# Patient Record
Sex: FEMALE | Race: WHITE | Employment: OTHER | URBAN - METROPOLITAN AREA
[De-identification: names, ages, dates, MRNs, and addresses within clinical notes are randomized per-mention and may not be internally consistent; named-entity substitution may affect disease eponyms.]

---

## 2017-01-09 ENCOUNTER — HOSPITAL ENCOUNTER (OUTPATIENT)
Dept: CT IMAGING | Age: 82
Discharge: HOME | End: 2017-01-09
Attending: UROLOGY | Admitting: UROLOGY

## 2017-01-09 DIAGNOSIS — N30.81 OTHER CYSTITIS WITH HEMATURIA: ICD-10-CM

## 2017-01-09 DIAGNOSIS — N30.21 OTHER CHRONIC CYSTITIS WITH HEMATURIA: ICD-10-CM

## 2017-05-01 ENCOUNTER — HOSPITAL ENCOUNTER (OUTPATIENT)
Dept: MAMMOGRAPHY | Age: 82
Discharge: HOME | End: 2017-05-01

## 2017-05-01 DIAGNOSIS — Z12.31 ENCOUNTER FOR SCREENING MAMMOGRAM FOR BREAST CANCER: ICD-10-CM

## 2017-06-16 PROBLEM — R55 SYNCOPE: Status: ACTIVE | Noted: 2017-06-16

## 2017-06-16 PROBLEM — E78.00 HIGH CHOLESTEROL: Status: ACTIVE | Noted: 2017-06-16

## 2017-06-20 ENCOUNTER — HOSPITAL ENCOUNTER (OUTPATIENT)
Dept: NON INVASIVE DIAGNOSTICS | Age: 82
Discharge: HOME | End: 2017-06-20
Attending: INTERNAL MEDICINE | Admitting: INTERNAL MEDICINE

## 2017-06-20 DIAGNOSIS — R55 SYNCOPE AND COLLAPSE: ICD-10-CM

## 2017-06-20 LAB
LEFT VENTRICULAR EJECTION FRACTION HIGH VALUE: 65 %
LEFT VENTRICULAR EJECTION FRACTION MODE: NORMAL
LV EF: 65 %
LVEF MODALITY: NORMAL

## 2017-06-29 ENCOUNTER — HOSPITAL ENCOUNTER (OUTPATIENT)
Dept: ENDOSCOPY | Age: 82
Discharge: HOME | End: 2017-06-29
Attending: INTERNAL MEDICINE | Admitting: INTERNAL MEDICINE

## 2017-06-29 VITALS
OXYGEN SATURATION: 97 % | SYSTOLIC BLOOD PRESSURE: 138 MMHG | DIASTOLIC BLOOD PRESSURE: 73 MMHG | TEMPERATURE: 97.3 F | RESPIRATION RATE: 16 BRPM | HEART RATE: 77 BPM

## 2017-06-29 RX ORDER — SODIUM CHLORIDE 9 MG/ML
INJECTION, SOLUTION INTRAVENOUS ONCE
Status: COMPLETED | OUTPATIENT
Start: 2017-06-29 | End: 2017-06-29

## 2017-06-29 RX ADMIN — SODIUM CHLORIDE: 9 INJECTION, SOLUTION INTRAVENOUS at 12:00

## 2017-06-29 ASSESSMENT — PAIN - FUNCTIONAL ASSESSMENT: PAIN_FUNCTIONAL_ASSESSMENT: 0-10

## 2017-09-27 ENCOUNTER — OFFICE VISIT (OUTPATIENT)
Dept: ORTHOPEDIC SURGERY | Age: 82
End: 2017-09-27

## 2017-09-27 VITALS
HEIGHT: 64 IN | HEART RATE: 105 BPM | DIASTOLIC BLOOD PRESSURE: 95 MMHG | SYSTOLIC BLOOD PRESSURE: 155 MMHG | WEIGHT: 150 LBS | BODY MASS INDEX: 25.61 KG/M2

## 2017-09-27 DIAGNOSIS — Z96.621 S/P ELBOW JOINT REPLACEMENT, RIGHT: Primary | ICD-10-CM

## 2017-09-27 PROCEDURE — 99213 OFFICE O/P EST LOW 20 MIN: CPT | Performed by: ORTHOPAEDIC SURGERY

## 2017-09-27 PROCEDURE — 73080 X-RAY EXAM OF ELBOW: CPT | Performed by: ORTHOPAEDIC SURGERY

## 2017-10-02 ENCOUNTER — OFFICE VISIT (OUTPATIENT)
Dept: ENT CLINIC | Age: 82
End: 2017-10-02

## 2017-10-02 VITALS
WEIGHT: 152.3 LBS | SYSTOLIC BLOOD PRESSURE: 157 MMHG | BODY MASS INDEX: 26 KG/M2 | DIASTOLIC BLOOD PRESSURE: 76 MMHG | HEIGHT: 64 IN | HEART RATE: 83 BPM

## 2017-10-02 DIAGNOSIS — E04.2 MULTIPLE THYROID NODULES: Primary | ICD-10-CM

## 2017-10-02 PROCEDURE — 99213 OFFICE O/P EST LOW 20 MIN: CPT | Performed by: OTOLARYNGOLOGY

## 2017-10-02 NOTE — MR AVS SNAPSHOT
After Visit Summary             Esequiel Hoang   10/2/2017 10:40 AM   Office Visit    Description:  Female : 1934   Provider:  Shiva Chung MD   Department:  [facility] Ear Nose Throat              Your Follow-Up and Future Appointments         Below is a list of your follow-up and future appointments. This may not be a complete list as you may have made appointments directly with providers that we are not aware of or your providers may have made some for you. Please call your providers to confirm appointments. It is important to keep your appointments. Please bring your current insurance card, photo ID, co-pay, and all medication bottles to your appointment. If self-pay, payment is expected at the time of service. Your To-Do List     Future Appointments Provider Department Dept Phone    2018 10:45 AM Jose Pereira MD [address] [facility] [phone]    Please arrive 15 minutes prior to appointment time, bring insurance card and photo ID. Future Orders Complete By Expires    US Thyroid [15173 Custom]  10/3/2017 10/2/2018    Follow-Up    Return in about 1 year (around 10/2/2018) for recheck of thyroid, neck, and vocal cords, follow-up, and sooner if condition worsens. Information from Your Visit        Department     Name Address Phone Fax    [facility] Raeann Calix [address] [place] [address] Doctor Fadi Lopez [phone] [phone]      You Were Seen for:         Comments    Multiple thyroid nodules   [218774]         Vital Signs     Blood Pressure Pulse Height Weight Body Mass Index Smoking Status    157/76 83 5' 3.78\" (1.62 m) 152 lb 4.8 oz (69.1 kg) 26.32 kg/m2 Never Smoker      Additional Information about your Body Mass Index (BMI)           Your BMI as listed above is considered overweight (25.0-29.9). BMI is an estimate of body fat, calculated from your height and weight.   The higher Microcytic anemia    Lower abdominal pain    Iron deficiency anemia due to chronic blood loss    Unspecified intestinal malabsorption    Multinodular goiter (nontoxic)    Thyroid nodule    Chronic pansinusitis    S/P elbow joint replacement    Ganglion cyst of flexor tendon sheath    Trigger ring finger of right hand    Hand pain, right    Wrist pain, right    Hypertension (Chronic)      Immunizations as of 10/2/2017     Name Date    Influenza Virus Vaccine 10/5/2013    Influenza Whole 9/23/2015    Pneumococcal 13-valent Conjugate (Boculys67) 10/28/2016    Pneumococcal Conjugate 7-valent 7/29/2008      Preventive Care        Date Due    Tetanus Combination Vaccine (1 - Tdap) 2/23/1953    Zoster Vaccine 2/23/1994    Yearly Flu Vaccine (1) 9/1/2017            MyChart Signup           Our records indicate that you have an active [employer] account. You can view your After Visit Summary by going to [email or web address] and logging in with your Tabtor username and password. If you don't have a Tabtor username and password but a parent or guardian has access to your record, the parent or guardian should login with their own Tabtor username and password and access your record to view the After Visit Summary. Additional Information  If you have questions, please contact the physician practice where you receive care. Remember, Tabtor is NOT to be used for urgent needs. For medical emergencies, dial 911. For questions regarding your [employer] account call [phone]. If you have a clinical question, please call your doctor's office.

## 2017-10-02 NOTE — PROGRESS NOTES
melanoma in 1972. Extraocular motion: intact, normal primary gaze alignment. EARS, OTOSCOPY:  The TMs and EACs appeared to be normal bilaterally. 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 Normal external nose. NOSE:  The nasal septum was midline. The inferior turbinates were normal.  The nasal mucosa and secretions were normal.  No pus or polyps were seen. OROPHARYNX/ORAL CAVITY:  Oral mucosa, hard and soft palates, tongue, and pharynx were normal.      INDIRECT LARYNGOSCOPY:  Vocal cords normally mobile bilaterally with midline approximation on phonation. The epiglottis, supraglottis, false vocal cords, true vocal cords, mobility of the larynx, base of tongue, subglottis, and piriform sinuses appeared to be normal.   NECK: No masses or tenderness. No abnormal appearance, asymmetry or abnormal tracheal position.    (+) THYROID:  Nodule in left lobe and bilateral goiter. No tenderness. PALPATION OF LYMPH NODES, CERVICAL, FACIAL AND SUPRACLAVICULAR:  No lymphadenopathy. IMPRESSION / Jennifer Silva / Arsen Pascal / PROCEDURES:       Deja Silva was seen today for thyroid problem. Diagnoses and all orders for this visit:    Multiple thyroid nodules  -     US Thyroid; Future         RECOMMENDATIONS / PLAN:    1. Thyroid ultrasound now. 2. Return in about 1 year (around 10/2/2018) for recheck of thyroid, neck, and vocal cords, follow-up, and sooner if condition worsens.

## 2017-10-13 ENCOUNTER — HOSPITAL ENCOUNTER (OUTPATIENT)
Dept: ULTRASOUND IMAGING | Age: 82
Discharge: HOME | End: 2017-10-13
Attending: OTOLARYNGOLOGY | Admitting: OTOLARYNGOLOGY

## 2017-10-13 DIAGNOSIS — E04.2 NONTOXIC MULTINODULAR GOITER: ICD-10-CM

## 2017-10-13 DIAGNOSIS — E04.2 MULTIPLE THYROID NODULES: ICD-10-CM

## 2017-10-20 ENCOUNTER — TELEPHONE (OUTPATIENT)
Dept: ENT CLINIC | Age: 82
End: 2017-10-20

## 2017-10-20 DIAGNOSIS — E04.2 MULTIPLE THYROID NODULES: Primary | ICD-10-CM

## 2017-10-20 NOTE — TELEPHONE ENCOUNTER
Patient called requesting the results of her Thyroid Ultrasound. Her number is [phone] and the MAs can leave a message if she does not answer.

## 2017-10-20 NOTE — TELEPHONE ENCOUNTER
Glory Álvarez or Amaya:    Please call Deja Silva and advise that the thyroid ultrasound showed multiple nodules, slightly enlarged. I recommend a FNA of the two larger left lobe nodules. If she agrees we will order and schedule that. If she wants to wait until the next FU appointment, we can do that. Let me know.

## 2017-10-23 NOTE — TELEPHONE ENCOUNTER
Patient informed of results and Dr. Lisandra Dumont recommendations. Patient stated to go ahead and order FNA. She would like the [facility].

## 2017-11-03 ENCOUNTER — HOSPITAL ENCOUNTER (OUTPATIENT)
Dept: ULTRASOUND IMAGING | Age: 82
Discharge: HOME | End: 2017-11-03

## 2017-11-03 VITALS
HEIGHT: 63 IN | DIASTOLIC BLOOD PRESSURE: 79 MMHG | TEMPERATURE: 98 F | RESPIRATION RATE: 16 BRPM | BODY MASS INDEX: 26.4 KG/M2 | OXYGEN SATURATION: 97 % | WEIGHT: 149 LBS | SYSTOLIC BLOOD PRESSURE: 156 MMHG | HEART RATE: 83 BPM

## 2017-11-03 DIAGNOSIS — E04.1 THYROID NODULE: ICD-10-CM

## 2017-11-03 DIAGNOSIS — E04.2 MULTIPLE THYROID NODULES: ICD-10-CM

## 2017-11-03 RX ORDER — LIDOCAINE HYDROCHLORIDE 10 MG/ML
5 INJECTION, SOLUTION EPIDURAL; INFILTRATION; INTRACAUDAL; PERINEURAL ONCE
Status: COMPLETED | OUTPATIENT
Start: 2017-11-03 | End: 2017-11-03

## 2017-11-03 RX ORDER — BACITRACIN, NEOMYCIN, POLYMYXIN B 400; 3.5; 5 [USP'U]/G; MG/G; [USP'U]/G
OINTMENT TOPICAL DAILY
Status: COMPLETED | OUTPATIENT
Start: 2017-11-03 | End: 2017-11-03

## 2017-11-03 RX ADMIN — LIDOCAINE HYDROCHLORIDE 5 ML: 10 INJECTION, SOLUTION EPIDURAL; INFILTRATION; INTRACAUDAL; PERINEURAL at 09:35

## 2017-11-03 RX ADMIN — BACITRACIN, NEOMYCIN, POLYMYXIN B: 400; 3.5; 5 OINTMENT TOPICAL at 09:50

## 2017-11-14 ENCOUNTER — TELEPHONE (OUTPATIENT)
Dept: ENT CLINIC | Age: 82
End: 2017-11-14

## 2017-11-14 NOTE — TELEPHONE ENCOUNTER
Both FNA showed no evidence of cancer. These nodules require continued follow up and she was instructed to RTO in one year for examination and possible order of repeat ultrasound. Marbella Espitia

## 2017-12-07 ENCOUNTER — HOSPITAL ENCOUNTER (OUTPATIENT)
Dept: GENERAL RADIOLOGY | Age: 82
Discharge: HOME | End: 2017-12-07
Attending: FAMILY MEDICINE | Admitting: FAMILY MEDICINE

## 2017-12-07 DIAGNOSIS — M81.0 AGE-RELATED OSTEOPOROSIS WITHOUT CURRENT PATHOLOGICAL FRACTURE: ICD-10-CM

## 2017-12-07 DIAGNOSIS — M81.0 AGE RELATED OSTEOPOROSIS, UNSPECIFIED PATHOLOGICAL FRACTURE PRESENCE: ICD-10-CM

## 2018-06-05 ENCOUNTER — OFFICE VISIT (OUTPATIENT)
Dept: ORTHOPEDIC SURGERY | Age: 83
End: 2018-06-05

## 2018-06-05 VITALS
RESPIRATION RATE: 16 BRPM | SYSTOLIC BLOOD PRESSURE: 173 MMHG | DIASTOLIC BLOOD PRESSURE: 96 MMHG | WEIGHT: 149.91 LBS | HEIGHT: 63 IN | BODY MASS INDEX: 26.56 KG/M2 | HEART RATE: 89 BPM

## 2018-06-05 DIAGNOSIS — S43.422A SPRAIN OF LEFT ROTATOR CUFF CAPSULE, INITIAL ENCOUNTER: Primary | ICD-10-CM

## 2018-06-05 DIAGNOSIS — M25.512 LEFT SHOULDER PAIN, UNSPECIFIED CHRONICITY: ICD-10-CM

## 2018-06-05 PROCEDURE — 20610 DRAIN/INJ JOINT/BURSA W/O US: CPT | Performed by: ORTHOPAEDIC SURGERY

## 2018-06-05 PROCEDURE — 99213 OFFICE O/P EST LOW 20 MIN: CPT | Performed by: ORTHOPAEDIC SURGERY

## 2018-06-14 ENCOUNTER — OFFICE VISIT (OUTPATIENT)
Dept: ORTHOPEDIC SURGERY | Age: 83
End: 2018-06-14

## 2018-06-14 VITALS
BODY MASS INDEX: 25.44 KG/M2 | WEIGHT: 149 LBS | HEIGHT: 64 IN | HEART RATE: 91 BPM | DIASTOLIC BLOOD PRESSURE: 85 MMHG | SYSTOLIC BLOOD PRESSURE: 175 MMHG

## 2018-06-14 DIAGNOSIS — S43.422A SPRAIN OF LEFT ROTATOR CUFF CAPSULE, INITIAL ENCOUNTER: Primary | ICD-10-CM

## 2018-06-14 PROCEDURE — 99214 OFFICE O/P EST MOD 30 MIN: CPT | Performed by: ORTHOPAEDIC SURGERY

## 2018-09-26 ENCOUNTER — OFFICE VISIT (OUTPATIENT)
Dept: ORTHOPEDIC SURGERY | Age: 83
End: 2018-09-26
Payer: COMMERCIAL

## 2018-09-26 VITALS
RESPIRATION RATE: 16 BRPM | WEIGHT: 149 LBS | BODY MASS INDEX: 25.44 KG/M2 | DIASTOLIC BLOOD PRESSURE: 89 MMHG | HEART RATE: 103 BPM | SYSTOLIC BLOOD PRESSURE: 173 MMHG | HEIGHT: 64 IN

## 2018-09-26 DIAGNOSIS — Z96.621 STATUS POST RIGHT ELBOW JOINT REPLACEMENT: Primary | ICD-10-CM

## 2018-09-26 PROCEDURE — 99213 OFFICE O/P EST LOW 20 MIN: CPT | Performed by: ORTHOPAEDIC SURGERY

## 2018-09-26 NOTE — PROGRESS NOTES
Ms. Parker Mehta  returns today in follow-up of her  previous  right total elbow arthroplasty. She was last seen in September, 2017 at which time she  was functioning fairly well with mild elbow difficulty or symptoms. She returns today with unchanged mild  symptoms of the right elbow, for routine screening visit. The patient's , past medical history, medications, allergies,  family history, social history, and review of systems have been reviewed and are recorded in the chart. Physical Exam:  Vitals  BP: (!) 173/89  Pulse: 103  Resp: 16  Height: 5' 4\" (162.6 cm)  Weight: 149 lb (67.6 kg)  Ms. Parker Mehta appears well, she is in no apparent distress, she demonstrates appropriate mood & affect. Skin: Skin color, texture, turgor normal. No rashes or lesions on the Right, normal on the Left  Digital range of motion is well preserved bilaterally  Wrist range of motion is equal bilateral   Elbow range of motion is painless with near full flexion and mild discomfort at the end range of extension which measures approximately 35* on the Right, normal on the Left  Sensation is subjectively normal on the Right, normal on the Left  Vascular examination reveals normal and good capillary refill bilaterally  Swelling is absent around the elbow  She has no pain with elbow excursion, no crepitance, no laxity on the Right, normal on the Left  Triceps function is Intact on the Right, normal on the Left    Radiographic Evaluation:  Radiographs were obtained today (3 views of the right elbow). They demonstrate no evidence of acute fracture, subluxation, or dislocation. There is stable and wihtout worsening  sign of loosening,  no displacement of the implants, no new sign of wear or failure. There is unchanged since 9/2017 osteolysis. Impression:  Ms. Parker Mehta  is doing adequately 6.5 years after right total elbow arthoplasty (performed elsewhere).     Plan:  After discussion of the nature her total elbow

## 2018-09-26 NOTE — Clinical Note
Dear  Khang Bueno MD,  Thank you very much for your referral or Ms. Shea Duong to me for evaluation and treatment of her Hand & Wrist condition. I appreciate your confidence in me and thank you for allowing me the opportunity to care for your patients. If I can be of any further assistance to you on this or any other patient, please do not hesitate to contact me. Sincerely,  Karie Carrasco.  Karolyn Tejada MD

## 2018-10-29 ENCOUNTER — OFFICE VISIT (OUTPATIENT)
Dept: ENT CLINIC | Age: 83
End: 2018-10-29
Payer: COMMERCIAL

## 2018-10-29 VITALS
HEIGHT: 64 IN | DIASTOLIC BLOOD PRESSURE: 83 MMHG | WEIGHT: 154 LBS | HEART RATE: 88 BPM | SYSTOLIC BLOOD PRESSURE: 142 MMHG | BODY MASS INDEX: 26.29 KG/M2

## 2018-10-29 DIAGNOSIS — E04.2 MULTIPLE THYROID NODULES: Primary | ICD-10-CM

## 2018-10-29 DIAGNOSIS — E04.2 MULTINODULAR GOITER (NONTOXIC): ICD-10-CM

## 2018-10-29 PROCEDURE — 99214 OFFICE O/P EST MOD 30 MIN: CPT | Performed by: OTOLARYNGOLOGY

## 2018-10-29 RX ORDER — OLMESARTAN MEDOXOMIL AND HYDROCHLOROTHIAZIDE 20/12.5 20; 12.5 MG/1; MG/1
1 TABLET ORAL
COMMUNITY
Start: 2018-06-25 | End: 2020-12-07 | Stop reason: SDUPTHER

## 2018-10-29 NOTE — PROGRESS NOTES
thyroid ultrasound, may need FU visit. Return in about 1 year (around 10/29/2019) for recheck of thyroid, neck, and vocal cords.

## 2018-11-01 ENCOUNTER — HOSPITAL ENCOUNTER (OUTPATIENT)
Dept: ULTRASOUND IMAGING | Age: 83
Discharge: HOME | End: 2018-11-01
Payer: COMMERCIAL

## 2018-11-01 DIAGNOSIS — E04.2 MULTIPLE THYROID NODULES: ICD-10-CM

## 2018-11-01 DIAGNOSIS — E04.2 MULTINODULAR GOITER (NONTOXIC): ICD-10-CM

## 2018-11-01 PROCEDURE — 76536 US EXAM OF HEAD AND NECK: CPT

## 2018-11-04 ENCOUNTER — TELEPHONE (OUTPATIENT)
Dept: ENT CLINIC | Age: 83
End: 2018-11-04

## 2018-11-05 NOTE — TELEPHONE ENCOUNTER
Spoke with patient and advised of results and recommendations     Patient stated that she would call back to schedule appointment    DONE

## 2019-06-18 ENCOUNTER — APPOINTMENT (OUTPATIENT)
Dept: CT IMAGING | Age: 84
DRG: 066 | End: 2019-06-18
Payer: COMMERCIAL

## 2019-06-18 ENCOUNTER — APPOINTMENT (OUTPATIENT)
Dept: GENERAL RADIOLOGY | Age: 84
DRG: 066 | End: 2019-06-18
Payer: COMMERCIAL

## 2019-06-18 ENCOUNTER — HOSPITAL ENCOUNTER (INPATIENT)
Age: 84
LOS: 2 days | Discharge: HOME HEALTH | DRG: 066 | End: 2019-06-20
Attending: EMERGENCY MEDICINE | Admitting: INTERNAL MEDICINE
Payer: COMMERCIAL

## 2019-06-18 DIAGNOSIS — R27.0 ATAXIA: ICD-10-CM

## 2019-06-18 DIAGNOSIS — R42 DIZZINESS: Primary | ICD-10-CM

## 2019-06-18 DIAGNOSIS — R42 VERTIGO: ICD-10-CM

## 2019-06-18 PROBLEM — I63.9 ACUTE CVA (CEREBROVASCULAR ACCIDENT) (HCC): Status: ACTIVE | Noted: 2019-06-18

## 2019-06-18 LAB
A/G RATIO: 1.6 (ref 1.1–2.2)
ALBUMIN SERPL-MCNC: 4.6 G/DL (ref 3.4–5)
ALP BLD-CCNC: 137 U/L (ref 40–129)
ALT SERPL-CCNC: 12 U/L (ref 10–40)
ANION GAP SERPL CALCULATED.3IONS-SCNC: 13 MMOL/L (ref 3–16)
AST SERPL-CCNC: 24 U/L (ref 15–37)
BASOPHILS ABSOLUTE: 0.1 K/UL (ref 0–0.2)
BASOPHILS RELATIVE PERCENT: 1.2 %
BILIRUB SERPL-MCNC: 0.8 MG/DL (ref 0–1)
BILIRUBIN URINE: NEGATIVE
BLOOD, URINE: ABNORMAL
BUN BLDV-MCNC: 12 MG/DL (ref 7–20)
CALCIUM SERPL-MCNC: 10.4 MG/DL (ref 8.3–10.6)
CHLORIDE BLD-SCNC: 98 MMOL/L (ref 99–110)
CLARITY: CLEAR
CO2: 24 MMOL/L (ref 21–32)
COLOR: YELLOW
CREAT SERPL-MCNC: 0.7 MG/DL (ref 0.6–1.2)
EKG ATRIAL RATE: 74 BPM
EKG DIAGNOSIS: NORMAL
EKG P AXIS: 53 DEGREES
EKG P-R INTERVAL: 184 MS
EKG Q-T INTERVAL: 444 MS
EKG QRS DURATION: 146 MS
EKG QTC CALCULATION (BAZETT): 492 MS
EKG R AXIS: 3 DEGREES
EKG T AXIS: 38 DEGREES
EKG VENTRICULAR RATE: 74 BPM
EOSINOPHILS ABSOLUTE: 0.2 K/UL (ref 0–0.6)
EOSINOPHILS RELATIVE PERCENT: 2.1 %
EPITHELIAL CELLS, UA: 1 /HPF (ref 0–5)
GFR AFRICAN AMERICAN: >60
GFR NON-AFRICAN AMERICAN: >60
GLOBULIN: 2.9 G/DL
GLUCOSE BLD-MCNC: 131 MG/DL (ref 70–99)
GLUCOSE BLD-MCNC: 136 MG/DL (ref 70–99)
GLUCOSE URINE: NEGATIVE MG/DL
HCT VFR BLD CALC: 44.1 % (ref 36–48)
HEMOGLOBIN: 15 G/DL (ref 12–16)
HYALINE CASTS: 2 /LPF (ref 0–8)
INR BLD: 0.98 (ref 0.86–1.14)
KETONES, URINE: NEGATIVE MG/DL
LEUKOCYTE ESTERASE, URINE: NEGATIVE
LIPASE: 34 U/L (ref 13–60)
LYMPHOCYTES ABSOLUTE: 1.3 K/UL (ref 1–5.1)
LYMPHOCYTES RELATIVE PERCENT: 17.4 %
MCH RBC QN AUTO: 27.9 PG (ref 26–34)
MCHC RBC AUTO-ENTMCNC: 34.1 G/DL (ref 31–36)
MCV RBC AUTO: 81.8 FL (ref 80–100)
MICROSCOPIC EXAMINATION: YES
MONOCYTES ABSOLUTE: 0.3 K/UL (ref 0–1.3)
MONOCYTES RELATIVE PERCENT: 4.2 %
NEUTROPHILS ABSOLUTE: 5.8 K/UL (ref 1.7–7.7)
NEUTROPHILS RELATIVE PERCENT: 75.1 %
NITRITE, URINE: NEGATIVE
PDW BLD-RTO: 14.3 % (ref 12.4–15.4)
PERFORMED ON: ABNORMAL
PH UA: 8.5 (ref 5–8)
PLATELET # BLD: 228 K/UL (ref 135–450)
PMV BLD AUTO: 7.9 FL (ref 5–10.5)
POTASSIUM SERPL-SCNC: 4.3 MMOL/L (ref 3.5–5.1)
PRO-BNP: 103 PG/ML (ref 0–449)
PROTEIN UA: NEGATIVE MG/DL
PROTHROMBIN TIME: 11.2 SEC (ref 9.8–13)
RBC # BLD: 5.39 M/UL (ref 4–5.2)
RBC UA: 2 /HPF (ref 0–4)
SODIUM BLD-SCNC: 135 MMOL/L (ref 136–145)
SPECIFIC GRAVITY UA: 1.02 (ref 1–1.03)
TOTAL PROTEIN: 7.5 G/DL (ref 6.4–8.2)
TROPONIN: <0.01 NG/ML
URINE REFLEX TO CULTURE: ABNORMAL
URINE TYPE: ABNORMAL
UROBILINOGEN, URINE: 0.2 E.U./DL
WBC # BLD: 7.7 K/UL (ref 4–11)
WBC UA: 1 /HPF (ref 0–5)

## 2019-06-18 PROCEDURE — 6360000004 HC RX CONTRAST MEDICATION: Performed by: PHYSICIAN ASSISTANT

## 2019-06-18 PROCEDURE — 70450 CT HEAD/BRAIN W/O DYE: CPT

## 2019-06-18 PROCEDURE — 93010 ELECTROCARDIOGRAM REPORT: CPT | Performed by: INTERNAL MEDICINE

## 2019-06-18 PROCEDURE — 70498 CT ANGIOGRAPHY NECK: CPT

## 2019-06-18 PROCEDURE — 80053 COMPREHEN METABOLIC PANEL: CPT

## 2019-06-18 PROCEDURE — 93005 ELECTROCARDIOGRAM TRACING: CPT | Performed by: EMERGENCY MEDICINE

## 2019-06-18 PROCEDURE — 2580000003 HC RX 258: Performed by: INTERNAL MEDICINE

## 2019-06-18 PROCEDURE — 94760 N-INVAS EAR/PLS OXIMETRY 1: CPT

## 2019-06-18 PROCEDURE — G0378 HOSPITAL OBSERVATION PER HR: HCPCS

## 2019-06-18 PROCEDURE — 85025 COMPLETE CBC W/AUTO DIFF WBC: CPT

## 2019-06-18 PROCEDURE — 70496 CT ANGIOGRAPHY HEAD: CPT

## 2019-06-18 PROCEDURE — 85610 PROTHROMBIN TIME: CPT

## 2019-06-18 PROCEDURE — 83690 ASSAY OF LIPASE: CPT

## 2019-06-18 PROCEDURE — 71045 X-RAY EXAM CHEST 1 VIEW: CPT

## 2019-06-18 PROCEDURE — 2580000003 HC RX 258: Performed by: PHYSICIAN ASSISTANT

## 2019-06-18 PROCEDURE — 83880 ASSAY OF NATRIURETIC PEPTIDE: CPT

## 2019-06-18 PROCEDURE — 6370000000 HC RX 637 (ALT 250 FOR IP): Performed by: INTERNAL MEDICINE

## 2019-06-18 PROCEDURE — 96361 HYDRATE IV INFUSION ADD-ON: CPT

## 2019-06-18 PROCEDURE — 84484 ASSAY OF TROPONIN QUANT: CPT

## 2019-06-18 PROCEDURE — 1200000000 HC SEMI PRIVATE

## 2019-06-18 PROCEDURE — 6360000002 HC RX W HCPCS: Performed by: PHYSICIAN ASSISTANT

## 2019-06-18 PROCEDURE — 81001 URINALYSIS AUTO W/SCOPE: CPT

## 2019-06-18 PROCEDURE — 96374 THER/PROPH/DIAG INJ IV PUSH: CPT

## 2019-06-18 PROCEDURE — 99285 EMERGENCY DEPT VISIT HI MDM: CPT

## 2019-06-18 RX ORDER — ESOMEPRAZOLE MAGNESIUM 40 MG/1
40 CAPSULE, DELAYED RELEASE ORAL
COMMUNITY

## 2019-06-18 RX ORDER — ASPIRIN 81 MG/1
81 TABLET, CHEWABLE ORAL DAILY
Status: DISCONTINUED | OUTPATIENT
Start: 2019-06-18 | End: 2019-06-18 | Stop reason: SDUPTHER

## 2019-06-18 RX ORDER — DICLOFENAC SODIUM 75 MG/1
75 TABLET, DELAYED RELEASE ORAL 2 TIMES DAILY
COMMUNITY
Start: 2019-06-12 | End: 2019-06-18 | Stop reason: ALTCHOICE

## 2019-06-18 RX ORDER — POTASSIUM CHLORIDE 20 MEQ/1
40 TABLET, EXTENDED RELEASE ORAL PRN
Status: DISCONTINUED | OUTPATIENT
Start: 2019-06-18 | End: 2019-06-20 | Stop reason: HOSPADM

## 2019-06-18 RX ORDER — PANTOPRAZOLE SODIUM 40 MG/1
40 TABLET, DELAYED RELEASE ORAL
Status: DISCONTINUED | OUTPATIENT
Start: 2019-06-19 | End: 2019-06-20 | Stop reason: HOSPADM

## 2019-06-18 RX ORDER — 0.9 % SODIUM CHLORIDE 0.9 %
500 INTRAVENOUS SOLUTION INTRAVENOUS PRN
Status: DISCONTINUED | OUTPATIENT
Start: 2019-06-18 | End: 2019-06-20 | Stop reason: HOSPADM

## 2019-06-18 RX ORDER — SODIUM CHLORIDE 0.9 % (FLUSH) 0.9 %
10 SYRINGE (ML) INJECTION EVERY 12 HOURS SCHEDULED
Status: DISCONTINUED | OUTPATIENT
Start: 2019-06-18 | End: 2019-06-20 | Stop reason: HOSPADM

## 2019-06-18 RX ORDER — ONDANSETRON 2 MG/ML
4 INJECTION INTRAMUSCULAR; INTRAVENOUS EVERY 6 HOURS PRN
Status: DISCONTINUED | OUTPATIENT
Start: 2019-06-18 | End: 2019-06-20 | Stop reason: HOSPADM

## 2019-06-18 RX ORDER — SODIUM CHLORIDE 0.9 % (FLUSH) 0.9 %
10 SYRINGE (ML) INJECTION PRN
Status: DISCONTINUED | OUTPATIENT
Start: 2019-06-18 | End: 2019-06-20 | Stop reason: HOSPADM

## 2019-06-18 RX ORDER — FERROUS SULFATE 325(65) MG
325 TABLET ORAL DAILY
Status: DISCONTINUED | OUTPATIENT
Start: 2019-06-19 | End: 2019-06-20 | Stop reason: HOSPADM

## 2019-06-18 RX ORDER — 0.9 % SODIUM CHLORIDE 0.9 %
1000 INTRAVENOUS SOLUTION INTRAVENOUS ONCE
Status: COMPLETED | OUTPATIENT
Start: 2019-06-18 | End: 2019-06-18

## 2019-06-18 RX ORDER — POTASSIUM CHLORIDE 7.45 MG/ML
10 INJECTION INTRAVENOUS PRN
Status: DISCONTINUED | OUTPATIENT
Start: 2019-06-18 | End: 2019-06-20 | Stop reason: HOSPADM

## 2019-06-18 RX ORDER — MECLIZINE HCL 12.5 MG/1
25 TABLET ORAL EVERY 6 HOURS PRN
Status: DISCONTINUED | OUTPATIENT
Start: 2019-06-18 | End: 2019-06-20 | Stop reason: HOSPADM

## 2019-06-18 RX ORDER — ONDANSETRON 2 MG/ML
4 INJECTION INTRAMUSCULAR; INTRAVENOUS ONCE
Status: COMPLETED | OUTPATIENT
Start: 2019-06-18 | End: 2019-06-18

## 2019-06-18 RX ORDER — MELATONIN/PYRIDOXINE HCL (B6) 5 MG-10 MG
1 TABLET,IMMED, EXTENDED RELEASE, BIPHASIC ORAL DAILY
Status: DISCONTINUED | OUTPATIENT
Start: 2019-06-18 | End: 2019-06-18 | Stop reason: CLARIF

## 2019-06-18 RX ORDER — CETIRIZINE HYDROCHLORIDE 10 MG/1
10 TABLET ORAL PRN
Status: DISCONTINUED | OUTPATIENT
Start: 2019-06-18 | End: 2019-06-20 | Stop reason: HOSPADM

## 2019-06-18 RX ORDER — ACETAMINOPHEN 325 MG/1
650 TABLET ORAL EVERY 4 HOURS PRN
Status: DISCONTINUED | OUTPATIENT
Start: 2019-06-18 | End: 2019-06-20 | Stop reason: HOSPADM

## 2019-06-18 RX ORDER — ASPIRIN 325 MG
325 TABLET ORAL ONCE
Status: DISCONTINUED | OUTPATIENT
Start: 2019-06-18 | End: 2019-06-20 | Stop reason: HOSPADM

## 2019-06-18 RX ORDER — DIMENHYDRINATE 50 MG
1200 TABLET ORAL DAILY
Status: DISCONTINUED | OUTPATIENT
Start: 2019-06-18 | End: 2019-06-18 | Stop reason: CLARIF

## 2019-06-18 RX ORDER — LOSARTAN POTASSIUM 25 MG/1
50 TABLET ORAL DAILY
Status: DISCONTINUED | OUTPATIENT
Start: 2019-06-19 | End: 2019-06-20 | Stop reason: HOSPADM

## 2019-06-18 RX ORDER — FERROUS SULFATE 325(65) MG
325 TABLET ORAL DAILY
COMMUNITY
End: 2020-12-07

## 2019-06-18 RX ORDER — MELATONIN/PYRIDOXINE HCL (B6) 5 MG-10 MG
1 TABLET,IMMED, EXTENDED RELEASE, BIPHASIC ORAL DAILY
COMMUNITY

## 2019-06-18 RX ORDER — OLMESARTAN MEDOXOMIL AND HYDROCHLOROTHIAZIDE 20/12.5 20; 12.5 MG/1; MG/1
1 TABLET ORAL DAILY
Status: DISCONTINUED | OUTPATIENT
Start: 2019-06-18 | End: 2019-06-18 | Stop reason: CLARIF

## 2019-06-18 RX ORDER — HYDRALAZINE HYDROCHLORIDE 20 MG/ML
10 INJECTION INTRAMUSCULAR; INTRAVENOUS EVERY 6 HOURS PRN
Status: DISCONTINUED | OUTPATIENT
Start: 2019-06-18 | End: 2019-06-20 | Stop reason: HOSPADM

## 2019-06-18 RX ORDER — HYDROCHLOROTHIAZIDE 25 MG/1
12.5 TABLET ORAL DAILY
Status: DISCONTINUED | OUTPATIENT
Start: 2019-06-19 | End: 2019-06-20 | Stop reason: HOSPADM

## 2019-06-18 RX ORDER — ASPIRIN 81 MG/1
81 TABLET ORAL DAILY
Status: DISCONTINUED | OUTPATIENT
Start: 2019-06-19 | End: 2019-06-20 | Stop reason: HOSPADM

## 2019-06-18 RX ADMIN — MECLIZINE 25 MG: 12.5 TABLET ORAL at 17:16

## 2019-06-18 RX ADMIN — IOPAMIDOL 75 ML: 755 INJECTION, SOLUTION INTRAVENOUS at 12:50

## 2019-06-18 RX ADMIN — Medication 10 ML: at 21:26

## 2019-06-18 RX ADMIN — SODIUM CHLORIDE 1000 ML: 9 INJECTION, SOLUTION INTRAVENOUS at 13:05

## 2019-06-18 RX ADMIN — ACETAMINOPHEN 650 MG: 325 TABLET, FILM COATED ORAL at 21:12

## 2019-06-18 RX ADMIN — ONDANSETRON 4 MG: 2 INJECTION INTRAMUSCULAR; INTRAVENOUS at 13:05

## 2019-06-18 ASSESSMENT — PAIN DESCRIPTION - ORIENTATION
ORIENTATION: POSTERIOR
ORIENTATION_2: UPPER;RIGHT
ORIENTATION: POSTERIOR

## 2019-06-18 ASSESSMENT — PAIN DESCRIPTION - LOCATION
LOCATION: NECK
LOCATION: NECK
LOCATION_2: ABDOMEN

## 2019-06-18 ASSESSMENT — PAIN SCALES - GENERAL
PAINLEVEL_OUTOF10: 5
PAINLEVEL_OUTOF10: 4
PAINLEVEL_OUTOF10: 4

## 2019-06-18 ASSESSMENT — PAIN DESCRIPTION - DESCRIPTORS: DESCRIPTORS_2: DISCOMFORT

## 2019-06-18 ASSESSMENT — PAIN DESCRIPTION - PAIN TYPE
TYPE: ACUTE PAIN
TYPE: ACUTE PAIN
TYPE_2: CHRONIC PAIN

## 2019-06-18 ASSESSMENT — ENCOUNTER SYMPTOMS
VOMITING: 0
EYE PAIN: 0
COUGH: 0
SHORTNESS OF BREATH: 0
NAUSEA: 1
DIARRHEA: 0
SINUS PRESSURE: 0
RHINORRHEA: 0
SINUS PAIN: 0
ABDOMINAL PAIN: 0
EYE ITCHING: 0

## 2019-06-18 ASSESSMENT — PAIN DESCRIPTION - INTENSITY: RATING_2: 9

## 2019-06-18 NOTE — ED PROVIDER NOTES
motor subscore is 6. Cranial nerves II through XII are grossly intact. Motor strength is 5/5 throughout. Normal sensation light touch. There is slight ataxia with finger-to-nose to the left upper extremity otherwise no ataxia finger-to-nose on the right. No pronator drift. No leg motor drift. I did see the patient up, she was swaying, and she was unable to walk due to her dizziness. Skin: Skin is warm and dry. She is not diaphoretic. Psychiatric: She has a normal mood and affect. Her behavior is normal.   Nursing note and vitals reviewed.       DIAGNOSTIC RESULTS   LABS:    Labs Reviewed   CBC WITH AUTO DIFFERENTIAL - Abnormal; Notable for the following components:       Result Value    RBC 5.39 (*)     All other components within normal limits    Narrative:     Performed at:  [facility] [address], [place], [address]   Phone [phone]   COMPREHENSIVE METABOLIC PANEL - Abnormal; Notable for the following components:    Sodium 135 (*)     Chloride 98 (*)     Glucose 136 (*)     Alkaline Phosphatase 137 (*)     All other components within normal limits    Narrative:     Performed at:  [facility] [address], [place], [address]   Phone [phone]   URINE RT REFLEX TO CULTURE - Abnormal; Notable for the following components:    Blood, Urine TRACE (*)     pH, UA 8.5 (*)     All other components within normal limits    Narrative:     Performed at:  [facility] [address], [place], [address]   Phone [phone]   POCT GLUCOSE - Abnormal; Notable for the following components:    POC Glucose 131 (*)     All other components within normal limits    Narrative:     Performed at:  [facility] [address], [place], [address]   Phone [phone]   LIPASE    Narrative:     Performed at:  [facility]  [address] [place] [address], VERTEBRAL ARTERIES: The vertebral arteries both arise from the subclavian arteries and are normal in caliber without evidence of flow limiting stenosis or dissection. SOFT TISSUES: The lung apices are clear. No cervical or superior mediastinal lymphadenopathy. The visualized portion of the larynx and pharynx appear unremarkable. The parotid, submandibular and thyroid glands demonstrate no acute abnormality. BONES: There is diffuse osteopenia. There is mild height loss of T1 and T2 vertebral bodies without fracture plane visible. CTA HEAD: ANTERIOR CIRCULATION: The intracranial internal carotid arteries are patent. The A1/A2 and M1/M2 segments are patent bilaterally. POSTERIOR CIRCULATION: The vertebrobasilar system is patent. The proximal posterior cerebral arteries are patent. BRAIN: Filling defects of the left transverse dural venous sinus likely related to arachnoid granulations. There is no intracranial mass effect or abnormal enhancement. Unremarkable CTA of the head and neck. Cta Head W Contrast    Result Date: 6/18/2019  EXAMINATION: CTA OF THE NECK; CTA OF THE HEAD WITH CONTRAST 6/18/2019 12:47 pm: TECHNIQUE: CTA of the neck was performed with the administration of intravenous contrast. Multiplanar reformatted images are provided for review. MIP images are provided for review. Stenosis of the internal carotid arteries measured using NASCET criteria. Dose modulation, iterative reconstruction, and/or weight based adjustment of the mA/kV was utilized to reduce the radiation dose to as low as reasonably achievable.; CTA of the head/brain was performed with the administration of intravenous contrast. Multiplanar reformatted images are provided for review. MIP images are provided for review. Dose modulation, iterative reconstruction, and/or weight based adjustment of the mA/kV was utilized to reduce the radiation dose to as low as reasonably achievable. COMPARISON: None.  HISTORY: ORDERING SYSTEM PROVIDED HISTORY: dizziness r/o posterior etiology TECHNOLOGIST PROVIDED HISTORY: Has a \"code stroke\" or \"stroke alert\" been called? ->Yes Ordering Physician Provided Reason for Exam: dizziness, stroke alert Acuity: Unknown Type of Exam: Unknown; ORDERING SYSTEM PROVIDED HISTORY: dizziness, r/o posterior etiology TECHNOLOGIST PROVIDED HISTORY: Has a \"code stroke\" or \"stroke alert\" been called? ->Yes Ordering Physician Provided Reason for Exam: dizziness, stroke alert Acuity: Unknown Type of Exam: Unknown FINDINGS: CTA NECK: AORTIC ARCH/ARCH VESSELS: There is a normal branch pattern of the aortic arch. No significant stenosis is seen of the innominate artery or subclavian arteries. CAROTID ARTERIES: The common and internal carotid arteries are patent bilaterally without evidence of a flow limiting stenosis or acute dissection. There is tortuosity of the distal cervical internal carotid arteries. VERTEBRAL ARTERIES: The vertebral arteries both arise from the subclavian arteries and are normal in caliber without evidence of flow limiting stenosis or dissection. SOFT TISSUES: The lung apices are clear. No cervical or superior mediastinal lymphadenopathy. The visualized portion of the larynx and pharynx appear unremarkable. The parotid, submandibular and thyroid glands demonstrate no acute abnormality. BONES: There is diffuse osteopenia. There is mild height loss of T1 and T2 vertebral bodies without fracture plane visible. CTA HEAD: ANTERIOR CIRCULATION: The intracranial internal carotid arteries are patent. The A1/A2 and M1/M2 segments are patent bilaterally. POSTERIOR CIRCULATION: The vertebrobasilar system is patent. The proximal posterior cerebral arteries are patent. BRAIN: Filling defects of the left transverse dural venous sinus likely related to arachnoid granulations. There is no intracranial mass effect or abnormal enhancement. Unremarkable CTA of the head and neck.           PROCEDURES   Unless

## 2019-06-18 NOTE — ED PROVIDER NOTES
This patient was seen by the Mid-Level Provider. I have seen and examined the patient, agree with the workup, evaluation, management and diagnosis. Care plan has been discussed. My assessment reveals an 80-year-old female who presents with dizziness. The patient presents with a severe episode of dizziness that started around 9:00 this morning. It is worse with movement. She describes some nausea and vomiting with it. She denies any chest pain or abdominal pain. She denies any history of this in the past.  She describes it is things moving around her. When she initially arrived the patient appeared to be ataxic as well. The patient's work-up was extensive today including arterial studies of her neck and head and a CT of the head that was negative. However, the patient continues to be very symptomatic. Her work-up included a cardiac work-up as well. Her EKG was unremarkable and labs are normal.  The patient was admitted for further care. The stroke team was consulted. Exam: Well-nourished female who appears to be ill sitting up in bed. Neurologically cranial 2 through 12 are intact with no focal motor or sensory deficits. Patient had difficult time standing because of some ataxia. MDM: The patient has remained stable and well. This does not appear to be cardiac. Her work-up was unremarkable. Patient is still very symptomatic. She was admitted to the hospitalist for further care consultation.     Results for orders placed or performed during the hospital encounter of 06/18/19   CBC Auto Differential   Result Value Ref Range    WBC 7.7 4.0 - 11.0 K/uL    RBC 5.39 (H) 4.00 - 5.20 M/uL    Hemoglobin 15.0 12.0 - 16.0 g/dL    Hematocrit 44.1 36.0 - 48.0 %    MCV 81.8 80.0 - 100.0 fL    MCH 27.9 26.0 - 34.0 pg    MCHC 34.1 31.0 - 36.0 g/dL    RDW 14.3 12.4 - 15.4 %    Platelets 418 071 - 172 K/uL    MPV 7.9 5.0 - 10.5 fL    Neutrophils % 75.1 %    Lymphocytes % 17.4 %    Monocytes % 4.2 %    Eosinophils \"code stroke\" or \"stroke alert\" been called? ->Yes Ordering Physician Provided Reason for Exam: dizziness, stroke alert Acuity: Unknown Type of Exam: Unknown FINDINGS: CTA NECK: AORTIC ARCH/ARCH VESSELS: There is a normal branch pattern of the aortic arch. No significant stenosis is seen of the innominate artery or subclavian arteries. CAROTID ARTERIES: The common and internal carotid arteries are patent bilaterally without evidence of a flow limiting stenosis or acute dissection. There is tortuosity of the distal cervical internal carotid arteries. VERTEBRAL ARTERIES: The vertebral arteries both arise from the subclavian arteries and are normal in caliber without evidence of flow limiting stenosis or dissection. SOFT TISSUES: The lung apices are clear. No cervical or superior mediastinal lymphadenopathy. The visualized portion of the larynx and pharynx appear unremarkable. The parotid, submandibular and thyroid glands demonstrate no acute abnormality. BONES: There is diffuse osteopenia. There is mild height loss of T1 and T2 vertebral bodies without fracture plane visible. CTA HEAD: ANTERIOR CIRCULATION: The intracranial internal carotid arteries are patent. The A1/A2 and M1/M2 segments are patent bilaterally. POSTERIOR CIRCULATION: The vertebrobasilar system is patent. The proximal posterior cerebral arteries are patent. BRAIN: Filling defects of the left transverse dural venous sinus likely related to arachnoid granulations. There is no intracranial mass effect or abnormal enhancement. Unremarkable CTA of the head and neck. Cta Head W Contrast    Result Date: 6/18/2019  EXAMINATION: CTA OF THE NECK; CTA OF THE HEAD WITH CONTRAST 6/18/2019 12:47 pm: TECHNIQUE: CTA of the neck was performed with the administration of intravenous contrast. Multiplanar reformatted images are provided for review. MIP images are provided for review. Stenosis of the internal carotid arteries measured using NASCET criteria.

## 2019-06-18 NOTE — CARE COORDINATION
Discharge Planning Assessment  SW discharge planner met with patient to discuss reason for admission, current living situation, and potential needs at the time of discharge. Pt in ED d/t dizziness    Demographics/Insurance verified Yes    Current type of dwelling:  House    Living arrangements:  w/spouse    Level of function/Support:  Pt reported usually independent with ADLs when she's not dizzy. Spouse and family are supportive. PCP:  Dr. Sinan Lemus    Last Visit to PCP:  March    DME:  None. No needs reported. Active with any community resources/agencies/skilled home care:  No.  No non-skilled needs reported. Medication compliance issues:   No    Financial issues that could impact healthcare:  No    Transportation at the time of discharge:  Pt drives but family can assist at discharge. Tentative discharge plan:  Home most likely. No needs identified at this time.     Electronically signed by HIEU Pimentel, LSW on 6/18/2019 at 3:45 PM

## 2019-06-18 NOTE — H&P
Hospital Problems    Diagnosis    Ataxia [R27.0]    Acute CVA (cerebrovascular accident) ([facility] [I63.9]    High cholesterol [E78.00]    Hypertension [I10]         Review of Systems: (1 system for EPF, 2-9 for detailed, 10+ for comprehensive)  Review of Systems   Constitutional: Negative for chills and fever. HENT: Negative for sinus pressure and sinus pain. Eyes: Negative for pain and itching. Respiratory: Negative for cough and shortness of breath. Cardiovascular: Negative for chest pain and leg swelling. Gastrointestinal: Positive for nausea. Negative for vomiting. Endocrine: Negative for heat intolerance and polyphagia. Genitourinary: Negative for frequency and urgency. Musculoskeletal: Positive for gait problem. Allergic/Immunologic: Negative for food allergies. Neurological: Positive for dizziness. Negative for speech difficulty. Hematological: Negative for adenopathy. Psychiatric/Behavioral: Negative for hallucinations. The patient is not hyperactive.             Past Medical History:   Past Medical History:   Diagnosis Date    Bleeding ulcer     [facility]     melanoma L IRIS    Gastroesophagitis     GERD (gastroesophageal reflux disease)     Hyperlipidemia     Hypertension     Pneumonia     Rheumatic fever with heart involvement     Trigger ring finger of right hand 7/1/2013    Unspecified diseases of blood and blood-forming organs     chronic anemia       Past Surgical History:   Past Surgical History:   Procedure Laterality Date    BLADDER REPAIR N/A 2002    BREAST SURGERY      L breast tumor-benign    CYST REMOVAL Right 8-1-13    GANGLION CYST EXCISION RIGHT WRIST, TRIGGER FINGER RELEASE RIGHT FOURTH    EYE SURGERY      , cataract right eye    FRACTURE SURGERY      R shoulderx2-pinned    HYSTERECTOMY      JOINT REPLACEMENT Right 03/28/2012    Total Elbow - Dr. Anita Lux WITH CONTRAST 6/18/2019 12:47 pm: TECHNIQUE: CTA of the neck was performed with the administration of intravenous contrast. Multiplanar reformatted images are provided for review. MIP images are provided for review. Stenosis of the internal carotid arteries measured using NASCET criteria. Dose modulation, iterative reconstruction, and/or weight based adjustment of the mA/kV was utilized to reduce the radiation dose to as low as reasonably achievable.; CTA of the head/brain was performed with the administration of intravenous contrast. Multiplanar reformatted images are provided for review. MIP images are provided for review. Dose modulation, iterative reconstruction, and/or weight based adjustment of the mA/kV was utilized to reduce the radiation dose to as low as reasonably achievable. COMPARISON: None. HISTORY: ORDERING SYSTEM PROVIDED HISTORY: dizziness r/o posterior etiology TECHNOLOGIST PROVIDED HISTORY: Has a \"code stroke\" or \"stroke alert\" been called? ->Yes Ordering Physician Provided Reason for Exam: dizziness, stroke alert Acuity: Unknown Type of Exam: Unknown; ORDERING SYSTEM PROVIDED HISTORY: dizziness, r/o posterior etiology TECHNOLOGIST PROVIDED HISTORY: Has a \"code stroke\" or \"stroke alert\" been called? ->Yes Ordering Physician Provided Reason for Exam: dizziness, stroke alert Acuity: Unknown Type of Exam: Unknown FINDINGS: CTA NECK: AORTIC ARCH/ARCH VESSELS: There is a normal branch pattern of the aortic arch. No significant stenosis is seen of the innominate artery or subclavian arteries. CAROTID ARTERIES: The common and internal carotid arteries are patent bilaterally without evidence of a flow limiting stenosis or acute dissection. There is tortuosity of the distal cervical internal carotid arteries. VERTEBRAL ARTERIES: The vertebral arteries both arise from the subclavian arteries and are normal in caliber without evidence of flow limiting stenosis or dissection.  SOFT TISSUES: The lung apices are

## 2019-06-19 ENCOUNTER — APPOINTMENT (OUTPATIENT)
Dept: GENERAL RADIOLOGY | Age: 84
DRG: 066 | End: 2019-06-19
Payer: COMMERCIAL

## 2019-06-19 ENCOUNTER — APPOINTMENT (OUTPATIENT)
Dept: MRI IMAGING | Age: 84
DRG: 066 | End: 2019-06-19
Payer: COMMERCIAL

## 2019-06-19 PROBLEM — R42 VERTIGO: Status: ACTIVE | Noted: 2019-06-19

## 2019-06-19 LAB
A/G RATIO: 2 (ref 1.1–2.2)
ALBUMIN SERPL-MCNC: 4.3 G/DL (ref 3.4–5)
ALP BLD-CCNC: 119 U/L (ref 40–129)
ALT SERPL-CCNC: 9 U/L (ref 10–40)
ANION GAP SERPL CALCULATED.3IONS-SCNC: 12 MMOL/L (ref 3–16)
AST SERPL-CCNC: 14 U/L (ref 15–37)
BASOPHILS ABSOLUTE: 0.1 K/UL (ref 0–0.2)
BASOPHILS RELATIVE PERCENT: 0.8 %
BILIRUB SERPL-MCNC: 0.8 MG/DL (ref 0–1)
BUN BLDV-MCNC: 13 MG/DL (ref 7–20)
CALCIUM SERPL-MCNC: 9.8 MG/DL (ref 8.3–10.6)
CHLORIDE BLD-SCNC: 99 MMOL/L (ref 99–110)
CHOLESTEROL, TOTAL: 246 MG/DL (ref 0–199)
CO2: 23 MMOL/L (ref 21–32)
CREAT SERPL-MCNC: 0.7 MG/DL (ref 0.6–1.2)
EOSINOPHILS ABSOLUTE: 0.1 K/UL (ref 0–0.6)
EOSINOPHILS RELATIVE PERCENT: 1.2 %
ESTIMATED AVERAGE GLUCOSE: 108.3 MG/DL
GFR AFRICAN AMERICAN: >60
GFR NON-AFRICAN AMERICAN: >60
GLOBULIN: 2.2 G/DL
GLUCOSE BLD-MCNC: 112 MG/DL (ref 70–99)
HBA1C MFR BLD: 5.4 %
HCT VFR BLD CALC: 43.5 % (ref 36–48)
HDLC SERPL-MCNC: 64 MG/DL (ref 40–60)
HEMOGLOBIN: 14.8 G/DL (ref 12–16)
LDL CHOLESTEROL CALCULATED: 167 MG/DL
LYMPHOCYTES ABSOLUTE: 1.7 K/UL (ref 1–5.1)
LYMPHOCYTES RELATIVE PERCENT: 17.4 %
MAGNESIUM: 2.2 MG/DL (ref 1.8–2.4)
MCH RBC QN AUTO: 27.6 PG (ref 26–34)
MCHC RBC AUTO-ENTMCNC: 34.1 G/DL (ref 31–36)
MCV RBC AUTO: 80.9 FL (ref 80–100)
MONOCYTES ABSOLUTE: 0.6 K/UL (ref 0–1.3)
MONOCYTES RELATIVE PERCENT: 6 %
NEUTROPHILS ABSOLUTE: 7.1 K/UL (ref 1.7–7.7)
NEUTROPHILS RELATIVE PERCENT: 74.6 %
PDW BLD-RTO: 14.5 % (ref 12.4–15.4)
PLATELET # BLD: 243 K/UL (ref 135–450)
PMV BLD AUTO: 7.2 FL (ref 5–10.5)
POTASSIUM REFLEX MAGNESIUM: 3 MMOL/L (ref 3.5–5.1)
RBC # BLD: 5.37 M/UL (ref 4–5.2)
SODIUM BLD-SCNC: 134 MMOL/L (ref 136–145)
TOTAL PROTEIN: 6.5 G/DL (ref 6.4–8.2)
TRIGL SERPL-MCNC: 76 MG/DL (ref 0–150)
VLDLC SERPL CALC-MCNC: 15 MG/DL
WBC # BLD: 9.5 K/UL (ref 4–11)

## 2019-06-19 PROCEDURE — 2580000003 HC RX 258: Performed by: INTERNAL MEDICINE

## 2019-06-19 PROCEDURE — 6360000002 HC RX W HCPCS: Performed by: INTERNAL MEDICINE

## 2019-06-19 PROCEDURE — G0378 HOSPITAL OBSERVATION PER HR: HCPCS

## 2019-06-19 PROCEDURE — 70553 MRI BRAIN STEM W/O & W/DYE: CPT

## 2019-06-19 PROCEDURE — 94760 N-INVAS EAR/PLS OXIMETRY 1: CPT

## 2019-06-19 PROCEDURE — 6370000000 HC RX 637 (ALT 250 FOR IP): Performed by: INTERNAL MEDICINE

## 2019-06-19 PROCEDURE — 80053 COMPREHEN METABOLIC PANEL: CPT

## 2019-06-19 PROCEDURE — 96376 TX/PRO/DX INJ SAME DRUG ADON: CPT

## 2019-06-19 PROCEDURE — 36415 COLL VENOUS BLD VENIPUNCTURE: CPT

## 2019-06-19 PROCEDURE — 85025 COMPLETE CBC W/AUTO DIFF WBC: CPT

## 2019-06-19 PROCEDURE — 96375 TX/PRO/DX INJ NEW DRUG ADDON: CPT

## 2019-06-19 PROCEDURE — 99223 1ST HOSP IP/OBS HIGH 75: CPT | Performed by: PSYCHIATRY & NEUROLOGY

## 2019-06-19 PROCEDURE — 83735 ASSAY OF MAGNESIUM: CPT

## 2019-06-19 PROCEDURE — A9579 GAD-BASE MR CONTRAST NOS,1ML: HCPCS | Performed by: INTERNAL MEDICINE

## 2019-06-19 PROCEDURE — 80061 LIPID PANEL: CPT

## 2019-06-19 PROCEDURE — 83036 HEMOGLOBIN GLYCOSYLATED A1C: CPT

## 2019-06-19 PROCEDURE — 74019 RADEX ABDOMEN 2 VIEWS: CPT

## 2019-06-19 PROCEDURE — 2580000003 HC RX 258

## 2019-06-19 PROCEDURE — 1200000000 HC SEMI PRIVATE

## 2019-06-19 PROCEDURE — 96372 THER/PROPH/DIAG INJ SC/IM: CPT

## 2019-06-19 PROCEDURE — 6360000004 HC RX CONTRAST MEDICATION: Performed by: INTERNAL MEDICINE

## 2019-06-19 RX ORDER — DIAZEPAM 2 MG/1
2 TABLET ORAL EVERY 8 HOURS
Status: DISCONTINUED | OUTPATIENT
Start: 2019-06-19 | End: 2019-06-20 | Stop reason: HOSPADM

## 2019-06-19 RX ORDER — SODIUM CHLORIDE 0.9 % (FLUSH) 0.9 %
10 SYRINGE (ML) INJECTION ONCE
Status: COMPLETED | OUTPATIENT
Start: 2019-06-19 | End: 2019-06-19

## 2019-06-19 RX ORDER — SODIUM CHLORIDE 9 MG/ML
INJECTION, SOLUTION INTRAVENOUS
Status: COMPLETED
Start: 2019-06-19 | End: 2019-06-19

## 2019-06-19 RX ORDER — PROMETHAZINE HYDROCHLORIDE 25 MG/ML
6.25 INJECTION, SOLUTION INTRAMUSCULAR; INTRAVENOUS EVERY 6 HOURS PRN
Status: DISCONTINUED | OUTPATIENT
Start: 2019-06-19 | End: 2019-06-20 | Stop reason: HOSPADM

## 2019-06-19 RX ORDER — MECLIZINE HCL 12.5 MG/1
12.5 TABLET ORAL EVERY 6 HOURS SCHEDULED
Status: DISCONTINUED | OUTPATIENT
Start: 2019-06-19 | End: 2019-06-20 | Stop reason: HOSPADM

## 2019-06-19 RX ADMIN — Medication 10 ML: at 09:28

## 2019-06-19 RX ADMIN — ONDANSETRON 4 MG: 2 INJECTION INTRAMUSCULAR; INTRAVENOUS at 06:54

## 2019-06-19 RX ADMIN — PROMETHAZINE HYDROCHLORIDE 6.25 MG: 25 INJECTION INTRAMUSCULAR; INTRAVENOUS at 10:05

## 2019-06-19 RX ADMIN — POTASSIUM CHLORIDE 10 MEQ: 7.46 INJECTION, SOLUTION INTRAVENOUS at 06:20

## 2019-06-19 RX ADMIN — FERROUS SULFATE TAB 325 MG (65 MG ELEMENTAL FE) 325 MG: 325 (65 FE) TAB at 17:47

## 2019-06-19 RX ADMIN — PANTOPRAZOLE SODIUM 40 MG: 40 TABLET, DELAYED RELEASE ORAL at 05:24

## 2019-06-19 RX ADMIN — GADOTERIDOL 15 ML: 279.3 INJECTION, SOLUTION INTRAVENOUS at 09:28

## 2019-06-19 RX ADMIN — Medication 10 ML: at 10:08

## 2019-06-19 RX ADMIN — MECLIZINE 12.5 MG: 12.5 TABLET ORAL at 12:04

## 2019-06-19 RX ADMIN — DIAZEPAM 2 MG: 2 TABLET ORAL at 20:39

## 2019-06-19 RX ADMIN — DIAZEPAM 2 MG: 2 TABLET ORAL at 13:26

## 2019-06-19 RX ADMIN — SODIUM CHLORIDE: 9 INJECTION, SOLUTION INTRAVENOUS at 13:26

## 2019-06-19 RX ADMIN — ONDANSETRON 4 MG: 2 INJECTION INTRAMUSCULAR; INTRAVENOUS at 00:44

## 2019-06-19 RX ADMIN — HYDROCHLOROTHIAZIDE 12.5 MG: 25 TABLET ORAL at 10:07

## 2019-06-19 RX ADMIN — POTASSIUM CHLORIDE 10 MEQ: 7.46 INJECTION, SOLUTION INTRAVENOUS at 16:30

## 2019-06-19 RX ADMIN — ASPIRIN 81 MG: 81 TABLET, COATED ORAL at 10:08

## 2019-06-19 RX ADMIN — POTASSIUM CHLORIDE 10 MEQ: 7.46 INJECTION, SOLUTION INTRAVENOUS at 17:48

## 2019-06-19 RX ADMIN — MECLIZINE 12.5 MG: 12.5 TABLET ORAL at 17:47

## 2019-06-19 RX ADMIN — POTASSIUM CHLORIDE 10 MEQ: 7.46 INJECTION, SOLUTION INTRAVENOUS at 10:41

## 2019-06-19 RX ADMIN — POTASSIUM CHLORIDE 10 MEQ: 7.46 INJECTION, SOLUTION INTRAVENOUS at 14:46

## 2019-06-19 RX ADMIN — HYDRALAZINE HYDROCHLORIDE 10 MG: 20 INJECTION INTRAMUSCULAR; INTRAVENOUS at 00:45

## 2019-06-19 RX ADMIN — Medication 10 ML: at 20:39

## 2019-06-19 RX ADMIN — LOSARTAN POTASSIUM 50 MG: 25 TABLET ORAL at 10:08

## 2019-06-19 RX ADMIN — ACETAMINOPHEN 650 MG: 325 TABLET, FILM COATED ORAL at 10:41

## 2019-06-19 RX ADMIN — ENOXAPARIN SODIUM 40 MG: 40 INJECTION SUBCUTANEOUS at 10:11

## 2019-06-19 RX ADMIN — POTASSIUM CHLORIDE 10 MEQ: 7.46 INJECTION, SOLUTION INTRAVENOUS at 12:45

## 2019-06-19 ASSESSMENT — PAIN DESCRIPTION - PROGRESSION
CLINICAL_PROGRESSION: GRADUALLY IMPROVING
CLINICAL_PROGRESSION: GRADUALLY IMPROVING
CLINICAL_PROGRESSION: GRADUALLY WORSENING

## 2019-06-19 ASSESSMENT — PAIN SCALES - GENERAL
PAINLEVEL_OUTOF10: 0
PAINLEVEL_OUTOF10: 5
PAINLEVEL_OUTOF10: 0
PAINLEVEL_OUTOF10: 2

## 2019-06-19 ASSESSMENT — PAIN DESCRIPTION - LOCATION
LOCATION: HEAD;NECK;ABDOMEN
LOCATION: NECK;ABDOMEN

## 2019-06-19 ASSESSMENT — PAIN DESCRIPTION - ONSET
ONSET: ON-GOING
ONSET: GRADUAL

## 2019-06-19 ASSESSMENT — PAIN DESCRIPTION - FREQUENCY
FREQUENCY: INTERMITTENT
FREQUENCY: INTERMITTENT

## 2019-06-19 ASSESSMENT — PAIN DESCRIPTION - ORIENTATION
ORIENTATION: POSTERIOR
ORIENTATION: POSTERIOR

## 2019-06-19 ASSESSMENT — PAIN DESCRIPTION - DESCRIPTORS
DESCRIPTORS: ACHING
DESCRIPTORS: ACHING

## 2019-06-19 ASSESSMENT — PAIN DESCRIPTION - PAIN TYPE
TYPE: ACUTE PAIN
TYPE: ACUTE PAIN

## 2019-06-19 NOTE — PLAN OF CARE
Problem: Falls - Risk of:  Goal: Will remain free from falls  Description  Will remain free from falls  Outcome: Met This Shift  Note:   Pt remains free from falls. Safety precautions in place. Bed in lowest position, bed wheels locked, call light with in reach, bed alarm on, yellow blanket in place, fall risk wrist band on, SAFE outside of doorway. Patient up with SBA to bathroom. Patient stated that dizziness is much better and almost all the way gone. Will continue to monitor.

## 2019-06-19 NOTE — CONSULTS
NEUROLOGY CONSULTATION     Patient's Name :   Leonarda Schwab        YOB: 1934                    Date of Consultation : 6/19/2019 10:00 AM    Referring Physician :  Lorena Mayorga MD    Reason for Consultation :    Acute vertigo and ataxia    HISTORY OF PRESENT ILLNESS :    Leonarda Schwab is a 80 y.o. female   History was obtained from patient and from dictations in the chart. Additional history was obtained from the patient's daughter at the bedside. Patient developed acute onset of dizziness and vertigo on the day of admission. Every time she moves her head to the left side the dizziness and vertigo became much worse. She also developed severe nausea and vomiting. The symptoms were better if she was very still without moving her head. There was no focal weakness numbness or diplopia. She has never had any similar symptoms in the past.  Patient states that she has chronic hearing loss on the right side. Symptom onset was acute abrupt and moderately severe with no other aggravating or relieving factors. REVIEW OF SYSTEMS  Denies any chest pain palpitations breathlessness fever or weight loss. Has nausea and some abdominal discomfort as well.   Rest of the 14 system review was reviewed and was negative except for the symptoms noted above    Past Medical History:   Diagnosis Date    Bleeding ulcer     Cancer ([address]     melanoma L IRIS    Gastroesophagitis     GERD (gastroesophageal reflux disease)     Hyperlipidemia     Hypertension     Pneumonia     Rheumatic fever with heart involvement     Trigger ring finger of right hand 7/1/2013    Unspecified diseases of blood and blood-forming organs     chronic anemia     Family History   Problem Relation Age of Onset    Heart Disease Mother     High Blood Pressure Mother     High Cholesterol Mother     Heart Disease Father     High Blood Pressure Father     High

## 2019-06-19 NOTE — PROGRESS NOTES
initial interpretation. Stroke alert results were called by Dr. Angelica Youngblood. MD Kal to [facility] on 6/18/2019 at 13:05. Result Date: 6/18/2019  EXAMINATION: CT OF THE HEAD WITHOUT CONTRAST  6/18/2019 12:46 pm TECHNIQUE: CT of the head was performed without the administration of intravenous contrast. Dose modulation, iterative reconstruction, and/or weight based adjustment of the mA/kV was utilized to reduce the radiation dose to as low as reasonably achievable. COMPARISON: None. HISTORY: ORDERING SYSTEM PROVIDED HISTORY: dizziness TECHNOLOGIST PROVIDED HISTORY: Has a \"code stroke\" or \"stroke alert\" been called? ->Yes Ordering Physician Provided Reason for Exam: dizziness Acuity: Unknown Type of Exam: Unknown FINDINGS: BRAIN/VENTRICLES: There is no acute intracranial hemorrhage, mass effect or midline shift. No abnormal extra-axial fluid collection. The gray-white differentiation is maintained without evidence of an acute infarct. There is no evidence of hydrocephalus. ORBITS: The visualized portion of the orbits demonstrate no acute abnormality. SINUSES: There is mild mucosal thickening throughout the paranasal sinuses. SOFT TISSUES/SKULL:  No acute abnormality of the visualized skull or soft tissues. No acute intracranial abnormality. Xr Chest Portable    Result Date: 6/18/2019  EXAMINATION: ONE XRAY VIEW OF THE CHEST 6/18/2019 1:22 pm COMPARISON: Chest radiograph June 16, 2017 and priors. HISTORY: ORDERING SYSTEM PROVIDED HISTORY: SOB TECHNOLOGIST PROVIDED HISTORY: Reason for exam:->SOB Ordering Physician Provided Reason for Exam: SOB. Acuity: Acute Type of Exam: Initial FINDINGS: The lungs are without acute focal process. Calcified granuloma is again seen in the right base. There is no effusion or pneumothorax. The cardiomediastinal silhouette is without acute process. The osseous structures are without acute process. No significant change compared to prior. No acute process.      Cta Neck W Contrast    Result Date: 6/18/2019  EXAMINATION: CTA OF THE NECK; CTA OF THE HEAD WITH CONTRAST 6/18/2019 12:47 pm: TECHNIQUE: CTA of the neck was performed with the administration of intravenous contrast. Multiplanar reformatted images are provided for review. MIP images are provided for review. Stenosis of the internal carotid arteries measured using NASCET criteria. Dose modulation, iterative reconstruction, and/or weight based adjustment of the mA/kV was utilized to reduce the radiation dose to as low as reasonably achievable.; CTA of the head/brain was performed with the administration of intravenous contrast. Multiplanar reformatted images are provided for review. MIP images are provided for review. Dose modulation, iterative reconstruction, and/or weight based adjustment of the mA/kV was utilized to reduce the radiation dose to as low as reasonably achievable. COMPARISON: None. HISTORY: ORDERING SYSTEM PROVIDED HISTORY: dizziness r/o posterior etiology TECHNOLOGIST PROVIDED HISTORY: Has a \"code stroke\" or \"stroke alert\" been called? ->Yes Ordering Physician Provided Reason for Exam: dizziness, stroke alert Acuity: Unknown Type of Exam: Unknown; ORDERING SYSTEM PROVIDED HISTORY: dizziness, r/o posterior etiology TECHNOLOGIST PROVIDED HISTORY: Has a \"code stroke\" or \"stroke alert\" been called? ->Yes Ordering Physician Provided Reason for Exam: dizziness, stroke alert Acuity: Unknown Type of Exam: Unknown FINDINGS: CTA NECK: AORTIC ARCH/ARCH VESSELS: There is a normal branch pattern of the aortic arch. No significant stenosis is seen of the innominate artery or subclavian arteries. CAROTID ARTERIES: The common and internal carotid arteries are patent bilaterally without evidence of a flow limiting stenosis or acute dissection. There is tortuosity of the distal cervical internal carotid arteries.  VERTEBRAL ARTERIES: The vertebral arteries both arise from the subclavian arteries and are normal in caliber without been called? ->Yes Ordering Physician Provided Reason for Exam: dizziness, stroke alert Acuity: Unknown Type of Exam: Unknown; ORDERING SYSTEM PROVIDED HISTORY: dizziness, r/o posterior etiology TECHNOLOGIST PROVIDED HISTORY: Has a \"code stroke\" or \"stroke alert\" been called? ->Yes Ordering Physician Provided Reason for Exam: dizziness, stroke alert Acuity: Unknown Type of Exam: Unknown FINDINGS: CTA NECK: AORTIC ARCH/ARCH VESSELS: There is a normal branch pattern of the aortic arch. No significant stenosis is seen of the innominate artery or subclavian arteries. CAROTID ARTERIES: The common and internal carotid arteries are patent bilaterally without evidence of a flow limiting stenosis or acute dissection. There is tortuosity of the distal cervical internal carotid arteries. VERTEBRAL ARTERIES: The vertebral arteries both arise from the subclavian arteries and are normal in caliber without evidence of flow limiting stenosis or dissection. SOFT TISSUES: The lung apices are clear. No cervical or superior mediastinal lymphadenopathy. The visualized portion of the larynx and pharynx appear unremarkable. The parotid, submandibular and thyroid glands demonstrate no acute abnormality. BONES: There is diffuse osteopenia. There is mild height loss of T1 and T2 vertebral bodies without fracture plane visible. CTA HEAD: ANTERIOR CIRCULATION: The intracranial internal carotid arteries are patent. The A1/A2 and M1/M2 segments are patent bilaterally. POSTERIOR CIRCULATION: The vertebrobasilar system is patent. The proximal posterior cerebral arteries are patent. BRAIN: Filling defects of the left transverse dural venous sinus likely related to arachnoid granulations. There is no intracranial mass effect or abnormal enhancement. Unremarkable CTA of the head and neck.        Assessment and Plan:  Patient Active Hospital Problem List:   Acute CVA (cerebrovascular accident) ([facility] [address] (6/18/2019)    Assessment: Established problem. Stable. Awaiting MRI brain. Still dizzy, with HA    Plan: per MRI finding (still awaiting reading)   Hypertension (3/6/2012)    Assessment: Established problem. Stable. 125/63    Plan: Continue present orders/plan. High cholesterol (6/16/2017)    Assessment: Established problem. Stable. Plan: Continue present orders/plan. Ataxia (6/18/2019)    Assessment: could be CVA. Could be vertigo or vestibular issue? Plan: ask PT/vestibuilar therapy to see. Antivert added.               Isidra Broussard  6/19/2019

## 2019-06-20 VITALS
RESPIRATION RATE: 16 BRPM | HEART RATE: 76 BPM | HEIGHT: 63 IN | WEIGHT: 150 LBS | BODY MASS INDEX: 26.58 KG/M2 | TEMPERATURE: 97.1 F | DIASTOLIC BLOOD PRESSURE: 71 MMHG | OXYGEN SATURATION: 96 % | SYSTOLIC BLOOD PRESSURE: 156 MMHG

## 2019-06-20 LAB
ANION GAP SERPL CALCULATED.3IONS-SCNC: 9 MMOL/L (ref 3–16)
BASOPHILS ABSOLUTE: 0.1 K/UL (ref 0–0.2)
BASOPHILS RELATIVE PERCENT: 1.1 %
BUN BLDV-MCNC: 12 MG/DL (ref 7–20)
CALCIUM SERPL-MCNC: 9.7 MG/DL (ref 8.3–10.6)
CHLORIDE BLD-SCNC: 101 MMOL/L (ref 99–110)
CO2: 24 MMOL/L (ref 21–32)
CREAT SERPL-MCNC: 0.7 MG/DL (ref 0.6–1.2)
EOSINOPHILS ABSOLUTE: 0.3 K/UL (ref 0–0.6)
EOSINOPHILS RELATIVE PERCENT: 5.3 %
GFR AFRICAN AMERICAN: >60
GFR NON-AFRICAN AMERICAN: >60
GLUCOSE BLD-MCNC: 96 MG/DL (ref 70–99)
HCT VFR BLD CALC: 38.8 % (ref 36–48)
HEMOGLOBIN: 13.4 G/DL (ref 12–16)
LYMPHOCYTES ABSOLUTE: 1.9 K/UL (ref 1–5.1)
LYMPHOCYTES RELATIVE PERCENT: 30.8 %
MCH RBC QN AUTO: 28.3 PG (ref 26–34)
MCHC RBC AUTO-ENTMCNC: 34.5 G/DL (ref 31–36)
MCV RBC AUTO: 81.9 FL (ref 80–100)
MONOCYTES ABSOLUTE: 0.4 K/UL (ref 0–1.3)
MONOCYTES RELATIVE PERCENT: 6.4 %
NEUTROPHILS ABSOLUTE: 3.4 K/UL (ref 1.7–7.7)
NEUTROPHILS RELATIVE PERCENT: 56.4 %
PDW BLD-RTO: 14.3 % (ref 12.4–15.4)
PLATELET # BLD: 201 K/UL (ref 135–450)
PMV BLD AUTO: 7.6 FL (ref 5–10.5)
POTASSIUM SERPL-SCNC: 3.5 MMOL/L (ref 3.5–5.1)
RBC # BLD: 4.74 M/UL (ref 4–5.2)
SODIUM BLD-SCNC: 134 MMOL/L (ref 136–145)
WBC # BLD: 6 K/UL (ref 4–11)

## 2019-06-20 PROCEDURE — 97116 GAIT TRAINING THERAPY: CPT

## 2019-06-20 PROCEDURE — 97530 THERAPEUTIC ACTIVITIES: CPT

## 2019-06-20 PROCEDURE — G0378 HOSPITAL OBSERVATION PER HR: HCPCS

## 2019-06-20 PROCEDURE — 85025 COMPLETE CBC W/AUTO DIFF WBC: CPT

## 2019-06-20 PROCEDURE — 97535 SELF CARE MNGMENT TRAINING: CPT

## 2019-06-20 PROCEDURE — 80048 BASIC METABOLIC PNL TOTAL CA: CPT

## 2019-06-20 PROCEDURE — 6370000000 HC RX 637 (ALT 250 FOR IP): Performed by: INTERNAL MEDICINE

## 2019-06-20 PROCEDURE — 36415 COLL VENOUS BLD VENIPUNCTURE: CPT

## 2019-06-20 PROCEDURE — 2580000003 HC RX 258: Performed by: INTERNAL MEDICINE

## 2019-06-20 PROCEDURE — 97161 PT EVAL LOW COMPLEX 20 MIN: CPT

## 2019-06-20 PROCEDURE — 97165 OT EVAL LOW COMPLEX 30 MIN: CPT

## 2019-06-20 RX ORDER — DIAZEPAM 2 MG/1
2 TABLET ORAL EVERY 8 HOURS PRN
Qty: 30 TABLET | Refills: 0 | Status: SHIPPED | OUTPATIENT
Start: 2019-06-20 | End: 2019-06-30

## 2019-06-20 RX ORDER — MECLIZINE HYDROCHLORIDE 25 MG/1
25 TABLET ORAL EVERY 6 HOURS PRN
Qty: 30 TABLET | Refills: 1 | Status: SHIPPED | OUTPATIENT
Start: 2019-06-20 | End: 2019-06-30

## 2019-06-20 RX ADMIN — MECLIZINE 12.5 MG: 12.5 TABLET ORAL at 05:21

## 2019-06-20 RX ADMIN — PANTOPRAZOLE SODIUM 40 MG: 40 TABLET, DELAYED RELEASE ORAL at 05:21

## 2019-06-20 RX ADMIN — LOSARTAN POTASSIUM 50 MG: 25 TABLET ORAL at 09:19

## 2019-06-20 RX ADMIN — ASPIRIN 81 MG: 81 TABLET, COATED ORAL at 09:19

## 2019-06-20 RX ADMIN — MECLIZINE 12.5 MG: 12.5 TABLET ORAL at 00:24

## 2019-06-20 RX ADMIN — POTASSIUM CHLORIDE 40 MEQ: 1500 TABLET, EXTENDED RELEASE ORAL at 09:19

## 2019-06-20 RX ADMIN — HYDROCHLOROTHIAZIDE 12.5 MG: 25 TABLET ORAL at 09:19

## 2019-06-20 RX ADMIN — DIAZEPAM 2 MG: 2 TABLET ORAL at 05:21

## 2019-06-20 RX ADMIN — Medication 10 ML: at 09:21

## 2019-06-20 ASSESSMENT — PAIN SCALES - GENERAL
PAINLEVEL_OUTOF10: 0

## 2019-06-20 NOTE — DISCHARGE INSTR - COC
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    Ganglion cyst of flexor tendon sheath M67.40    Trigger ring finger of right hand M65.341    Hand pain, right M79.641    Wrist pain, right M25.531    Thyroid nodule E04.1    Chronic pansinusitis J32.4    Multinodular goiter (nontoxic) E04.2    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.060    Microcytic anemia D50.9    Lower abdominal pain R10.30    Iron deficiency anemia due to chronic blood loss D50.0    Intestinal malabsorption K90.9    Hearing loss due to cerumen impaction H61.20    High cholesterol E78.00    Syncope R55    Multiple thyroid nodules E04.2    Sprain of left rotator cuff capsule S43.422A    Ataxia R27.0    Acute CVA (cerebrovascular accident) ([facility] [address] I63.9    Vertigo R42       Isolation/Infection:   Isolation          No Isolation            Nurse Assessment:  Last Vital Signs: BP (!) 154/71   Pulse 67   Temp 98.4 °F (36.9 °C) (Temporal)   Resp 16   Ht 5' 3\" (1.6 m)   Wt 150 lb (68 kg)   SpO2 95%   BMI 26.57 kg/m²     Last documented pain score (0-10 scale): Pain Level: 0  Last Weight:   Wt Readings from Last 1 Encounters:   06/20/19 150 lb (68 kg)     Mental Status:  oriented, alert, coherent, logical and thought processes intact    IV Access:  - None    Nursing Mobility/ADLs:  Walking   Assisted  Transfer  Assisted  Bathing Assisted  Dressing  Assisted  Toileting  Assisted  Feeding  Independent  Med Admin  Independent  Med Delivery   whole    Wound Care Documentation and Therapy:  Incision 08/01/13 Wrist Right (Active)   Number of days: 2148        Elimination:  Continence:   · Bowel: Yes  · Bladder: Yes  Urinary Catheter: None   Colostomy/Ileostomy/Ileal Conduit: No       Date of Last BM: 6-      Intake/Output Summary (Last 24 hours) at 6/20/2019 0831  Last data filed at 6/19/2019 1333  Gross per 24 hour   Intake 490 ml   Output 200 ml   Net 290 ml     I/O last 3 completed shifts: In: 56 [P.O.:480; I.V.:10]  Out: 200 [Urine:200]    Safety Concerns: At Risk for Falls    Impairments/Disabilities:      Vision and Hearing    Nutrition Therapy:  Current Nutrition Therapy:   - Oral Diet:  General    Routes of Feeding: Oral  Liquids: Thin Liquids  Daily Fluid Restriction: no  Last Modified Barium Swallow with Video (Video Swallowing Test): not done    Treatments at the Time of Hospital Discharge:   Respiratory Treatments: NONE    Oxygen Therapy:  is not on home oxygen therapy.   Ventilator:    - No ventilator support    Rehab Therapies: Physical Therapy and Occupational Therapy  Weight Bearing Status/Restrictions: No weight bearing restirctions  Other Medical Equipment (for information only, NOT a DME order):  walker  Other Treatments: VERTIGO EAR EXERCISES -INNER EAR     Patient's personal belongings (please select all that are sent with patient):  Glasses    RN SIGNATURE:  Electronically signed by Lizette Parrish RN on 6/20/19 at 10:01 AM    CASE MANAGEMENT/SOCIAL WORK SECTION    Inpatient Status Date: ***    Readmission Risk Assessment Score:  Readmission Risk              Risk of Unplanned Readmission:        11           Discharging to Facility/ Agency   · Name:   · Address:  · Phone:  · Fax:    Dialysis Facility (if applicable)   · Name:  · Address:  · Dialysis Schedule:  · Phone:  · Fax:    /Social

## 2019-06-20 NOTE — PROGRESS NOTES
Occupational Therapy   Occupational Therapy Initial Assessment/Discharge Summary d/t pt d/cd'ing from hospital this date  Date: 2019   Patient Name: Mercy Wilcox  MRN: [id number]     : 1934    Date of Service: 2019    Discharge Recommendations: Mercy Wilcox scored a  on the AM-PAC ADL Inpatient form. At this time, no further OT is recommended upon discharge due to anticipating pt will return to baseline level. Recommend patient returns to prior setting with prior services (OP PT recommended for vestibular therapy)         OT Equipment Recommendations  Equipment Needed: Yes  Other: shower chair without a back    Assessment   Performance deficits / Impairments: Decreased functional mobility ; Decreased ADL status; Decreased balance;Decreased high-level IADLs  Assessment: Pt is not at baseline level of function and will benefit from skilled OT in order to address the above limitations. Treatment Diagnosis: Decreased functional mobility, ADL/IADL status, balance related to vertigo  Prognosis: Good  Decision Making: Low Complexity  Patient Education: Role of OT, OT eval, safety with ADLs, DME, POC, d/c  REQUIRES OT FOLLOW UP: No(d/c pt d/c'ing this date ())  Safety Devices  Safety Devices in place: Yes  Type of devices: All fall risk precautions in place; Patient at risk for falls; Left in chair;Call light within reach;Nurse notified(medium fall risk)           Patient Diagnosis(es): The primary encounter diagnosis was Dizziness. Diagnoses of Vertigo and Ataxia were also pertinent to this visit. has a past medical history of Bleeding ulcer, Cancer ([address] Gastroesophagitis, GERD (gastroesophageal reflux disease), Hyperlipidemia, Hypertension, Pneumonia, Rheumatic fever with heart involvement, Trigger ring finger of right hand, and Unspecified diseases of blood and blood-forming organs. has a past surgical history that includes Hysterectomy;  Tonsillectomy; tumor removal; Rectocele repair; Breast surgery; fracture surgery; Upper gastrointestinal endoscopy (3/7/12); bladder repair (N/A, 2002); cyst removal (Right, 8-1-13); joint replacement (Right, 03/28/2012); shoulder surgery (Right); Upper gastrointestinal endoscopy (11/8/13); Upper gastrointestinal endoscopy (4/24/14); eye surgery; Upper gastrointestinal endoscopy (3/17/16); and Upper gastrointestinal endoscopy (06/29/2017). Treatment Diagnosis: Decreased functional mobility, ADL/IADL status, balance related to vertigo      Restrictions  Restrictions/Precautions  Restrictions/Precautions: Fall Risk(medium fall risk )  Position Activity Restriction  Other position/activity restrictions: Pt admitted on 6/18 for dizziness and gait imbalance. The patient's work-up including arterial studies of her neck and head and a CT of the head that was negative. Her EKG was unremarkable and labs are normal.  She has had some nausea and vomiting. Subjective   General  Chart Reviewed: Yes  Family / Caregiver Present: Yes(grand daughter)  Diagnosis: vertigo  Subjective  Subjective: Pt lying supine in bed upon arrival, agreeable to evaluation. Pt reports decreased dizziness after receiving antivert. However c/o dizziness when ambulating in hallway and specifically when looking to the left.   Pain Assessment  Pain Assessment: 0-10  Pain Level: 0    Social/Functional History  Social/Functional History  Lives With: Spouse  Type of Home: House  Home Layout: Multi-level, Bed/Bath upstairs  Home Access: Stairs to enter without rails  Entrance Stairs - Number of Steps: 1  Bathroom Shower/Tub: Walk-in shower  Bathroom Toilet: Handicap height  Receives Help From: Family  ADL Assistance: Independent  Homemaking Assistance: Independent  Homemaking Responsibilities: Yes  Ambulation Assistance: Independent  Transfer Assistance: Independent  Active : Yes  Mode of Transportation: Car  Occupation: Retired  Type of occupation: seamstress  Leisure & Hobbies:

## 2019-06-20 NOTE — DISCHARGE SUMMARY
[facility] -- Physician Discharge Summary     Adolfo Matthews  1934  MRN: [id number]    Admit Date: 6/18/2019  Discharge Date: No discharge date for patient encounter. Attending MD: Oralia Acosta MD  Discharging MD: Oralia Acosta MD  PCP: Babatunde Moore MD [address] / [place] [phone]    Admission Diagnosis: Acute CVA (cerebrovascular accident) [facility]) [I63.9]  Acute CVA (cerebrovascular accident) [facility]) [I63.9]  DISCHARGE DIAGNOSIS: acute vertigo    Full Hospital Problem List:  Active Hospital Problems    Diagnosis Date Noted    Vertigo [R42] 06/19/2019    Ataxia [R27.0] 06/18/2019    Acute CVA (cerebrovascular accident) ([facility] [address] [I63.9] 06/18/2019    High cholesterol [E78.00] 06/16/2017    Hypertension [I10] 03/06/2012           Hospital Course:  80 y. o. female presents to the emergency department today for evaluation for dizziness and nausea.  The patient tells me that she has not been feeling well for the past week, with complaints of intermittent nausea.  The patient states that today around 9 AM she had sudden onset of dizziness and she felt that she was off balance and she could not walk. Marie Singh states that she does have a history of dizziness, but she has never been dizzy like this before.  The patient states that her dizziness is worse with turning her head from side to side and with position changes.  She denies any headaches.  She denies any numbness, tingling or weakness.  She denies any fall or head injury.  She denies any chest pain or shortness of breath.  She denies any abdominal pain.  No vomiting or diarrhea.  No urinary symptoms.     CT head, reviewed by self, does not show sign of acute cva    Pt admitted for further eval    Neurology consulted  MRI Brain done   Impression:       No acute intracranial abnormality. Age-related involutional changes and mild chronic microvascular ischemic  disease.        Pt continues to have dizziness - given negative an acute infarct. There is no evidence of hydrocephalus. ORBITS: The visualized portion of the orbits demonstrate no acute abnormality. SINUSES: There is mild mucosal thickening throughout the paranasal sinuses. SOFT TISSUES/SKULL:  No acute abnormality of the visualized skull or soft tissues. No acute intracranial abnormality. Xr Chest Portable    Result Date: 6/18/2019  EXAMINATION: ONE XRAY VIEW OF THE CHEST 6/18/2019 1:22 pm COMPARISON: Chest radiograph June 16, 2017 and priors. HISTORY: ORDERING SYSTEM PROVIDED HISTORY: SOB TECHNOLOGIST PROVIDED HISTORY: Reason for exam:->SOB Ordering Physician Provided Reason for Exam: SOB. Acuity: Acute Type of Exam: Initial FINDINGS: The lungs are without acute focal process. Calcified granuloma is again seen in the right base. There is no effusion or pneumothorax. The cardiomediastinal silhouette is without acute process. The osseous structures are without acute process. No significant change compared to prior. No acute process. Cta Neck W Contrast    Result Date: 6/18/2019  EXAMINATION: CTA OF THE NECK; CTA OF THE HEAD WITH CONTRAST 6/18/2019 12:47 pm: TECHNIQUE: CTA of the neck was performed with the administration of intravenous contrast. Multiplanar reformatted images are provided for review. MIP images are provided for review. Stenosis of the internal carotid arteries measured using NASCET criteria. Dose modulation, iterative reconstruction, and/or weight based adjustment of the mA/kV was utilized to reduce the radiation dose to as low as reasonably achievable.; CTA of the head/brain was performed with the administration of intravenous contrast. Multiplanar reformatted images are provided for review. MIP images are provided for review. Dose modulation, iterative reconstruction, and/or weight based adjustment of the mA/kV was utilized to reduce the radiation dose to as low as reasonably achievable. COMPARISON: None.  HISTORY: ORDERING SYSTEM PROVIDED HISTORY: dizziness r/o posterior etiology TECHNOLOGIST PROVIDED HISTORY: Has a \"code stroke\" or \"stroke alert\" been called? ->Yes Ordering Physician Provided Reason for Exam: dizziness, stroke alert Acuity: Unknown Type of Exam: Unknown; ORDERING SYSTEM PROVIDED HISTORY: dizziness, r/o posterior etiology TECHNOLOGIST PROVIDED HISTORY: Has a \"code stroke\" or \"stroke alert\" been called? ->Yes Ordering Physician Provided Reason for Exam: dizziness, stroke alert Acuity: Unknown Type of Exam: Unknown FINDINGS: CTA NECK: AORTIC ARCH/ARCH VESSELS: There is a normal branch pattern of the aortic arch. No significant stenosis is seen of the innominate artery or subclavian arteries. CAROTID ARTERIES: The common and internal carotid arteries are patent bilaterally without evidence of a flow limiting stenosis or acute dissection. There is tortuosity of the distal cervical internal carotid arteries. VERTEBRAL ARTERIES: The vertebral arteries both arise from the subclavian arteries and are normal in caliber without evidence of flow limiting stenosis or dissection. SOFT TISSUES: The lung apices are clear. No cervical or superior mediastinal lymphadenopathy. The visualized portion of the larynx and pharynx appear unremarkable. The parotid, submandibular and thyroid glands demonstrate no acute abnormality. BONES: There is diffuse osteopenia. There is mild height loss of T1 and T2 vertebral bodies without fracture plane visible. CTA HEAD: ANTERIOR CIRCULATION: The intracranial internal carotid arteries are patent. The A1/A2 and M1/M2 segments are patent bilaterally. POSTERIOR CIRCULATION: The vertebrobasilar system is patent. The proximal posterior cerebral arteries are patent. BRAIN: Filling defects of the left transverse dural venous sinus likely related to arachnoid granulations. There is no intracranial mass effect or abnormal enhancement. Unremarkable CTA of the head and neck.      Cta Head W Contrast    Result Date: 6/18/2019  EXAMINATION: CTA OF THE NECK; CTA OF THE HEAD WITH CONTRAST 6/18/2019 12:47 pm: TECHNIQUE: CTA of the neck was performed with the administration of intravenous contrast. Multiplanar reformatted images are provided for review. MIP images are provided for review. Stenosis of the internal carotid arteries measured using NASCET criteria. Dose modulation, iterative reconstruction, and/or weight based adjustment of the mA/kV was utilized to reduce the radiation dose to as low as reasonably achievable.; CTA of the head/brain was performed with the administration of intravenous contrast. Multiplanar reformatted images are provided for review. MIP images are provided for review. Dose modulation, iterative reconstruction, and/or weight based adjustment of the mA/kV was utilized to reduce the radiation dose to as low as reasonably achievable. COMPARISON: None. HISTORY: ORDERING SYSTEM PROVIDED HISTORY: dizziness r/o posterior etiology TECHNOLOGIST PROVIDED HISTORY: Has a \"code stroke\" or \"stroke alert\" been called? ->Yes Ordering Physician Provided Reason for Exam: dizziness, stroke alert Acuity: Unknown Type of Exam: Unknown; ORDERING SYSTEM PROVIDED HISTORY: dizziness, r/o posterior etiology TECHNOLOGIST PROVIDED HISTORY: Has a \"code stroke\" or \"stroke alert\" been called? ->Yes Ordering Physician Provided Reason for Exam: dizziness, stroke alert Acuity: Unknown Type of Exam: Unknown FINDINGS: CTA NECK: AORTIC ARCH/ARCH VESSELS: There is a normal branch pattern of the aortic arch. No significant stenosis is seen of the innominate artery or subclavian arteries. CAROTID ARTERIES: The common and internal carotid arteries are patent bilaterally without evidence of a flow limiting stenosis or acute dissection. There is tortuosity of the distal cervical internal carotid arteries.  VERTEBRAL ARTERIES: The vertebral arteries both arise from the subclavian arteries and are normal in caliber without evidence of flow limiting stenosis or dissection. SOFT TISSUES: The lung apices are clear. No cervical or superior mediastinal lymphadenopathy. The visualized portion of the larynx and pharynx appear unremarkable. The parotid, submandibular and thyroid glands demonstrate no acute abnormality. BONES: There is diffuse osteopenia. There is mild height loss of T1 and T2 vertebral bodies without fracture plane visible. CTA HEAD: ANTERIOR CIRCULATION: The intracranial internal carotid arteries are patent. The A1/A2 and M1/M2 segments are patent bilaterally. POSTERIOR CIRCULATION: The vertebrobasilar system is patent. The proximal posterior cerebral arteries are patent. BRAIN: Filling defects of the left transverse dural venous sinus likely related to arachnoid granulations. There is no intracranial mass effect or abnormal enhancement. Unremarkable CTA of the head and neck. Mri Brain With And Without Contrast    Result Date: 6/19/2019  EXAMINATION: MRI OF THE BRAIN WITHOUT AND WITH CONTRAST  6/19/2019 8:39 am TECHNIQUE: Multiplanar multisequence MRI of the head/brain was performed without and with the administration of intravenous contrast. COMPARISON: Noncontrast CT head 06/18/2019 HISTORY: ORDERING SYSTEM PROVIDED HISTORY: TIA TECHNOLOGIST PROVIDED HISTORY: Ordering Physician Provided Reason for Exam: PT BEING EVALUATED TO R/O TIA Acuity: Acute Type of Exam: Initial Additional signs and symptoms: PT BEING EVALUATED TO R/O TIA Relevant Medical/Surgical History: PT BEING EVALUATED TO R/O TIA FINDINGS: INTRACRANIAL STRUCTURES/VENTRICLES:  Ventricles and sulci within normal limits for patient's age. There is no midline shift or hydrocephalus. Basal cisterns are patent. There is no extra-axial fluid collection. Patchy foci of T2/FLAIR hyperintense signal  in the periventricular and subcortical white matter and gilda may reflect mild chronic microvascular disease. No restricted diffusion is identified to suggest acute infarct.

## 2019-06-20 NOTE — PROGRESS NOTES
removal; Rectocele repair; Breast surgery; fracture surgery; Upper gastrointestinal endoscopy (3/7/12); bladder repair (N/A, 2002); cyst removal (Right, 8-1-13); joint replacement (Right, 03/28/2012); shoulder surgery (Right); Upper gastrointestinal endoscopy (11/8/13); Upper gastrointestinal endoscopy (4/24/14); eye surgery; Upper gastrointestinal endoscopy (3/17/16); and Upper gastrointestinal endoscopy (06/29/2017). Restrictions  Restrictions/Precautions  Restrictions/Precautions: Fall Risk(medium fall risk )  Position Activity Restriction  Other position/activity restrictions: Pt admitted on 6/18 for dizziness and gait imbalance. The patient's work-up including arterial studies of her neck and head and a CT of the head that was negative. Her EKG was unremarkable and labs are normal.  She has had some nausea and vomiting.   Vision/Hearing  Vision: Impaired  Vision Exceptions: Wears glasses at all times  Hearing: Exceptions to [facility]  Hearing Exceptions: Hard of hearing/hearing concerns     Subjective  General  Chart Reviewed: Yes  Response To Previous Treatment: Not applicable  Diagnosis: dizziness  Follows Commands: Within Functional Limits  General Comment  Comments: pt. was supine in bed upon arrival  Subjective  Subjective: pt. was agreeable to PT eval on this date   Pain Screening  Patient Currently in Pain: Denies  Vital Signs  Patient Currently in Pain: Denies       Orientation  Orientation  Overall Orientation Status: Within Normal Limits  Social/Functional History  Social/Functional History  Lives With: Spouse  Type of Home: House  Home Layout: Multi-level, Bed/Bath upstairs  Home Access: Stairs to enter without rails  Entrance Stairs - Number of Steps: 1  Bathroom Shower/Tub: Walk-in shower  Bathroom Toilet: Handicap height  Receives Help From: Family  ADL Assistance: Independent  Homemaking Assistance: Independent  Homemaking Responsibilities: Yes  Ambulation Assistance: Independent  Transfer

## 2019-06-20 NOTE — PLAN OF CARE
Problem: Pain:  Goal: Pain level will decrease  Description  Pain level will decrease  Outcome: Ongoing   Pt c/o no pain throughout the shift. Problem: Nausea/Vomiting:  Goal: Absence of nausea/vomiting  Description  Absence of nausea/vomiting  Outcome: Ongoing   Pt has had no n/v throughout the shift  Problem: HEMODYNAMIC STATUS  Goal: Patient has stable vital signs and fluid balance  Outcome: Ongoing   Pt vitals are stable  Problem: Falls - Risk of:  Goal: Will remain free from falls  Description  Will remain free from falls  6/20/2019 0323 by Maggi Murray RN  Outcome: Ongoing   Pt remains free from falls. Pt is asleep in bed with 2/4 side rails up, bed in lowest position with brakes locked and bedside table and call light within reach. Bed alarm on. Will continue to monitor pt.

## 2019-06-25 ENCOUNTER — FOLLOWUP TELEPHONE ENCOUNTER (OUTPATIENT)
Dept: INPATIENT UNIT | Age: 84
End: 2019-06-25

## 2019-10-18 ENCOUNTER — HOSPITAL ENCOUNTER (OUTPATIENT)
Dept: CT IMAGING | Age: 84
Discharge: HOME | End: 2019-10-18
Payer: COMMERCIAL

## 2019-10-18 ENCOUNTER — APPOINTMENT (OUTPATIENT)
Dept: CT IMAGING | Age: 84
End: 2019-10-18
Payer: COMMERCIAL

## 2019-10-18 DIAGNOSIS — D50.9 MICROCYTIC ANEMIA: ICD-10-CM

## 2019-10-18 PROCEDURE — 74177 CT ABD & PELVIS W/CONTRAST: CPT

## 2019-10-18 PROCEDURE — 6360000004 HC RX CONTRAST MEDICATION: Performed by: INTERNAL MEDICINE

## 2019-10-18 RX ADMIN — IOHEXOL 50 ML: 240 INJECTION, SOLUTION INTRATHECAL; INTRAVASCULAR; INTRAVENOUS; ORAL at 14:27

## 2019-10-18 RX ADMIN — IOPAMIDOL 75 ML: 755 INJECTION, SOLUTION INTRAVENOUS at 14:24

## 2019-11-22 ENCOUNTER — OFFICE VISIT (OUTPATIENT)
Dept: ENT CLINIC | Age: 84
End: 2019-11-22
Payer: COMMERCIAL

## 2019-11-22 VITALS
WEIGHT: 152.8 LBS | DIASTOLIC BLOOD PRESSURE: 87 MMHG | SYSTOLIC BLOOD PRESSURE: 153 MMHG | HEIGHT: 63 IN | HEART RATE: 104 BPM | BODY MASS INDEX: 27.07 KG/M2

## 2019-11-22 DIAGNOSIS — E04.2 MULTIPLE THYROID NODULES: ICD-10-CM

## 2019-11-22 DIAGNOSIS — J39.2 DRY THROAT: Primary | ICD-10-CM

## 2019-11-22 DIAGNOSIS — R09.89 GLOBUS PHARYNGEUS: ICD-10-CM

## 2019-11-22 DIAGNOSIS — R49.0 HOARSENESS OF VOICE: ICD-10-CM

## 2019-11-22 DIAGNOSIS — E04.2 MULTINODULAR GOITER (NONTOXIC): ICD-10-CM

## 2019-11-22 DIAGNOSIS — R68.2 DRY MOUTH: ICD-10-CM

## 2019-11-22 PROBLEM — R09.A2 GLOBUS PHARYNGEUS: Status: ACTIVE | Noted: 2019-11-22

## 2019-11-22 PROCEDURE — 99214 OFFICE O/P EST MOD 30 MIN: CPT | Performed by: OTOLARYNGOLOGY

## 2019-11-22 PROCEDURE — 31505 DIAGNOSTIC LARYNGOSCOPY: CPT | Performed by: OTOLARYNGOLOGY

## 2019-11-29 ENCOUNTER — HOSPITAL ENCOUNTER (OUTPATIENT)
Dept: ULTRASOUND IMAGING | Age: 84
Discharge: HOME | End: 2019-11-29
Payer: COMMERCIAL

## 2019-11-29 DIAGNOSIS — E04.2 MULTIPLE THYROID NODULES: ICD-10-CM

## 2019-11-29 DIAGNOSIS — E04.2 MULTINODULAR GOITER (NONTOXIC): ICD-10-CM

## 2019-11-29 PROCEDURE — 76536 US EXAM OF HEAD AND NECK: CPT

## 2019-12-09 ENCOUNTER — TELEPHONE (OUTPATIENT)
Dept: ENT CLINIC | Age: 84
End: 2019-12-09

## 2019-12-25 ENCOUNTER — APPOINTMENT (OUTPATIENT)
Dept: GENERAL RADIOLOGY | Age: 84
End: 2019-12-25
Payer: COMMERCIAL

## 2019-12-25 ENCOUNTER — HOSPITAL ENCOUNTER (EMERGENCY)
Age: 84
Discharge: HOME | End: 2019-12-25
Attending: EMERGENCY MEDICINE
Payer: COMMERCIAL

## 2019-12-25 VITALS
TEMPERATURE: 97.9 F | WEIGHT: 155 LBS | HEIGHT: 63 IN | RESPIRATION RATE: 20 BRPM | OXYGEN SATURATION: 97 % | DIASTOLIC BLOOD PRESSURE: 88 MMHG | BODY MASS INDEX: 27.46 KG/M2 | SYSTOLIC BLOOD PRESSURE: 175 MMHG | HEART RATE: 90 BPM
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
## 2019-12-25 DIAGNOSIS — S22.31XA CLOSED FRACTURE OF ONE RIB OF RIGHT SIDE, INITIAL ENCOUNTER: Primary | ICD-10-CM

## 2019-12-25 PROCEDURE — 6370000000 HC RX 637 (ALT 250 FOR IP): Performed by: EMERGENCY MEDICINE

## 2019-12-25 PROCEDURE — 99283 EMERGENCY DEPT VISIT LOW MDM: CPT

## 2019-12-25 PROCEDURE — 71101 X-RAY EXAM UNILAT RIBS/CHEST: CPT

## 2019-12-25 RX ORDER — HYDROCODONE BITARTRATE AND ACETAMINOPHEN 5; 325 MG/1; MG/1
1 TABLET ORAL ONCE
Status: COMPLETED | OUTPATIENT
Start: 2019-12-25 | End: 2019-12-25

## 2019-12-25 RX ORDER — IBUPROFEN 600 MG/1
600 TABLET ORAL EVERY 6 HOURS PRN
Qty: 40 TABLET | Refills: 0 | Status: SHIPPED | OUTPATIENT
Start: 2019-12-25 | End: 2021-08-03 | Stop reason: DRUGHIGH

## 2019-12-25 RX ORDER — HYDROCODONE BITARTRATE AND ACETAMINOPHEN 5; 325 MG/1; MG/1
1 TABLET ORAL EVERY 6 HOURS PRN
Qty: 12 TABLET | Refills: 0 | Status: SHIPPED | OUTPATIENT
Start: 2019-12-25 | End: 2019-12-28

## 2019-12-25 RX ADMIN — HYDROCODONE BITARTRATE AND ACETAMINOPHEN 1 TABLET: 5; 325 TABLET ORAL at 20:59

## 2019-12-25 ASSESSMENT — PAIN SCALES - GENERAL
PAINLEVEL_OUTOF10: 10
PAINLEVEL_OUTOF10: 10

## 2019-12-25 ASSESSMENT — PAIN DESCRIPTION - ORIENTATION: ORIENTATION: RIGHT

## 2019-12-25 ASSESSMENT — PAIN DESCRIPTION - PAIN TYPE: TYPE: ACUTE PAIN

## 2019-12-25 ASSESSMENT — PAIN DESCRIPTION - LOCATION: LOCATION: RIB CAGE

## 2020-02-26 ENCOUNTER — OFFICE VISIT (OUTPATIENT)
Dept: ORTHOPEDIC SURGERY | Age: 85
End: 2020-02-26
Payer: COMMERCIAL

## 2020-02-26 VITALS
HEIGHT: 63 IN | DIASTOLIC BLOOD PRESSURE: 100 MMHG | SYSTOLIC BLOOD PRESSURE: 196 MMHG | HEART RATE: 110 BPM | WEIGHT: 150 LBS | RESPIRATION RATE: 16 BRPM | BODY MASS INDEX: 26.58 KG/M2

## 2020-02-26 PROCEDURE — 99213 OFFICE O/P EST LOW 20 MIN: CPT | Performed by: ORTHOPAEDIC SURGERY

## 2020-02-26 NOTE — PROGRESS NOTES
Ms. Dmitri Whitehead  returns today in follow-up of her  previous  right total elbow arthroplasty done elsewhere in 3/28/2012 which was very early on, complicated by early loose ing and cement failure. She was last seen in September, 2018 at which time she  was functioning fairly well with mild elbow difficulty or symptoms. She returns today without new complaint or symptoms of the right elbow, for routine screening visit. The patient's , past medical history, medications, allergies,  family history, social history, and review of systems have been reviewed and are recorded in the chart. Physical Exam:  Vitals  BP: (!) 196/100  Pulse: 110  Resp: 16  Height: 5' 3\" (160 cm)  Weight: 150 lb (68 kg)  Ms. Dmitri Whitehead appears well, she is in no apparent distress, she demonstrates appropriate mood & affect. Skin: Normal in appearance, Normal Color and Free of Lesions Bilaterally   Digital range of motion is without significant limitation on the Right, normal on the Left  Wrist range of motion is without significant limitation on the Right, normal on the Left  Elbow range of motion is flexion arc to the face with loss of 45* extension, pronation functionally full and supination  functionally full on the Right, normal on the Left  Sensation is subjectively normal bilaterally  Vascular examination reveals normal and good capillary refill bilaterally  Swelling is minimal around the elbow  She has mild pain with elbow excursion, no crepitance, no laxity on the Right, normal on the Left  Triceps function is Intact on the Right, normal on the Left    Radiographic Evaluation:  Radiographs were obtained today (3 views of the right elbow). They demonstrate no evidence of acute fracture, subluxation, or dislocation. There is stable and unchanged sign of loosening,  no displacement of the implants, mild sign of wear or failure. There is minimal and unchanged osteolysis.       Impression:  Ms. Dmitri Whitehead  is doing adequately 8 years after right total elbow arthoplasty. Plan:  After discussion of the nature her total elbow arthoplasty,  she is reminded of her precautions and limitations. We have again reviewed weight limits and activity restrictions specifically related to her prosthetic device. We have also reviewed the requirement for prophylactic oral antibiotic administration before any procedure or treatment which may pose a risk of hematogenous infection of the Elbow Arthroplasty. She is asked to follow up annually for routine radiographic screening and examination. Ms. Naveen Flowers has been given a full verbal list of instructions and precautions related to her present condition. I have asked her to followup with me in the office at the prescribed time. She is also specifically requested to call or return to the office sooner if her symptoms change or worsen prior to the next scheduled appointment.

## 2020-02-26 NOTE — PATIENT INSTRUCTIONS
Thank you for choosing [facility]) Physicians for your Hand and Upper Extremity needs. If we can be of any further assistance to you, please do not hesitate to contact us.     Office Phone Number:  [phone]  or  [phone]

## 2020-07-13 ENCOUNTER — OFFICE VISIT (OUTPATIENT)
Dept: ENT CLINIC | Age: 85
End: 2020-07-13
Payer: COMMERCIAL

## 2020-07-13 VITALS
TEMPERATURE: 97.8 F | WEIGHT: 149 LBS | BODY MASS INDEX: 25.44 KG/M2 | DIASTOLIC BLOOD PRESSURE: 98 MMHG | HEIGHT: 64 IN | SYSTOLIC BLOOD PRESSURE: 176 MMHG | HEART RATE: 97 BPM

## 2020-07-13 PROCEDURE — 99214 OFFICE O/P EST MOD 30 MIN: CPT | Performed by: OTOLARYNGOLOGY

## 2020-07-13 RX ORDER — HYDROCODONE POLISTIREX AND CHLORPHENIRAMINE POLISTIREX 10; 8 MG/5ML; MG/5ML
5 SUSPENSION, EXTENDED RELEASE ORAL EVERY 12 HOURS PRN
Qty: 100 ML | Refills: 0 | Status: SHIPPED | OUTPATIENT
Start: 2020-07-13 | End: 2020-07-23

## 2020-07-13 NOTE — PROGRESS NOTES
normal bilaterally. EXTERNAL EAR/NOSE:  Normal pinnae and mastoids. Normal external nose. EARS, OTOSCOPY: The TMs and EACs appeared to be normal bilaterally. NOSE:  The nasal septum was midline. The inferior turbinates were normal.  The nasal mucosa and secretions were normal.  No pus or polyps were seen. SINUSES:  The maxillary and frontal sinuses were nontender, bilaterally. LIPS, TEETH, AND GUMS:   Normal.     OROPHARYNX/ORAL CAVITY:  Oral mucosa, hard and soft palates, tongue, and pharynx were normal.      NECK:  No masses or tenderness. The laryngeal cartilages and hyoid bone were normal, with normal laryngeal crepitus. No abnormal appearance, asymmetry or abnormal tracheal position. PALPATION OF LYMPH NODES, CERVICAL, FACIAL AND SUPRACLAVICULAR:  No lymphadenopathy. IMPRESSION / Nas Cook / Glory Distance / PROCEDURES:       Rob Johnston was seen today for cough. Diagnoses and all orders for this visit:    Chronic sinusitis, unspecified location  -     CT SINUS WO CONTRAST    Chronic cough  -     CT SINUS WO CONTRAST  -     HYDROcodone-chlorpheniramine (TUSSIONEX PENNKINETIC ER) 10-8 MG/5ML SUER; Take 5 mLs by mouth every 12 hours as needed (chronic cough) for up to 10 days. Allergic rhinitis, unspecified seasonality, unspecified trigger  -     RESPIRATORY ALLERGEN PROFILE         RECOMMENDATIONS / PLAN:    Return for flexible fiberoptic nasal and nasopharyngolaryngoscopy.

## 2020-07-20 ENCOUNTER — HOSPITAL ENCOUNTER (OUTPATIENT)
Dept: CT IMAGING | Age: 85
Discharge: HOME | End: 2020-07-20
Payer: COMMERCIAL

## 2020-07-20 ENCOUNTER — HOSPITAL ENCOUNTER (OUTPATIENT)
Age: 85
Discharge: HOME | End: 2020-07-20
Payer: COMMERCIAL

## 2020-07-20 PROCEDURE — 82785 ASSAY OF IGE: CPT

## 2020-07-20 PROCEDURE — 70486 CT MAXILLOFACIAL W/O DYE: CPT

## 2020-07-20 PROCEDURE — 86003 ALLG SPEC IGE CRUDE XTRC EA: CPT

## 2020-07-26 LAB
2000687N OAK TREE IGE: <0.1 KU/L
ALLERGEN ASPERGILLUS ALTERNATA IGE: <0.1 KU/L
ALLERGEN ASPERGILLUS FUMIGATUS IGE: <0.1 KU/L
ALLERGEN BERMUDA GRASS IGE: <0.1 KU/L
ALLERGEN BIRCH IGE: <0.1 KU/L
ALLERGEN CAT DANDER IGE: <0.1 KU/L
ALLERGEN COMMON SHORT RAGWEED IGE: <0.1 KU/L
ALLERGEN COTTONWOOD: <0.1 KU/L
ALLERGEN DOG DANDER IGE: <0.1 KU/L
ALLERGEN ELM IGE: <0.1 KU/L
ALLERGEN FUNGI/MOLD M.RACEMOSUS IGE: <0.1 KU/L
ALLERGEN GERMAN COCKROACH IGE: <0.1 KU/L
ALLERGEN HORMODENDRUM HORDEI IGE: <0.1 KU/L
ALLERGEN MAPLE/BOX ELDER IGE: <0.1 KU/L
ALLERGEN MITE DUST FARINAE IGE: <0.1 KU/L
ALLERGEN MITE DUST PTERONYSSINUS IGE: <0.1 KU/L
ALLERGEN MOUNTAIN CEDAR: <0.1 KU/L
ALLERGEN MOUSE EPITHELIA IGE: <0.1 KU/L
ALLERGEN PECAN TREE IGE: <0.1 KU/L
ALLERGEN PENICILLIUM NOTATUM: <0.1 KU/L
ALLERGEN ROUGH PIGWEED (W14) IGE: <0.1 KU/L
ALLERGEN RUSSIAN THISTLE IGE: <0.1 KU/L
ALLERGEN SEE NOTE: NORMAL
ALLERGEN SHEEP SORREL (W18) IGE: <0.1 KU/L
ALLERGEN TIMOTHY GRASS: <0.1 KU/L
ALLERGEN TREE SYCAMORE: <0.1 KU/L
ALLERGEN WALNUT TREE IGE: <0.1 KU/L
ALLERGEN WHITE MULBERRY TREE, IGE: <0.1 KU/L
ALLERGEN, TREE, WHITE ASH IGE: <0.1 KU/L
IGE: 34 KU/L

## 2020-07-30 ENCOUNTER — OFFICE VISIT (OUTPATIENT)
Dept: ENT CLINIC | Age: 85
End: 2020-07-30
Payer: COMMERCIAL

## 2020-07-30 VITALS — SYSTOLIC BLOOD PRESSURE: 194 MMHG | DIASTOLIC BLOOD PRESSURE: 98 MMHG | TEMPERATURE: 98.7 F | HEART RATE: 119 BPM

## 2020-07-30 PROCEDURE — 31575 DIAGNOSTIC LARYNGOSCOPY: CPT | Performed by: OTOLARYNGOLOGY

## 2020-07-30 RX ORDER — CEFUROXIME AXETIL 250 MG/1
250 TABLET ORAL 2 TIMES DAILY
Qty: 28 TABLET | Refills: 0 | Status: SHIPPED | OUTPATIENT
Start: 2020-07-30 | End: 2020-08-13

## 2020-07-30 NOTE — PATIENT INSTRUCTIONS
RHINOSINUSITIS CARE  · Take Probiotic while you are taking antibiotics, to prevent diarrhea, stomach upset, pseudomembranous colitis, and C. difficile diarrhea. This may be obtained at your pharmacy or health food store. Alternatively, you may eat one cup of yogurt with active or live cultures twice daily while taking the antibiotic. Continue for two to three days after completion of the antibiotic. · Use a 12 hour decongestant spray, 0.05% oxymetazoline (e.g. Afrin, Duration, 4-Way). Spray each nostril twice three times a day for three days, then two times a day for 2 days, and then stop for two days and then repeat the cycle once. · Take one Mucinex (blue box) maximum strength 1200 mg tablet every 12 hours, daily for the next 14 days. · Use fluticasone 2 sprays in each nostril once daily. · It is important to take all your medications as prescribed. Please continue your antibiotics as prescribed.        ===================================================================      ADVERSE AND SIDE EFFECTS OF MEDICATIONS:    Please be aware of the following possible adverse reactions, side effects, and complications of the following medications, including, but not limited to: allergic reaction, interactions with other medications, nausea, headache, diarrhea, persistent symptoms, failure to improve, and the following:     Cefuroxime (antibiotic):  diarrhea, colitis (severe infection and inflammation of the large intestine), pseudomembranous colitis (severe infection and inflammation of the colon, usually due to C. difficile bacteria)  yeast or fungal  infections, Moore-Jamarcus syndrome (very rare necrotic skin reaction with peeling of skin, blisters and arthritis), persistent symptoms/infection, and failure to improve.      Fluticasone:  nosebleed, burning sensation, atrophy of nasal mucosa, septal ulceration or perforation, nasal irritation, nasal yeast infection,  drowsiness, bad taste.      ~~~>>> Also

## 2020-07-30 NOTE — PROGRESS NOTES
Chief Complaint   Patient presents with    Other     post nasal drainage    Cough       PROCEDURE:  FLEXIBLE FIBEROPTIC NASAL AND NASOPHARYNGOLARYNGOSCOPY  INDICATION:  Inadequate visualization by indirect laryngoscopy mirror examination and need for detailed endoscopic examination of the nose, larynx and pharynx to evaluate the upper aerodigestive tract for   etiology of chronic cough and post nasal drip. INFORMED CONSENT:  The procedure was described to the patient, including method of anesthesia. The patient was advised of the medical necessity for this procedure. The risks and potential complications were discussed, including, but not limited to, bleeding, infection, adverse reaction to medications, hoarseness, sore throat, inability to obtain adequate visualization, and future need for rigid operative endoscopy. The expected outcome, potential benefits and the alternatives of therapy were discussed. Dixon Pacheco asked appropriate questions and then expressed the lack of any further questions, understanding, acceptance, and the desire to undergo with this procedure, granting verbal informed consent. FINDINGS:  Nasal okay and normal.  Left nf septal spur. Drainage from MM bilaterally onto inf turbinate and into post nasal and NP. The vocal cords appeared to be normal, with no nodule, ulceration, polyp, leukoplakia or other lesions. The vocal cords appeared to be normally mobile bilaterally with midline approximation on phonation. Sensation of the hypopharynx and larynx appeared to be normal when touched by the end of the flexible scope. The nasopharynx, eustachian tube orifices and fossa of Rosenmüller, oropharynx, base of tongue, hypopharynx, supraglottis, subglottis, and piriform sinuses all appeared to be normal, with no lesions. Visualization was excellent throughout the examination.        DESCRIPTION OF PROCEDURE:  The right and left nasal cavity was topically anesthetized and decongested with a 50-50 mixture of 0.05% oxymetazoline solution and topical 4% lidocaine solution by nasal sprayer, twice. After about ten minutes, the flexible fiberoptic nasopharyngolaryngoscope, with camera attached, was inserted through the right nare/nasal cavity and advanced to the nasopharynx and then to the hypopharynx and larynx. The [employer] video system was used. After adequate endoscopic visualization, the endoscope was removed. The patient appeared to tolerate the procedure well with no evidence of perioperative complications. REVIEW OF IMAGES:       I independently reviewed the images of the CT scan of the sinuses, showing chronic maxillary and ethmoid sinusitis. See images and radiologist interpretation report for details. Narrative    EXAMINATION:    CT OF THE SINUS WITHOUT CONTRAST  7/20/2020 3:12 pm           FINDINGS:    SINUSES/MASTOIDS:  Mild mucosal thickening present in the maxillary sinuses.     Moderate opacification bilaterally in the ethmoid sinuses.  Frontal sinuses    and sphenoid sinuses are clear.  Left ostiomeatal complex is patent.  Right    ostiomeatal complex is obstructed by soft tissue.  Septum lies in the    midline.  Moderate to large blanca bullosa in the right middle nasal    turbinates filled with mucus.  Turbinates otherwise normal in size.  The    mastoid air cells are well aerated.         SOFT TISSUES:  Visualized soft tissues demonstrate no acute abnormality.  The    visualized portion of the intracranial contents demonstrate no gross acute    abnormality.              Impression    No evidence of acute sinusitis.  Chronic sinusitis involving the maxillary    and ethmoid sinuses.  Moderate ethmoid opacification with opacification of    both fronto-ethmoid complexes.  Soft tissue obstruction of the right    ostiomeatal complex.  Filling of moderate 2 large blanca bullosa of the right    middle turbinate with mucus.  Significant progression of disease since 06/19/2019.                 INVITRO ALLERGY TESTING   Negative for all antigens tested. COUNSELING FOR ENDOSCOPIC SINUS SURGERY  I counseled Illene Callow for the procedure of ENDOSCOPIC SINUS SURGERY, POSSIBLE POLYPECTOMY, SEPTAL RECONSTRUCTION (SEPTOPLASTY), OR PARTIAL EXCISION, OUTFRACTURE, OR CAUTERIZATION OF NASAL TURBINATES. I advised that the purpose/goal of this surgery is for the treatment of and the attempt to improve chronic and/or recurrent sinusitis, nasal polyps and/or nasal obstruction, with difficulty breathing through the nose. I advised that the expected outcome and potential benefits of surgery, include, but are not limited to:  Decreased frequency of sinus infections, resolution of chronic sinusitis, improvement in nasal breathing. I advised that certain expected conditions may occur after this surgery, usually temporary, including, but not limited to, bleeding, nasal drainage and/or crusting, pain and discomfort, numbness or pain of the upper lip, teeth, and/or gums, decrease in sense of taste or smell, and headache.   I advised Illene Callow of the attendant RISKS AND POTENTIAL COMPLICATIONS, including, but not limited to:  excessive bleeding, infection, persistent or recurrent sinusitis, persistent nasal/post-nasal drainage, excessive crusting in nose, persistent nasal obstruction and/or difficulty breathing and/or snoring, persistence of headache, recurrence of polyps or enlargement of turbinates, loss of part or all of turbinate(s) due to necrosis or slough, chronic pain or neuralgia, numbness of the facial skin in the cheek and lateral nasal areas or the upper lip and gums (infra-orbital nerve injury), atrophic rhinitis (dry nose), decreased (partial or total) sense of smell or taste, need for further surgery or secondary procedure, risks of septoplasty (hole or perforation of the septum, residual deformity or deviation of the septum, depression of nose, saddle-nose deformity), adverse reactions to medications, and the risks and complications of anesthesia. I advised of certain rare complications, including, but not limited to:  central retinal artery occlusion, disturbance or loss of vision, cerebrospinal fluid leak, meningitis, brain abscess/infection, pulmonary embolism, and complications of anesthesia, including cardiac arrest and death. The patient was counseled at length about the risks of rosette Covid-19 during the perioperative period and any recovery window from the procedure. The patient was made aware that rosette Covid-19  may worsen the prognosis for recovering from the procedure and lend to a higher morbidity and/or mortality risk. All material risks, benefits, and reasonable alternatives including postponing the procedure were discussed. The patient does wish to proceed with the procedure at this time. I discussed the alternatives of therapy, including, but not limited to: Further observation and medical treatment. Potential risk, possible consequences, and adverse effects of not undergoing the surgery, including, but not limited to: persistence of sinusitis and the associated symptoms or problems, complications of untreated sinusitis, including, but not limited to, meningitis, brain abscess, loss of vision, or complete, blindness, and death. IMPRESSION / Nguyễn Stone / Robson Williamson / PROCEDURES:       Amelia Saldana was seen today for other and cough. Diagnoses and all orders for this visit:    Chronic ethmoidal sinusitis  -     cefUROXime (CEFTIN) 250 MG tablet; Take 1 tablet by mouth 2 times daily for 14 days    Chronic maxillary sinusitis  -     cefUROXime (CEFTIN) 250 MG tablet; Take 1 tablet by mouth 2 times daily for 14 days    Chronic cough  -     cefUROXime (CEFTIN) 250 MG tablet; Take 1 tablet by mouth 2 times daily for 14 days    Post-nasal drip  -     cefUROXime (CEFTIN) 250 MG tablet;  Take 1 tablet by mouth 2 times daily for 14 days    Other orders  -     Cancel:

## 2020-08-09 PROBLEM — R09.82 POST-NASAL DRIP: Status: ACTIVE | Noted: 2020-08-09

## 2020-08-09 PROBLEM — R05.3 CHRONIC COUGH: Status: ACTIVE | Noted: 2020-08-09

## 2020-08-09 PROBLEM — J32.2 CHRONIC ETHMOIDAL SINUSITIS: Status: ACTIVE | Noted: 2020-08-09

## 2020-08-09 PROBLEM — J32.0 CHRONIC MAXILLARY SINUSITIS: Status: ACTIVE | Noted: 2020-08-09

## 2020-09-09 ENCOUNTER — TELEPHONE (OUTPATIENT)
Dept: ENT CLINIC | Age: 85
End: 2020-09-09

## 2020-12-07 ENCOUNTER — HOSPITAL ENCOUNTER (OUTPATIENT)
Dept: GENERAL RADIOLOGY | Age: 85
Discharge: HOME | End: 2020-12-07
Payer: COMMERCIAL

## 2020-12-07 ENCOUNTER — OFFICE VISIT (OUTPATIENT)
Dept: INTERNAL MEDICINE CLINIC | Age: 85
End: 2020-12-07
Payer: COMMERCIAL

## 2020-12-07 ENCOUNTER — HOSPITAL ENCOUNTER (OUTPATIENT)
Age: 85
Discharge: HOME | End: 2020-12-07
Payer: COMMERCIAL

## 2020-12-07 VITALS
BODY MASS INDEX: 24.96 KG/M2 | OXYGEN SATURATION: 100 % | DIASTOLIC BLOOD PRESSURE: 86 MMHG | WEIGHT: 145.4 LBS | HEART RATE: 96 BPM | TEMPERATURE: 96 F | SYSTOLIC BLOOD PRESSURE: 152 MMHG

## 2020-12-07 DIAGNOSIS — I10 ESSENTIAL HYPERTENSION: ICD-10-CM

## 2020-12-07 PROBLEM — J34.89 NASAL SEPTAL SPUR: Status: ACTIVE | Noted: 2020-09-02

## 2020-12-07 PROBLEM — K21.9 LARYNGOPHARYNGEAL REFLUX (LPR): Status: ACTIVE | Noted: 2020-09-02

## 2020-12-07 PROBLEM — J34.3 HYPERTROPHY OF NASAL TURBINATES: Status: ACTIVE | Noted: 2020-09-02

## 2020-12-07 PROBLEM — I63.9 ACUTE CVA (CEREBROVASCULAR ACCIDENT) (HCC): Status: RESOLVED | Noted: 2019-06-18 | Resolved: 2020-12-07

## 2020-12-07 LAB
ANION GAP SERPL CALCULATED.3IONS-SCNC: 11 MMOL/L (ref 3–16)
BUN BLDV-MCNC: 12 MG/DL (ref 7–20)
CALCIUM SERPL-MCNC: 10.3 MG/DL (ref 8.3–10.6)
CHLORIDE BLD-SCNC: 100 MMOL/L (ref 99–110)
CO2: 27 MMOL/L (ref 21–32)
CREAT SERPL-MCNC: 0.8 MG/DL (ref 0.6–1.2)
GFR AFRICAN AMERICAN: >60
GFR NON-AFRICAN AMERICAN: >60
GLUCOSE BLD-MCNC: 116 MG/DL (ref 70–99)
POTASSIUM SERPL-SCNC: 3.6 MMOL/L (ref 3.5–5.1)
SODIUM BLD-SCNC: 138 MMOL/L (ref 136–145)

## 2020-12-07 PROCEDURE — 99214 OFFICE O/P EST MOD 30 MIN: CPT | Performed by: NURSE PRACTITIONER

## 2020-12-07 PROCEDURE — 71046 X-RAY EXAM CHEST 2 VIEWS: CPT

## 2020-12-07 RX ORDER — ALBUTEROL SULFATE 90 UG/1
1 AEROSOL, METERED RESPIRATORY (INHALATION) 4 TIMES DAILY PRN
Qty: 1 INHALER | Refills: 0 | Status: SHIPPED | OUTPATIENT
Start: 2020-12-07 | End: 2021-01-29 | Stop reason: SDUPTHER

## 2020-12-07 RX ORDER — OLMESARTAN MEDOXOMIL AND HYDROCHLOROTHIAZIDE 20/12.5 20; 12.5 MG/1; MG/1
1 TABLET ORAL DAILY
Qty: 90 TABLET | Refills: 3 | Status: SHIPPED | OUTPATIENT
Start: 2020-12-07 | End: 2021-01-06

## 2020-12-07 ASSESSMENT — ENCOUNTER SYMPTOMS
CHEST TIGHTNESS: 0
WHEEZING: 1
SORE THROAT: 0
COUGH: 1
TROUBLE SWALLOWING: 0
VOICE CHANGE: 0
SINUS PRESSURE: 0
SHORTNESS OF BREATH: 1
FACIAL SWELLING: 0
RHINORRHEA: 1

## 2020-12-07 ASSESSMENT — PATIENT HEALTH QUESTIONNAIRE - PHQ9
1. LITTLE INTEREST OR PLEASURE IN DOING THINGS: 1
SUM OF ALL RESPONSES TO PHQ9 QUESTIONS 1 & 2: 2
SUM OF ALL RESPONSES TO PHQ QUESTIONS 1-9: 2
2. FEELING DOWN, DEPRESSED OR HOPELESS: 1

## 2020-12-07 NOTE — PROGRESS NOTES
12/7/2020    This is a 80 y.o. female   Chief Complaint   Patient presents with    Established New Doctor    Cough     post nasal drip, has been seeing ENT for it, was told no surgery, feels that her glands in her neck are swollen     HPI     Pt is here to establish care. Previously seeing Dr. Bernardino Hunt    Seeing ENT for chronic allergic rhinitis, allergies, chronic PND with cough, dry mouth, hx of thyroid nodules. She has been taking abx and steroids off and on all year. Had CT sinus. Recommended sinus surgery. Had a second opinion - no surgery. Using nasal rinse and humidifier. Was using nasocort - with some relief but not using currently. Using a oral rinse for dry mouth. Does not have follow up for plan. She reports her cough is worsening, some intermittent SOB at times can hear wheezing at times. Discussed seeing pulmonology - did not see yet (needs referral). GERD with history of bleeding ulcer - on PPI  History of anemia 2/2 to gi bleed   Taking pepcid at night to help with cough and neal reflux    HTN - reports better controlled at home 130s/70s  Reports history of white coat syndrome.  Denies any signs of end organ damage     HLD - on CoQ10 and asa    Past Medical History:   Diagnosis Date    Bleeding ulcer     Cancer ([facility] [address]     melanoma L IRIS    Gastroesophagitis     GERD (gastroesophageal reflux disease)     Hyperlipidemia     Hypertension     Pneumonia     Rheumatic fever with heart involvement     Trigger ring finger of right hand 7/1/2013    Unspecified diseases of blood and blood-forming organs     chronic anemia     Past Surgical History:   Procedure Laterality Date    BLADDER REPAIR N/A 2002    BREAST SURGERY      L breast tumor-benign    CYST REMOVAL Right 8-1-13    GANGLION CYST EXCISION RIGHT WRIST, TRIGGER FINGER RELEASE RIGHT FOURTH    EYE SURGERY      , cataract right eye    FRACTURE SURGERY      R [id number]    HYSTERECTOMY      JOINT REPLACEMENT Right High Blood Pressure Father     High Cholesterol Father     Cancer Sister     Stroke Sister     High Blood Pressure Sister     High Cholesterol Sister     High Blood Pressure Brother     High Cholesterol Brother     Heart Attack Brother     Cancer Brother     Heart Attack Brother     Parkinsonism Brother        Current Outpatient Medications   Medication Sig Dispense Refill    Iodine, Kelp, (KELP PO) Take by mouth daily      TURMERIC PO Take by mouth      olmesartan-hydroCHLOROthiazide (BENICAR HCT) 20-12.5 MG per tablet Take 1 tablet by mouth daily 90 tablet 3    Famotidine-Ca Carb-Mag Hydrox (PEPCID COMPLETE PO) Take 1 tablet by mouth nightly      albuterol sulfate HFA (VENTOLIN HFA) 108 (90 Base) MCG/ACT inhaler Inhale 1 puff into the lungs 4 times daily as needed for Wheezing or Shortness of Breath 1 Inhaler 0    esomeprazole (NEXIUM) 40 MG delayed release capsule Take 40 mg by mouth every morning (before breakfast)      Coenzyme Q10 (COQ-10) 100 MG CPCR Take 1 capsule by mouth daily      cetirizine (ZYRTEC) 10 MG tablet Take 10 mg by mouth as needed       Flaxseed, Linseed, (FLAX SEED OIL) 1000 MG CAPS Take 1,200 mg by mouth daily.  aspirin 81 MG chewable tablet Take 81 mg by mouth daily.  ibuprofen (ADVIL;MOTRIN) 600 MG tablet Take 1 tablet by mouth every 6 hours as needed for Pain (Patient not taking: Reported on 12/7/2020) 40 tablet 0    Saline (SIMPLY SALINE) 0.9 % AERS by Nasal route daily as needed        No current facility-administered medications for this visit.         Allergies   Allergen Reactions    Simvastatin Other (See Comments)     Body aches    Codeine [facility] Outpatient Visit on 07/20/2020   Component Date Value Ref Range Status    IgE 07/20/2020 34  <=214 kU/L Final    Comment: REFERENCE INTERVAL: Immunoglobulin E, Serum  Access complete set of age- and/or gender-specific reference intervals  for  this test in the [facility] Test She is not ill-appearing or diaphoretic. HENT:      Head: Normocephalic and atraumatic. Right Ear: Ear canal normal.      Left Ear: Ear canal normal.      Nose: Rhinorrhea present. Mouth/Throat:      Mouth: Mucous membranes are moist.      Pharynx: No oropharyngeal exudate. Cardiovascular:      Rate and Rhythm: Normal rate and regular rhythm. Heart sounds: Normal heart sounds. No murmur. Pulmonary:      Effort: Pulmonary effort is normal. No respiratory distress. Breath sounds: Normal breath sounds. No wheezing or rhonchi. Skin:     General: Skin is warm and dry. Neurological:      General: No focal deficit present. Mental Status: She is alert and oriented to person, place, and time. Psychiatric:         Mood and Affect: Mood and affect normal.         Behavior: Behavior normal.       Diagnosis  Assessment and Plan  1. Essential hypertension  Stable, controlled on current regimen. Better controlled at home   History of white coat syndrome   Some improvement with recheck   - olmesartan-hydroCHLOROthiazide (BENICAR HCT) 20-12.5 MG per tablet; Take 1 tablet by mouth daily  Dispense: 90 tablet; Refill: 3  - Basic Metabolic Panel; Future    2. High cholesterol  Stable, controlled on current regimen. 3. Multiple thyroid nodules  Stable, controlled on current regimen. Seeing ENT     4. Post-nasal drip  Stable, uncontrolled  Discussed restarted nasal steroid for better control   Will f/u with ENT     5. Laryngopharyngeal reflux (LPR)  Stable, controlled on current regimen. On PPI and taking pepcid at night for better control   - XR CHEST STANDARD (2 VW); Future    6. At high risk for falls    7. Chronic cough  Stable, uncontrolled  Lungs clear today - although reports intermittent SOB and wheezing - checking xray   Trial of CHANDAN - discussed seeing pulmonology - would like cxr first   - XR CHEST STANDARD (2 VW);  Future  - albuterol sulfate HFA (VENTOLIN HFA) 108 (90 Base)

## 2020-12-09 ENCOUNTER — TELEPHONE (OUTPATIENT)
Dept: ADMINISTRATIVE | Age: 85
End: 2020-12-09

## 2020-12-09 NOTE — TELEPHONE ENCOUNTER
Submitted PA for Albuterol Sulfate  (90 Base)MCG/ACT aerosol  Via CMM STATUS: The patient currently has access to the requested medication and a Prior Authorization is not needed for the patient/medication. .Please notify patient, thank you.

## 2020-12-21 ENCOUNTER — NURSE TRIAGE (OUTPATIENT)
Dept: OTHER | Facility: CLINIC | Age: 85
End: 2020-12-21

## 2020-12-22 ENCOUNTER — OFFICE VISIT (OUTPATIENT)
Dept: INTERNAL MEDICINE CLINIC | Age: 85
End: 2020-12-22
Payer: COMMERCIAL

## 2020-12-22 VITALS
TEMPERATURE: 97.1 F | SYSTOLIC BLOOD PRESSURE: 174 MMHG | HEART RATE: 99 BPM | WEIGHT: 145.4 LBS | HEIGHT: 63 IN | BODY MASS INDEX: 25.76 KG/M2 | DIASTOLIC BLOOD PRESSURE: 90 MMHG

## 2020-12-22 PROCEDURE — 99213 OFFICE O/P EST LOW 20 MIN: CPT | Performed by: NURSE PRACTITIONER

## 2020-12-22 RX ORDER — METHYLPREDNISOLONE 4 MG/1
TABLET ORAL
Qty: 1 KIT | Refills: 0 | Status: SHIPPED | OUTPATIENT
Start: 2020-12-22 | End: 2020-12-28

## 2020-12-22 ASSESSMENT — ENCOUNTER SYMPTOMS
COUGH: 1
DIARRHEA: 0
SHORTNESS OF BREATH: 0
ABDOMINAL PAIN: 0
WHEEZING: 0
CONSTIPATION: 0
VOMITING: 0
NAUSEA: 0

## 2020-12-22 NOTE — PROGRESS NOTES
Acute Office Visit  12/22/2020    SUBJECTIVE:    Patient ID: Montana Reynoso is a 80 y.o. female. Chief Complaint   Patient presents with    Cough     Pt has had for over a year. HPI: The patient presents to the office for an acute visit. Pt's PCP note from 12/7/2020 states \"Seeing ENT for chronic allergic rhinitis, allergies, chronic PND with cough, dry mouth, hx of thyroid nodules. She has been taking abx and steroids off and on all year. Had CT sinus. Recommended sinus surgery. Had a second opinion - no surgery. Using nasal rinse and humidifier. Was using nasocort - with some relief but not using currently. Using a oral rinse for dry mouth. Does not have follow up for plan. She reports her cough is worsening, some intermittent SOB at times can hear wheezing at times. Discussed seeing pulmonology - did not see yet (needs referral). \"    CXR was ordered and albuterol was prescribed. CXR showed no acute findings. Pt reports a cough \"forever\" - around 12 months. Has had ATB 8 times and steroids 3-4 times per pt. Intermittent for months. Stable. No other URI s/s. Denies sore throat/nasal congestion/fevers/chills. Allergies   Allergen Reactions    Simvastatin Other (See Comments)     Body aches    Codeine Hives     Current Outpatient Medications   Medication Sig Dispense Refill    methylPREDNISolone (MEDROL DOSEPACK) 4 MG tablet Take by mouth.  1 kit 0    Iodine, Kelp, (KELP PO) Take by mouth daily      TURMERIC PO Take by mouth      olmesartan-hydroCHLOROthiazide (BENICAR HCT) 20-12.5 MG per tablet Take 1 tablet by mouth daily 90 tablet 3    Famotidine-Ca Carb-Mag Hydrox (PEPCID COMPLETE PO) Take 1 tablet by mouth nightly      albuterol sulfate HFA (VENTOLIN HFA) 108 (90 Base) MCG/ACT inhaler Inhale 1 puff into the lungs 4 times daily as needed for Wheezing or Shortness of Breath 1 Inhaler 0  ibuprofen (ADVIL;MOTRIN) 600 MG tablet Take 1 tablet by mouth every 6 hours as needed for Pain 40 tablet 0    esomeprazole (NEXIUM) 40 MG delayed release capsule Take 40 mg by mouth every morning (before breakfast)      Coenzyme Q10 (COQ-10) 100 MG CPCR Take 1 capsule by mouth daily      cetirizine (ZYRTEC) 10 MG tablet Take 10 mg by mouth as needed       Saline (SIMPLY SALINE) 0.9 % AERS by Nasal route daily as needed       Flaxseed, Linseed, (FLAX SEED OIL) 1000 MG CAPS Take 1,200 mg by mouth daily.  aspirin 81 MG chewable tablet Take 81 mg by mouth daily. No current facility-administered medications for this visit. Review of Systems   Constitutional: Negative for chills, fatigue, fever and unexpected weight change. Eyes: Negative for visual disturbance. Respiratory: Positive for cough. Negative for shortness of breath and wheezing. Cardiovascular: Negative for chest pain, palpitations and leg swelling. Gastrointestinal: Negative for abdominal pain, constipation, diarrhea, nausea and vomiting. Skin: Negative for pallor and rash. Neurological: Negative for dizziness, weakness, light-headedness, numbness and headaches. OBJECTIVE:  BP (!) 174/90   Pulse 99   Temp 97.1 °F (36.2 °C) (Temporal)   Ht 5' 3\" (1.6 m)   Wt 145 lb 6.4 oz (66 kg)   BMI 25.76 kg/m²    Physical Exam  Vitals signs reviewed. Constitutional:       General: She is not in acute distress. Appearance: She is well-developed. She is not diaphoretic. HENT:      Head: Normocephalic and atraumatic. Eyes:      Pupils: Pupils are equal, round, and reactive to light. Cardiovascular:      Rate and Rhythm: Normal rate and regular rhythm. Pulmonary:      Effort: Pulmonary effort is normal. No respiratory distress. Breath sounds: Normal breath sounds. No wheezing or rales. Comments: Cough noted throughout the visit  Chest:      Chest wall: No tenderness.    Skin: General: Skin is warm and dry. Findings: No rash. Neurological:      Mental Status: She is alert and oriented to person, place, and time. Coordination: Coordination normal.   Psychiatric:         Mood and Affect: Mood normal.       Vitals 12/22/2020 [id number]/2957   SYSTOLIC 522 282   DIASTOLIC 90 98     ASSESSMENT/PLAN:  Ana Maria Baker was seen today for cough. Diagnoses and all orders for this visit:    Chronic cough  -    Coughing throughout the visit. Recommend steroids. Patient reports she has been on multiple antibiotics and she is agreeable to steroids at this time.  - Recommend pulmonology referral- pt agreeable. - Carolyne Enciso MD, Pulmonary, [facility]  - methylPREDNISolone (MEDROL DOSEPACK) 4 MG tablet; Take by mouth. - patient education handout provided and reviewed with the pt. - Patient will call if symptoms worsen or fail to improve  - Red flag warning signs reviewed with the patient she will go to the ER if these occur    Essential hypertension  - BP elevated today. Appears blood pressure was elevated last visit as well. - She states that she is taking medications as prescribed. White coat HTN reported. Home BP readings running 150/80s per pt. - Recommended dose increase on BP medication. The patient declines. The patient reports that she is interested in keeping a daily blood pressure log and calling with elevated readings.  - Recommended close BP follow-up to reevaluate  - Red flag warning signs reviewed with the patient she will go to the ER if these occur    Return in about 2 weeks (around 1/5/2021) for BP f/u, or sooner if needed. Pt informed to call if symptoms worsen or fail to improve. All questions answered. Patient states no further questions or concerns at this time.     Electronically signed by ROSE Braxton CNP 12/22/20

## 2020-12-22 NOTE — PATIENT INSTRUCTIONS
Call to schedule with pulmonology    Keep BP log daily and bring to appt. Take all steroids as prescribed      Patient Education   methylprednisolone (oral)  Pronunciation:  METH il pred NIS oh lone  Brand:  Medrol, Medrol Dosepak, MethylPREDNISolone Dose Pack  What is the most important information I should know about methylprednisolone? You should not use this medicine if you have a fungal infection anywhere in your body. What is methylprednisolone? Methylprednisolone is a steroid that prevents the release of substances in the body that cause inflammation. Methylprednisolone is used to treat many different inflammatory conditions such as arthritis, lupus, psoriasis, ulcerative colitis, allergic disorders, gland (endocrine) disorders, and conditions that affect the skin, eyes, lungs, stomach, nervous system, or blood cells. Methylprednisolone may also be used for purposes not listed in this medication guide. What should I discuss with my healthcare provider before taking methylprednisolone? You should not use methylprednisolone if you are allergic to it, or if you have:  · a fungal infection anywhere in your body. Methylprednisolone can weaken your immune system, making it easier for you to get an infection. Steroids can also worsen an infection you already have, or reactivate an infection you recently had. Tell your doctor about any illness or infection you have had within the past several weeks. To make sure methylprednisolone is safe for you, tell your doctor if you have ever had:  · a thyroid disorder;  · herpes infection of the eyes;  · stomach ulcers, ulcerative colitis, or diverticulitis;  · depression, mental illness, or psychosis;  · liver disease (especially cirrhosis);  · high blood pressure;  · osteoporosis;  · a muscle disorder such as myasthenia gravis; or  · multiple sclerosis. An overdose of methylprednisolone is not expected to produce life threatening symptoms. However, long term use of high steroid doses can lead to symptoms such as thinning skin, easy bruising, changes in the shape or location of body fat (especially in your face, neck, back, and waist), increased acne or facial hair, menstrual problems, impotence, or loss of interest in sex. What should I avoid while taking methylprednisolone? Avoid being near people who are sick or have infections. Call your doctor for preventive treatment if you are exposed to chicken pox or measles. These conditions can be serious or even fatal in people who are using steroid medication. Do not receive a \"live\" vaccine while using methylprednisolone. The vaccine may not work as well during this time, and may not fully protect you from disease. Live vaccines include measles, mumps, rubella (MMR), polio, rotavirus, typhoid, yellow fever, varicella (chickenpox), zoster (shingles), and nasal flu (influenza) vaccine. What are the possible side effects of methylprednisolone? Get emergency medical help if you have signs of an allergic reaction: hives; difficult breathing; swelling of your face, lips, tongue, or throat. Call your doctor at once if you have:  · shortness of breath (even with mild exertion), swelling, rapid weight gain;  · bruising, thinning skin, or any wound that will not heal;  · blurred vision, tunnel vision, eye pain, or seeing halos around lights;  · severe depression, changes in personality, unusual thoughts or behavior;  · new or unusual pain in an arm or leg or in your back;  · bloody or tarry stools, coughing up blood or vomit that looks like coffee grounds;  · seizure (convulsions); or  · low potassium --leg cramps, constipation, irregular heartbeats, fluttering in your chest, increased thirst or urination, numbness or tingling. Steroids can affect growth in children. Tell your doctor if your child is not growing at a normal rate while using this medicine. Common side effects may include:  · fluid retention (swelling in your hands or ankles);  · dizziness, spinning sensation;  · changes in your menstrual periods;  · headache;  · mild muscle pain or weakness; or  · stomach discomfort, bloating. This is not a complete list of side effects and others may occur. Call your doctor for medical advice about side effects. You may report side effects to FDA at 3-919-FDA-4771. What other drugs will affect methylprednisolone? Other drugs may interact with methylprednisolone, including prescription and over-the-counter medicines, vitamins, and herbal products. Tell each of your health care providers about all medicines you use now and any medicine you start or stop using. Where can I get more information? Your pharmacist can provide more information about methylprednisolone. Remember, keep this and all other medicines out of the reach of children, never share your medicines with others, and use this medication only for the indication prescribed. Every effort has been made to ensure that the information provided by [address] is accurate, up-to-date, and complete, but no guarantee is made to that effect. Drug information contained herein may be time sensitive. [facility] information has been compiled for use by healthcare practitioners and consumers in the [place] and therefore [facility] does not warrant that uses outside of the [place] are appropriate, unless specifically indicated otherwise. [facility]'s drug information does not endorse drugs, diagnose patients or recommend therapy. [facility]'s drug information is an informational resource designed to assist licensed healthcare practitioners in caring for their patients and/or to serve consumers viewing this service as a supplement to, and not a substitute for, the expertise, skill, knowledge and judgment of healthcare practitioners. The absence of a warning for a given drug or drug combination in no way should be construed to indicate that the drug or drug combination is safe, effective or appropriate for any given patient. [facility] does not assume any responsibility for any aspect of healthcare administered with the aid of information [facility] provides. The information contained herein is not intended to cover all possible uses, directions, precautions, warnings, drug interactions, allergic reactions, or adverse effects. If you have questions about the drugs you are taking, check with your doctor, nurse or pharmacist.  Copyright 7816-4673 [address]. Version: 9.01. Revision date: 8/30/2017. Care instructions adapted under license by [facility] ([facility]). If you have questions about a medical condition or this instruction, always ask your healthcare professional. [place] [address] any warranty or liability for your use of this information.

## 2020-12-23 NOTE — TELEPHONE ENCOUNTER
Evaluated in office 12/22
Please do not respond through this encounter as the response is not directed to a shared pool. Warm transfer to [facility] ([facility]).

## 2021-01-06 ENCOUNTER — OFFICE VISIT (OUTPATIENT)
Dept: INTERNAL MEDICINE CLINIC | Age: 86
End: 2021-01-06
Payer: COMMERCIAL

## 2021-01-06 VITALS
DIASTOLIC BLOOD PRESSURE: 82 MMHG | OXYGEN SATURATION: 96 % | BODY MASS INDEX: 26.29 KG/M2 | TEMPERATURE: 96 F | SYSTOLIC BLOOD PRESSURE: 138 MMHG | WEIGHT: 148.4 LBS | HEART RATE: 78 BPM

## 2021-01-06 DIAGNOSIS — I10 ESSENTIAL HYPERTENSION: Primary | ICD-10-CM

## 2021-01-06 PROCEDURE — 99212 OFFICE O/P EST SF 10 MIN: CPT | Performed by: NURSE PRACTITIONER

## 2021-01-06 RX ORDER — OLMESARTAN MEDOXOMIL AND HYDROCHLOROTHIAZIDE 40/12.5 40; 12.5 MG/1; MG/1
TABLET ORAL
COMMUNITY
Start: 2020-12-08 | End: 2021-03-10

## 2021-01-06 ASSESSMENT — PATIENT HEALTH QUESTIONNAIRE - PHQ9
SUM OF ALL RESPONSES TO PHQ QUESTIONS 1-9: 1
2. FEELING DOWN, DEPRESSED OR HOPELESS: 1
SUM OF ALL RESPONSES TO PHQ QUESTIONS 1-9: 1
SUM OF ALL RESPONSES TO PHQ QUESTIONS 1-9: 1

## 2021-01-06 NOTE — PROGRESS NOTES
1/6/21     Chief Complaint   Patient presents with    2 Week Follow-Up     hypertension     HPI     Here for HTN f/u   Noted to have elevated BP during acute visit 2 weeks ago   No medication changes at that time  Pt monitoring BP at home - running 115-140/70s     Chronic cough - was worse and improved with recent round of steroids. Doing better. Seeing pulmonology. Allergies   Allergen Reactions    Simvastatin Other (See Comments)     Body aches    Codeine Hives     Current Outpatient Medications   Medication Sig Dispense Refill    olmesartan-hydroCHLOROthiazide (BENICAR HCT) 40-12.5 MG per tablet TAKE 1 TABLET BY MOUTH DAILY      Iodine, Kelp, (KELP PO) Take by mouth daily      TURMERIC PO Take by mouth      Famotidine-Ca Carb-Mag Hydrox (PEPCID COMPLETE PO) Take 1 tablet by mouth nightly      albuterol sulfate HFA (VENTOLIN HFA) 108 (90 Base) MCG/ACT inhaler Inhale 1 puff into the lungs 4 times daily as needed for Wheezing or Shortness of Breath 1 Inhaler 0    ibuprofen (ADVIL;MOTRIN) 600 MG tablet Take 1 tablet by mouth every 6 hours as needed for Pain 40 tablet 0    esomeprazole (NEXIUM) 40 MG delayed release capsule Take 40 mg by mouth every morning (before breakfast)      Coenzyme Q10 (COQ-10) 100 MG CPCR Take 1 capsule by mouth daily      cetirizine (ZYRTEC) 10 MG tablet Take 10 mg by mouth as needed       Saline (SIMPLY SALINE) 0.9 % AERS by Nasal route daily as needed       Flaxseed, Linseed, (FLAX SEED OIL) 1000 MG CAPS Take 1,200 mg by mouth daily.  aspirin 81 MG chewable tablet Take 81 mg by mouth daily. No current facility-administered medications for this visit. Review of Systems  Negative other than HPI    Vitals:    01/06/21 1032   BP: 138/82   Pulse: 78   Temp: 96 °F (35.6 °C)   SpO2: 96%   Weight: 148 lb 6.4 oz (67.3 kg)      Physical Exam  Vitals signs reviewed. Constitutional:       General: She is not in acute distress. Appearance: She is well-developed. She is not ill-appearing or diaphoretic. HENT:      Head: Normocephalic and atraumatic. Cardiovascular:      Rate and Rhythm: Normal rate and regular rhythm. Heart sounds: Normal heart sounds. No murmur. Pulmonary:      Effort: Pulmonary effort is normal. No respiratory distress. Breath sounds: Normal breath sounds. No wheezing or rhonchi. Skin:     General: Skin is warm and dry. Neurological:      General: No focal deficit present. Mental Status: She is alert and oriented to person, place, and time. Psychiatric:         Mood and Affect: Mood and affect normal.         Behavior: Behavior normal.       Assessment/Plan:  1. Essential hypertension  Stable, controlled on current regimen. Discussed medications with patient, who voiced understanding of their use and indications. All questions answered.     Keep AWV appt as scheduled     Electronically signed by ROSE Curry CNP on 1/6/2021 at 10:45 AM

## 2021-01-29 DIAGNOSIS — R05.3 CHRONIC COUGH: ICD-10-CM

## 2021-01-29 RX ORDER — ALBUTEROL SULFATE 90 UG/1
1 AEROSOL, METERED RESPIRATORY (INHALATION) 4 TIMES DAILY PRN
Qty: 1 INHALER | Refills: 1 | Status: SHIPPED | OUTPATIENT
Start: 2021-01-29 | End: 2022-07-05

## 2021-02-02 ENCOUNTER — APPOINTMENT (OUTPATIENT)
Dept: CT IMAGING | Age: 86
End: 2021-02-02
Payer: COMMERCIAL

## 2021-02-02 ENCOUNTER — HOSPITAL ENCOUNTER (EMERGENCY)
Age: 86
Discharge: HOME | End: 2021-02-02
Attending: EMERGENCY MEDICINE
Payer: COMMERCIAL

## 2021-02-02 ENCOUNTER — APPOINTMENT (OUTPATIENT)
Dept: GENERAL RADIOLOGY | Age: 86
End: 2021-02-02
Payer: COMMERCIAL

## 2021-02-02 VITALS
RESPIRATION RATE: 18 BRPM | HEART RATE: 95 BPM | OXYGEN SATURATION: 100 % | SYSTOLIC BLOOD PRESSURE: 182 MMHG | DIASTOLIC BLOOD PRESSURE: 91 MMHG | TEMPERATURE: 98.3 F

## 2021-02-02 DIAGNOSIS — S61.411A LACERATION OF RIGHT HAND WITHOUT FOREIGN BODY, INITIAL ENCOUNTER: ICD-10-CM

## 2021-02-02 DIAGNOSIS — S62.316A DISPLACED FRACTURE OF BASE OF FIFTH METACARPAL BONE, RIGHT HAND, INITIAL ENCOUNTER FOR CLOSED FRACTURE: ICD-10-CM

## 2021-02-02 DIAGNOSIS — S09.90XA INJURY OF HEAD, INITIAL ENCOUNTER: ICD-10-CM

## 2021-02-02 DIAGNOSIS — S01.81XA LACERATION OF FOREHEAD, INITIAL ENCOUNTER: Primary | ICD-10-CM

## 2021-02-02 DIAGNOSIS — S80.01XA CONTUSION OF RIGHT KNEE, INITIAL ENCOUNTER: ICD-10-CM

## 2021-02-02 PROCEDURE — 29125 APPL SHORT ARM SPLINT STATIC: CPT

## 2021-02-02 PROCEDURE — 70450 CT HEAD/BRAIN W/O DYE: CPT

## 2021-02-02 PROCEDURE — 99284 EMERGENCY DEPT VISIT MOD MDM: CPT

## 2021-02-02 PROCEDURE — 6370000000 HC RX 637 (ALT 250 FOR IP): Performed by: EMERGENCY MEDICINE

## 2021-02-02 PROCEDURE — 2500000003 HC RX 250 WO HCPCS: Performed by: STUDENT IN AN ORGANIZED HEALTH CARE EDUCATION/TRAINING PROGRAM

## 2021-02-02 PROCEDURE — 73110 X-RAY EXAM OF WRIST: CPT

## 2021-02-02 PROCEDURE — 6360000002 HC RX W HCPCS: Performed by: EMERGENCY MEDICINE

## 2021-02-02 PROCEDURE — 12002 RPR S/N/AX/GEN/TRNK2.6-7.5CM: CPT

## 2021-02-02 PROCEDURE — 73562 X-RAY EXAM OF KNEE 3: CPT

## 2021-02-02 PROCEDURE — 90715 TDAP VACCINE 7 YRS/> IM: CPT | Performed by: EMERGENCY MEDICINE

## 2021-02-02 PROCEDURE — 12011 RPR F/E/E/N/L/M 2.5 CM/<: CPT

## 2021-02-02 PROCEDURE — 90471 IMMUNIZATION ADMIN: CPT | Performed by: EMERGENCY MEDICINE

## 2021-02-02 PROCEDURE — 72125 CT NECK SPINE W/O DYE: CPT

## 2021-02-02 RX ORDER — BACITRACIN ZINC AND POLYMYXIN B SULFATE 500; 1000 [USP'U]/G; [USP'U]/G
OINTMENT TOPICAL ONCE
Status: COMPLETED | OUTPATIENT
Start: 2021-02-02 | End: 2021-02-02

## 2021-02-02 RX ADMIN — LIDOCAINE HYDROCHLORIDE 20 ML: 10; .005 INJECTION, SOLUTION EPIDURAL; INFILTRATION; INTRACAUDAL; PERINEURAL at 15:51

## 2021-02-02 RX ADMIN — BACITRACIN ZINC AND POLYMYXIN B SULFATE: 500; 10000 OINTMENT TOPICAL at 15:52

## 2021-02-02 RX ADMIN — TETANUS TOXOID, REDUCED DIPHTHERIA TOXOID AND ACELLULAR PERTUSSIS VACCINE, ADSORBED 0.5 ML: 5; 2.5; 8; 8; 2.5 SUSPENSION INTRAMUSCULAR at 16:19

## 2021-02-02 ASSESSMENT — PAIN DESCRIPTION - LOCATION: LOCATION: HEAD;HAND;LEG

## 2021-02-02 ASSESSMENT — PAIN DESCRIPTION - DESCRIPTORS: DESCRIPTORS: CONSTANT

## 2021-02-02 ASSESSMENT — PAIN DESCRIPTION - ORIENTATION: ORIENTATION: RIGHT

## 2021-02-02 ASSESSMENT — PAIN SCALES - GENERAL: PAINLEVEL_OUTOF10: 0

## 2021-02-02 NOTE — ED NOTES
Patient discharged to home via family. Written discharge instructions reviewed with understanding. Copy of AVD sent home with patient. Patient discharged from ED by wheelchair.      Mahi Delvalle RN  02/02/21 3949

## 2021-02-02 NOTE — ED PROVIDER NOTES
ED Attending Attestation Note     Date of evaluation: 2/2/2021    This patient was seen by the resident. I have seen and examined the patient, agree with the workup, evaluation, management and diagnosis. The care plan has been discussed. My assessment reveals patient here after mechanical fall during which she sustained head, arm, and leg injuries. Imaging shows base of 5th MC fx.  CTs negative. Lacerations repaired by me and dressings applied. Ulnar gutter splint applied and patient will f/u with her hand specialist Dr. Kirt Davies. Laceration Repair Procedure Note    Indication: Laceration    Procedure: The patient was placed in the appropriate position and anesthesia around the lacerations were obtained by infiltration using 5.0 cc of 1% Lidocaine with epinephrine. The area was then cleansed using chlorhexidine, irrigated with normal saline and draped in a sterile fashion. The laceration was closed with 5-0 Ethilon using interrupted sutures. A second laceration on the right hand was closed with 6-0 Ethilon using interrupted sutures. The wound area was then dressed with bacitracin and a bandage. Total repaired wound length: 4 cm. Other Items: None    The patient tolerated the procedure well.     Complications: None         Paula Han MD  02/02/21 8699

## 2021-02-02 NOTE — ED NOTES
Bed: B25-25  Expected date:   Expected time:   Means of arrival:   Comments:  [address], RN  02/02/21 1400

## 2021-02-02 NOTE — ED PROVIDER NOTES
[facility] RESIDENT NOTE       Date of evaluation: 2/2/2021    Chief Complaint     Fall (Pt tripped and fell on stairs. Lac above right eye. Skin tear on right hand, \"feels broke\" ), Head Injury, Facial Laceration, Hand Pain (right), and Knee Pain (right)      History of Present Illness     Trinidad Pal is a 80 y.o. female w/ past medical history of GERD, HLD, HTN, left eye melanoma who presents with a fall. Patient reports she was at her sister's house and she was walking on the stairs she missed a step and fell and hit the right side of her forehead. She explains that she also hurt her right wrist and right knee and right cheek. Patient endorses not losing consciousness and is sure that she tripped on a step. On examination patient has small laceration on the right side on top of her eyebrow. Patient does have some scrapes and bruises on cheek, right hand and wrist, and right knee. Patient is neurologically intact, she is alert and oriented x4. Review of Systems     Review of Systems   Constitutional: Negative. Past Medical, Surgical, Family, and Social History     She has a past medical history of Bleeding ulcer, Cancer ([address] Gastroesophagitis, GERD (gastroesophageal reflux disease), Hyperlipidemia, Hypertension, Pneumonia, Rheumatic fever with heart involvement, Trigger ring finger of right hand, and Unspecified diseases of blood and blood-forming organs. She has a past surgical history that includes Hysterectomy; Tonsillectomy; tumor removal; Rectocele repair; Breast surgery; fracture surgery; Upper gastrointestinal endoscopy (3/7/12); bladder repair (N/A, 2002); cyst removal (Right, 8-1-13); joint replacement (Right, 03/28/2012); shoulder surgery (Right); Upper gastrointestinal endoscopy (11/8/13); Upper gastrointestinal endoscopy (4/24/14); eye surgery; Upper gastrointestinal endoscopy (3/17/16); and Upper gastrointestinal endoscopy (06/29/2017). Her family history includes Cancer in her brother and sister; Heart Attack in her brother and brother; Heart Disease in her father and mother; High Blood Pressure in her brother, father, mother, and sister; High Cholesterol in her brother, father, mother, and sister; Parkinsonism in her brother; Stroke in her sister. She reports that she has never smoked. She has never used smokeless tobacco. She reports that she does not drink alcohol or use drugs.     Medications     Current Discharge Medication List      CONTINUE these medications which have NOT CHANGED    Details   albuterol sulfate HFA (VENTOLIN HFA) 108 (90 Base) MCG/ACT inhaler Inhale 1 puff into the lungs 4 times daily as needed for Wheezing or Shortness of Breath  Qty: 1 Inhaler, Refills: 1    Associated Diagnoses: Chronic cough      olmesartan-hydroCHLOROthiazide (BENICAR HCT) 40-12.5 MG per tablet TAKE 1 TABLET BY MOUTH DAILY      Iodine, Kelp, (KELP PO) Take by mouth daily      TURMERIC PO Take by mouth      Famotidine-Ca Carb-Mag Hydrox (PEPCID COMPLETE PO) Take 1 tablet by mouth nightly      ibuprofen (ADVIL;MOTRIN) 600 MG tablet Take 1 tablet by mouth every 6 hours as needed for Pain  Qty: 40 tablet, Refills: 0      esomeprazole (NEXIUM) 40 MG delayed release capsule Take 40 mg by mouth every morning (before breakfast)      Coenzyme Q10 (COQ-10) 100 MG CPCR Take 1 capsule by mouth daily      cetirizine (ZYRTEC) 10 MG tablet Take 10 mg by mouth as needed Saline (SIMPLY SALINE) 0.9 % AERS by Nasal route daily as needed       Flaxseed, Linseed, (FLAX SEED OIL) 1000 MG CAPS Take 1,200 mg by mouth daily. aspirin 81 MG chewable tablet Take 81 mg by mouth daily. Allergies     She is allergic to simvastatin and codeine. Physical Exam     INITIAL VITALS: BP: (!) 196/89, Temp: 98.3 °F (36.8 °C), Pulse: 95, Resp: 18, SpO2: 100 %   Physical Exam  Constitutional:       Appearance: Normal appearance. HENT:      Head: Normocephalic. Comments: Laceration on top of right eyebrow   Eyes:      Extraocular Movements: Extraocular movements intact. Pupils: Pupils are equal, round, and reactive to light. Neck:      Musculoskeletal: Normal range of motion and neck supple. Cardiovascular:      Rate and Rhythm: Normal rate and regular rhythm. Pulses: Normal pulses. Heart sounds: Normal heart sounds. Pulmonary:      Effort: Pulmonary effort is normal.      Breath sounds: Normal breath sounds. Abdominal:      Palpations: Abdomen is soft. Musculoskeletal: Normal range of motion. General: Tenderness present. Comments: Lacerations and tenderness present on right wrist, right cheek, and right knee    Neurological:      General: No focal deficit present. Mental Status: She is alert. Psychiatric:         Mood and Affect: Mood normal.         Behavior: Behavior normal.         Thought Content: Thought content normal.         Diagnostic Results       RADIOLOGY:  XR KNEE RIGHT (3 VIEWS)   Final Result   1. Mild degenerative changes      XR WRIST RIGHT (MIN 3 VIEWS)   Final Result   1. Transverse fracture of the proximal shaft of the base of fifth metacarpal #2.   2. Mild degenerative changes   3. Osteoporosis      CT Cervical Spine WO Contrast   Final Result      1. No discrete findings for acute traumatic cervical spine abnormality. 2.  Multilevel degenerative spondylosis and facet arthropathy contributing to neural foraminal narrowing at multiple levels. CT Head WO Contrast   Final Result      1. No findings for acute intracranial abnormality. 2.  Age related atrophy with mild periventricular white matter changes bilaterally consistent with chronic small vessel ischemia. 3.  Right frontal scalp edema without underlying skull fracture. 4.  Pansinus disease. LABS:   No results found for this visit on 02/02/21. RECENT VITALS:  BP: (!) 182/91, Temp: 98.3 °F (36.8 °C),Pulse: 95, Resp: 18, SpO2: 100 %     Procedures         ED Course     Nursing Notes, Past Medical Hx, Past Surgical Hx, Social Hx, Allergies, and FamilyHx were reviewed.     The patient was giventhe following medications:  Orders Placed This Encounter   Medications    lidocaine-EPINEPHrine 1 percent-1:264617 injection 20 mL    bacitracin-polymyxin b (POLYSPORIN) ointment    Tetanus-Diphth-Acell Pertussis (BOOSTRIX) injection 0.5 mL       CONSULTS:  None    MEDICAL DECISION Rei Grimaldo / Vicky Alvarez / Mike Santa Rosa Carrion is a 80 y.o. female w/ past medical history of GERD, HLD, HTN, left eye melanoma who presents with a fall. Patient reports she was at her sister's house and she was walking on the stairs she missed a step and fell and hit the right side of her forehead. She explains that she also hurt her right wrist and right knee and right cheek. We ordered CT head, CT cervical spine. CT head and CT cervical came back within normal limits with no acute bleeds or fractures. X-ray of the right right knee resulted within normal limits without any fracture. X-ray of right wrist resulted with transverse fracture of the proximal shaft of the base of the fifth metacarpal.  Patient will receive ulnar gutter splint. Patient received tetanus injection. Patient received stitches for laceration. Patient follow-up with Dr. Marcelo Curtis. Discussed plan with patient patient agreeable and is stable upon discharge. This patient was also evaluated by the attending physician. All care plans were discussed and agreed upon. Clinical Impression     1. Laceration of forehead, initial encounter    2. Laceration of right hand without foreign body, initial encounter    3. Displaced fracture of base of fifth metacarpal bone, right hand, initial encounter for closed fracture    4. Contusion of right knee, initial encounter    5.  Injury of head, initial encounter        Disposition     PATIENT REFERRED TO:  Rosa Maria Denton MD  [address] [place] [address]  [phone]    Call in 1 day  to schedule followup appointment      DISCHARGE MEDICATIONS:  Current Discharge Medication List          Ruben Mcdonald MD  Resident  02/02/21 9851

## 2021-02-05 ENCOUNTER — OFFICE VISIT (OUTPATIENT)
Dept: ORTHOPEDIC SURGERY | Age: 86
End: 2021-02-05
Payer: COMMERCIAL

## 2021-02-05 VITALS — BODY MASS INDEX: 26.22 KG/M2 | WEIGHT: 148 LBS | HEIGHT: 63 IN | TEMPERATURE: 98 F

## 2021-02-05 DIAGNOSIS — S62.398A: Primary | ICD-10-CM

## 2021-02-05 PROCEDURE — 26600 TREAT METACARPAL FRACTURE: CPT | Performed by: PHYSICIAN ASSISTANT

## 2021-02-05 PROCEDURE — 99203 OFFICE O/P NEW LOW 30 MIN: CPT | Performed by: PHYSICIAN ASSISTANT

## 2021-02-05 NOTE — PROGRESS NOTES
I applied a Short Arm Fiberglass Cast incorporating the Ring Finger and Small Finger in an intrinsic safe postion to [facility], Ring Finger, Small Finger. I applied casting stockinette,  1 rolls of padding in an overlapping fashion. Rina Villalobos requested Purple color fiberglass. I rolled 2 rolls of fiberglass in an overlapping fashion. Her  Right, Ring Finger, Small Finger was maintained in a 90 degree Flexion. At the conclusion of the procedure, Catherine Rebolledo's nail beds were pink in color, the extremity is warm to the touch. Capillary refill is less than 2 seconds. Rina Villalobos was instructed in proper care of cast.  Do not get wet, keep all items out of cast.  If cast is painful please make appointment to get checked. She was also briefed on circulation compromise. If digits are cold, blue, and tingling patient must must seek care. If after hours patient is to go to Emergency Room. During office hours patient must come in to office.

## 2021-02-05 NOTE — PROGRESS NOTES
Ms. German Pro is a 80 y.o. right handed woman  who is seen today in Hand Surgical Consultation at the request of ROSE Moy CNP. She is seen today regarding an injury occurring on February 2nd, 2021. She reports injuring her right hand, having Frank Favor . She was seen for Emergency evaluation elsewhere, radiographs were obtained & she has been immobilized. By report, there  was an associated skin injury. She reports moderate pain located in the Dorsal aspect of the hand, no tenderness of the wrist or elbow. She notes today, no neurologic symptoms in the Whole Hand. Symptoms show no change over time. I have today reviewed with German Pro the clinically relevant, past medical history, medications, allergies,  family history, social history, and Review Of Systems & I have documented any details relevant to today's presenting complaints in my history above. Ms. Noelle Cortes self-reported past medical history, medications, allergies,  family history, social history, and Review Of Systems have been scanned into the chart under the \"Media\" tab. Physical Exam:  Ms. Noelle Cortes most recent vitals:  Vitals  Temp: 98 °F (36.7 °C)  Temp Source: Infrared  Height: 5' 3\" (160 cm)  Weight: 148 lb (67.1 kg)    She is well nourished, oriented to person, place & time. She demonstrates appropriate mood and affect as well as normal gait and station. Skin: Normal in appearance and Normal Texture on the injured limb, normal on the contralateral side. Digital range of motion is limited by pain on the Right, normal on the Left  Wrist range of motion is without significant limitation on the Right, normal on the Left  Sensation is subjectively normal in the Whole Hand, other digits are bilaterally normally sensate  Vascular examination reveals normal, good capillary refill and good color bilaterally. There is moderate acute ecchymosis on the Right, normal on the Left. Swelling is mild in the hand & digits on the Right, normal on the Left. There is no evidence of gross joint instability, excluding the immediate zone of injury, bilaterally. Muscular strength is clinically appropriate bilaterally, limited by pain in the injured extremity. Maximal pain is elicited with palpation of the base Small Finger Metacarpal  on the Right, normal on the Left. The distal radius & ulna are not tender to palpation. There is a 0 degree angular deformity and no clinical evidence of  mal-rotation. Radiographic Evaluation:  Radiographs are reviewed  today (3 views of the right hand). They demonstrate evidence of an acute displaced fracture of the base  Small Finger Metacarpal Base with moderate comminution,and no mal-rotation. There is not evidence of other injury or bony fracture. Impression:  Ms. Connor Wallace has sustained recent metacarpal fracture, displaced and presents requesting further treatment.     Plan: I have discussed with Ms. Param Hensley the various treatment options for treatment of right Small Finger metacarpal fracture. We discussed the options of Closed treatment in situ, attempted closed reduction & cast immobilization, and surgical treatments (Open Reduction & Internal Fixation or Closed Reduction & Percutaneous Pinning of the fracture). she has elected to proceed conservativly, voicing an understanding of the other options available to her. I have explained the complications, limitations, expectations, alternatives, & risks of her chosen treatment. We discussed the possibility of residual symptoms as may be related to closed treatment of fractures including the possiblities of: mal-union, non-union, delayed union, persistant deformity, persistant pain, limitation of motion, future arthritic symptoms & the possible need for further treatment. She understood our discussion and was comfortable with her decision; she was provided with appropriate expectations. She is today fitted with a carefully applied, well padded & appropriately molded short arm fiberglass cast incorporating the Small Finger in an intrinsic safe postion  . She was given a Patient Instruction Sheet related to cast care. I have asked her to schedule a follow-up appointment for 4 weeks from now at which time we will obtain repeat radiographs of the hand out of splint/cast.    She is specifically instructed to contact the office between now & her scheduled appointment if she has concerns related to the cast or the underlying fracture. She is welcome to call for an appointment sooner if she has any additional concerns or questions.

## 2021-02-05 NOTE — PATIENT INSTRUCTIONS
CAST CARE INSTRUCTIONS    ? Elevate your injured limb to reduce swelling. Elevate above your heart level. If upper extremity (i.e. hand/wrist) elevate fingers above eye level. Exercise fingers & toes frequently. ? Do not stick anything inside your cast to scratch. Using a  or sharp object to scratch under a cast can be harmful and irritating to the skin. This can cause an infection with long-term problems. ? Itching is usually caused by moisture and/or perspiration so keep your cast dry. If after a few days the itching persist you may want to sprinkle some baby powder into the end of the cast.  This should never be done if you have any incisions, sores or pins under the cast.     ? Never get your cast wet. o If you have any type of incision, sores or pins under the cast this can lead to an infection. o Cast protectors for showering are available in our office as well as most medical supply companies and some pharmacies. o Ask the technician for instructions for swimming. ? IF YOUR CAST BECOMES WET CONTACT OUR OFFICE. ? For a walking cast you must wear a cast shoe at all times when on your feet. Going without the shoe will cause the cast to crack on the bottom. Injury can also result from slipping without the protection of a cast shoe. If your cast begins to crack, contact our office. .  ? Casts are never completely comfortable and some pain and swelling are common. Below are some warning signs. You should contact your doctor if you experience:  o Extreme/worsening pain.  o Numbness or tingling. o New increasing tightness. o Swollen, blue or cold fingers or toes. o Rubbing from the cast that persists and causes pain. HUMUROUS RULES FOR KIDS    ? Do not hit anyone with your cast, especially brothers and sisters. ? If your cast itches, scratch the opposite arm or leg. ? Do not stick anything in your cast.  We will keep any lunch money that is found.

## 2021-02-08 ENCOUNTER — OFFICE VISIT (OUTPATIENT)
Dept: INTERNAL MEDICINE CLINIC | Age: 86
End: 2021-02-08
Payer: COMMERCIAL

## 2021-02-08 VITALS
TEMPERATURE: 96.5 F | OXYGEN SATURATION: 98 % | HEART RATE: 96 BPM | SYSTOLIC BLOOD PRESSURE: 138 MMHG | DIASTOLIC BLOOD PRESSURE: 74 MMHG | BODY MASS INDEX: 25.54 KG/M2 | WEIGHT: 149.6 LBS | HEIGHT: 64 IN

## 2021-02-08 DIAGNOSIS — Z91.81 AT HIGH RISK FOR FALLS: ICD-10-CM

## 2021-02-08 DIAGNOSIS — Z00.00 ROUTINE GENERAL MEDICAL EXAMINATION AT A HEALTH CARE FACILITY: Primary | ICD-10-CM
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
## 2021-02-08 DIAGNOSIS — M79.641 HAND PAIN, RIGHT: ICD-10-CM

## 2021-02-08 DIAGNOSIS — S01.01XD LACERATION OF SCALP, SUBSEQUENT ENCOUNTER: ICD-10-CM

## 2021-02-08 DIAGNOSIS — M25.531 WRIST PAIN, RIGHT: ICD-10-CM

## 2021-02-08 PROCEDURE — G0438 PPPS, INITIAL VISIT: HCPCS | Performed by: NURSE PRACTITIONER

## 2021-02-08 PROCEDURE — 99214 OFFICE O/P EST MOD 30 MIN: CPT | Performed by: NURSE PRACTITIONER

## 2021-02-08 ASSESSMENT — PATIENT HEALTH QUESTIONNAIRE - PHQ9
SUM OF ALL RESPONSES TO PHQ QUESTIONS 1-9: 1
2. FEELING DOWN, DEPRESSED OR HOPELESS: 1
SUM OF ALL RESPONSES TO PHQ9 QUESTIONS 1 & 2: 1
SUM OF ALL RESPONSES TO PHQ QUESTIONS 1-9: 1

## 2021-02-08 NOTE — PROGRESS NOTES
Medicare Annual Wellness Visit  Name: Cecy Balbuena Date: 2021   MRN: [id number] Sex: Female   Age: 80 y.o. Ethnicity: Non-/Non    : 1934 Race: Nina Vital is here for Medicare AWV and Suture / Staple Removal (fell on )    Screenings for behavioral, psychosocial and functional/safety risks, and cognitive dysfunction are all negative except as indicated below. These results, as well as other patient data from the [address] form, are documented in Flowsheets linked to this Encounter. Allergies   Allergen Reactions    Simvastatin Other (See Comments)     Body aches    Codeine Hives         Prior to Visit Medications    Medication Sig Taking? Authorizing Provider   albuterol sulfate HFA (VENTOLIN HFA) 108 (90 Base) MCG/ACT inhaler Inhale 1 puff into the lungs 4 times daily as needed for Wheezing or Shortness of Breath Yes Mckayla De León, APRN - CNP   olmesartan-hydroCHLOROthiazide (BENICAR HCT) 40-12.5 MG per tablet TAKE 1 TABLET BY MOUTH DAILY Yes Historical Provider, MD   Iodine, Kelp, (KELP PO) Take by mouth daily Yes Historical Provider, MD   TURMERIC PO Take by mouth Yes Historical Provider, MD   Famotidine-Ca Carb-Mag Hydrox (PEPCID COMPLETE PO) Take 1 tablet by mouth nightly Yes Historical Provider, MD   ibuprofen (ADVIL;MOTRIN) 600 MG tablet Take 1 tablet by mouth every 6 hours as needed for Pain Yes Ryland Irwin MD   esomeprazole (NEXIUM) 40 MG delayed release capsule Take 40 mg by mouth every morning (before breakfast) Yes Historical Provider, MD   Coenzyme Q10 (COQ-10) 100 MG CPCR Take 1 capsule by mouth daily Yes Historical Provider, MD   cetirizine (ZYRTEC) 10 MG tablet Take 10 mg by mouth as needed  Yes Historical Provider, MD   Saline (SIMPLY SALINE) 0.9 % AERS by Nasal route daily as needed  Yes Historical Provider, MD   Flaxseed, Linseed, (FLAX SEED OIL) 1000 MG CAPS Take 1,200 mg by mouth daily.    Yes Historical Provider, MD aspirin 81 MG chewable tablet Take 81 mg by mouth daily.    Yes Historical Provider, MD         Past Medical History:   Diagnosis Date    Bleeding ulcer     Cancer ([facility] [address]     melanoma L IRIS    Gastroesophagitis     GERD (gastroesophageal reflux disease)     Hyperlipidemia     Hypertension     Pneumonia     Rheumatic fever with heart involvement     Trigger ring finger of right hand 7/1/2013    Unspecified diseases of blood and blood-forming organs     chronic anemia       Past Surgical History:   Procedure Laterality Date    BLADDER REPAIR N/A 2002    BREAST SURGERY      L breast tumor-benign    CYST REMOVAL Right 8-1-13    GANGLION CYST EXCISION RIGHT WRIST, TRIGGER FINGER RELEASE RIGHT FOURTH    EYE SURGERY      , cataract right eye    FRACTURE SURGERY      R shoulderx2-pinned    HYSTERECTOMY      JOINT REPLACEMENT Right 03/28/2012    Total Elbow - Dr. Gomes Brothers Right     TONSILLECTOMY      TUMOR REMOVAL      carcinoid    UPPER GASTROINTESTINAL ENDOSCOPY  3/7/12    with bx    UPPER GASTROINTESTINAL ENDOSCOPY  11/8/13    EUS    UPPER GASTROINTESTINAL ENDOSCOPY  4/24/14    UPPER GASTROINTESTINAL ENDOSCOPY  3/17/16    push enteroscopy with ablation    UPPER GASTROINTESTINAL ENDOSCOPY  06/29/2017         Family History   Problem Relation Age of Onset    Heart Disease Mother     High Blood Pressure Mother     High Cholesterol Mother     Heart Disease Father     High Blood Pressure Father     High Cholesterol Father     Cancer Sister     Stroke Sister     High Blood Pressure Sister     High Cholesterol Sister     High Blood Pressure Brother     High Cholesterol Brother     Heart Attack Brother     Cancer Brother     Heart Attack Brother     Parkinsonism Brother        CareTeam (Including outside providers/suppliers regularly involved in providing care):   Patient Care Team: ROSE Adorno CNP as PCP - General (Certified Nurse Practitioner)  ROSE Adorno CNP as PCP - [facility]  Chivo Beck MD as Consulting Physician (Otolaryngology)  Tj Forrester MD as Consulting Physician (Internal Medicine)    Wt Readings from Last 3 Encounters:   02/08/21 149 lb 9.6 oz (67.9 kg)   02/05/21 148 lb (67.1 kg)   01/06/21 148 lb 6.4 oz (67.3 kg)     Vitals:    02/08/21 1310 02/08/21 1338   BP: (!) 158/78 138/74   Pulse: 96    Temp: 96.5 °F (35.8 °C)    SpO2: 98%    Weight: 149 lb 9.6 oz (67.9 kg)    Height: 5' 3.5\" (1.613 m)      Body mass index is 26.08 kg/m². Based upon direct observation of the patient, evaluation of cognition reveals recent and remote memory intact. Physical Exam  Constitutional:       General: She is not in acute distress. Appearance: Normal appearance. She is not ill-appearing. HENT:      Head: Normocephalic and atraumatic. Pulmonary:      Effort: Pulmonary effort is normal. No respiratory distress. Skin:     Comments: Laceration above right eye brow with 5 sutures cdi, no warmth, drainage. Healing appropriately     Neurological:      General: No focal deficit present. Mental Status: She is alert and oriented to person, place, and time. Mental status is at baseline. Psychiatric:         Mood and Affect: Mood normal.         Behavior: Behavior normal.       Patient's complete Health Risk Assessment and screening values have been reviewed and are found in Flowsheets. The following problems were reviewed today and where indicated follow up appointments were made and/or referrals ordered.     Positive Risk Factor Screenings with Interventions:     Fall Risk:  2 or more falls in past year?: (!) yes  Fall with injury in past year?: (!) yes(stitches on forehead)  Fall Risk Interventions:    · Physical therapy referral ordered for strength and balance training          General Health and ACP:  General In general, how would you say your health is?: Fair  In the past 7 days, have you experienced any of the following?  New or Increased Pain, New or Increased Fatigue, Loneliness, Social Isolation, Stress or Anger?: (!) New or Increased Pain(from the fall mostly)  Do you get the social and emotional support that you need?: Yes  Do you have a Living Will?: Yes  Advance Directives     Power of  Living Will ACP-Advance Directive ACP-Power of     Not on File Filed on 06/21/19 Filed Not on File      General Health Risk Interventions:  · Pain issues: improving     Health Habits/Nutrition:  Health Habits/Nutrition  Do you exercise for at least 20 minutes 2-3 times per week?: (!) No  Have you lost any weight without trying in the past 3 months?: No  Do you eat only one meal per day?: No  Have you seen the dentist within the past year?: (!) No  Body mass index: (!) 26.08  Health Habits/Nutrition Interventions:  · Inadequate physical activity:  educational materials provided to promote increased physical activity  · Dental exam overdue:  patient encouraged to make appointment with his/her dentist    Hearing/Vision:  No exam data present  Hearing/Vision  Do you or your family notice any trouble with your hearing that hasn't been managed with hearing aids?: (!) Yes  Do you have difficulty driving, watching TV, or doing any of your daily activities because of your eyesight?: No  Have you had an eye exam within the past year?: (!) No  Hearing/Vision Interventions:  · Hearing concerns:  patient declines any further evaluation/treatment for hearing issues  · Vision concerns:  patient encouraged to make appointment with his/her eye specialist    Safety:  Safety  Do you have working smoke detectors?: Yes  Have all throw rugs been removed or fastened?: Yes  Do you have non-slip mats or surfaces in all bathtubs/showers?: (!) No  Do all of your stairways have a railing or banister?: Yes Are your doorways, halls and stairs free of clutter?: Yes  Do you always fasten your seatbelt when you are in a car?: Yes  Safety Interventions:  · Home safety tips provided    ADL:  ADLs  In the past 7 days, did you need help from others to perform any of the following everyday activities? Eating, dressing, grooming, bathing, toileting, or walking/balance?: (!) Bathing  In the past 7 days, did you need help from others to take care of any of the following? Laundry, housekeeping, banking/finances, shopping, telephone use, food preparation, transportation, or taking medications?: [employer], Shopping  ADL Interventions:  · currently has gradnchildren that help her.  has difficulty getting out but is able to get around the house.       Personalized Preventive Plan   Current Health Maintenance Status  Immunization History   Administered Date(s) Administered    Influenza Vaccine, unspecified formulation 10/22/2014, 10/26/2015, 10/10/2016    Influenza Virus Vaccine 10/05/2013, 10/10/2016, 09/27/2017, 09/26/2018    Influenza Whole 10/29/2012, 10/05/2013, 09/23/2015    Influenza, Quadv, IM, PF (6 mo and older Fluzone, Flulaval, Fluarix, and 3 yrs and older Afluria) 09/27/2017, 10/04/2019, 10/14/2020    Pneumococcal Conjugate 13-valent (Fuvcupo81) 10/28/2016    Pneumococcal Conjugate 7-valent (Prevnar7) 07/29/2008    Pneumococcal Polysaccharide ([id number]) 10/25/2006    Tdap (Boostrix, Adacel) 02/02/2021        Health Maintenance   Topic Date Due    Shingles Vaccine (1 of 2) 02/23/1984    Annual Wellness Visit (AWV)  06/18/2019    COVID-19 Vaccine (2 of 2 - Moderna series) 02/26/2021    Potassium monitoring  12/07/2021    Creatinine monitoring  12/07/2021    DTaP/Tdap/Td vaccine (2 - Td) 02/02/2031    Flu vaccine  Completed    Pneumococcal 65+ years Vaccine  Completed    Hepatitis A vaccine  Aged Out    Hepatitis B vaccine  Aged Out    Hib vaccine  Aged Out  Meningococcal (ACWY) vaccine  Aged Out     Recommendations for Preventive Services Due: see orders and patient instructions/AVS.   . Recommended screening schedule for the next 5-10 years is provided to the patient in written form: see Patient Instructions/AVS.    AWV today   Fall on 2/2 - went to the ED   Tripped up the stairs. Denies any dizziness or LOC. She did hit her head. Has 2 stitches under cast and 4 to right eye brow   Right knee xray - degenerative changes - pain is improving   CT cervical spine and CT head - no acute findings   Went Friday to have cast placed to right arm for a transverse fracture of the proximal shaft of the base of fifth metacarpal #2. Seeing Dr. Mirna Hendrix. Her pain is improving. HTN - reports well controlled at home with some mild elevations at times. Was rushing to get here and was elevated initially, improved at recheck. Tuyet Cuadra was seen today for medicare awv and suture / staple removal.    Diagnoses and all orders for this visit:    Routine general medical examination at a health care facility  AWV complete today   Needs more support at home   Referral for COA - needs more resources at home. Lives with 80 yr      Fall, subsequent encounter  -     Ambulatory referral to Physical Therapy  Reviewed ED notes and imaging with pt and family   Seeing Dr Mirna Hendrix for fracture  Reports will be okay to get to out pt PT. Referral for COA and SW      At high risk for falls  -     Ambulatory referral to Physical Therapy    Hand pain, right  -     Ambulatory referral to Physical Therapy    Wrist pain, right  -     Ambulatory referral to Physical Therapy    Laceration of scalp, subsequent encounter  -     Ambulatory referral to Physical Therapy  5 sutures removed from right eye brow without difficulty. No signs of infection.       Follow up in 3 months and prn    Electronically signed by ROSE Larios CNP on 2/8/2021 at 2:28 PM Follow up 3 months and prn

## 2021-02-08 NOTE — PATIENT INSTRUCTIONS
Personalized Preventive Plan for Brianda Herring - 2/8/2021  Medicare offers a range of preventive health benefits. Some of the tests and screenings are paid in full while other may be subject to a deductible, co-insurance, and/or copay. Some of these benefits include a comprehensive review of your medical history including lifestyle, illnesses that may run in your family, and various assessments and screenings as appropriate. After reviewing your medical record and screening and assessments performed today your provider may have ordered immunizations, labs, imaging, and/or referrals for you. A list of these orders (if applicable) as well as your Preventive Care list are included within your After Visit Summary for your review. Other Preventive Recommendations:    · A preventive eye exam performed by an eye specialist is recommended every 1-2 years to screen for glaucoma; cataracts, macular degeneration, and other eye disorders. · A preventive dental visit is recommended every 6 months. · Try to get at least 150 minutes of exercise per week or 10,000 steps per day on a pedometer . · Order or download the FREE \"Exercise & Physical Activity: Your Everyday Guide\" from [employer]. Call 7-612.617.2866 or search [employer] online. · You need 6414-1016 mg of calcium and 1161-3876 IU of vitamin D per day. It is possible to meet your calcium requirement with diet alone, but a vitamin D supplement is usually necessary to meet this goal.  · When exposed to the sun, use a sunscreen that protects against both UVA and UVB radiation with an SPF of 30 or greater. Reapply every 2 to 3 hours or after sweating, drying off with a towel, or swimming. · Always wear a seat belt when traveling in a car. Always wear a helmet when riding a bicycle or motorcycle.

## 2021-02-09 ENCOUNTER — TELEPHONE (OUTPATIENT)
Dept: PRIMARY CARE CLINIC | Age: 86
End: 2021-02-09

## 2021-02-09 NOTE — TELEPHONE ENCOUNTER
SW Contact: spoke with patient per referral. Pt agreeable to referral to the [place] [facility] ([facility]) for caregiver support and home aide assistance. Referral made to COA.

## 2021-02-12 ENCOUNTER — OFFICE VISIT (OUTPATIENT)
Dept: PULMONOLOGY | Age: 86
End: 2021-02-12
Payer: COMMERCIAL

## 2021-02-12 DIAGNOSIS — K21.9 LARYNGOPHARYNGEAL REFLUX (LPR): ICD-10-CM

## 2021-02-12 DIAGNOSIS — J45.40 MODERATE PERSISTENT ASTHMA WITHOUT COMPLICATION: ICD-10-CM

## 2021-02-12 DIAGNOSIS — R05.3 CHRONIC COUGH: Primary | ICD-10-CM

## 2021-02-12 DIAGNOSIS — J32.4 CHRONIC PANSINUSITIS: ICD-10-CM

## 2021-02-12 PROCEDURE — 99204 OFFICE O/P NEW MOD 45 MIN: CPT | Performed by: INTERNAL MEDICINE

## 2021-02-12 ASSESSMENT — ENCOUNTER SYMPTOMS
SINUS PRESSURE: 0
CONSTIPATION: 0
DIARRHEA: 0
ABDOMINAL PAIN: 0
NAUSEA: 0

## 2021-02-12 NOTE — PROGRESS NOTES
[facility]  Consult Note  Chauncey Greene MD       [place]   YOB: 1934    Date of Visit:  2/12/2021    Assessment/Plan:  1. Chronic cough  2. Moderate persistent asthma without complication  3. Chronic pansinusitis  4. Laryngopharyngeal reflux (LPR)    I have reviewed laboratories, medical records and images for this visit  Chest x-ray from 12/20 was reviewed with the patient and her daughter. Perhaps some chronic interstitial change at the left base but otherwise negative. She does have a history of eosinophilia is much is 8.5%. Respiratory allergen profile was negative and serum IgE level was normal  I do wonder she has a component of asthma  Start her on Breo  Obtain CT chest and PFT  Follow-up in 1 month      Chief Complaint   Patient presents with    Cough     for over a year now Has an Albuterol Inhaler to use and sometimes it helps and other times it makes her cough worse Has seen Dr Moses Hines and had Resp. Allergen Profile done. He also wanted to do surgery because he thought cough was from sinus but pt was sent for a 2nd opinion by PCP and told she did not need surgery       HPI  The patient presents with a chief complaint of chronic cough. It sounds like this is been going on for quite a few years. However, she feels it has been much worse in the last 1 year. She is known to have sinus issues. She complains of sinus drainage. At one point she was told she needed surgery but a second opinion felt that surgery was not needed. She has had history of GERD/LPR D and takes Nexium in the morning and Pepcid at night. This is controlled symptoms for her mostly. She uses albuterol as well. She finds this helpful. She has taken antibiotics and prednisone. She is found these helpful but only while she is taking the medicine. When she stops it symptoms come right back.   She is a lifetime non-smoker with no significant secondary smoke exposure. Review of Systems  Review of Systems   Constitutional: Negative for fatigue and fever. HENT: Negative for congestion and sinus pressure. Eyes: Negative for visual disturbance. Cardiovascular: Negative for chest pain and palpitations. Gastrointestinal: Negative for abdominal pain, constipation, diarrhea and nausea. Genitourinary: Negative for difficulty urinating. Musculoskeletal: Negative for arthralgias. Skin: Negative for rash. Neurological: Negative for dizziness and light-headedness. Hematological: Does not bruise/bleed easily. Psychiatric/Behavioral: Negative for behavioral problems. History  I have reviewed past medical, surgical, social and family history. This is documented elsewhere in themedical record. Physical Exam:  Physical Exam  Constitutional:       General: She is not in acute distress. Appearance: She is well-developed. HENT:      Head: Normocephalic and atraumatic. Eyes:      Comments: Nasal mucosa, teeth and gums and oropharynx are clear. Neck:      Musculoskeletal: Neck supple. Thyroid: No thyromegaly (There are no other neck masses noted. ). Vascular: No JVD. Trachea: No tracheal deviation. Cardiovascular:      Rate and Rhythm: Normal rate and regular rhythm. Heart sounds: Normal heart sounds. No murmur. Pulmonary:      Effort: Pulmonary effort is normal. No respiratory distress. Breath sounds: Wheezing present. No rales. Chest:      Chest wall: No tenderness. Abdominal:      General: Bowel sounds are normal. There is no distension. Palpations: Abdomen is soft. Mass: There is no hepatosplenomegaly. Tenderness: There is no abdominal tenderness. Musculoskeletal:         General: No tenderness (Muscle strength is normal and there is no obvious atrophy). Lymphadenopathy:      Cervical: No cervical adenopathy. Skin:     General: Skin is warm and dry. Findings: No rash.    Psychiatric: Comments: Oriented to person place and time         Allergies   Allergen Reactions    Simvastatin Other (See Comments)     Body aches    Codeine Hives     Prior to Visit Medications    Medication Sig Taking? Authorizing Provider   albuterol sulfate HFA (VENTOLIN HFA) 108 (90 Base) MCG/ACT inhaler Inhale 1 puff into the lungs 4 times daily as needed for Wheezing or Shortness of Breath  Mckayla De León, APRN - CNP   olmesartan-hydroCHLOROthiazide (BENICAR HCT) 40-12.5 MG per tablet TAKE 1 TABLET BY MOUTH DAILY  Historical Provider, MD   Iodine, Kelp, (KELP PO) Take by mouth daily  Historical Provider, MD   TURMERIC PO Take by mouth  Historical Provider, MD   Famotidine-Ca Carb-Mag Hydrox (PEPCID COMPLETE PO) Take 1 tablet by mouth nightly  Historical Provider, MD   ibuprofen (ADVIL;MOTRIN) 600 MG tablet Take 1 tablet by mouth every 6 hours as needed for Pain  Katharine MD Danny   esomeprazole (NEXIUM) 40 MG delayed release capsule Take 40 mg by mouth every morning (before breakfast)  Historical Provider, MD   Coenzyme Q10 (COQ-10) 100 MG CPCR Take 1 capsule by mouth daily  Historical Provider, MD   cetirizine (ZYRTEC) 10 MG tablet Take 10 mg by mouth as needed   Historical Provider, MD   Saline (SIMPLY SALINE) 0.9 % AERS by Nasal route daily as needed   Historical Provider, MD   Flaxseed, Linseed, (FLAX SEED OIL) 1000 MG CAPS Take 1,200 mg by mouth daily. Historical Provider, MD   aspirin 81 MG chewable tablet Take 81 mg by mouth daily. Historical Provider, MD       There were no vitals filed for this visit. There is no height or weight on file to calculate BMI.      Wt Readings from Last 3 Encounters:   02/08/21 149 lb 9.6 oz (67.9 kg)   02/05/21 148 lb (67.1 kg)   01/06/21 148 lb 6.4 oz (67.3 kg)     BP Readings from Last 3 Encounters:   02/08/21 138/74   02/02/21 (!) 182/91   01/06/21 138/82        Social History     Tobacco Use   Smoking Status Never Smoker   Smokeless Tobacco Never Used

## 2021-02-22 ENCOUNTER — HOSPITAL ENCOUNTER (OUTPATIENT)
Dept: PHYSICAL THERAPY | Age: 86
Setting detail: THERAPIES SERIES
Discharge: HOME | End: 2021-02-22
Payer: COMMERCIAL

## 2021-02-22 PROCEDURE — 97162 PT EVAL MOD COMPLEX 30 MIN: CPT

## 2021-02-22 PROCEDURE — 97112 NEUROMUSCULAR REEDUCATION: CPT

## 2021-02-22 NOTE — PLAN OF CARE
[facility]  Outpatient Physical Therapy, [address], [facility] [address]  Phone: [phone]   Fax: [phone]                                                       Physical Therapy Certification    Dear Referring Practitioner: KT Moreno,    We had the pleasure of evaluating the following patient for physical therapy services at [address] [place] [address]. A summary of our findings can be found in the initial assessment below. This includes our plan of care. If you have any questions or concerns regarding these findings, please do not hesitate to contact me at the office phone number checked above. Thank you for the referral.       Physician Signature:_______________________________Date:__________________  By signing above (or electronic signature), therapists plan is approved by physician      Patient: Param Hensley   : 1934   MRN: [id number]  Referring Physician: Referring Practitioner: KT Moreno      Evaluation Date: 2021      Medical Diagnosis Information:  Diagnosis: Fall, subsequent encounter   Treatment Diagnosis: imbalance, Core muscle weakness, B hip muscle weakness                                         Insurance information: PT Insurance Information: [employer]     Precautions/ Contra-indications: Right fifth metacarpal Fx /right wrist cast   Latex Allergy:  [x]NO      []YES  Preferred Language for Healthcare:   [x]English       []Other:    C-SSRS Triggered by Intake questionnaire (Past 2 wk assessment ):   [x] No, Questionnaire did not trigger screening.   [] Yes, Patient intake triggered C-SSRS Screening     [] Completed, no further action required.    [] Completed, PCP notified via Epic SUBJECTIVE: Patient stated complaint: Patient reports that she had fall on the 2nd of February tripping over a step at entry way of her sisters house walkway. During the fall she suffered a fracture of right Fifth metacarpal and right wrist. Patient has had a fall history in the past two years and her family has noticed that her balance has been off. Her goal is to improve her balance . Relevant Medical History: HTN , Fracture 5th metacarpal   Functional Outcome: LEFS raw score = 23; dysfunction = 72%    Pain Scale: 2/10  Easing factors: rest  Provocative factors: reaching, overhead     Type: []Constant   [x]Intermittent  []Radiating []Localized []Other:     Numbness/Tingling: None    Occupation/School:  Retired     Living Status/Prior Level of Function:Prior to this injury / incident, pt was independent with ADLs and IADLs, Patient lives in [address] with steps and rails on both sides. Prior to fall at the beginning of the month pt was independent with all ADLs. OBJECTIVE:   Palpation: Tenderness at medial right gastroc     Functional Mobility/Transfers:  Mod I     Posture: rounded shoulders/     Bandages/Dressings/Incisions: NA     Gait: (include devices/WB status) unsteady, narrowed base pattern     Dermatomes Normal Abnormal Comments   inguinal area (L1)  x     anterior mid-thigh (L2) x     distal ant thigh/med knee (L3) x     medial lower leg and foot (L4) x     lateral lower leg and foot (L5) x     posterior calf (S1) x     medial calcaneus (S2) x         Reflexes Normal Abnormal Comments   S1-2 Seated achilles x     S1-2 Prone knee bend x     L3-4 Patellar tendon x  R knee hyperreflexic    Clonus x     Babinski           PROM AROM    L R L R   Hip Flexion [facility]   Hip Abduction [facility]   Hip ER [facility] WFL   Hip IR       Knee Flexion       Knee Extension       Dorsiflexion        Plantarflexion        Inversion        Eversion Strength (0-5) / Myotomes Left Right   Hip Flexion - supine     Hip Flexion - seated (L1-2) 4 4   Hip Abduction 4 4   Hip Adduction 4 4   Hip ER 4 4   Hip IR 4 4   Quads (L2-4) +4 +4   Hamstrings +4 +4   Ankle Dorsiflexion (L4-5) 4 4   Ankle Plantarflexion (S1-2) 4 4   Ankle Inversion 4 4   Ankle Eversion (S1-2) 4 4   Great Toe Extension (L5)          Flexibility     Hamstrings (90/90) NT NT   ITB (Dede) NT NT   Quads (Ely's) NT NT   Hip Flexor [facility])          Girth     Mid patella NT NT   Suprapatellar     Figure 8     Transmalleolar     Metatarsal Heads         Joint mobility:    []Normal    [x]Hypo   []Hyper    Orthopaedic Special Tests  Positive  Negative  NT Comments    Hip       KENDELL / Carlitos's   x    FADIR   x    Scour   x    Trendelenburg   x           Knee       Lachman's / Anterior Drawer   x    Posterior Drawer   x    Varus Stress   x    Valgus Stress   x    Cuca's    x    Appley's   x    Thessaly's   x    Patellar Tracking   x           Ankle       Anterior Drawer   x    Talar Tilt   x    Garcias   x    Earl's    x               Balance: Fair                        [x] Patient history, allergies, meds reviewed. Medical chart reviewed. See intake form. Review Of Systems (ROS):  [x]Performed Review of systems (Integumentary, CardioPulmonary, Neurological) by intake and observation. Intake form has been scanned into medical record. Patient has been instructed to contact their primary care physician regarding ROS issues if not already being addressed at this time.       Co-morbidities/Complexities (which will affect course of rehabilitation):   []None        []Hx of COVID   Arthritic conditions   []Rheumatoid arthritis (M05.9)  [x]Osteoarthritis (M19.91)  []Gout   Cardiovascular conditions   [x]Hypertension (I10)  []Hyperlipidemia (E78.5)  []Angina pectoris (I20)  []Atherosclerosis (I70)  []Pacemaker  []Hx of CABG/stent/  cardiac surgeries   Musculoskeletal conditions   []Disc pathology []Congenital spine pathologies   []Osteoporosis (M81.8)  []Osteopenia (M85.8)  []Scoliosis       Endocrine conditions   []Hypothyroid (E03.9)  []Hyperthyroid Gastrointestinal conditions   []Constipation (X34.67)   Metabolic conditions   []Morbid obesity (E66.01)  []Diabetes type 1(E10.65) or 2 (E11.65)   []Neuropathy (G60.9)     Cardio/Pulmonary conditions   []Asthma (J45)  []Coughing   []COPD (J44.9)  []CHF  []A-fib   Psychological Disorders  []Anxiety (F41.9)  []Depression (F32.9)   []Other:   Developmental Disorders  []Autism (F84.0)  []CP (G80)  []Down Syndrome (Q90.9)  []Developmental delay     Neurological conditions  []Prior Stroke (I69.30)  []Parkinson's (G20)  []Encephalopathy (G93.40)  []MS (G35)  []Post-polio (G14)  []SCI  []TBI  []ALS Other conditions  []Fibromyalgia (M79.7)  []Vertigo  []Syncope  []Kidney Failure  []Cancer      []currently undergoing                treatment  []Pregnancy  []Incontinence   Prior surgeries  []involved limb  []previous spinal surgery  [] section birth  []hysterectomy  []bowel / bladder surgery  []other relevant surgeries   []Other:              Barriers to/and or personal factors that will affect rehab potential:              []Age  []Sex    []Smoker              []Motivation/Lack of Motivation                        [x]Co-Morbidities              []Cognitive Function, education/learning barriers              []Environmental, home barriers              []profession/work barriers  []past PT/medical experience  []other:  Justification: recent right wrist and fifth metacarpal fracture    Falls Risk Assessment (30 days):   [x] Falls Risk assessed and no intervention required.   [x] Falls Risk assessed and Patient requires intervention due to being higher risk   TUG score (>12s at risk): 12  sec  [x] Falls education provided, including ASSESSMENT:  Patient is a 79 yo female who recently suffered a fall who presents B proximal hip muscle weakness, imbalance with NBOS and SLS. Functional Impairments:     []Noted lumbar/proximal hip/LE joint hypomobility   []Decreased LE functional ROM   [x]Decreased core/proximal hip strength and neuromuscular control   [x]Decreased LE functional strength   [x]Reduced balance/proprioceptive control   []other:      Functional Activity Limitations (from functional questionnaire and intake)   []Reduced ability to tolerate prolonged functional positions   []Reduced ability or difficulty with changes of positions or transfers between positions   []Reduced ability to maintain good posture and demonstrate good body mechanics with sitting, bending, and lifting   []Reduced ability to sleep   [] Reduced ability or tolerance with driving and/or computer work   [x]Reduced ability to perform lifting, carrying tasks   [x]Reduced ability to squat   []Reduced ability to forward bend   [x]Reduced ability to ambulate prolonged functional periods/distances/surfaces   []Reduced ability to ascend/descend stairs   []Reduced ability to run, hop, cut or jump   []other:    Participation Restrictions   [x]Reduced participation in self care activities   [x]Reduced participation in home management activities   []Reduced participation in work activities   [x]Reduced participation in social activities. []Reduced participation in sport/recreation activities. Classification :    []Signs/symptoms consistent with post-surgical status including decreased ROM, strength and function.    []Signs/symptoms consistent with joint sprain/strain   []Signs/symptoms consistent with patella-femoral syndrome   []Signs/symptoms consistent with knee OA/hip OA   []Signs/symptoms consistent with internal derangement of knee/Hip   [x]Signs/symptoms consistent with functional hip weakness/NMR control      []Signs/symptoms consistent with tendinitis/tendinosis []signs/symptoms consistent with pathology which may benefit from Dry needling      []other:      Prognosis/Rehab Potential:      [x]Excellent   []Good    []Fair   []Poor    Tolerance of evaluation/treatment:    [x]Excellent   []Good    []Fair   []Poor    Physical Therapy Evaluation Complexity Justification  [x] A history of present problem with:  [] no personal factors and/or comorbidities that impact the plan of care;  [x]1-2 personal factors and/or comorbidities that impact the plan of care  []3 personal factors and/or comorbidities that impact the plan of care  [x] An examination of body systems using standardized tests and measures addressing any of the following: body structures and functions (impairments), activity limitations, and/or participation restrictions;:  [x] a total of 1-2 or more elements   [] a total of 3 or more elements   [] a total of 4 or more elements   [x] A clinical presentation with:  [x] stable and/or uncomplicated characteristics   [] evolving clinical presentation with changing characteristics  [] unstable and unpredictable characteristics;   [x] Clinical decision making of [] low, [x] moderate, [] high complexity using standardized patient assessment instrument and/or measurable assessment of functional outcome. [] EVAL (LOW) 91877 (typically 15 minutes face-to-face)  [x] EVAL (MOD) 31288 (typically 30 minutes face-to-face)  [] EVAL (HIGH) 64877 (typically 45 minutes face-to-face)  [] RE-EVAL     PLAN:   Frequency/Duration:  2 days per week for 6 Weeks:  Interventions:  [x]  Therapeutic exercise including: strength training, ROM, for Lower extremity and core   [x]  NMR activation and proprioception for LE, Glutes and Core   [x]  Manual therapy as indicated for LE, Hip and spine to include: Dry Needling/IASTM, STM, PROM, Gr I-IV mobilizations, manipulation.    [x] Modalities as needed that may include: thermal agents, E-stim, Biofeedback, US, iontophoresis as indicated [x] Patient education on joint protection, postural re-education, activity modification, progression of HEP. HEP instruction: Written HEP instructions provided and reviewed   Access Code: [email or web address]   URL: [email or web address]   Date: 02/22/2021   Prepared by: Giovana Cisneros     Exercises   Tandem Stance with Support - 10 reps - 3 sets - 15 hold - 1x daily - 7x weekly   Standing Single Leg Stance with Counter Support - 10 reps - 3 sets - 15 hold - 1x daily - 7x weekly         GOALS:  Patient stated goal: To improve balance   [] Progressing: [] Met: [] Not Met: [] Adjusted    Therapist goals for Patient:   Short Term Goals: To be achieved in: 2 weeks  1. Independent in HEP and progression per patient tolerance, in order to prevent re-injury. [] Progressing: [] Met: [] Not Met: [] Adjusted  2. Patient will have a decrease in pain to facilitate improvement in movement, function, and ADLs as indicated by Functional Deficits. [] Progressing: [] Met: [] Not Met: [] Adjusted    Long Term Goals: To be achieved in: 6 weeks  1. Disability index score of 35% or less for the LEFS to assist with reaching prior level of function. [] Progressing: [] Met: [] Not Met: [] Adjusted  2. Patient will demonstrate an increase in Strength to at least +4/5 as well as good proximal hip strength and control to allow for proper functional mobility as indicated by patients Functional Deficits. [] Progressing: [] Met: [] Not Met: [] Adjusted  3. Patient will return to functional activities including overhead and reaching activities without increased symptoms or restriction. [] Progressing: [] Met: [] Not Met: [] Adjusted  4. Patient to report 0 falls in throughout duration Therapy plan of care to reduce injury .    [] Progressing: [] Met: [] Not Met: [] Adjusted     Electronically signed by:  Giovana Cisneros, PT

## 2021-02-22 NOTE — FLOWSHEET NOTE
[facility]  Outpatient Physical Therapy  Phone: [phone]   Fax: [phone]    Physical Therapy Daily Treatment Note  Date:  2021    Patient Name:  David Everett    :  1934  MRN: [id number]  Medical/Treatment Diagnosis Information:  · Diagnosis: Fall, subsequent encounter  · Treatment Diagnosis: imbalance, Core muscle weakness, B hip muscle weakness  Insurance/Certification information:  PT Insurance Information: [employer] 42  Physician Information:  Referring Practitioner: KT Kaur  Plan of care signed (Y/N): []  Yes [x]  No     Date of Patient follow up with Physician: MIKA     Progress Report: []  Yes  [x]  No     Date Range for reporting period:  Beginnin21  Ending:     Progress report due (10 Rx/or 30 days whichever is less): visit #06    Recertification due (POC duration/ or 90 days whichever is less): visit 3/22/21    Visit # Insurance Allowable Auth required? Date Range    Mn []  Yes  [x]  No NA     Latex Allergy:  [x]NO      []YES  Preferred Language for Healthcare:   [x]English       []other:    Functional Scale:        Date assessed:  LEFS: raw score = 23/80; dysfunction = 72%  21    Pain level:  10     SUBJECTIVE:  See eval    OBJECTIVE: See eval      RESTRICTIONS/PRECAUTIONS:    Exercises/Interventions:     Therapeutic Exercises (11805) Resistance / level Sets/sec Reps Notes   Nustep                                                               Therapeutic Activities (43255)                                   TUG score 12       Neuromuscular Re-ed (05737)       Tandem       NBOS       Airex                             Manual Intervention (30415)                                                     Modalities:     Pt. Education:  -patient educated on diagnosis, prognosis and expectations for rehab  -all patient questions were answered    Home Exercise Program:  Access Code: [email or web address]   URL: [email or web address] Date: 02/22/2021   Prepared by: Moncho Adame     Exercises   Tandem Stance with Support - 10 reps - 3 sets - 15 hold - 1x daily - 7x weekly   Standing Single Leg Stance with Counter Support - 10 reps - 3 sets - 15 hold - 1x daily - 7x weekly         Therapeutic Exercise and NMR EXR  [] (73343) Provided verbal/tactile cueing for activities related to strengthening, flexibility, endurance, ROM for improvements in  [] LE / Lumbar: LE, proximal hip, and core control with self care, mobility, lifting, ambulation. [] UE / Cervical: cervical, postural, scapular, scapulothoracic and UE control with self care, reaching, carrying, lifting, house/yardwork, driving, computer work.  [] (72323) Provided verbal/tactile cueing for activities related to improving balance, coordination, kinesthetic sense, posture, motor skill, proprioception to assist with   [] LE / lumbar: LE, proximal hip, and core control in self care, mobility, lifting, ambulation and eccentric single leg control. [] UE / cervical: cervical, scapular, scapulothoracic and UE control with self care, reaching, carrying, lifting, house/yardwork, driving, computer work.   [] (94909) Therapist is in constant attendance of 2 or more patients providing skilled therapy interventions, but not providing any significant amount of measurable one-on-one time to either patient, for improvements in  [] LE / lumbar: LE, proximal hip, and core control in self care, mobility, lifting, ambulation and eccentric single leg control. [] UE / cervical: cervical, scapular, scapulothoracic and UE control with self care, reaching, carrying, lifting, house/yardwork, driving, computer work.      NMR and Therapeutic Activities:    [] (28069 or 02034) Provided verbal/tactile cueing for activities related to improving balance, coordination, kinesthetic sense, posture, motor skill, proprioception and motor activation to allow for proper function of [] LE: / Lumbar core, proximal hip and LE with self care and ADLs  [] UE / Cervical: cervical, postural, scapular, scapulothoracic and UE control with self care, carrying, lifting, driving, computer work.   [] (86617) Gait Re-education- Provided training and instruction to the patient for proper LE, core and proximal hip recruitment and positioning and eccentric body weight control with ambulation re-education including up and down stairs     Home Management Training / Self Care:  [] (31721) Provided self-care/home management training related to activities of daily living and compensatory training, and/or use of adaptive equipment for improvement with: ADLs and compensatory training, meal preparation, safety procedures and instruction in use of adaptive equipment, including bathing, grooming, dressing, personal hygiene, basic household cleaning and chores.      Home Exercise Program:    [x] (69956) Reviewed/Progressed HEP activities related to strengthening, flexibility, endurance, ROM of   [] LE / Lumbar: core, proximal hip and LE for functional self-care, mobility, lifting and ambulation/stair navigation   [] UE / Cervical: cervical, postural, scapular, scapulothoracic and UE control with self care, reaching, carrying, lifting, house/yardwork, driving, computer work  [] (69777)Reviewed/Progressed HEP activities related to improving balance, coordination, kinesthetic sense, posture, motor skill, proprioception of   [] LE: core, proximal hip and LE for self care, mobility, lifting, and ambulation/stair navigation    [] UE / Cervical: cervical, postural,  scapular, scapulothoracic and UE control with self care, reaching, carrying, lifting, house/yardwork, driving, computer work    Manual Treatments:  PROM / STM / Oscillations-Mobs:  G-I, II, III, IV (PA's, Inf., Post.) [] (10358) Provided manual therapy to mobilize LE, proximal hip and/or LS spine soft tissue/joints for the purpose of modulating pain, promoting relaxation,  increasing ROM, reducing/eliminating soft tissue swelling/inflammation/restriction, improving soft tissue extensibility and allowing for proper ROM for normal function with   [] LE / lumbar: self care, mobility, lifting and ambulation. [] UE / Cervical: self care, reaching, carrying, lifting, house/yardwork, driving, computer work. Modalities:  [] (30466) Vasopneumatic compression: Utilized vasopneumatic compression to decrease edema / swelling for the purpose of improving mobility and quad tone / recruitment which will allow for increased overall function including but not limited to self-care, transfers, ambulation, and ascending / descending stairs. Charges:  Timed Code Treatment Minutes: 25   Total Treatment Minutes: 35     [] EVAL - LOW (58597)   [x] EVAL - MOD (90002)  [] EVAL - HIGH (18606)  [] RE-EVAL (22057)  [] WK(42277) x       [] Ionto  [x] NMR (30996) x 1      [] Vaso  [] Manual (99598) x       [] Ultrasound  [] TA x        [] Mech Traction (71353)  [] Aquatic Therapy x     [] ES (un) (28823):   [] Home Management Training x  [] ES(attended) (40995)   [] Dry Needling 1-2 muscles (42166):  [] Dry Needling 3+ muscles (291721)  [] Group:      [] Other:     GOALS:  Patient stated goal: To improve balance   []? Progressing: []? Met: []? Not Met: []? Adjusted     Therapist goals for Patient:   Short Term Goals: To be achieved in: 2 weeks  1. Independent in HEP and progression per patient tolerance, in order to prevent re-injury. []? Progressing: []? Met: []? Not Met: []? Adjusted  2. Patient will have a decrease in pain to facilitate improvement in movement, function, and ADLs as indicated by Functional Deficits. []? Progressing: []? Met: []? Not Met: []? Adjusted     Long Term Goals:  To be achieved in: 6 weeks PLAN: See eval. PT 2x / week for 6 weeks. [] Continue per plan of care [] Alter current plan (see comments)  [x] Plan of care initiated [] Hold pending MD visit [] Discharge    Electronically signed by: Geri Mendoza PT, DPT    Note: If patient does not return for scheduled/ recommended follow up visits, this note will serve as a discharge from care along with most recent update on progress.

## 2021-02-26 ENCOUNTER — APPOINTMENT (OUTPATIENT)
Dept: PHYSICAL THERAPY | Age: 86
End: 2021-02-26
Payer: COMMERCIAL

## 2021-03-01 ENCOUNTER — HOSPITAL ENCOUNTER (OUTPATIENT)
Dept: PHYSICAL THERAPY | Age: 86
Setting detail: THERAPIES SERIES
Discharge: HOME | End: 2021-03-01
Payer: COMMERCIAL

## 2021-03-01 PROCEDURE — 97112 NEUROMUSCULAR REEDUCATION: CPT

## 2021-03-01 PROCEDURE — 97110 THERAPEUTIC EXERCISES: CPT

## 2021-03-01 NOTE — FLOWSHEET NOTE
[facility]  Outpatient Physical Therapy  Phone: [phone]   Fax: [phone]    Physical Therapy Daily Treatment Note  Date:  3/1/2021    Patient Name:  Char Bajwa    :  1934  MRN: [id number]  Medical/Treatment Diagnosis Information:  · Diagnosis: Fall, subsequent encounter  · Treatment Diagnosis: imbalance, Core muscle weakness, B hip muscle weakness  Insurance/Certification information:  PT Insurance Information: [employer] 42  Physician Information:  Referring Practitioner: KT Potter  Plan of care signed (Y/N): []  Yes [x]  No     Date of Patient follow up with Physician: MIKA     Progress Report: []  Yes  [x]  No     Date Range for reporting period:  Beginnin21  Ending:     Progress report due (10 Rx/or 30 days whichever is less): visit #53    Recertification due (POC duration/ or 90 days whichever is less): visit 3/22/21    Visit # Insurance Allowable Auth required? Date Range    Mn []  Yes  [x]  No NA     Latex Allergy:  [x]NO      []YES  Preferred Language for Healthcare:   [x]English       []other:    Functional Scale:        Date assessed:  LEFS: raw score = 23/80; dysfunction = 72%  21    Pain level:  2/10     SUBJECTIVE:   The patient reports that she received her second Covid shot on last Friday and had fever  Long with body aches on Saturday.      OBJECTIVE:  3/1: Patient  Late for session       RESTRICTIONS/PRECAUTIONS:    Exercises/Interventions:     Therapeutic Exercises (32129) Resistance / level Sets/sec Reps Notes   Nustep 3 4 min                                                             Therapeutic Activities (28935)       FSU   10                         TUG score 12       Neuromuscular Re-ed (40734)       Tandem  2/15\"     NBOS       Airex        Cone taps    10    Stepping over hurtles (4)) fwd       Lat/retro walking in // bars  7\" x2    Manual Intervention ([phone]) Modalities:     Pt. Education:  -patient educated on diagnosis, prognosis and expectations for rehab  -all patient questions were answered    Home Exercise Program:  Access Code: [email or web address]   URL: [email or web address]   Date: 02/22/2021   Prepared by: Geri Mendoza     Exercises   Tandem Stance with Support - 10 reps - 3 sets - 15 hold - 1x daily - 7x weekly   Standing Single Leg Stance with Counter Support - 10 reps - 3 sets - 15 hold - 1x daily - 7x weekly         Therapeutic Exercise and NMR EXR  [] (38240) Provided verbal/tactile cueing for activities related to strengthening, flexibility, endurance, ROM for improvements in  [] LE / Lumbar: LE, proximal hip, and core control with self care, mobility, lifting, ambulation. [] UE / Cervical: cervical, postural, scapular, scapulothoracic and UE control with self care, reaching, carrying, lifting, house/yardwork, driving, computer work.  [] (48065) Provided verbal/tactile cueing for activities related to improving balance, coordination, kinesthetic sense, posture, motor skill, proprioception to assist with   [] LE / lumbar: LE, proximal hip, and core control in self care, mobility, lifting, ambulation and eccentric single leg control. [] UE / cervical: cervical, scapular, scapulothoracic and UE control with self care, reaching, carrying, lifting, house/yardwork, driving, computer work.   [] (34467) Therapist is in constant attendance of 2 or more patients providing skilled therapy interventions, but not providing any significant amount of measurable one-on-one time to either patient, for improvements in  [] LE / lumbar: LE, proximal hip, and core control in self care, mobility, lifting, ambulation and eccentric single leg control. [] UE / cervical: cervical, scapular, scapulothoracic and UE control with self care, reaching, carrying, lifting, house/yardwork, driving, computer work.      NMR and Therapeutic Activities: [] (43866 or 30796) Provided verbal/tactile cueing for activities related to improving balance, coordination, kinesthetic sense, posture, motor skill, proprioception and motor activation to allow for proper function of   [] LE: / Lumbar core, proximal hip and LE with self care and ADLs  [] UE / Cervical: cervical, postural, scapular, scapulothoracic and UE control with self care, carrying, lifting, driving, computer work.   [] (95462) Gait Re-education- Provided training and instruction to the patient for proper LE, core and proximal hip recruitment and positioning and eccentric body weight control with ambulation re-education including up and down stairs     Home Management Training / Self Care:  [] (60904) Provided self-care/home management training related to activities of daily living and compensatory training, and/or use of adaptive equipment for improvement with: ADLs and compensatory training, meal preparation, safety procedures and instruction in use of adaptive equipment, including bathing, grooming, dressing, personal hygiene, basic household cleaning and chores.      Home Exercise Program:    [x] (89653) Reviewed/Progressed HEP activities related to strengthening, flexibility, endurance, ROM of   [] LE / Lumbar: core, proximal hip and LE for functional self-care, mobility, lifting and ambulation/stair navigation   [] UE / Cervical: cervical, postural, scapular, scapulothoracic and UE control with self care, reaching, carrying, lifting, house/yardwork, driving, computer work  [] (88544)Reviewed/Progressed HEP activities related to improving balance, coordination, kinesthetic sense, posture, motor skill, proprioception of   [] LE: core, proximal hip and LE for self care, mobility, lifting, and ambulation/stair navigation    [] UE / Cervical: cervical, postural,  scapular, scapulothoracic and UE control with self care, reaching, carrying, lifting, house/yardwork, driving, computer work Manual Treatments:  PROM / STM / Oscillations-Mobs:  G-I, II, III, IV (PA's, Inf., Post.)  [] (58304) Provided manual therapy to mobilize LE, proximal hip and/or LS spine soft tissue/joints for the purpose of modulating pain, promoting relaxation,  increasing ROM, reducing/eliminating soft tissue swelling/inflammation/restriction, improving soft tissue extensibility and allowing for proper ROM for normal function with   [] LE / lumbar: self care, mobility, lifting and ambulation. [] UE / Cervical: self care, reaching, carrying, lifting, house/yardwork, driving, computer work. Modalities:  [] (31178) Vasopneumatic compression: Utilized vasopneumatic compression to decrease edema / swelling for the purpose of improving mobility and quad tone / recruitment which will allow for increased overall function including but not limited to self-care, transfers, ambulation, and ascending / descending stairs. Charges:  Timed Code Treatment Minutes: 35   Total Treatment Minutes: 35     [] EVAL - LOW (96984)   [] EVAL - MOD (65508)  [] EVAL - HIGH (06674)  [] RE-EVAL (27702)  [x] YM(55870) x 1      [] Ionto  [x] NMR (27409) x 1      [] Vaso  [] Manual (29251) x       [] Ultrasound  [] TA x        [] Mech Traction (92287)  [] Aquatic Therapy x     [] ES (un) (99009):   [] Home Management Training x  [] ES(attended) (42391)   [] Dry Needling 1-2 muscles (74682):  [] Dry Needling 3+ muscles (534527)  [] Group:      [] Other:     GOALS:  Patient stated goal: To improve balance   []? Progressing: []? Met: []? Not Met: []? Adjusted     Therapist goals for Patient:   Short Term Goals: To be achieved in: 2 weeks  1. Independent in HEP and progression per patient tolerance, in order to prevent re-injury. []? Progressing: []? Met: []? Not Met: []? Adjusted  2. Patient will have a decrease in pain to facilitate improvement in movement, function, and ADLs as indicated by Functional Deficits. []? Progressing: []? Met: []? Not Met: []? Adjusted     Long Term Goals: To be achieved in: 6 weeks  1. Disability index score of 35% or less for the LEFS to assist with reaching prior level of function. []? Progressing: []? Met: []? Not Met: []? Adjusted  2. Patient will demonstrate an increase in Strength to at least +4/5 as well as good proximal hip strength and control to allow for proper functional mobility as indicated by patients Functional Deficits. []? Progressing: []? Met: []? Not Met: []? Adjusted  3. Patient will return to functional activities including overhead and reaching activities without increased symptoms or restriction. []? Progressing: []? Met: []? Not Met: []? Adjusted  4. Patient to report 0 falls in throughout duration Therapy plan of care to reduce injury . []? Progressing: []? Met: []? Not Met: []? Adjusted         Overall Progression Towards Functional goals/ Treatment Progress Update:  [] Patient is progressing as expected towards functional goals listed. [] Progression is slowed due to complexities/Impairments listed. [] Progression has been slowed due to co-morbidities.   [x] Plan just implemented, too soon to assess goals progression <30days   [] Goals require adjustment due to lack of progress  [] Patient is not progressing as expected and requires additional follow up with physician  [] Other    Persisting Functional Limitations/Impairments:  []Sleeping []Sitting               []Standing []Transfers        []Walking []Kneeling               []Stairs []Squatting / bending   []ADLs [x]Reaching  [x]Lifting  []Housework  [x]Driving []Job related tasks  []Sports/Recreation [x]Other: overhead movements ASSESSMENT:  The patient began balance exercises with some limitation. Patient demonstrated low activity tolerance due to shortness of breath and required frequent rest breaks. The patient struggled with SLS activities demonstrated LOB . Will slowly progress balance activities and add hip strengthening as tolerated. Treatment/Activity Tolerance:  [] Patient able to complete tx [] Patient limited by fatigue  [] Patient limited by pain  [] Patient limited by other medical complications  [] Other:     Prognosis: [] Good [] Fair  [] Poor    Patient Requires Follow-up: [x] Yes  [] No    Plan for next treatment session: Begin balance training     PLAN: See mayur. PT 2x / week for 6 weeks. [] Continue per plan of care [] Alter current plan (see comments)  [x] Plan of care initiated [] Hold pending MD visit [] Discharge    Electronically signed by: Jessa Mccauley PT, DPT    Note: If patient does not return for scheduled/ recommended follow up visits, this note will serve as a discharge from care along with most recent update on progress.

## 2021-03-02 ENCOUNTER — HOSPITAL ENCOUNTER (OUTPATIENT)
Dept: CT IMAGING | Age: 86
Discharge: HOME | End: 2021-03-02
Payer: COMMERCIAL

## 2021-03-02 DIAGNOSIS — R05.3 CHRONIC COUGH: ICD-10-CM

## 2021-03-02 PROCEDURE — 71250 CT THORAX DX C-: CPT

## 2021-03-03 ENCOUNTER — OFFICE VISIT (OUTPATIENT)
Dept: PRIMARY CARE CLINIC | Age: 86
End: 2021-03-03
Payer: COMMERCIAL

## 2021-03-03 DIAGNOSIS — Z20.828 EXPOSURE TO SARS-ASSOCIATED CORONAVIRUS: Primary | ICD-10-CM

## 2021-03-03 LAB — SARS-COV-2: NOT DETECTED

## 2021-03-03 PROCEDURE — 99211 OFF/OP EST MAY X REQ PHY/QHP: CPT | Performed by: NURSE PRACTITIONER

## 2021-03-03 NOTE — PATIENT INSTRUCTIONS
You have received a viral test for COVID-19. Below is education on quarantine per the CDC guidelines. For any symptoms, seek care from your PCP, call [phone] to establish care with a doctor, or go directly to an urgent care or the emergency room. Test results will take 2-7 days and will be sent to you in your [employer] account. If you test positive, you will be contacted via phone. If you test negative, the ONLY communication will be through [address] GO TO [employer] AND SIGN UP FOR [employer]  (LOWER LEFT OF THE HOME PAGE)  No test is 100%. If you have symptoms, you should follow the guidance of quarantine as previously stated. You can still be contagious if you have symptoms. Your [facility] will reach out to you if you have a positive result. They will provide you with a return to work date and note. If you were tested for a pre-op, then you should remain in quarantine until your procedure. How do I know if I need to be in quarantine? If you live in a community where COVID-19 is or might be spreading (currently, that is virtually everywhere in the [place])  Be alert for symptoms. Watch for fever, cough, shortness of breath, or other symptoms of COVID-19.  ? Take your temperature if symptoms develop. ? Practice social distancing. Maintain 6 feet of distance from others and stay out of crowded places. ? Follow CDC guidance if symptoms develop. If you feel healthy but:  ? Recently had close contact with a person with COVID-19 you need to Quarantine:  ? Stay home until 14 days after your last exposure. ? Check your temperature twice a day and watch for symptoms of COVID-19.  ? If possible, stay away from people who are at higher-risk for getting very sick from COVID-19. Stay Home and Monitor Your Health if you:  ? Have been diagnosed with COVID-19, or  ? Are waiting for test results, or  ?  Have cough, fever, or shortness of breath, or symptoms of COVID-19 When You Can be Around Others After You Had or Likely Had COVID-19     If you have or think you might have COVID-19, it is important to stay home and away from other people. Staying away from others helps stop the spread of COVID-19. If you have an emergency warning sign (including trouble breathing), get emergency medical care immediately. When you can be around others (end home isolation) depends on different factors for different situations. Find CDC's recommendations for your situation below. I think or know I had COVID-19, and I had symptoms  You can be with others after  ? 3 days with no fever and  ? Respiratory symptoms have improved (e.g. cough, shortness of breath) and  ? 10 days since symptoms first appeared  Depending on your healthcare provider's advice and availability of testing, you might get tested to see if you still have COVID-19. If you will be tested, you can be around others when you have no fever, respiratory symptoms have improved, and you receive two negative test results in a row, at least 24 hours apart. I tested positive for COVID-19 but had no symptoms  If you continue to have no symptoms, you can be with others after:  ? 10 days have passed since test or 14 days since your exposure test   Depending on your healthcare provider's advice and availability of testing, you might get tested to see if you still have COVID-19. If you will be tested, you can be around others after you receive two negative test results in a row, at least 24 hours apart. If you develop symptoms after testing positive, follow the guidance above for I think or know I had COVID, and I had symptoms.   For Anyone Who Has Been Around a Person with COVID-19  It is important to remember that anyone who has close contact with someone with COVID-19 should stay home for 14 days after exposure based on the time it takes to develop illness. Testing is not necessary.     www.cdc.gov/coronavirus/2019-ncov/index.html

## 2021-03-03 NOTE — PROGRESS NOTES
[facility] Meng received a viral test for COVID-19. They were educated on isolation and quarantine as appropriate. For any symptoms, they were directed to seek care from their PCP, given contact information to establish with a doctor, directed to an urgent care or the emergency room.

## 2021-03-05 ENCOUNTER — APPOINTMENT (OUTPATIENT)
Dept: PHYSICAL THERAPY | Age: 86
End: 2021-03-05
Payer: COMMERCIAL

## 2021-03-09 ENCOUNTER — HOSPITAL ENCOUNTER (OUTPATIENT)
Dept: PULMONOLOGY | Age: 86
Discharge: HOME | End: 2021-03-09
Payer: COMMERCIAL

## 2021-03-09 VITALS — RESPIRATION RATE: 18 BRPM | OXYGEN SATURATION: 99 % | HEART RATE: 79 BPM

## 2021-03-09 DIAGNOSIS — J45.40 MODERATE PERSISTENT ASTHMA WITHOUT COMPLICATION: ICD-10-CM

## 2021-03-09 LAB
DLCO %PRED: 75 %
DLCO PRED: NORMAL
DLCO/VA %PRED: NORMAL
DLCO/VA PRED: NORMAL
DLCO/VA: NORMAL
DLCO: NORMAL
EXPIRATORY TIME-POST: NORMAL
EXPIRATORY TIME: NORMAL
FEF 25-75% %CHNG: NORMAL
FEF 25-75% %PRED-POST: NORMAL
FEF 25-75% %PRED-PRE: NORMAL
FEF 25-75% PRED: NORMAL
FEF 25-75%-POST: NORMAL
FEF 25-75%-PRE: NORMAL
FEV1 %PRED-POST: NORMAL %
FEV1 %PRED-PRE: 98 %
FEV1 PRED: NORMAL
FEV1-POST: NORMAL
FEV1-PRE: NORMAL
FEV1/FVC %PRED-POST: NORMAL
FEV1/FVC %PRED-PRE: NORMAL
FEV1/FVC PRED: NORMAL
FEV1/FVC-POST: NORMAL %
FEV1/FVC-PRE: 73 %
FVC %PRED-POST: NORMAL
FVC %PRED-PRE: NORMAL
FVC PRED: NORMAL
FVC-POST: NORMAL
FVC-PRE: NORMAL
GAW %PRED: NORMAL
GAW PRED: NORMAL
GAW: NORMAL
IC %PRED: NORMAL
IC PRED: NORMAL
IC: NORMAL
MEP: NORMAL
MIP: NORMAL
MVV %PRED-PRE: NORMAL
MVV PRED: NORMAL
MVV-PRE: NORMAL
PEF %PRED-POST: NORMAL
PEF %PRED-PRE: NORMAL
PEF PRED: NORMAL
PEF%CHNG: NORMAL
PEF-POST: NORMAL
PEF-PRE: NORMAL
RAW %PRED: NORMAL
RAW PRED: NORMAL
RAW: NORMAL
RV %PRED: NORMAL
RV PRED: NORMAL
RV: NORMAL
SVC %PRED: NORMAL
SVC PRED: NORMAL
SVC: NORMAL
TLC %PRED: 99 %
TLC PRED: NORMAL
TLC: NORMAL
VA %PRED: NORMAL
VA PRED: NORMAL
VA: NORMAL
VTG %PRED: NORMAL
VTG PRED: NORMAL
VTG: NORMAL

## 2021-03-09 PROCEDURE — 94726 PLETHYSMOGRAPHY LUNG VOLUMES: CPT

## 2021-03-09 PROCEDURE — 94010 BREATHING CAPACITY TEST: CPT

## 2021-03-09 PROCEDURE — 94760 N-INVAS EAR/PLS OXIMETRY 1: CPT

## 2021-03-09 PROCEDURE — 94729 DIFFUSING CAPACITY: CPT

## 2021-03-09 PROCEDURE — 94200 LUNG FUNCTION TEST (MBC/MVV): CPT

## 2021-03-09 RX ORDER — ALBUTEROL SULFATE 90 UG/1
4 AEROSOL, METERED RESPIRATORY (INHALATION) ONCE
Status: CANCELLED | OUTPATIENT
Start: 2021-03-09

## 2021-03-09 ASSESSMENT — PULMONARY FUNCTION TESTS
FEV1_PERCENT_PREDICTED_PRE: 98
FEV1/FVC_PRE: 73

## 2021-03-10 ENCOUNTER — OFFICE VISIT (OUTPATIENT)
Dept: ORTHOPEDIC SURGERY | Age: 86
End: 2021-03-10

## 2021-03-10 VITALS — HEIGHT: 64 IN | WEIGHT: 149 LBS | TEMPERATURE: 97.3 F | BODY MASS INDEX: 25.44 KG/M2

## 2021-03-10 DIAGNOSIS — S62.398A: Primary | ICD-10-CM

## 2021-03-10 PROCEDURE — 99024 POSTOP FOLLOW-UP VISIT: CPT | Performed by: PHYSICIAN ASSISTANT

## 2021-03-10 RX ORDER — OLMESARTAN MEDOXOMIL AND HYDROCHLOROTHIAZIDE 20/12.5 20; 12.5 MG/1; MG/1
TABLET ORAL
COMMUNITY
Start: 2021-03-08 | End: 2021-05-05

## 2021-03-10 NOTE — PROCEDURES
Pulmonary Function Testing      Patient name:  Gabby Shepherd     [facility] Unit #:   [id number]   Date of test: 3/9/2021  Date of interpretation:   3/10/2021    Ms. Gabby Shepherd is a 80y.o. year-old non smoker. The spirometry data were acceptable and reproducible. Spirometry:  Flow volume loops were obstructed. The FEV-1/FVC ratio was normal. The   FEV-1 was 1.67 liters (98% of predicted), which was normal. The FVC was 2.29 liters (99% of predicted), which was normal. Response to inhaled bronchodilators (albuterol) was not performed. Lung volumes:  Lung volumes were tested by plethysmography. The total lung capacity was 4.65 liters (99% of predicted), which was normal. The residual volume was 2.36 liters (113% of predicted), which was normal. The ratio of residual volume to total lung capacity (RV/TLC) was 113, which was increased. Specific airway resistance was increased. Diffusion capacity was found to be decreased. Interpretation:  Very mild obstructive airway disease with reduced diffusion capacity.

## 2021-03-10 NOTE — PROGRESS NOTES
SUBJECTIVE:   CC: Priya Krishnan is an 57 year old woman who presents for preventive health visit.     Healthy Habits:    Do you get at least three servings of calcium containing foods daily (dairy, green leafy vegetables, etc.)? yes    Amount of exercise or daily activities, outside of work: 2 day(s) per week    Problems taking medications regularly No    Medication side effects: No    Have you had an eye exam in the past two years? yes    Do you see a dentist twice per year? yes    Do you have sleep apnea, excessive snoring or daytime drowsiness?yes          Today's PHQ-2 Score: No flowsheet data found.    Abuse: Current or Past(Physical, Sexual or Emotional)- No  Do you feel safe in your environment - Yes    Social History   Substance Use Topics     Smoking status: Never Smoker     Smokeless tobacco: Never Used     Alcohol use Yes     If you drink alcohol do you typically have >3 drinks per day or >7 drinks per week? No                     Reviewed orders with patient.  Reviewed health maintenance and updated orders accordingly - Yes  Labs reviewed in EPIC  BP Readings from Last 3 Encounters:   02/09/18 123/84    Wt Readings from Last 3 Encounters:   02/09/18 168 lb (76.2 kg)                    Patient over age 50, mutual decision to screen reflected in health maintenance.    Pertinent mammograms are reviewed under the imaging tab.  History of abnormal Pap smear: NO - age 30-65 PAP every 5 years with negative HPV co-testing recommended    Reviewed and updated as needed this visit by clinical staff  Tobacco  Allergies  Meds  Med Hx  Surg Hx  Fam Hx  Soc Hx        Reviewed and updated as needed this visit by Provider          ROS:  C: NEGATIVE for fever, chills, change in weight  I: NEGATIVE for worrisome rashes, moles or lesions  E: NEGATIVE for vision changes or irritation  ENT: NEGATIVE for ear, mouth and throat problems  R: NEGATIVE for significant cough or SOB  B: NEGATIVE for masses, tenderness or  Ms. Alma Barnes returns today in follow-up of her recent right Small Finger Metacarpal Base Fracture which occurred approximately 5 weeks ago. Options for treatment of her fracture, such as closed reduction or surgical stabilization were discussed & considered during prior visits and were Declined. She has noted decreased discomfort and decreased swelling. She notes no symptoms of numbness, tingling, no symptoms related to perfusion. Physical Exam:  Skin (after immobilization is removed) is Skin color, texture, turgor normal. No rashes or lesions. Digits: diminished range of motion due to immobilization  Wrist range of motion is stiff from immobilization. Sensation is normal.  Vascular examination reveals normal, good capillary refill and good color. Swelling is mild. There is no clinical evidence of residual skeletal deformity. There is no rotational deformity. Fracture site is not tender to palpation. There is not crepitance or gross motion at the previous fracture site. Radiographic Evaluation:  Radiographs were obtained today (3 views of the right hand). They demonstrate fair maintenance of alignment of the fracture fragments, moderate amount of interval healing of the fracture. The fracture does appear to be healed at this time. Impression:  Ms. Alma Barnes is doing well after recent right Small Finger Metacarpal Base Fracture. It would appear that she has fully healed her fracture. Plan:  Ms. Alma Barnes is instructed in the appropriate short term protection of her freshly healed fracture. We discussed the use of either a removable protective orthosis or diane taping technique as the situation demands. She is demonstrated the application and use of both devices. She is also instructed in work on Active & Passive range of motion of the digits, wrist, & elbow. These modalities were demonstrated to her today.   We discussed the continued restrictions on the use of the "discharge  CV: NEGATIVE for chest pain, palpitations or peripheral edema  GI: NEGATIVE for nausea, abdominal pain, heartburn, or change in bowel habits   menopausal female: vaginal dryness  M: NEGATIVE for significant arthralgias or myalgia  N: NEGATIVE for weakness, dizziness or paresthesias  P: NEGATIVE for changes in mood or affect     OBJECTIVE:   /84  Pulse 89  Temp 96.7  F (35.9  C) (Tympanic)  Resp 18  Ht 5' 6\" (1.676 m)  Wt 168 lb (76.2 kg)  Breastfeeding? No  BMI 27.12 kg/m2  EXAM:  GENERAL: healthy, alert and no distress  EYES: Eyes grossly normal to inspection, PERRL and conjunctivae and sclerae normal  HENT: ear canals and TM's normal, nose and mouth without ulcers or lesions  NECK: no adenopathy, no asymmetry, masses, or scars and thyroid normal to palpation  RESP: lungs clear to auscultation - no rales, rhonchi or wheezes  BREAST: normal without masses, tenderness or nipple discharge and no palpable axillary masses or adenopathy  CV: regular rate and rhythm, normal S1 S2, no S3 or S4, no murmur, click or rub, no peripheral edema and peripheral pulses strong  ABDOMEN: soft, nontender, no hepatosplenomegaly, no masses and bowel sounds normal   (female): normal female external genitalia, normal urethral meatus , vaginal mucosa pink, moist, well rugated, vaginal mucosal atrophy and normal cervix, adnexae, and uterus without masses.  MS: no gross musculoskeletal defects noted, no edema  SKIN: no suspicious lesions or rashes and keratoses - seborrheic  NEURO: Normal strength and tone, mentation intact and speech normal  PSYCH: mentation appears normal, affect normal/bright  Recent Results (from the past 744 hour(s))   CT Calcium Screening    Narrative    Procedure: CT CALCIUM SCREENING     Examination Date: 2/5/2018 3:13 PM      Clinical Information: ; Screening for heart disease     Ordering Provider: Self    PROCEDURE: High-resolution, ECG synchronized multi-slice computed  tomography was " injured hand and the limitations on resumption of activities until full range of motion and comfort have been regained. I have explained the time course and likely expectations for maximal recovery of motion and function. We discussed the option of pursuing formalized hand therapy and a prescription  was not indicated. I have asked Ms. Krystle Oates to follow-up with me by either scheduling an appointment for 4 weeks from now or by calling me at that time if she has not been able to regain full painless range of motion and functional use of the injured extremity. She is also specifically instructed to return to the office or call for an appointment sooner if her symptoms are changing or worsening prior to that time. performed without incident. Coronary calcification was  analyzed using [employer] calcium scoring software. Scan protocol was  optimized to minimize radiation exposure. The total radiation exposure  was calculated to be 88 DLP and 1.2 mSv.    FINDINGS:    Overall quality of the study: Good.     CORONARY ARTERY CALCIUM SCORES:     Left main coronary artery: 0  Left anterior descending coronary artery: 0  Circumflex coronary artery: 0   Right coronary artery: 0    TOTAL CALCIUM SCORE: 0    The total Agatston calcium score is 0.     Impressions  No evidence of coronary artery calcification  Please review Radiology report for incidental noncardiac findings that  will follow separately      CALCIUM SCORE OF ZERO: A calcium score of zero places this individual  in the lowest quartile when compared to an age and gender matched  control group. If the patient has more than one cardiac risk factor  then the risk of coronary heart disease, death or myocardial  infarction is less than 0.4% per year (Jorge P. et al. [facility],  2007; 49:378-402).     GENERAL RECOMMENDATIONS: Adoption and maintenance of a healthy  lifestyle is recommended for all people. This should include regular,  appropriate exercise and observance of a proper diet, to ensure  balanced nutrition and weight control. Tobacco use should be avoided.  Cholesterol has been linked to coronary atherosclerosis, and we  strongly encourage adhering to the recommendations of the National  Cholesterol Education Panel (NCEP). For primary prevention, these  include a target goal for total cholesterol of less than 200 mg/dl,  HDL cholesterol of greater than 40 mg/dl, triglycerides of less than  150 mg/dl, and LDL cholesterol of less than 130 mg/dl. However note  that these are general recommendations only, and as with all such  matters, the personal physician should be consulted regarding  recommendations appropriate for the individual.    OVIDIO BRAVO MD   Radiologist  "Consult For Cardiology    Narrative    RADIOLOGIST CONSULT FOR CARDIOLOGY   2/5/2018  3:13 PM     HISTORY: Screening for heart disease.    LIMITATIONS: This interpretation for the soft tissue structures only.  Cardiac and vascular structures are interpreted separately.    TECHNIQUE: Noncontrast images of the chest. The apices of the lungs  were excluded from view.  Radiation dose for this scan was reduced  using automated exposure control, adjustment of the mA and/or kV  according to patient size, or iterative reconstruction technique.    COMPARISON: None.    FINDINGS: No pulmonary nodule or mass. No pleural or pericardial  effusion. No thoracic adenopathy. Visualized bones and upper abdomen  are unremarkable.      Impression    IMPRESSION: No significant soft tissue abnormality.    BUBBA DUQUE MD         ASSESSMENT/PLAN:   1. Routine general medical examination at a health care facility     - GLUCOSE  - Pap imaged thin layer screen with HPV - recommended age 30 - 65  - HPV High Risk Types DNA Cervical  - Lipid panel reflex to direct LDL Fasting    2. Fear of flying   travels six times a year for work, fear of flying  - ALPRAZolam (XANAX) 0.25 MG tablet; Take 1 tablet (0.25 mg) by mouth 3 times daily as needed for anxiety  Dispense: 12 tablet; Refill: 1    COUNSELING:   Reviewed preventive health counseling, as reflected in patient instructions       Regular exercise       Healthy diet/nutrition         reports that she has never smoked. She has never used smokeless tobacco.    Estimated body mass index is 27.12 kg/(m^2) as calculated from the following:    Height as of this encounter: 5' 6\" (1.676 m).    Weight as of this encounter: 168 lb (76.2 kg).   Weight management plan: Discussed healthy diet and exercise guidelines and patient will follow up in 12 months in clinic to re-evaluate.    Counseling Resources:  ATP IV Guidelines  Pooled Cohorts Equation Calculator  Breast Cancer Risk Calculator  FRAX Risk " Assessment  ICSI Preventive Guidelines  Dietary Guidelines for Americans, 2010  USDA's MyPlate  ASA Prophylaxis  Lung CA Screening    Manisha Han MD  [facility]

## 2021-03-10 NOTE — PATIENT INSTRUCTIONS
Hand Fracture Instructions  After Cast is Removed    Dr. Kristin Eisenberg    1. Be cautions in resuming fulll activities and use of the hand for next 2 - 4 weeks. 2. If you were provided a brace, use is for more vigorous activity, but remove it for light activity and to perform the exercises lised below. 3. Perform the following exercises VIGOROUSLY at least four times a day. Exercises should be performed in the seated position with elbow on tabletop or other firm surface. If you cannot make these motions on your own, you may use other hand to assist in making these motions. A. Fully straighten fingers until hand is flat. Fully bend fingers until hand is in a full fist.   B. Bend wrist forward and backward (grasp hand around knuckles with other hand to do so). C. Rotate forearm so that your palm faces towards your face. Rotate forearm so that your palm faces away from your face (grasp hand around wrist with other hand to do so). D. Fully straighten elbow. Fully bend elbow. 4. Continue light use of the hand progressing to more normal us as it feels comfortable to do so.   5. In 2 - 4 weeks you may discontinue using the brace (if you were using one) and resume normal use of the hand and wrist if you have regained full and painless motion and function. 6. If you are unable to achieve  full and painless motion and function over 4 weeks, please call the office at [phone] to schedule a follow-up appointment with Dr. Tom Whitehead.

## 2021-03-12 ENCOUNTER — APPOINTMENT (OUTPATIENT)
Dept: PHYSICAL THERAPY | Age: 86
End: 2021-03-12
Payer: COMMERCIAL

## 2021-03-15 ENCOUNTER — APPOINTMENT (OUTPATIENT)
Dept: PHYSICAL THERAPY | Age: 86
End: 2021-03-15
Payer: COMMERCIAL

## 2021-03-19 ENCOUNTER — APPOINTMENT (OUTPATIENT)
Dept: PHYSICAL THERAPY | Age: 86
End: 2021-03-19
Payer: COMMERCIAL

## 2021-03-19 ENCOUNTER — OFFICE VISIT (OUTPATIENT)
Dept: PULMONOLOGY | Age: 86
End: 2021-03-19
Payer: COMMERCIAL

## 2021-03-19 VITALS — HEART RATE: 90 BPM | OXYGEN SATURATION: 99 %

## 2021-03-19 DIAGNOSIS — K21.9 LARYNGOPHARYNGEAL REFLUX (LPR): ICD-10-CM

## 2021-03-19 DIAGNOSIS — R05.3 CHRONIC COUGH: Primary | ICD-10-CM

## 2021-03-19 DIAGNOSIS — R91.1 PULMONARY NODULE: ICD-10-CM

## 2021-03-19 DIAGNOSIS — J45.40 MODERATE PERSISTENT ASTHMA WITHOUT COMPLICATION: ICD-10-CM

## 2021-03-19 PROCEDURE — 99214 OFFICE O/P EST MOD 30 MIN: CPT | Performed by: INTERNAL MEDICINE

## 2021-03-19 RX ORDER — FLUTICASONE FUROATE AND VILANTEROL TRIFENATATE 100; 25 UG/1; UG/1
1 POWDER RESPIRATORY (INHALATION) DAILY
Qty: 1 EACH | Refills: 11 | Status: SHIPPED | OUTPATIENT
Start: 2021-03-19 | End: 2022-07-05

## 2021-03-19 NOTE — PROGRESS NOTES
[facility]  Consult Note  Anne Cabrera MD       Ritu Butt   YOB: 1934    Date of Visit:  3/19/2021    Assessment/Plan:  1. Chronic cough  2. Moderate persistent asthma without complication  3. Chronic pansinusitis  4. Laryngopharyngeal reflux (LPR)  5. Pulmonary nodule    I have reviewed laboratories, medical records and images for this visit  I reviewed CT imaging with the patient and her daughter during today's visit. 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 These are not responsible for her chronic cough symptoms  However, would repeat CT scan in 1 year to follow-up the pulmonary nodule  Pulmonary function testing revealed very mild obstructive lung disease  Cough has gotten better in response to [facility]  She did become concerned because her pulse went from the mid 70s to the mid 90s after taking albuterol yesterday. Have her continue Breo but stop albuterol  Follow-up in 4 months    Chief Complaint   Patient presents with    Cough     had PFT and CT Chest done       HPI  The patient presents with a chief complaint of chronic cough. It sounds like this is been going on for quite a few years. However, she feels it has been much worse in the last 1 year. She is known to have sinus issues. She complains of sinus drainage. At one point she was told she needed surgery but a second opinion felt that surgery was not needed. She has had history of GERD/LPR D and takes Nexium in the morning and Pepcid at night. This is controlled symptoms for her mostly. She uses albuterol as well. She finds this helpful. She has taken antibiotics and prednisone. She is found these helpful but only while she is taking the medicine. When she stops it symptoms come right back. She is a lifetime non-smoker with no significant secondary smoke exposure.     Review of Systems  No complaint of chest pain, nausea or vomiting. History  I have reviewed past medical, surgical, social and family history. This is documented elsewhere in themedical record. Physical Exam:  Well developed, well nourished  Alert and oriented  Sclera is clear  No cervical adenopathy  No JVD. Chest examination is clear. Cardiac examination reveals regular rate and rhythm without murmur, gallop or rub. The abdomen is soft, nontender and nondistended. There is no clubbing, cyanosis or edema of the extremities. There is no obvious skin rash. No focal neuro deficicts  Normal mood and affect      Allergies   Allergen Reactions    Simvastatin Other (See Comments)     Body aches    Codeine Hives     Prior to Visit Medications    Medication Sig Taking?  Authorizing Provider   fluticasone-vilanterol (BREO ELLIPTA) 100-25 MCG/INH AEPB inhaler Inhale 1 puff into the lungs daily Yes Onesimo Bloch, MD   olmesartan-hydroCHLOROthiazide (BENICAR HCT) 20-12.5 MG per tablet   Historical Provider, MD   albuterol sulfate HFA (VENTOLIN HFA) 108 (90 Base) MCG/ACT inhaler Inhale 1 puff into the lungs 4 times daily as needed for Wheezing or Shortness of Breath  Mckayla De León, APRN - CNP   Iodine, Kelp, (KELP PO) Take by mouth daily  Historical Provider, MD   TURMERIC PO Take by mouth  Historical Provider, MD   Famotidine-Ca Carb-Mag Hydrox (PEPCID COMPLETE PO) Take 1 tablet by mouth nightly  Historical Provider, MD   ibuprofen (ADVIL;MOTRIN) 600 MG tablet Take 1 tablet by mouth every 6 hours as needed for Pain  Lauren Hankins MD   esomeprazole (NEXIUM) 40 MG delayed release capsule Take 40 mg by mouth every morning (before breakfast)  Historical Provider, MD   Coenzyme Q10 (COQ-10) 100 MG CPCR Take 1 capsule by mouth daily  Historical Provider, MD   cetirizine (ZYRTEC) 10 MG tablet Take 10 mg by mouth as needed   Historical Provider, MD   Saline (SIMPLY SALINE) 0.9 % AERS by Nasal route daily as needed   Historical Provider, MD   Flaxseed, Linseed, (FLAX SEED OIL) 1000 MG CAPS Take 1,200 mg by mouth daily. Historical Provider, MD   aspirin 81 MG chewable tablet Take 81 mg by mouth daily. Historical Provider, MD       Vitals:    03/19/21 1413   Pulse: 90   SpO2: 99%     There is no height or weight on file to calculate BMI.      Wt Readings from Last 3 Encounters:   03/10/21 149 lb (67.6 kg)   02/08/21 149 lb 9.6 oz (67.9 kg)   02/05/21 148 lb (67.1 kg)     BP Readings from Last 3 Encounters:   02/08/21 138/74   02/02/21 (!) 182/91   01/06/21 138/82        Social History     Tobacco Use   Smoking Status Never Smoker   Smokeless Tobacco Never Used

## 2021-05-04 ENCOUNTER — TELEPHONE (OUTPATIENT)
Dept: INTERNAL MEDICINE CLINIC | Age: 86
End: 2021-05-04

## 2021-05-04 NOTE — TELEPHONE ENCOUNTER
Patient needs her refill on OLMESARTAN 40MG not 20MG.     [facility] [address] [place] [address]

## 2021-05-05 RX ORDER — OLMESARTAN MEDOXOMIL AND HYDROCHLOROTHIAZIDE 40/12.5 40; 12.5 MG/1; MG/1
TABLET ORAL
Qty: 90 TABLET | Refills: 3 | Status: SHIPPED | OUTPATIENT
Start: 2021-05-05 | End: 2022-04-26

## 2021-05-10 ENCOUNTER — OFFICE VISIT (OUTPATIENT)
Dept: INTERNAL MEDICINE CLINIC | Age: 86
End: 2021-05-10
Payer: COMMERCIAL

## 2021-05-10 VITALS
WEIGHT: 146.4 LBS | BODY MASS INDEX: 25.94 KG/M2 | SYSTOLIC BLOOD PRESSURE: 138 MMHG | HEIGHT: 63 IN | DIASTOLIC BLOOD PRESSURE: 84 MMHG

## 2021-05-10 DIAGNOSIS — E78.00 HIGH CHOLESTEROL: ICD-10-CM

## 2021-05-10 DIAGNOSIS — I10 ESSENTIAL HYPERTENSION: Primary | ICD-10-CM

## 2021-05-10 DIAGNOSIS — D50.9 MICROCYTIC ANEMIA: ICD-10-CM

## 2021-05-10 DIAGNOSIS — E78.2 MIXED HYPERLIPIDEMIA: ICD-10-CM

## 2021-05-10 DIAGNOSIS — I10 ESSENTIAL HYPERTENSION: ICD-10-CM

## 2021-05-10 DIAGNOSIS — D50.0 IRON DEFICIENCY ANEMIA DUE TO CHRONIC BLOOD LOSS: ICD-10-CM

## 2021-05-10 DIAGNOSIS — R73.09 ELEVATED GLUCOSE: ICD-10-CM

## 2021-05-10 DIAGNOSIS — Z91.81 AT HIGH RISK FOR FALLS: ICD-10-CM

## 2021-05-10 DIAGNOSIS — E55.9 VITAMIN D DEFICIENCY: ICD-10-CM

## 2021-05-10 PROBLEM — M35.00 SICCA SYNDROME (HCC): Status: ACTIVE | Noted: 2020-09-02

## 2021-05-10 PROCEDURE — 99214 OFFICE O/P EST MOD 30 MIN: CPT | Performed by: NURSE PRACTITIONER

## 2021-05-10 ASSESSMENT — ENCOUNTER SYMPTOMS
COUGH: 0
SHORTNESS OF BREATH: 0
WHEEZING: 0
CHEST TIGHTNESS: 0

## 2021-05-11 LAB
ANION GAP SERPL CALCULATED.3IONS-SCNC: 11 MMOL/L (ref 3–16)
BASOPHILS ABSOLUTE: 0.1 K/UL (ref 0–0.2)
BASOPHILS RELATIVE PERCENT: 1.2 %
BUN BLDV-MCNC: 14 MG/DL (ref 7–20)
CALCIUM SERPL-MCNC: 10.4 MG/DL (ref 8.3–10.6)
CHLORIDE BLD-SCNC: 98 MMOL/L (ref 99–110)
CO2: 26 MMOL/L (ref 21–32)
CREAT SERPL-MCNC: 0.7 MG/DL (ref 0.6–1.2)
EOSINOPHILS ABSOLUTE: 0.4 K/UL (ref 0–0.6)
EOSINOPHILS RELATIVE PERCENT: 6 %
ESTIMATED AVERAGE GLUCOSE: 111.2 MG/DL
GFR AFRICAN AMERICAN: >60
GFR NON-AFRICAN AMERICAN: >60
GLUCOSE BLD-MCNC: 115 MG/DL (ref 70–99)
HBA1C MFR BLD: 5.5 %
HCT VFR BLD CALC: 32.8 % (ref 36–48)
HEMOGLOBIN: 10.8 G/DL (ref 12–16)
LYMPHOCYTES ABSOLUTE: 1.7 K/UL (ref 1–5.1)
LYMPHOCYTES RELATIVE PERCENT: 26.2 %
MCH RBC QN AUTO: 23.2 PG (ref 26–34)
MCHC RBC AUTO-ENTMCNC: 32.9 G/DL (ref 31–36)
MCV RBC AUTO: 70.6 FL (ref 80–100)
MONOCYTES ABSOLUTE: 0.4 K/UL (ref 0–1.3)
MONOCYTES RELATIVE PERCENT: 6.8 %
NEUTROPHILS ABSOLUTE: 3.8 K/UL (ref 1.7–7.7)
NEUTROPHILS RELATIVE PERCENT: 59.8 %
PDW BLD-RTO: 15.6 % (ref 12.4–15.4)
PLATELET # BLD: 276 K/UL (ref 135–450)
PMV BLD AUTO: 7.6 FL (ref 5–10.5)
POTASSIUM SERPL-SCNC: 4.2 MMOL/L (ref 3.5–5.1)
RBC # BLD: 4.64 M/UL (ref 4–5.2)
SODIUM BLD-SCNC: 135 MMOL/L (ref 136–145)
VITAMIN D 25-HYDROXY: 35.9 NG/ML
WBC # BLD: 6.4 K/UL (ref 4–11)

## 2021-05-27 ENCOUNTER — APPOINTMENT (OUTPATIENT)
Dept: GENERAL RADIOLOGY | Age: 86
End: 2021-05-27
Payer: COMMERCIAL

## 2021-05-27 ENCOUNTER — APPOINTMENT (OUTPATIENT)
Dept: CT IMAGING | Age: 86
End: 2021-05-27
Payer: COMMERCIAL

## 2021-05-27 ENCOUNTER — HOSPITAL ENCOUNTER (EMERGENCY)
Age: 86
Discharge: HOME | End: 2021-05-27
Payer: COMMERCIAL

## 2021-05-27 VITALS
SYSTOLIC BLOOD PRESSURE: 180 MMHG | DIASTOLIC BLOOD PRESSURE: 85 MMHG | WEIGHT: 146 LBS | BODY MASS INDEX: 25.87 KG/M2 | OXYGEN SATURATION: 100 % | RESPIRATION RATE: 18 BRPM | HEART RATE: 83 BPM | HEIGHT: 63 IN | TEMPERATURE: 97.7 F

## 2021-05-27 DIAGNOSIS — S52.614A CLOSED NONDISPLACED FRACTURE OF STYLOID PROCESS OF RIGHT ULNA, INITIAL ENCOUNTER: Primary | ICD-10-CM

## 2021-05-27 DIAGNOSIS — S09.90XA INJURY OF HEAD, INITIAL ENCOUNTER: ICD-10-CM
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
## 2021-05-27 PROCEDURE — 72125 CT NECK SPINE W/O DYE: CPT

## 2021-05-27 PROCEDURE — 73130 X-RAY EXAM OF HAND: CPT

## 2021-05-27 PROCEDURE — 6370000000 HC RX 637 (ALT 250 FOR IP): Performed by: PHYSICIAN ASSISTANT

## 2021-05-27 PROCEDURE — 73090 X-RAY EXAM OF FOREARM: CPT

## 2021-05-27 PROCEDURE — 70450 CT HEAD/BRAIN W/O DYE: CPT

## 2021-05-27 PROCEDURE — 73110 X-RAY EXAM OF WRIST: CPT

## 2021-05-27 PROCEDURE — 73080 X-RAY EXAM OF ELBOW: CPT

## 2021-05-27 PROCEDURE — 29125 APPL SHORT ARM SPLINT STATIC: CPT

## 2021-05-27 PROCEDURE — 99283 EMERGENCY DEPT VISIT LOW MDM: CPT

## 2021-05-27 RX ORDER — ACETAMINOPHEN 500 MG
1000 TABLET ORAL ONCE
Status: COMPLETED | OUTPATIENT
Start: 2021-05-27 | End: 2021-05-27

## 2021-05-27 RX ADMIN — ACETAMINOPHEN 1000 MG: 500 TABLET ORAL at 19:32

## 2021-05-27 ASSESSMENT — PAIN SCALES - GENERAL
PAINLEVEL_OUTOF10: 9
PAINLEVEL_OUTOF10: 10

## 2021-05-27 NOTE — ED PROVIDER NOTES
[facility]        Pt Name: Bhavya Mahajan  MRN: [id number]  [place] 1934  Date of evaluation: 5/27/2021  Provider: Francis Navarro PA-C  PCP: ROSE Bledsoe CNP  Note Started: 7:43 PM EDT       GEOVANNA. I have evaluated this patient. My supervising physician was available for consultation. CHIEF COMPLAINT       Chief Complaint   Patient presents with    Fall     pt states was carrying groceries down stairs and heel got caught, feel down 2-3 stairs, head hit door, right wrist pain only, no blood thinners       HISTORY OF PRESENT ILLNESS   (Location, Timing/Onset, Context/Setting, Quality, Duration, Modifying Factors, Severity, Associated Signs and Symptoms)  Note limiting factors. Bhavya Mahajan is a 80 y.o. female who presents with a Chief Complaint of the presents to the emergency department with a chief complaint of some pain from her right hand to her right elbow after her heel got caught walking down the steps and she fell forward down about 2 steps and she did hit the top of her head on the door that was cracked open. She states the pain is mostly in her right wrist and hand but does radiate up into the elbow and she has previous history of elbow replacement. She is right-hand dominant. She denies use of anticoagulants, headache, visual changes, nausea, vomiting, seizures, neck pain, chest pain, abdominal pain. She is been able to ambulate since this happened. Rates the pain a 9 out of 10. Does not want anything stronger for the pain than Tylenol. Nursing Notes were all reviewed and agreed with or any disagreements were addressed in the HPI. REVIEW OF SYSTEMS    (2-9 systems for level 4, 10 or more for level 5)     Review of Systems    Positives and Pertinent negatives as per HPI. Except as noted above in the ROS, all other systems were reviewed and negative.        PAST MEDICAL HISTORY     Past Medical History:   Diagnosis Date    Bleeding ulcer     Cancer ([facility] [address]     melanoma L IRIS    Gastroesophagitis     GERD (gastroesophageal reflux disease)     Hyperlipidemia     Hypertension     Moderate persistent asthma 2/12/2021    Pneumonia     Rheumatic fever with heart involvement     Trigger ring finger of right hand 7/1/2013    Unspecified diseases of blood and blood-forming organs     chronic anemia         SURGICAL HISTORY     Past Surgical History:   Procedure Laterality Date    BLADDER REPAIR N/A 2002    BREAST SURGERY      L breast tumor-benign    CYST REMOVAL Right 8-1-13    GANGLION CYST EXCISION RIGHT WRIST, TRIGGER FINGER RELEASE RIGHT FOURTH    EYE SURGERY      , cataract right eye    FRACTURE SURGERY      R shoulderx2-pinned    HYSTERECTOMY      JOINT REPLACEMENT Right 03/28/2012    Total Elbow - Dr. Patricia Govea      carcinoid    UPPER GASTROINTESTINAL ENDOSCOPY  3/7/12    with bx    UPPER GASTROINTESTINAL ENDOSCOPY  11/8/13    EUS    UPPER GASTROINTESTINAL ENDOSCOPY  4/24/14    UPPER GASTROINTESTINAL ENDOSCOPY  3/17/16    push enteroscopy with ablation    UPPER GASTROINTESTINAL ENDOSCOPY  06/29/2017         CURRENTMEDICATIONS       Discharge Medication List as of 5/27/2021  9:55 PM      CONTINUE these medications which have NOT CHANGED    Details   olmesartan-hydroCHLOROthiazide (BENICAR HCT) 40-12.5 MG per tablet TAKE 1 TABLET BY MOUTH DAILY, Disp-90 tablet, R-3Normal      fluticasone-vilanterol (BREO ELLIPTA) 100-25 MCG/INH AEPB inhaler Inhale 1 puff into the lungs daily, Disp-1 each, R-11Print      albuterol sulfate HFA (VENTOLIN HFA) 108 (90 Base) MCG/ACT inhaler Inhale 1 puff into the lungs 4 times daily as needed for Wheezing or Shortness of Breath, Disp-1 Inhaler, R-1Normal      Iodine, Kelp, (KELP PO) Take by mouth dailyHistorical Med      TURMERIC PO Take by mouthHistorical Med Famotidine-Ca Carb-Mag Hydrox (PEPCID COMPLETE PO) Take 1 tablet by mouth nightlyHistorical Med      ibuprofen (ADVIL;MOTRIN) 600 MG tablet Take 1 tablet by mouth every 6 hours as needed for Pain, Disp-40 tablet, R-0Print      esomeprazole (NEXIUM) 40 MG delayed release capsule Take 40 mg by mouth every morning (before breakfast)Historical Med      Coenzyme Q10 (COQ-10) 100 MG CPCR Take 1 capsule by mouth dailyHistorical Med      cetirizine (ZYRTEC) 10 MG tablet Take 10 mg by mouth as needed Historical Med      Saline (SIMPLY SALINE) 0.9 % AERS by Nasal route daily as needed Historical Med      Flaxseed, Linseed, (FLAX SEED OIL) 1000 MG CAPS Take 1,200 mg by mouth daily. aspirin 81 MG chewable tablet Take 81 mg by mouth daily. ALLERGIES     Simvastatin and Codeine    FAMILYHISTORY       Family History   Problem Relation Age of Onset    Heart Disease Mother     High Blood Pressure Mother     High Cholesterol Mother     Heart Disease Father     High Blood Pressure Father     High Cholesterol Father     Cancer Sister     Stroke Sister     High Blood Pressure Sister     High Cholesterol Sister     High Blood Pressure Brother     High Cholesterol Brother     Heart Attack Brother     Cancer Brother     Heart Attack Brother     Parkinsonism Brother           SOCIAL HISTORY       Social History     Tobacco Use    Smoking status: Never Smoker    Smokeless tobacco: Never Used   Vaping Use    Vaping Use: Never used   Substance Use Topics    Alcohol use: No    Drug use: No       SCREENINGS             PHYSICAL EXAM    (up to 7 for level 4, 8 or more for level 5)     ED Triage Vitals [05/27/21 1840]   BP Temp Temp Source Pulse Resp SpO2 Height Weight   (!) 180/85 97.7 °F (36.5 °C) Infrared 83 18 100 % 5' 3\" (1.6 m) 146 lb (66.2 kg)       Physical Exam  Vitals and nursing note reviewed. Constitutional:       Appearance: She is well-developed. She is not diaphoretic.    HENT: Head: Atraumatic. Nose: Nose normal.   Eyes:      General:         Right eye: No discharge. Left eye: No discharge. Cardiovascular:      Rate and Rhythm: Normal rate and regular rhythm. Pulses: Normal pulses. Heart sounds: No murmur heard. No friction rub. No gallop. Comments: 2+ radial pulse in right wrist.  Pulmonary:      Effort: Pulmonary effort is normal. No respiratory distress. Breath sounds: No stridor. No wheezing, rhonchi or rales. Abdominal:      General: Bowel sounds are normal. There is no distension. Palpations: Abdomen is soft. There is no mass. Tenderness: There is no abdominal tenderness. There is no guarding or rebound. Hernia: No hernia is present. Musculoskeletal:         General: Tenderness present. No swelling. Normal range of motion. Cervical back: Normal range of motion. Comments: Some tenderness and swelling around the right wrist and general tenderness over the right hand and right forearm. Full range of motion with flexion extension of right elbow. No obvious deformity. Some subjective tenderness to palpate over the neck without point tenderness or step-off deformity. Full range of motion of left upper extremity and bilateral lower extremities. Skin:     General: Skin is warm and dry. Findings: No erythema or rash. Neurological:      Mental Status: She is alert and oriented to person, place, and time. Cranial Nerves: No cranial nerve deficit. Psychiatric:         Behavior: Behavior normal.         DIAGNOSTIC RESULTS   LABS:    Labs Reviewed - No data to display    All other labs were within normal range or not returned as of this dictation. EKG: All EKG's are interpreted by the Emergency Department Physician in the absence of a cardiologist.  Please see their note for interpretation of EKG.     RADIOLOGY:   Non-plain film images such as CT, Ultrasound and MRI are read by the radiologist. Savana Aldrich

## 2021-06-02 ENCOUNTER — OFFICE VISIT (OUTPATIENT)
Dept: ORTHOPEDIC SURGERY | Age: 86
End: 2021-06-02
Payer: COMMERCIAL

## 2021-06-02 VITALS — BODY MASS INDEX: 25.69 KG/M2 | RESPIRATION RATE: 16 BRPM | HEIGHT: 63 IN | WEIGHT: 145 LBS

## 2021-06-02 DIAGNOSIS — S52.614A CLOSED NONDISPLACED FRACTURE OF STYLOID PROCESS OF RIGHT ULNA, INITIAL ENCOUNTER: Primary | ICD-10-CM

## 2021-06-02 PROCEDURE — 99213 OFFICE O/P EST LOW 20 MIN: CPT | Performed by: ORTHOPAEDIC SURGERY

## 2021-06-02 PROCEDURE — 25530 CLTX ULNAR SHFT FX W/O MNPJ: CPT | Performed by: ORTHOPAEDIC SURGERY

## 2021-06-02 PROCEDURE — L3908 WHO COCK-UP NONMOLDE PRE OTS: HCPCS | Performed by: ORTHOPAEDIC SURGERY

## 2021-06-02 NOTE — PROGRESS NOTES
This 80 y.o.  right hand dominant retired woman is seen in referral for ROSE Harmon CNP with a chief complaint of injury to their right wrist, which was injured 1 week ago when she fell. She noticed pain, swelling and bruising. She was evaluated at the [address] [place] [address] were obtained and the patient was splinted and referred for hand/upper extremity evaluation and treatment. There is no history of additional significant injury. Symptoms have improved since the date of injury. The pain assessment has been reviewed and is correct. The patient's social history, past medical history, family history, medications, allergies and review of systems, entered 6/2/21,  have been reviewed, and dated and are recorded in the chart. On physical examination the patient is Height: 5' 3\" (160 cm) tall and weighs Weight: 145 lb (65.8 kg). Respirations are 18 per minute. The patient is well nourished, is oriented to time and place, demonstrates appropriate mood and affect as well as normal gait and station. There is mild soft tissue swelling present about the right wrist, ulnar. There is mild discoloration. There is no deformity. Tenderness is present on palpation in the area of the right wrist, ulnar  Range of motion of the wrist is limited only on the right and is accompanied by pain. Skin is intact, as is distal circulation and sensation. Gross muscle strength is limited only on the right   Hand and wrist joints are stable. There are no subcutaneous nodules or enlarged epitrochlear lymph nodes. I have personally reviewed and interpreted all previous imaging studies, laboratory tests, diagnostic proceedures and medical encounters pertinent to this patient's visit today. Xrays: Review of outside Xrays of the right wrist demonstrate a nondisplaced fracture of the ulnar styloid. .    Impression: Fracture ulnar styloid right wrist.    The nature of this medical problem is fully

## 2021-06-30 ENCOUNTER — OFFICE VISIT (OUTPATIENT)
Dept: ORTHOPEDIC SURGERY | Age: 86
End: 2021-06-30

## 2021-06-30 VITALS — WEIGHT: 145 LBS | BODY MASS INDEX: 25.69 KG/M2 | HEIGHT: 63 IN | RESPIRATION RATE: 16 BRPM

## 2021-06-30 DIAGNOSIS — S52.614A CLOSED NONDISPLACED FRACTURE OF STYLOID PROCESS OF RIGHT ULNA, INITIAL ENCOUNTER: Primary | ICD-10-CM

## 2021-06-30 PROCEDURE — 99024 POSTOP FOLLOW-UP VISIT: CPT | Performed by: ORTHOPAEDIC SURGERY

## 2021-08-03 RX ORDER — IBUPROFEN 400 MG/1
400 TABLET ORAL EVERY 6 HOURS PRN
COMMUNITY

## 2021-08-03 NOTE — PROGRESS NOTES
Patient reached __X__ yes  _____ no   VM instructions left ____ yes   phone number ________                                ____ no-office notified          Date _8/11/21________  Time _1145______  Arrival __1015  hosp-endo____    Nothing to eat or drink after midnight-follow your doctors prep instructions-this may include taking a second dose of your prep after midnight  Responsible adult 25 or older to stay on site while you are here-drive you home-stay with you after  Follow any instructions your doctors office has given you  Bring a complete list of all your medications and supplements including name,dose,how often taken the day of your procedure  If you normally take the following medications in the morning please do so the AM of your procedure with a small sip of water       Heart,blood pressure,seizure,thyroid or breathing medications-use your inhalers       DO NOT take blood pressure medications ending in \"jayda\" or \"pril\" the AM of procedure or evening prior-DO NOT TAKE BENICAR  Take half or your normal dose of any long acting insulins the night before your procedure-do not take any diabetic medications the AM of procedure  Follow your doctors instructions regarding stopping or taking  any blood thinners-if you do not have instructions-call them  Any questions call your doctor  Other ______________________________________________________________      COVID TEST    _X__ Vaccinated--instructed to bring card DOS    ___ Covid test to be done 3-5 days prior to scheduled surgery if not vaccinated-patient aware they are REQUIRED to bring a copy of the negative result DOS-if they receive a positive result to notify their surgeon.           If known-indicate where patient is getting covid test done          _____________________________________________    ___ Rapid - DOS    ___ Other________________________________________            Manisha Trinh POLICY(subject to change)         There is a one visitor policy at [facility] for all surgeries and endoscopies. Whether the visitor can stay or will be asked to wait in the car will depend on the current policy and if social distancing can be maintained. The policy is subject to change at any time. Please make sure the visitor has a cell phone that is on,charged and able to accept calls, as this may be the way that the staff communicates with them. Pain management is NO VISITOR policyThe patients ride is expected to remain in the car with a cell phone for communication. If the ride is leaving the hospital grounds please make sure they are back in time for pickup. Have the patient inform the staff on arrival what their rides plans are while the patient is in the facility. At the MAIN there is one visitor allowed. Please note that the visitor policy is subject to change.

## 2021-08-03 NOTE — PROGRESS NOTES
Call to Dr. Magaile Morse office regarding whether carcinoid tumor is secreting or non-secreting. There is no mention of which type of tumor in physician notes and Dr. Damien Nguyen does not have any additional orders related to anesthesia for the procedure. Dr. Rusty Mcmillan notified.

## 2021-08-11 ENCOUNTER — ANESTHESIA EVENT (OUTPATIENT)
Dept: ENDOSCOPY | Age: 86
End: 2021-08-11
Payer: COMMERCIAL

## 2021-08-11 ENCOUNTER — ANESTHESIA (OUTPATIENT)
Dept: ENDOSCOPY | Age: 86
End: 2021-08-11
Payer: COMMERCIAL

## 2021-08-11 ENCOUNTER — HOSPITAL ENCOUNTER (OUTPATIENT)
Age: 86
Setting detail: OUTPATIENT SURGERY
Discharge: HOME | End: 2021-08-11
Attending: INTERNAL MEDICINE | Admitting: INTERNAL MEDICINE
Payer: COMMERCIAL

## 2021-08-11 VITALS
HEIGHT: 64 IN | TEMPERATURE: 97 F | OXYGEN SATURATION: 100 % | BODY MASS INDEX: 24.75 KG/M2 | HEART RATE: 84 BPM | SYSTOLIC BLOOD PRESSURE: 148 MMHG | WEIGHT: 145 LBS | DIASTOLIC BLOOD PRESSURE: 82 MMHG | RESPIRATION RATE: 20 BRPM

## 2021-08-11 VITALS
DIASTOLIC BLOOD PRESSURE: 78 MMHG | OXYGEN SATURATION: 93 % | RESPIRATION RATE: 24 BRPM | SYSTOLIC BLOOD PRESSURE: 160 MMHG

## 2021-08-11 PROCEDURE — 3700000000 HC ANESTHESIA ATTENDED CARE: Performed by: INTERNAL MEDICINE

## 2021-08-11 PROCEDURE — 3700000001 HC ADD 15 MINUTES (ANESTHESIA): Performed by: INTERNAL MEDICINE

## 2021-08-11 PROCEDURE — 2580000003 HC RX 258: Performed by: FAMILY MEDICINE

## 2021-08-11 PROCEDURE — 2709999900 HC NON-CHARGEABLE SUPPLY: Performed by: INTERNAL MEDICINE

## 2021-08-11 PROCEDURE — 7100000010 HC PHASE II RECOVERY - FIRST 15 MIN: Performed by: INTERNAL MEDICINE

## 2021-08-11 PROCEDURE — 6360000002 HC RX W HCPCS: Performed by: NURSE ANESTHETIST, CERTIFIED REGISTERED

## 2021-08-11 PROCEDURE — C1753 CATH, INTRAVAS ULTRASOUND: HCPCS | Performed by: INTERNAL MEDICINE

## 2021-08-11 PROCEDURE — 3609018500 HC EGD US SCOPE W/ADJACENT STRUCTURES: Performed by: INTERNAL MEDICINE

## 2021-08-11 PROCEDURE — 2500000003 HC RX 250 WO HCPCS: Performed by: NURSE ANESTHETIST, CERTIFIED REGISTERED

## 2021-08-11 PROCEDURE — 7100000011 HC PHASE II RECOVERY - ADDTL 15 MIN: Performed by: INTERNAL MEDICINE

## 2021-08-11 RX ORDER — PROPOFOL 10 MG/ML
INJECTION, EMULSION INTRAVENOUS CONTINUOUS PRN
Status: DISCONTINUED | OUTPATIENT
Start: 2021-08-11 | End: 2021-08-11 | Stop reason: SDUPTHER

## 2021-08-11 RX ORDER — OXYCODONE HYDROCHLORIDE 5 MG/1
10 TABLET ORAL PRN
Status: DISCONTINUED | OUTPATIENT
Start: 2021-08-11 | End: 2021-08-11 | Stop reason: HOSPADM

## 2021-08-11 RX ORDER — HYDROMORPHONE HCL 110MG/55ML
0.25 PATIENT CONTROLLED ANALGESIA SYRINGE INTRAVENOUS EVERY 5 MIN PRN
Status: DISCONTINUED | OUTPATIENT
Start: 2021-08-11 | End: 2021-08-11 | Stop reason: HOSPADM

## 2021-08-11 RX ORDER — HYDROMORPHONE HCL 110MG/55ML
0.5 PATIENT CONTROLLED ANALGESIA SYRINGE INTRAVENOUS EVERY 5 MIN PRN
Status: DISCONTINUED | OUTPATIENT
Start: 2021-08-11 | End: 2021-08-11 | Stop reason: HOSPADM

## 2021-08-11 RX ORDER — SODIUM CHLORIDE 9 MG/ML
INJECTION, SOLUTION INTRAVENOUS CONTINUOUS
Status: DISCONTINUED | OUTPATIENT
Start: 2021-08-11 | End: 2021-08-11 | Stop reason: HOSPADM

## 2021-08-11 RX ORDER — LIDOCAINE HYDROCHLORIDE 20 MG/ML
INJECTION, SOLUTION INFILTRATION; PERINEURAL PRN
Status: DISCONTINUED | OUTPATIENT
Start: 2021-08-11 | End: 2021-08-11 | Stop reason: SDUPTHER

## 2021-08-11 RX ORDER — OXYCODONE HYDROCHLORIDE 5 MG/1
5 TABLET ORAL PRN
Status: DISCONTINUED | OUTPATIENT
Start: 2021-08-11 | End: 2021-08-11 | Stop reason: HOSPADM

## 2021-08-11 RX ORDER — LABETALOL HYDROCHLORIDE 5 MG/ML
5 INJECTION, SOLUTION INTRAVENOUS EVERY 10 MIN PRN
Status: DISCONTINUED | OUTPATIENT
Start: 2021-08-11 | End: 2021-08-11 | Stop reason: HOSPADM

## 2021-08-11 RX ORDER — FENTANYL CITRATE 50 UG/ML
50 INJECTION, SOLUTION INTRAMUSCULAR; INTRAVENOUS EVERY 5 MIN PRN
Status: DISCONTINUED | OUTPATIENT
Start: 2021-08-11 | End: 2021-08-11 | Stop reason: HOSPADM

## 2021-08-11 RX ORDER — PROPOFOL 10 MG/ML
INJECTION, EMULSION INTRAVENOUS PRN
Status: DISCONTINUED | OUTPATIENT
Start: 2021-08-11 | End: 2021-08-11 | Stop reason: SDUPTHER

## 2021-08-11 RX ORDER — DIPHENHYDRAMINE HYDROCHLORIDE 50 MG/ML
12.5 INJECTION INTRAMUSCULAR; INTRAVENOUS
Status: DISCONTINUED | OUTPATIENT
Start: 2021-08-11 | End: 2021-08-11 | Stop reason: HOSPADM

## 2021-08-11 RX ORDER — PROMETHAZINE HYDROCHLORIDE 25 MG/ML
6.25 INJECTION, SOLUTION INTRAMUSCULAR; INTRAVENOUS PRN
Status: DISCONTINUED | OUTPATIENT
Start: 2021-08-11 | End: 2021-08-11 | Stop reason: HOSPADM

## 2021-08-11 RX ORDER — MEPERIDINE HYDROCHLORIDE 25 MG/ML
12.5 INJECTION INTRAMUSCULAR; INTRAVENOUS; SUBCUTANEOUS EVERY 5 MIN PRN
Status: DISCONTINUED | OUTPATIENT
Start: 2021-08-11 | End: 2021-08-11 | Stop reason: HOSPADM

## 2021-08-11 RX ADMIN — SODIUM CHLORIDE: 9 INJECTION, SOLUTION INTRAVENOUS at 10:33

## 2021-08-11 RX ADMIN — PROPOFOL 20 MG: 10 INJECTION, EMULSION INTRAVENOUS at 11:57

## 2021-08-11 RX ADMIN — PROPOFOL 60 MG: 10 INJECTION, EMULSION INTRAVENOUS at 11:55

## 2021-08-11 RX ADMIN — LIDOCAINE HYDROCHLORIDE 100 MG: 20 INJECTION, SOLUTION INFILTRATION; PERINEURAL at 11:53

## 2021-08-11 RX ADMIN — PROPOFOL 120 MCG/KG/MIN: 10 INJECTION, EMULSION INTRAVENOUS at 11:57

## 2021-08-11 RX ADMIN — PROPOFOL 80 MG: 10 INJECTION, EMULSION INTRAVENOUS at 11:53

## 2021-08-11 ASSESSMENT — PAIN - FUNCTIONAL ASSESSMENT: PAIN_FUNCTIONAL_ASSESSMENT: 0-10

## 2021-08-11 ASSESSMENT — PULMONARY FUNCTION TESTS
PIF_VALUE: 1

## 2021-08-11 NOTE — ANESTHESIA PRE PROCEDURE
Department of Anesthesiology  Preprocedure Note       Name:  Shemar Barnard   Age:  80 y.o.  :  1934                                          MRN:  [id number]         Date:  2021      Surgeon: Twyla Kyle):  Naomi Garcia MD    Procedure: Procedure(s):  EGD ULTRASOUND OF STOMACH    Medications prior to admission:   Prior to Admission medications    Medication Sig Start Date End Date Taking? Authorizing Provider   olmesartan-hydroCHLOROthiazide (BENICAR HCT) 40-12.5 MG per tablet TAKE 1 TABLET BY MOUTH DAILY 21  Yes ROSE Moreno CNP   Iodine, Kelp, (KELP PO) Take by mouth daily   Yes Historical Provider, MD   TURMERIC PO Take by mouth daily    Yes Historical Provider, MD   esomeprazole (NEXIUM) 40 MG delayed release capsule Take 40 mg by mouth every morning (before breakfast)   Yes Historical Provider, MD   Coenzyme Q10 (COQ-10) 100 MG CPCR Take 1 capsule by mouth daily   Yes Historical Provider, MD   cetirizine (ZYRTEC) 10 MG tablet Take 10 mg by mouth as needed    Yes Historical Provider, MD   Flaxseed, Linseed, (FLAX SEED OIL) 1000 MG CAPS Take 1,200 mg by mouth daily.      Yes Historical Provider, MD   ibuprofen (ADVIL;MOTRIN) 400 MG tablet Take 400 mg by mouth every 6 hours as needed for Pain    Historical Provider, MD   fluticasone-vilanterol (BREO ELLIPTA) 100-25 MCG/INH AEPB inhaler Inhale 1 puff into the lungs daily  Patient not taking: Reported on 8/3/2021 3/19/21   Angeles Chris MD   albuterol sulfate HFA (VENTOLIN HFA) 108 (90 Base) MCG/ACT inhaler Inhale 1 puff into the lungs 4 times daily as needed for Wheezing or Shortness of Breath  Patient not taking: Reported on 8/3/2021 1/29/21   ROSE Schumacher CNP   Famotidine-Ca Carb-Mag Hydrox (PEPCID COMPLETE PO) Take 1 tablet by mouth nightly    Historical Provider, MD       Current medications:    Current Facility-Administered Medications   Medication Dose Route Frequency Provider Last Rate Last Admin    0.9 % sodium chloride infusion   Intravenous Continuous Willy De Oliveira  mL/hr at 08/11/21 1033 New Bag at 08/11/21 1033    meperidine (DEMEROL) injection 12.5 mg  12.5 mg Intravenous Q5 Min PRN Willy De Oliveira MD        HYDROmorphone (DILAUDID) injection 0.25 mg  0.25 mg Intravenous Q5 Min PRN Willy De Oliveira MD        fentaNYL (SUBLIMAZE) injection 50 mcg  50 mcg Intravenous Q5 Min PRN Willy De Oliveira MD        HYDROmorphone (DILAUDID) injection 0.25 mg  0.25 mg Intravenous Q5 Min PRN Willy De Oliveira MD        HYDROmorphone (DILAUDID) injection 0.5 mg  0.5 mg Intravenous Q5 Min PRN Willy De Oliveira MD        oxyCODONE (ROXICODONE) immediate release tablet 5 mg  5 mg Oral PRN Willy De Oliveira MD        Or    oxyCODONE (ROXICODONE) immediate release tablet 10 mg  10 mg Oral PRN Willy De Oliveira MD        [facility]) injection 6.25 mg  6.25 mg Intravenous PRN Willy De Oliveira MD        diphenhydrAMINE (BENADRYL) injection 12.5 mg  12.5 mg Intravenous Once PRN Willy De Oliveira MD        labetalol (NORMODYNE;TRANDATE) injection 5 mg  5 mg Intravenous Q10 Min PRN Willy De Oliveira MD           Allergies:     Allergies   Allergen Reactions    Simvastatin Other (See Comments)     Body aches       Problem List:    Patient Active Problem List   Diagnosis Code    Hypertension I10    S/P elbow joint replacement Z96.629    Ganglion cyst of flexor tendon sheath M67.40    Trigger ring finger of right hand M65.341    Hand pain, right M79.641    Wrist pain, right M25.531    Thyroid nodule E04.1    Chronic pansinusitis J32.4    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.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    Microcytic anemia D50.9    Lower abdominal pain R10.30    Iron deficiency anemia due to chronic blood loss D50.0    Intestinal malabsorption K90.9    Hearing loss due to cerumen impaction H61.20    Mixed hyperlipidemia E78.2    Syncope R55    Multiple thyroid nodules E04.2    Sprain of left rotator  Smoking status: Never Smoker    Smokeless tobacco: Never Used   Substance Use Topics    Alcohol use: No                                Counseling given: Not Answered      Vital Signs (Current):   Vitals:    08/03/21 0907 08/11/21 1022   BP:  (!) 159/72   Pulse:  99   Resp:  16   Temp:  96.5 °F (35.8 °C)   TempSrc:  Temporal   SpO2:  98%   Weight: 145 lb (65.8 kg) 145 lb (65.8 kg)   Height: 5' 4\" (1.626 m) 5' 4\" (1.626 m)                                              BP Readings from Last 3 Encounters:   08/11/21 (!) 159/72   05/27/21 (!) 180/85   05/10/21 138/84       NPO Status: Time of last liquid consumption: 0800                        Time of last solid consumption: 2359                        Date of last liquid consumption: 08/11/21 (sip of water with med)                        Date of last solid food consumption: 08/10/21    BMI:   Wt Readings from Last 3 Encounters:   08/11/21 145 lb (65.8 kg)   06/30/21 145 lb (65.8 kg)   06/02/21 145 lb (65.8 kg)     Body mass index is 24.89 kg/m². CBC:   Lab Results   Component Value Date    WBC 6.4 05/10/2021    RBC 4.64 05/10/2021    RBC 4.81 08/18/2017    HGB 10.8 05/10/2021    HCT 32.8 05/10/2021    MCV 70.6 05/10/2021    RDW 15.6 05/10/2021     05/10/2021       CMP:   Lab Results   Component Value Date     05/10/2021    K 4.2 05/10/2021    K 3.0 06/19/2019    CL 98 05/10/2021    CO2 26 05/10/2021    BUN 14 05/10/2021    CREATININE 0.7 05/10/2021    GFRAA >60 05/10/2021    GFRAA >60 01/11/2013    AGRATIO 2.0 06/19/2019    LABGLOM >60 05/10/2021    GLUCOSE 115 05/10/2021    PROT 6.5 06/19/2019    PROT 6.5 03/06/2012    CALCIUM 10.4 05/10/2021    BILITOT 0.8 06/19/2019    ALKPHOS 119 06/19/2019    AST 14 06/19/2019    ALT 9 06/19/2019       POC Tests: No results for input(s): POCGLU, POCNA, POCK, POCCL, POCBUN, POCHEMO, POCHCT in the last 72 hours.     Coags:   Lab Results   Component Value Date    PROTIME 11.2 06/18/2019    INR 0.98 06/18/2019 APTT 28.4 06/16/2017       HCG (If Applicable): No results found for: PREGTESTUR, PREGSERUM, HCG, HCGQUANT     ABGs: No results found for: PHART, PO2ART, FUL2QRN, GLV2ISD, BEART, [id number]     Type & Screen (If Applicable):  Lab Results   Component Value Date    LABABO O 03/06/2012    [address] Positive 03/06/2012       Drug/Infectious Status (If Applicable):  No results found for: HIV, HEPCAB    COVID-19 Screening (If Applicable):   Lab Results   Component Value Date    COVID19 Not Detected 03/03/2021           Anesthesia Evaluation    Airway: Mallampati: II  TM distance: >3 FB   Neck ROM: full  Mouth opening: > = 3 FB Dental:          Pulmonary:   (+) asthma:                            Cardiovascular:    (+) hypertension:,         Rhythm: regular  Rate: normal                    Neuro/Psych:               GI/Hepatic/Renal:   (+) GERD:, PUD,           Endo/Other:    (+) : arthritis:., .                 Abdominal:             Vascular: Other Findings:             Anesthesia Plan      MAC     ASA 2       Induction: intravenous. Anesthetic plan and risks discussed with patient. Plan discussed with CRNA.                   Uriel Padilla MD   8/11/2021

## 2021-08-11 NOTE — OP NOTE
[address] [place] [address]  Endoscopy Note    Patient: Shemar [place]   : 1934  Acct#:     Procedure: Endoscopic ultrasound  Doppler of mesenteric vessels  EGD with biopsy    Date: 2021    Surgeon:  Naomi Garcia MD, MD    Referring Physician:  Dr. Liliya Pierre, Dr. Priscilla Montilla, and India Champion APRN-CNP    Anesthesia:  MAC per Anesthesia. Indications: This is a 80y.o. year old female who presents today with history of duodenal bulb carcinoid removed endoscopically in 2013 now with rising chromagranin level and negative Gallium-68 Dotatate PET CT. Asked for EGD and EUS for further evaluation     Consent: Risks, benefits, and alternatives were explained and informed consent was obtained. Monitoring:  Patient was monitored with continuous pulse oximetry, telemetry, and intermittent blood pressures. Details of the Procedure: The patient was then taken to the endoscopy suite. A time-out was performed. The patient and staff were in agreement as to the correct patient and procedure. The above anesthesia was administered by the anesthesia department. The patient was placed in the left lateral position. The Olympus videoendoscope was placed in the patient's mouth and under direct visualization passed into the esophagus and advanced without difficulty to the 2nd portion of the duodenum. Views were good, patient toleration was good. Retroflexion was performed in the stomach. Findings:  1. The esophagus appeared normal without evidence of Fonseca's esophagus or reflux esophagitis. 2.  Normal stomach. 3.  Normal duodenum. Next, the curvilinear array echoendoscope was advanced without difficulty to the 2nd portion of the duodenum. Endosonographic views were good, patient toleration was good. Findings:   Major Papilla: Endoscopically, the major papilla was normal.  Endosonographically, the major papilla appeared normal  Pancreas:  The pancreatic duct measured 1.6mm off the major

## 2021-08-11 NOTE — H&P
[address] [place] [address]   Pre-operative History and Physical    Patient: Zohra Bajwa  : 1934  Acct#:     HISTORY OF PRESENT ILLNESS:    The patient is a 80 y.o. female who presents with history of duodenal bulb carcinoid removed endoscopically in  now with rising chromagranin level and negative Gallium-68 Dotatate PET CT. Asked for EGD and EUS for further evaluation     Past Medical History:        Diagnosis Date    Bleeding ulcer     Cancer ([address]     melanoma L IRIS    Gastroesophagitis     GERD (gastroesophageal reflux disease)     Hyperlipidemia     Hypertension     Moderate persistent asthma 2021    Pneumonia     Rheumatic fever with heart involvement     Trigger ring finger of right hand 2013    Unspecified diseases of blood and blood-forming organs     chronic anemia      Past Surgical History:        Procedure Laterality Date    BLADDER REPAIR N/A     BREAST SURGERY      L breast tumor-benign    CYST REMOVAL Right 13    GANGLION CYST EXCISION RIGHT WRIST, TRIGGER FINGER RELEASE RIGHT FOURTH    EYE SURGERY      , cataract right eye    FRACTURE SURGERY      R shoulderx2-pinned    HYSTERECTOMY      JOINT REPLACEMENT Right 2012    Total Elbow - Dr. Rose Hamilton TUMOR REMOVAL      carcinoid    UPPER GASTROINTESTINAL ENDOSCOPY  3/7/12    with bx    UPPER GASTROINTESTINAL ENDOSCOPY  13    EUS    UPPER GASTROINTESTINAL ENDOSCOPY  14    UPPER GASTROINTESTINAL ENDOSCOPY  3/17/16    push enteroscopy with ablation    UPPER GASTROINTESTINAL ENDOSCOPY  2017      Medications Prior to Admission:   No current facility-administered medications on file prior to encounter.      Current Outpatient Medications on File Prior to Encounter   Medication Sig Dispense Refill    olmesartan-hydroCHLOROthiazide (BENICAR HCT) 40-12.5 MG per tablet TAKE 1 TABLET BY MOUTH DAILY 90 tablet 3    Iodine, Kelp, (KELP PO) Take by mouth daily      TURMERIC PO Take by mouth daily       esomeprazole (NEXIUM) 40 MG delayed release capsule Take 40 mg by mouth every morning (before breakfast)      Coenzyme Q10 (COQ-10) 100 MG CPCR Take 1 capsule by mouth daily      cetirizine (ZYRTEC) 10 MG tablet Take 10 mg by mouth as needed       Flaxseed, Linseed, (FLAX SEED OIL) 1000 MG CAPS Take 1,200 mg by mouth daily.  ibuprofen (ADVIL;MOTRIN) 400 MG tablet Take 400 mg by mouth every 6 hours as needed for Pain      fluticasone-vilanterol (BREO ELLIPTA) 100-25 MCG/INH AEPB inhaler Inhale 1 puff into the lungs daily (Patient not taking: Reported on 8/3/2021) 1 each 11    albuterol sulfate HFA (VENTOLIN HFA) 108 (90 Base) MCG/ACT inhaler Inhale 1 puff into the lungs 4 times daily as needed for Wheezing or Shortness of Breath (Patient not taking: Reported on 8/3/2021) 1 Inhaler 1    Famotidine-Ca Carb-Mag Hydrox (PEPCID COMPLETE PO) Take 1 tablet by mouth nightly          Allergies:  Simvastatin    Social History:   Social History     Socioeconomic History    Marital status:      Spouse name: Not on file    Number of children: 1    Years of education: Not on file    Highest education level: Not on file   Occupational History    Occupation: Retired   Tobacco Use    Smoking status: Never Smoker    Smokeless tobacco: Never Used   Vaping Use    Vaping Use: Never used   Substance and Sexual Activity    Alcohol use: No    Drug use: No    Sexual activity: Yes     Partners: Male   Other Topics Concern    Not on file   Social History Narrative    Not on file     Social Determinants of Health     Financial Resource Strain:     Difficulty of Paying Living Expenses:    Food Insecurity:     Worried About Running Out of Food in the Last Year:     Ran Out of Food in the Last Year:    Transportation Needs:     Lack of Transportation (Medical):      Lack of Transportation (Non-Medical):    Physical Activity:     Days of Exercise per Week:     Minutes of Exercise per Session:    Stress:     Feeling of Stress :    Social Connections:     Frequency of Communication with Friends and Family:     Frequency of Social Gatherings with Friends and Family:     Attends Yazdanism Services:     Active Member of Clubs or Organizations:     Attends Club or Organization Meetings:     Marital Status:    Intimate Partner Violence:     Fear of Current or Ex-Partner:     Emotionally Abused:     Physically Abused:     Sexually Abused:       Family History:       Problem Relation Age of Onset    Heart Disease Mother     High Blood Pressure Mother     High Cholesterol Mother     Heart Disease Father     High Blood Pressure Father     High Cholesterol Father     Cancer Sister     Stroke Sister     High Blood Pressure Sister     High Cholesterol Sister     High Blood Pressure Brother     High Cholesterol Brother     Heart Attack Brother     Cancer Brother     Heart Attack Brother     Parkinsonism Brother         PHYSICAL EXAM:      BP (!) 159/72   Pulse 99   Temp 96.5 °F (35.8 °C) (Temporal)   Resp 16   Ht 5' 4\" (1.626 m)   Wt 145 lb (65.8 kg)   SpO2 98%   BMI 24.89 kg/m²  I        Heart:  RRR    Lungs:  CTA b    Abdomen:  S/NT/ND/+BS      ASSESSMENT AND PLAN:  ASA: per anesthesia  Mallampati: per anesthesia  1. Patient is a 80 y.o. female here for EUS and EGD   2. Procedure options, risks and benefits reviewed with the patient. The patient expresses understanding.     Yevgeniy Quezada

## 2021-09-24 ENCOUNTER — TELEPHONE (OUTPATIENT)
Dept: ORTHOPEDIC SURGERY | Age: 86
End: 2021-09-24

## 2021-09-24 ENCOUNTER — APPOINTMENT (OUTPATIENT)
Dept: GENERAL RADIOLOGY | Age: 86
End: 2021-09-24
Payer: COMMERCIAL

## 2021-09-24 ENCOUNTER — HOSPITAL ENCOUNTER (EMERGENCY)
Age: 86
Discharge: HOME | End: 2021-09-24
Payer: COMMERCIAL

## 2021-09-24 VITALS
WEIGHT: 140 LBS | DIASTOLIC BLOOD PRESSURE: 89 MMHG | BODY MASS INDEX: 24.8 KG/M2 | HEIGHT: 63 IN | OXYGEN SATURATION: 98 % | HEART RATE: 97 BPM | RESPIRATION RATE: 16 BRPM | TEMPERATURE: 98.2 F | SYSTOLIC BLOOD PRESSURE: 201 MMHG
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
## 2021-09-24 DIAGNOSIS — S90.32XA CONTUSION OF LEFT FOOT, INITIAL ENCOUNTER: ICD-10-CM

## 2021-09-24 DIAGNOSIS — S49.91XA RIGHT SHOULDER INJURY, INITIAL ENCOUNTER: ICD-10-CM

## 2021-09-24 PROCEDURE — 73030 X-RAY EXAM OF SHOULDER: CPT

## 2021-09-24 PROCEDURE — 99284 EMERGENCY DEPT VISIT MOD MDM: CPT

## 2021-09-24 PROCEDURE — 73630 X-RAY EXAM OF FOOT: CPT

## 2021-09-24 PROCEDURE — 73070 X-RAY EXAM OF ELBOW: CPT

## 2021-09-24 PROCEDURE — 6370000000 HC RX 637 (ALT 250 FOR IP)

## 2021-09-24 RX ORDER — HYDROCODONE BITARTRATE AND ACETAMINOPHEN 5; 325 MG/1; MG/1
TABLET ORAL
Status: COMPLETED
Start: 2021-09-24 | End: 2021-09-24

## 2021-09-24 RX ORDER — HYDROCODONE BITARTRATE AND ACETAMINOPHEN 5; 325 MG/1; MG/1
1 TABLET ORAL EVERY 8 HOURS PRN
Qty: 9 TABLET | Refills: 0 | Status: SHIPPED | OUTPATIENT
Start: 2021-09-24 | End: 2021-10-01 | Stop reason: SDUPTHER

## 2021-09-24 RX ORDER — HYDROCODONE BITARTRATE AND ACETAMINOPHEN 5; 325 MG/1; MG/1
1 TABLET ORAL ONCE
Status: COMPLETED | OUTPATIENT
Start: 2021-09-24 | End: 2021-09-24

## 2021-09-24 RX ADMIN — HYDROCODONE BITARTRATE AND ACETAMINOPHEN 1 TABLET: 5; 325 TABLET ORAL at 11:01

## 2021-09-24 ASSESSMENT — ENCOUNTER SYMPTOMS
ABDOMINAL PAIN: 0
CONSTIPATION: 0
NAUSEA: 0
COLOR CHANGE: 1
VOMITING: 0
SHORTNESS OF BREATH: 0
DIARRHEA: 0
BACK PAIN: 0

## 2021-09-24 ASSESSMENT — PAIN SCALES - GENERAL: PAINLEVEL_OUTOF10: 10

## 2021-09-24 NOTE — ED PROVIDER NOTES
**EVALUATED BY GEOVANNA**    [address] [facility]  eMERGENCY dEPARTMENT eNCOUnter    Pt Name: Feli West  MRN: [id number]  Mikgfrobb 1934  Date of evaluation: 9/24/2021  Provider: THA Land    Chief Complaint:    Chief Complaint   Patient presents with    Fall     Pt reports her foot slipped this am, causing her to fall, pt c/o pain to her right shoulder and her left foot. No LOC. Nursing Notes, Past Medical Hx, Past Surgical Hx, Social Hx, Allergies, and Family Hx were all reviewed and agreedwith or any disagreements were addressed in the HPI.    HPI:  (Location, Duration, Timing, Severity, Quality, Assoc Sx, Context, Modifying factors)  This is a  80 y.o. female who presents to the emergency department with complaints of slipping on a step this morning, barefooted causing her to misplace her left foot and fall onto her bottom and right shoulder down approximately 3 steps. No head injury or loss consciousness. Denies any midline neck thoracic or lumbar spine discomfort. No other injuries. She has had previous shoulder surgery with pins and plates and right elbow replacement in the past.  Left foot is painful at the distal aspect at all toes, swelling and ecchymosis are present. Overall pain is 10 out of 10 in severity. Patient was brought in by granddaughter. Right shoulder is held in a slightly abducted position with a pillow currently. Concerned of her elbow replacement as well. Denies any distal numbness or tingling. Unsure of range of motion as she is scared to move right shoulder and elbow. Denies any chest pain shortness of breath abdominal pain nausea vomiting diarrhea constipation fevers chills cough or congestion. All other systems were reviewed and are negative.      Past Medical/Surgical History:      Diagnosis Date    Bleeding ulcer     Cancer ([facility] [address]     melanoma L IRIS    Gastroesophagitis     GERD (gastroesophageal reflux disease)     Hyperlipidemia     Hypertension     Moderate persistent asthma 2/12/2021    Pneumonia     Rheumatic fever with heart involvement     Trigger ring finger of right hand 7/1/2013    Unspecified diseases of blood and blood-forming organs     chronic anemia         Procedure Laterality Date    BLADDER REPAIR N/A 2002    BREAST SURGERY      L breast tumor-benign    CYST REMOVAL Right 8-1-13    GANGLION CYST EXCISION RIGHT WRIST, TRIGGER FINGER RELEASE RIGHT FOURTH    EYE SURGERY      , cataract right eye    FRACTURE SURGERY      R shoulderx2-pinned    HYSTERECTOMY      JOINT REPLACEMENT Right 03/28/2012    Total Elbow - Dr. Laura Ziegler TUMOR REMOVAL  2013    carcinoid    UPPER GASTROINTESTINAL ENDOSCOPY  3/7/12    with bx    UPPER GASTROINTESTINAL ENDOSCOPY  11/8/13    EUS    UPPER GASTROINTESTINAL ENDOSCOPY  4/24/14    UPPER GASTROINTESTINAL ENDOSCOPY  3/17/16    push enteroscopy with ablation    UPPER GASTROINTESTINAL ENDOSCOPY  06/29/2017    UPPER GASTROINTESTINAL ENDOSCOPY N/A 8/11/2021    EGD ULTRASOUND OF STOMACH performed by Fredrick Angelucci, MD at [address] [place] [address]       Medications:  Discharge Medication List as of 9/24/2021 12:08 PM      CONTINUE these medications which have NOT CHANGED    Details   ibuprofen (ADVIL;MOTRIN) 400 MG tablet Take 400 mg by mouth every 6 hours as needed for PainHistorical Med      olmesartan-hydroCHLOROthiazide (BENICAR HCT) 40-12.5 MG per tablet TAKE 1 TABLET BY MOUTH DAILY, Disp-90 tablet, R-3Normal      fluticasone-vilanterol (BREO ELLIPTA) 100-25 MCG/INH AEPB inhaler Inhale 1 puff into the lungs daily, Disp-1 each, R-11Print      albuterol sulfate HFA (VENTOLIN HFA) 108 (90 Base) MCG/ACT inhaler Inhale 1 puff into the lungs 4 times daily as needed for Wheezing or Shortness of Breath, Disp-1 Inhaler, R-1Normal      Iodine, Kelp, (KELP PO) Take by mouth dailyHistorical Med      TURMERIC PO Take by mouth daily Historical Med      Famotidine-Ca Carb-Mag Hydrox (PEPCID COMPLETE PO) Take 1 tablet by mouth nightlyHistorical Med      esomeprazole (NEXIUM) 40 MG delayed release capsule Take 40 mg by mouth every morning (before breakfast)Historical Med      Coenzyme Q10 (COQ-10) 100 MG CPCR Take 1 capsule by mouth dailyHistorical Med      cetirizine (ZYRTEC) 10 MG tablet Take 10 mg by mouth as needed Historical Med      Flaxseed, Linseed, (FLAX SEED OIL) 1000 MG CAPS Take 1,200 mg by mouth daily. Review of Systems:  Review of Systems   Constitutional: Negative for chills, fatigue and fever. Respiratory: Negative for shortness of breath. Cardiovascular: Negative for chest pain. Gastrointestinal: Negative for abdominal pain, constipation, diarrhea, nausea and vomiting. Musculoskeletal: Positive for arthralgias, joint swelling and myalgias. Negative for back pain, gait problem, neck pain and neck stiffness. Skin: Positive for color change. Negative for rash and wound. Neurological: Negative for headaches. All other systems reviewed and are negative. Positives and Pertinent negatives as per HPI. Except as noted above in the ROS, all other systems were reviewed/completed and are negative. Physical Exam:  Physical Exam  Vitals and nursing note reviewed. Constitutional:       Appearance: Normal appearance. She is well-developed. She is not toxic-appearing or diaphoretic. HENT:      Head: Normocephalic. Right Ear: External ear normal.      Left Ear: External ear normal.      Nose: Nose normal.   Eyes:      General:         Right eye: No discharge. Left eye: No discharge. Conjunctiva/sclera: Conjunctivae normal.   Cardiovascular:      Rate and Rhythm: Normal rate and regular rhythm. Pulses:           Radial pulses are 2+ on the right side. Dorsalis pedis pulses are 2+ on the left side. Posterior tibial pulses are 2+ on the left side. Heart sounds: Normal heart sounds. No murmur heard. No friction rub. No gallop. Pulmonary:      Effort: Pulmonary effort is normal. No respiratory distress. Breath sounds: Normal breath sounds. No wheezing or rales. Musculoskeletal:         General: Tenderness present. Right shoulder: Tenderness and bony tenderness present. No swelling or deformity. Decreased range of motion. Decreased strength. Right elbow: No deformity or lacerations. Decreased range of motion. No tenderness. Cervical back: Normal, normal range of motion and neck supple. No deformity, tenderness or bony tenderness. Thoracic back: Normal. No deformity, tenderness or bony tenderness. Lumbar back: Normal. No deformity, tenderness or bony tenderness. Left knee: Normal.      Left ankle: Normal.      Left foot: Decreased range of motion. Swelling, deformity, tenderness and bony tenderness present. No laceration. Normal pulse. Comments: Patient has no pain to right clavicle, right trapezius muscle or humeral head. Pain is mostly at the proximal humerus. No pain distal to this region, patient is scared to move right elbow secondary to previous elbow replacement. Skin:     General: Skin is warm and dry. Coloration: Skin is not pale. Neurological:      Mental Status: She is alert and oriented to person, place, and time. Comments: Normal distal strength sensation to light touch of right upper extremity compared to left at deltoid, forearm and bilateral hands. Normal distal sensation to light touch of left foot. Psychiatric:         Behavior: Behavior normal. Behavior is cooperative.          MEDICAL DECISION MAKING    Vitals:    Vitals:    09/24/21 1049   BP: (!) 201/89   Pulse: 97   Resp: 16   Temp: 98.2 °F (36.8 °C)   TempSrc: Oral   SpO2: 98%   Weight: 140 lb (63.5 kg)   Height: 5' 3\" (1.6 m)       LABS: Labs Reviewed - No data to display     Remainder of labs reviewed and were negative at this time or not returned at the time of this note. RADIOLOGY:   Non-plain film images suchas CT, Ultrasound and MRI are read by the radiologist. Estrellita DYKES PA have directly visualized the radiologic plain film image(s) with the below findings:  Interpretation per the Radiologist below, if available at the time of this note:    XR FOOT LEFT (MIN 3 VIEWS)   Final Result   No acute osseous abnormality left foot. Bones may be osteoporotic or osteopenic. Mild-to-moderate severity osteoarthritis predominates at the PIP joint left   2nd toe. Follow-up imaging recommended if pain persists or worsens following   conservative management. XR SHOULDER RIGHT (MIN 2 VIEWS)   Final Result   No evidence of acute fracture or dislocation. XR ELBOW RIGHT (2 VIEWS)   Final Result   Right elbow arthroplasty in unchanged alignment. No evidence of   periprosthetic fracture. The radial head is partially obscured by the   arthroplasty device. MEDICAL DECISION MAKING / ED COURSE:      PROCEDURES:   Procedures    Patient was given:  Medications   HYDROcodone-acetaminophen (NORCO) 5-325 MG per tablet 1 tablet (1 tablet Oral Given 9/24/21 1101)     Patient presents to the emergency department with mechanical fall resulting in injury to left foot, right shoulder, right humerus and possibly right elbow. She is neurovascularly intact in these extremities. There is no midline tenderness to cervical, thoracic or lumbar spine and no head injury or loss of consciousness. Requesting pain medication, given Norco, tolerates this well in the past.  Imaging will be obtained of these 3 areas. [facility] [facility] has helped improve pain significantly. Left foot imaging shows no acute bony abnormality, no fracture or dislocation. Right shoulder without acute fracture dislocation right elbow arthroplasty unchanged alignment. She does see Dr. Hernan Campos for orthopedics for her wrist and elbow.   Will prescribe pain medication for home. Normal neurovascular exam at discharge. Musculoskeletal injury instructions for home. The patient tolerated their visit well. I have evaluated the patient with physician available for consultation as needed. I have discussed the findings of today's workup with the patient and addressed the patient's questions and concerns. Important warning signs as well as new or worsening symptoms which wouldnecessitate immediate return to the ED were discussed. The plan is to discharge from the ED at this time, and the patient is in stable condition. The patient acknowledged understanding is agreeable with this plan. CLINICAL IMPRESSION:  1. Fall from slip, trip, or stumble, initial encounter    2. Contusion of left foot, initial encounter    3. Right shoulder injury, initial encounter        DISPOSITION Decision To Discharge 09/24/2021 12:04:26 PM      PATIENT REFERRED TO:  Your orthopedic physician    Schedule an appointment as soon as possible for a visit       [facility] Emergency Department  [address] [place] [address]  [phone]  Go to   If symptoms worsen      DISCHARGE MEDICATIONS:  Discharge Medication List as of 9/24/2021 12:08 PM      START taking these medications    Details   HYDROcodone-acetaminophen (NORCO) 5-325 MG per tablet Take 1 tablet by mouth every 8 hours as needed for Pain for up to 3 days. Intended supply: 3 days.  Take lowest dose possible to manage pain, Disp-9 tablet, R-0Normal                    (Please note the MDM and HPI sections of this note were completed with avoice recognition program.  Efforts were made to edit the dictations but occasionally words are mis-transcribed.)    Electronically signed, THA Moeller,             THA Grady  09/24/21 6530

## 2021-09-28 ENCOUNTER — NURSE TRIAGE (OUTPATIENT)
Dept: OTHER | Facility: CLINIC | Age: 86
End: 2021-09-28

## 2021-09-28 NOTE — TELEPHONE ENCOUNTER
Reason for Disposition   Requesting regular office appointment    Answer Assessment - Initial Assessment Questions  1. REASON FOR CALL or QUESTION: \"What is your reason for calling today? \" or \"How can I best help you? \" or \"What question do you have that I can help answer? \"      Annita Frankel was in ED and is calling for f/u appt. Warm transfer to [address] [place] [address] in [facility] for appt scheduling.     Protocols used: INFORMATION ONLY CALL - NO TRIAGE-ADULT-

## 2021-09-29 ENCOUNTER — OFFICE VISIT (OUTPATIENT)
Dept: INTERNAL MEDICINE CLINIC | Age: 86
End: 2021-09-29
Payer: COMMERCIAL

## 2021-09-29 ENCOUNTER — HOSPITAL ENCOUNTER (OUTPATIENT)
Age: 86
Discharge: HOME | End: 2021-09-29
Payer: COMMERCIAL

## 2021-09-29 ENCOUNTER — HOSPITAL ENCOUNTER (OUTPATIENT)
Dept: GENERAL RADIOLOGY | Age: 86
Discharge: HOME | End: 2021-09-29
Payer: COMMERCIAL

## 2021-09-29 ENCOUNTER — OFFICE VISIT (OUTPATIENT)
Dept: ORTHOPEDIC SURGERY | Age: 86
End: 2021-09-29
Payer: COMMERCIAL

## 2021-09-29 VITALS
OXYGEN SATURATION: 98 % | SYSTOLIC BLOOD PRESSURE: 138 MMHG | DIASTOLIC BLOOD PRESSURE: 82 MMHG | WEIGHT: 143 LBS | BODY MASS INDEX: 25.33 KG/M2 | HEART RATE: 96 BPM

## 2021-09-29 VITALS — HEIGHT: 63 IN | BODY MASS INDEX: 25.34 KG/M2 | WEIGHT: 143 LBS

## 2021-09-29 DIAGNOSIS — S40.021D TRAUMATIC ECCHYMOSIS OF RIGHT UPPER ARM, SUBSEQUENT ENCOUNTER: ICD-10-CM

## 2021-09-29 DIAGNOSIS — S90.32XA CONTUSION OF LEFT FOOT, INITIAL ENCOUNTER: ICD-10-CM

## 2021-09-29 DIAGNOSIS — M25.511 ACUTE PAIN OF RIGHT SHOULDER: ICD-10-CM

## 2021-09-29 DIAGNOSIS — M79.621 PAIN IN RIGHT UPPER ARM: ICD-10-CM
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
## 2021-09-29 DIAGNOSIS — M85.89 OSTEOPENIA OF MULTIPLE SITES: ICD-10-CM

## 2021-09-29 DIAGNOSIS — M25.521 RIGHT ELBOW PAIN: ICD-10-CM

## 2021-09-29 DIAGNOSIS — S40.011A CONTUSION OF RIGHT SHOULDER, INITIAL ENCOUNTER: Primary | ICD-10-CM

## 2021-09-29 DIAGNOSIS — M81.0 AGE RELATED OSTEOPOROSIS, UNSPECIFIED PATHOLOGICAL FRACTURE PRESENCE: ICD-10-CM
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
## 2021-09-29 PROCEDURE — 99203 OFFICE O/P NEW LOW 30 MIN: CPT | Performed by: ORTHOPAEDIC SURGERY

## 2021-09-29 PROCEDURE — 73060 X-RAY EXAM OF HUMERUS: CPT

## 2021-09-29 PROCEDURE — 99214 OFFICE O/P EST MOD 30 MIN: CPT | Performed by: NURSE PRACTITIONER

## 2021-09-29 SDOH — ECONOMIC STABILITY: FOOD INSECURITY: WITHIN THE PAST 12 MONTHS, THE FOOD YOU BOUGHT JUST DIDN'T LAST AND YOU DIDN'T HAVE MONEY TO GET MORE.: NEVER TRUE

## 2021-09-29 SDOH — ECONOMIC STABILITY: FOOD INSECURITY: WITHIN THE PAST 12 MONTHS, YOU WORRIED THAT YOUR FOOD WOULD RUN OUT BEFORE YOU GOT MONEY TO BUY MORE.: NEVER TRUE

## 2021-09-29 ASSESSMENT — SOCIAL DETERMINANTS OF HEALTH (SDOH): HOW HARD IS IT FOR YOU TO PAY FOR THE VERY BASICS LIKE FOOD, HOUSING, MEDICAL CARE, AND HEATING?: NOT HARD AT ALL

## 2021-09-29 NOTE — PROGRESS NOTES
9/29/21     Chief Complaint   Patient presents with   [facility] ED Follow-up     [facility] [place] on 9/24/21 after fall. Left foot and right shoulder pain     HPI    Here for ED follow up, had a fall on 9/24/2021  Reports she slipped on a step while barefoot   Fell on her bottom, right shoulder and down approx 3 stairs   Reports folded her left foot under her  Doyal Rosenbaum to [facility] following the fall - had completed Xray of left foot, right shoulder, right elbow complete - not acute and no fracture noted      Reports still having pain 10/10 in right shoulder, upper arm, elbow with movements. Having trouble getting in/ out of bed. Denies any improvements with right arm. History of right shoulder surgery, right elbow replacement. Left foot - still having swelling, bruising and pain. Minimal pain improvement of the foot. Swelling has improved a lot. Has appt today with Dr. Diana Gilmore for her left foot. Taking norco for pain - makes her feel a little woozy. 3rd falls in 2021 - started PT for fall prevention but she canceled / stopped going due to having \"to much going on\"    Used to be on medication for osteoporosis but has not been on for some time - not sure of the name of the medication.       Allergies   Allergen Reactions    Simvastatin Other (See Comments)     Body aches       Current Outpatient Medications   Medication Sig Dispense Refill    ibuprofen (ADVIL;MOTRIN) 400 MG tablet Take 400 mg by mouth every 6 hours as needed for Pain      olmesartan-hydroCHLOROthiazide (BENICAR HCT) 40-12.5 MG per tablet TAKE 1 TABLET BY MOUTH DAILY 90 tablet 3    Iodine, Kelp, (KELP PO) Take by mouth daily      TURMERIC PO Take by mouth daily       Famotidine-Ca Carb-Mag Hydrox (PEPCID COMPLETE PO) Take 1 tablet by mouth nightly      esomeprazole (NEXIUM) 40 MG delayed release capsule Take 40 mg by mouth every morning (before breakfast)      Coenzyme Q10 (COQ-10) 100 MG CPCR Take 1 capsule by mouth daily      cetirizine (ZYRTEC) 10 MG tablet Take 10 mg by mouth as needed       Flaxseed, Linseed, (FLAX SEED OIL) 1000 MG CAPS Take 1,200 mg by mouth daily.  fluticasone-vilanterol (BREO ELLIPTA) 100-25 MCG/INH AEPB inhaler Inhale 1 puff into the lungs daily (Patient not taking: Reported on 8/3/2021) 1 each 11    albuterol sulfate HFA (VENTOLIN HFA) 108 (90 Base) MCG/ACT inhaler Inhale 1 puff into the lungs 4 times daily as needed for Wheezing or Shortness of Breath (Patient not taking: Reported on 8/3/2021) 1 Inhaler 1     No current facility-administered medications for this visit. Review of Systems   Negative other than HPI    Vitals:    09/29/21 0916 09/29/21 0918   BP: (!) 152/84 138/82   Pulse: 96    SpO2: 98%    Weight: 143 lb (64.9 kg)       Physical Exam  Constitutional:       General: She is not in acute distress. Appearance: Normal appearance. She is not ill-appearing. HENT:      Head: Normocephalic and atraumatic. Cardiovascular:      Rate and Rhythm: Normal rate and regular rhythm. Heart sounds: Normal heart sounds. No murmur heard. Pulmonary:      Effort: Pulmonary effort is normal. No respiratory distress. Breath sounds: Normal breath sounds. Musculoskeletal:      Right shoulder: Swelling, deformity and tenderness present. Decreased range of motion. Decreased strength. Right elbow: Decreased range of motion. Tenderness present. Right foot: Normal.      Left foot: Decreased range of motion. Swelling and tenderness (ecchymosis ) present. Comments: Right upper arm with large ecchymosis to right upper ext. Skin:     General: Skin is warm and dry. Findings: Bruising present. Neurological:      General: No focal deficit present. Mental Status: She is alert and oriented to person, place, and time. Mental status is at baseline.    Psychiatric:         Mood and Affect: Mood normal.         Behavior: Behavior normal.       Assessment/Plan:  Fall, subsequent encounter/  Acute pain of right shoulder/  Right elbow pain/ Pain in right upper arm/ Traumatic ecchymosis of right upper arm, subsequent encounter  Uncontrolled   Frequent falls - home PT for fall prevention/ decreased mobility/ frequent falls and current pain secondary to falling if okay with ortho. Reviewed notes and imaging form ED visit in detail with pt  Given large ecchymosis, decreased ROM and pain in upper arm - checking xray   Trial of lidocaine patches   Okay to continue prn norco - use sparingly   See Dr. Mayank Garvey today as scheduled   - XR HUMERUS RIGHT (MIN 2 VIEWS); Future  - External Referral To Home Health    Osteopenia of multiple sites/ Age related osteoporosis, unspecified pathological fracture presence  Previously treated for osteoporosis - unclear with what medication   Pt is over due for dexa scan - ordered today   - DEXA AXIAL SKELETON W VERTEBRAL FX ASST; Future  - External Referral To Home Health    Discussed medications with patient, who voiced understanding of their use and indications. All questions answered.     Reviewed strict criteria for follow up   Follow up when due for CC     Electronically signed by ROSE Licea CNP on 9/29/2021 at 10:15 AM

## 2021-10-01 ENCOUNTER — TELEPHONE (OUTPATIENT)
Dept: INTERNAL MEDICINE CLINIC | Age: 86
End: 2021-10-01

## 2021-10-01 DIAGNOSIS — S49.91XA RIGHT SHOULDER INJURY, INITIAL ENCOUNTER: ICD-10-CM

## 2021-10-01 DIAGNOSIS — S90.32XA CONTUSION OF LEFT FOOT, INITIAL ENCOUNTER: ICD-10-CM

## 2021-10-01 RX ORDER — HYDROCODONE BITARTRATE AND ACETAMINOPHEN 5; 325 MG/1; MG/1
1 TABLET ORAL EVERY 8 HOURS PRN
Qty: 15 TABLET | Refills: 0 | Status: SHIPPED | OUTPATIENT
Start: 2021-10-01 | End: 2021-10-08

## 2021-10-01 NOTE — TELEPHONE ENCOUNTER
Jo-Ann from [facility] is calling asking verbal orders for home care and PT---please call pt

## 2021-10-01 NOTE — TELEPHONE ENCOUNTER
Patient advised on results, vu  She is very anxious  Uncontrolled right shoulder pain  Out of [place] - refill? She did not get lidocaine patches, taking ibuprofen prn without relief.

## 2021-10-01 NOTE — TELEPHONE ENCOUNTER
Benjie Anne at [facility] called to give verbal orders for OT   3 visits for bathroom safety.

## 2021-10-01 NOTE — TELEPHONE ENCOUNTER
Refilling norco to use sparingly   Should of seen Dr. Gay Busch the same day she saw me - I do not see a note - please verify this and that she has a plan with abram Pulido

## 2021-10-07 ENCOUNTER — TELEPHONE (OUTPATIENT)
Dept: INTERNAL MEDICINE CLINIC | Age: 86
End: 2021-10-07

## 2021-10-07 NOTE — TELEPHONE ENCOUNTER
Attempted to call, LM to call back. Need to know:   Is the pt symptomatic ? Taking medication as prescribed ?    Lisa Fields

## 2021-10-09 PROBLEM — S40.011A CONTUSION OF RIGHT SHOULDER: Status: ACTIVE | Noted: 2021-10-09

## 2021-10-09 PROBLEM — S90.32XA CONTUSION OF LEFT FOOT: Status: ACTIVE | Noted: 2021-10-09

## 2021-10-10 NOTE — PROGRESS NOTES
CHIEF COMPLAINT:   1- Right shoulder pain/ contusion. 2- Left foot pain/ contusion. DATE OF INJURY: 9/24/2021    HISTORY:  Ms. Keila Pandya is a 80 y.o.  female right handed who presents today for evaluation of a right shoulder and left foot injury. The patient reports that this injury occurred after a fall. She was first seen and evaluated in , when she was x-rayed, and asked to f/u with Orthopedics. The patient denies any other injuries. Movement makes the pain worse, the sling and resting makes the pain better. No numbness or tingling sensation. She had right TEA in 2012 by Dr Vivi Strong. She also had ORIF right humerus greater tuberosity by Dr Heena Garner.     Past Medical History:   Diagnosis Date    Bleeding ulcer     Cancer ([facility] [address]     melanoma L IRIS    Gastroesophagitis     GERD (gastroesophageal reflux disease)     Hyperlipidemia     Hypertension     Moderate persistent asthma 2/12/2021    Pneumonia     Rheumatic fever with heart involvement     Trigger ring finger of right hand 7/1/2013    Unspecified diseases of blood and blood-forming organs     chronic anemia       Past Surgical History:   Procedure Laterality Date    BLADDER REPAIR N/A 2002    BREAST SURGERY      L breast tumor-benign    CYST REMOVAL Right 8-1-13    GANGLION CYST EXCISION RIGHT WRIST, TRIGGER FINGER RELEASE RIGHT FOURTH    EYE SURGERY      , cataract right eye    FRACTURE SURGERY      R shoulderx2-pinned    HYSTERECTOMY      JOINT REPLACEMENT Right 03/28/2012    Total Elbow - Dr. Teresa Beck TUMOR REMOVAL  2013    carcinoid    UPPER GASTROINTESTINAL ENDOSCOPY  3/7/12    with bx    UPPER GASTROINTESTINAL ENDOSCOPY  11/8/13    EUS    UPPER GASTROINTESTINAL ENDOSCOPY  4/24/14    UPPER GASTROINTESTINAL ENDOSCOPY  3/17/16    push enteroscopy with ablation    UPPER GASTROINTESTINAL ENDOSCOPY  06/29/2017    UPPER GASTROINTESTINAL ENDOSCOPY Blood Pressure Brother     High Cholesterol Brother     Heart Attack Brother     Cancer Brother     Heart Attack Brother     Parkinsonism Brother        Current Outpatient Medications on File Prior to Visit   Medication Sig Dispense Refill    ibuprofen (ADVIL;MOTRIN) 400 MG tablet Take 400 mg by mouth every 6 hours as needed for Pain      olmesartan-hydroCHLOROthiazide (BENICAR HCT) 40-12.5 MG per tablet TAKE 1 TABLET BY MOUTH DAILY 90 tablet 3    fluticasone-vilanterol (BREO ELLIPTA) 100-25 MCG/INH AEPB inhaler Inhale 1 puff into the lungs daily (Patient not taking: Reported on 8/3/2021) 1 each 11    albuterol sulfate HFA (VENTOLIN HFA) 108 (90 Base) MCG/ACT inhaler Inhale 1 puff into the lungs 4 times daily as needed for Wheezing or Shortness of Breath (Patient not taking: Reported on 8/3/2021) 1 Inhaler 1    Iodine, Kelp, (KELP PO) Take by mouth daily      TURMERIC PO Take by mouth daily       Famotidine-Ca Carb-Mag Hydrox (PEPCID COMPLETE PO) Take 1 tablet by mouth nightly      esomeprazole (NEXIUM) 40 MG delayed release capsule Take 40 mg by mouth every morning (before breakfast)      Coenzyme Q10 (COQ-10) 100 MG CPCR Take 1 capsule by mouth daily      cetirizine (ZYRTEC) 10 MG tablet Take 10 mg by mouth as needed       Flaxseed, Linseed, (FLAX SEED OIL) 1000 MG CAPS Take 1,200 mg by mouth daily. No current facility-administered medications on file prior to visit. Pertinent items are noted in HPI  Review of systems reviewed from Patient History Form dated on 9/29/2021 and available in the patient's chart under the Media tab. No change noted. PHYSICAL EXAMINATION:  Ms. Chet Reyes is a very pleasant 80 y.o.  female who presents today in no acute distress, awake, alert, and oriented. She is well dressed, nourished and  groomed. Patient with normal affect. Height is  5' 3\" (1.6 m), weight is 143 lb (64.9 kg), Body mass index is 25.33 kg/m².   Resting respiratory rate is 16.     The patient walks with no limp. On evaluation of her bilateral upper extremity, there is no obvious deformity right shoulder. There is moderate swelling and moderate ecchymosis. She is tender to palpation over the shoulder, and otherwise non tender over the remainder of the extremity. Range of motion is decreased secondary to pain over the right shoulder. The skin overlying the right shoulder is intact without evidence of lesion, laceration. Distal pulses are 2+ and symmetric bilaterally. Sensation is grossly intact to light touch and symmetric bilaterally. Examination of both ankles showing a decreased range of motion of the left ankle compare to the other side because of foot pain. There is mild swelling that can be seen, as well as ecchymosis over lateral side of the left foot. She has intact sensation and good pedal pulses. She has significant tenderness on deep palpation over the left foot. IMAGING:  Xrays dated 9/24/2021, 3 views of right shoulder were reviewed, and showed anterior dislocation with relocation xray. [place] [facility] were reviewed, Dated 9/24/2021, 3 views of the left foot, and showed no displaced fracture. IMPRESSION:    1- Right shoulder pain/ contusion. 2- Left foot pain/ contusion. PLAN:  I assured the patient that the xray is negative for acute fracture. I discussed with Daniela Segal the treatment options and that we can treat this in a sling right shoulder, with no heavy impact activities, and gentle ROM. WBAT left foot. We will see her  back in 4 weeks at which time we will consider PT if indicated.       Kathryn Aponte MD

## 2021-10-27 ENCOUNTER — HOSPITAL ENCOUNTER (OUTPATIENT)
Dept: GENERAL RADIOLOGY | Age: 86
Discharge: HOME | End: 2021-10-27
Payer: COMMERCIAL

## 2021-10-27 DIAGNOSIS — M81.0 AGE RELATED OSTEOPOROSIS, UNSPECIFIED PATHOLOGICAL FRACTURE PRESENCE: ICD-10-CM

## 2021-10-27 DIAGNOSIS — M85.89 OSTEOPENIA OF MULTIPLE SITES: ICD-10-CM

## 2021-10-27 PROCEDURE — 77080 DXA BONE DENSITY AXIAL: CPT

## 2021-11-02 ENCOUNTER — OFFICE VISIT (OUTPATIENT)
Dept: ORTHOPEDIC SURGERY | Age: 86
End: 2021-11-02
Payer: COMMERCIAL

## 2021-11-02 VITALS — BODY MASS INDEX: 25.34 KG/M2 | WEIGHT: 143 LBS | HEIGHT: 63 IN

## 2021-11-02 DIAGNOSIS — S40.011A CONTUSION OF RIGHT SHOULDER, INITIAL ENCOUNTER: Primary | ICD-10-CM

## 2021-11-02 DIAGNOSIS — S92.512A CLOSED DISPLACED FRACTURE OF PROXIMAL PHALANX OF LESSER TOE OF LEFT FOOT, INITIAL ENCOUNTER: ICD-10-CM

## 2021-11-02 PROCEDURE — 99214 OFFICE O/P EST MOD 30 MIN: CPT | Performed by: ORTHOPAEDIC SURGERY

## 2021-11-02 PROCEDURE — 28510 TREATMENT OF TOE FRACTURE: CPT | Performed by: ORTHOPAEDIC SURGERY

## 2021-11-14 NOTE — PROGRESS NOTES
CHIEF COMPLAINT:   1- Right shoulder pain/ contusion. 2- Left foot pain/ 4th toe proximal phalanx neck fracture. DATE OF INJURY: 9/24/2021    HISTORY:  Ms. Vin Garcia is a 80 y.o.  female right handed who presents today for f/u evaluation of a right shoulder and left foot injury. The patient reports that this injury occurred after a fall. She was first seen and evaluated in , when she was x-rayed, and asked to f/u with Orthopedics. The patient denies any other injuries. Movement makes the pain worse, the sling and resting makes the pain better. No numbness or tingling sensation. She had right TEA in 2012 by Dr Elmira Howell. She also had ORIF right humerus greater tuberosity by Dr Stepan Mendoza.     Past Medical History:   Diagnosis Date    Bleeding ulcer     Cancer ([facility] [address]     melanoma L IRIS    Gastroesophagitis     GERD (gastroesophageal reflux disease)     Hyperlipidemia     Hypertension     Moderate persistent asthma 2/12/2021    Pneumonia     Rheumatic fever with heart involvement     Trigger ring finger of right hand 7/1/2013    Unspecified diseases of blood and blood-forming organs     chronic anemia       Past Surgical History:   Procedure Laterality Date    BLADDER REPAIR N/A 2002    BREAST SURGERY      L breast tumor-benign    CYST REMOVAL Right 8-1-13    GANGLION CYST EXCISION RIGHT WRIST, TRIGGER FINGER RELEASE RIGHT FOURTH    EYE SURGERY      , cataract right eye    FRACTURE SURGERY      R shoulderx2-pinned    HYSTERECTOMY      JOINT REPLACEMENT Right 03/28/2012    Total Elbow - Dr. Benedict Chapa TUMOR REMOVAL  2013    carcinoid    UPPER GASTROINTESTINAL ENDOSCOPY  3/7/12    with bx    UPPER GASTROINTESTINAL ENDOSCOPY  11/8/13    EUS    UPPER GASTROINTESTINAL ENDOSCOPY  4/24/14    UPPER GASTROINTESTINAL ENDOSCOPY  3/17/16    push enteroscopy with ablation    UPPER GASTROINTESTINAL ENDOSCOPY  06/29/2017    UPPER GASTROINTESTINAL ENDOSCOPY N/A 8/11/2021    EGD ULTRASOUND OF STOMACH performed by Maria M Escamilla MD at [address] History     Socioeconomic History    Marital status:      Spouse name: Not on file    Number of children: 1    Years of education: Not on file    Highest education level: Not on file   Occupational History    Occupation: Retired   Tobacco Use    Smoking status: Never Smoker    Smokeless tobacco: Never Used   Vaping Use    Vaping Use: Never used   Substance and Sexual Activity    Alcohol use: No    Drug use: No    Sexual activity: Yes     Partners: Male   Other Topics Concern    Not on file   Social History Narrative    Not on file     Social Determinants of Health     Financial Resource Strain: Low Risk     Difficulty of Paying Living Expenses: Not hard at all   Food Insecurity: No Food Insecurity    Worried About [address] [employer] in the Last Year: Never true    [address]  [employer] in the Last Year: Never true   Transportation Needs:     Lack of Transportation (Medical): Not on file    Lack of Transportation (Non-Medical):  Not on file   Physical Activity:     Days of Exercise per Week: Not on file    Minutes of Exercise per Session: Not on file   Stress:     Feeling of Stress : Not on file   Social Connections:     Frequency of Communication with Friends and Family: Not on file    Frequency of Social Gatherings with Friends and Family: Not on file    Attends Protestant Services: Not on file    Active Member of Clubs or Organizations: Not on file    Attends Club or Organization Meetings: Not on file    Marital Status: Not on file   Intimate Partner Violence:     Fear of Current or Ex-Partner: Not on file    Emotionally Abused: Not on file    Physically Abused: Not on file    Sexually Abused: Not on file   Housing Stability:     Unable to Pay for Housing in the Last Year: Not on file    Number of [place] in the Last Year: Not on file  Unstable Housing in the Last Year: Not on file       Family History   Problem Relation Age of Onset    Heart Disease Mother     High Blood Pressure Mother     High Cholesterol Mother     Heart Disease Father     High Blood Pressure Father     High Cholesterol Father     Cancer Sister     Stroke Sister     High Blood Pressure Sister     High Cholesterol Sister     High Blood Pressure Brother     High Cholesterol Brother     Heart Attack Brother     Cancer Brother     Heart Attack Brother     Parkinsonism Brother        Current Outpatient Medications on File Prior to Visit   Medication Sig Dispense Refill    ibuprofen (ADVIL;MOTRIN) 400 MG tablet Take 400 mg by mouth every 6 hours as needed for Pain      olmesartan-hydroCHLOROthiazide (BENICAR HCT) 40-12.5 MG per tablet TAKE 1 TABLET BY MOUTH DAILY 90 tablet 3    fluticasone-vilanterol (BREO ELLIPTA) 100-25 MCG/INH AEPB inhaler Inhale 1 puff into the lungs daily (Patient not taking: Reported on 8/3/2021) 1 each 11    albuterol sulfate HFA (VENTOLIN HFA) 108 (90 Base) MCG/ACT inhaler Inhale 1 puff into the lungs 4 times daily as needed for Wheezing or Shortness of Breath (Patient not taking: Reported on 8/3/2021) 1 Inhaler 1    Iodine, Kelp, (KELP PO) Take by mouth daily      TURMERIC PO Take by mouth daily       Famotidine-Ca Carb-Mag Hydrox (PEPCID COMPLETE PO) Take 1 tablet by mouth nightly      esomeprazole (NEXIUM) 40 MG delayed release capsule Take 40 mg by mouth every morning (before breakfast)      Coenzyme Q10 (COQ-10) 100 MG CPCR Take 1 capsule by mouth daily      cetirizine (ZYRTEC) 10 MG tablet Take 10 mg by mouth as needed       Flaxseed, Linseed, (FLAX SEED OIL) 1000 MG CAPS Take 1,200 mg by mouth daily. No current facility-administered medications on file prior to visit.        Pertinent items are noted in HPI  Review of systems reviewed from Patient History Form dated on 9/29/2021 and available in the patient's chart under the Media tab. No change noted. PHYSICAL EXAMINATION:  Ms. Medardo Olson is a very pleasant 80 y.o.  female who presents today in no acute distress, awake, alert, and oriented. She is well dressed, nourished and  groomed. Patient with normal affect. Height is  5' 3\" (1.6 m), weight is 143 lb (64.9 kg), Body mass index is 25.33 kg/m². Resting respiratory rate is 16. The patient walks with minimal limp. On evaluation of her bilateral upper extremity, there is no obvious deformity right shoulder. There is no swelling and no ecchymosis. She is tender to palpation over the shoulder, and otherwise non tender over the remainder of the extremity. Range of motion is decreased secondary to pain over the right shoulder. The skin overlying the right shoulder is intact without evidence of lesion, laceration. Distal pulses are 2+ and symmetric bilaterally. Sensation is grossly intact to light touch and symmetric bilaterally. Examination of both ankles showing a decreased range of motion of the left ankle compare to the other side because of foot pain. There is mild swelling that can be seen, no ecchymosis over lateral side of the left foot. She has intact sensation and good pedal pulses. She has significant tenderness on deep palpation over the left foot 4th toe. IMAGING:  Xrays taken today in the office, 3 views of right shoulder were reviewed, and showed anterior dislocation with relocation xray. Xray's were reviewed, taken today in the office, 3 views of the left foot, and showed 4th toe displaced proximal phalanx neck fracture. IMPRESSION:    1- Right shoulder pain/ contusion. 2- Left foot pain/ 4th toe proximal phalanx neck fracture. PLAN:   I discussed with Devonte Carty the treatment options and that the overall alignment of the 4th toe fracture is good and we can treat this non-operatively with WBAT. We discussed the risk of nonunion and or malunion.    I discussed with Rudie Plate the treatment options for her right shoulder and that we can treat this with no heavy impact activities, and ROM. We will see her  back in 6 weeks at which time we will get xray left foot, may consider PT if indicated.       Lily Alvarado MD

## 2021-12-06 ENCOUNTER — OFFICE VISIT (OUTPATIENT)
Dept: INTERNAL MEDICINE CLINIC | Age: 86
End: 2021-12-06
Payer: COMMERCIAL

## 2021-12-06 VITALS
HEART RATE: 58 BPM | DIASTOLIC BLOOD PRESSURE: 90 MMHG | OXYGEN SATURATION: 99 % | BODY MASS INDEX: 25.69 KG/M2 | SYSTOLIC BLOOD PRESSURE: 154 MMHG | WEIGHT: 145 LBS
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
## 2021-12-06 DIAGNOSIS — T14.8XXA FRACTURE: ICD-10-CM

## 2021-12-06 DIAGNOSIS — M81.0 AGE RELATED OSTEOPOROSIS, UNSPECIFIED PATHOLOGICAL FRACTURE PRESENCE: Primary | ICD-10-CM

## 2021-12-06 PROCEDURE — 99213 OFFICE O/P EST LOW 20 MIN: CPT | Performed by: NURSE PRACTITIONER

## 2021-12-06 ASSESSMENT — LIFESTYLE VARIABLES: HOW OFTEN DO YOU HAVE A DRINK CONTAINING ALCOHOL: 0

## 2021-12-06 ASSESSMENT — PATIENT HEALTH QUESTIONNAIRE - PHQ9
2. FEELING DOWN, DEPRESSED OR HOPELESS: 0
SUM OF ALL RESPONSES TO PHQ9 QUESTIONS 1 & 2: 0
SUM OF ALL RESPONSES TO PHQ QUESTIONS 1-9: 0
1. LITTLE INTEREST OR PLEASURE IN DOING THINGS: 0
SUM OF ALL RESPONSES TO PHQ QUESTIONS 1-9: 0
SUM OF ALL RESPONSES TO PHQ QUESTIONS 1-9: 0

## 2021-12-06 NOTE — PROGRESS NOTES
12/6/21     Chief Complaint   Patient presents with    Osteoporosis     discuss shot for osteporosis d/t dexa scan results      HPI     Recent dexa showed decreased bone loss/ osteoporosis. She is still recovering from her last fall that resulted in multiple fractures. She is still seeing Dr. Conchita Lopez. Reports improving slowly. Still having left pain and right hand pain with decreased ROM. She has been treated with an oral biphosphate in the past has been off for a few years. Reports some GI upset with this when she was taking it. Still seeing Dr. Chanda Solomon for abnormal blood work. Has imaging scheduled later this month. She has a history of carcinoid tumor, recently had normal scope with Dr. Silvia Irwin. Allergies   Allergen Reactions    Simvastatin Other (See Comments)     Body aches     Current Outpatient Medications   Medication Sig Dispense Refill    denosumab (PROLIA) 60 MG/ML SOSY SC injection Inject 1 mL into the skin once for 1 dose 1 each 1    ibuprofen (ADVIL;MOTRIN) 400 MG tablet Take 400 mg by mouth every 6 hours as needed for Pain      olmesartan-hydroCHLOROthiazide (BENICAR HCT) 40-12.5 MG per tablet TAKE 1 TABLET BY MOUTH DAILY 90 tablet 3    Iodine, Kelp, (KELP PO) Take by mouth daily      TURMERIC PO Take by mouth daily       Famotidine-Ca Carb-Mag Hydrox (PEPCID COMPLETE PO) Take 1 tablet by mouth nightly      esomeprazole (NEXIUM) 40 MG delayed release capsule Take 40 mg by mouth every morning (before breakfast)      Coenzyme Q10 (COQ-10) 100 MG CPCR Take 1 capsule by mouth daily      cetirizine (ZYRTEC) 10 MG tablet Take 10 mg by mouth as needed       Flaxseed, Linseed, (FLAX SEED OIL) 1000 MG CAPS Take 1,200 mg by mouth daily.         fluticasone-vilanterol (BREO ELLIPTA) 100-25 MCG/INH AEPB inhaler Inhale 1 puff into the lungs daily (Patient not taking: Reported on 8/3/2021) 1 each 11    albuterol sulfate HFA (VENTOLIN HFA) 108 (90 Base) MCG/ACT inhaler Inhale 1 puff into the lungs 4 times daily as needed for Wheezing or Shortness of Breath (Patient not taking: Reported on 8/3/2021) 1 Inhaler 1     No current facility-administered medications for this visit. Review of Systems  Negative other than HPI    Vitals:    12/06/21 1043 12/06/21 1045   BP: (!) 160/90 (!) 154/90   Pulse: 58    SpO2: 99%    Weight: 145 lb (65.8 kg)       Physical Exam  Constitutional:       General: She is not in acute distress. Appearance: Normal appearance. She is not ill-appearing. HENT:      Head: Normocephalic and atraumatic. Pulmonary:      Effort: Pulmonary effort is normal. No respiratory distress. Neurological:      General: No focal deficit present. Mental Status: She is alert and oriented to person, place, and time. Mental status is at baseline. Psychiatric:         Mood and Affect: Mood normal.         Behavior: Behavior normal.       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 All questions answered. FU in 6 months and prn   See Dr. Veronika Jackson as scheduled - she will discuss prolia with her given recent abnormal CBC.      Electronically signed by ROSE Bustamante CNP on 12/6/2021 at 12:02 PM

## 2021-12-06 NOTE — PATIENT INSTRUCTIONS
Patient Education        denosumab (Prolia)  Pronunciation:  zulma [place] paulina mab  Brand:  Prolia  What is the most important information I should know about Prolia? This medication guide provides information about the Prolia brand of denosumab. Bruce Gely is another brand of denosumab used to prevent bone fractures and other skeletal conditions in people with tumors that have spread to the bone. Prolia can cause many serious side effects. Call your doctor at once if you have a fever, chills, pain or burning when you urinate, severe stomach pain, cough, shortness of breath, skin problems, numbness or tingling, severe or unusual pain, or skin problems. Do not use if you are pregnant. Use effective birth control while using Prolia, and for at least 5 months after you stop. What is denosumab (Prolia)? Denosumab is a monoclonal antibody. Monoclonal antibodies are made to target and destroy only certain cells in the body. This may help to protect healthy cells from damage. The Prolia brand of denosumab is used to treat osteoporosis or bone loss in men and women who have a high risk of bone fracture. Prolia is sometimes used in people whose bone fracture is caused by certain medicines or cancer treatments. This medication guide provides information about the Prolia brand of denosumab. Shevlin Gely is another brand of denosumab used to prevent bone fractures and other skeletal conditions in people with tumors that have spread to the bone. Denosumab may also be used for purposes not listed in this medication guide. What should I discuss with my healthcare provider before receiving Prolia? You should not receive Prolia if you are allergic to denosumab, or if you have:  · low levels of calcium in your blood (hypocalcemia); or  · if you are pregnant. While you are using Prolia, you should not receive Xgeva, another brand of denosumab.   Tell your doctor if you have ever had:  · kidney disease (or if you are on dialysis);  · a weak immune system (caused by disease or by using certain medicines);  · hypoparathyroidism (decreased functioning of the parathyroid glands);  · thyroid surgery;  · any condition that makes it hard for your body to absorb nutrients from food (malabsorption);  · a latex allergy;  · if you are scheduled for a dental procedure; or  · if you cannot take daily calcium and vitamin D. Denosumab may cause bone loss (osteonecrosis) in the jaw. Symptoms include jaw pain or numbness, red or swollen gums, loose teeth, gum infection, or slow healing after dental work. The risk of osteonecrosis is highest in people with cancer, blood cell disorders, pre-existing dental problems, or people treated with steroids, chemotherapy, or radiation. Ask your doctor about your own risk. You may need to have a negative pregnancy test before starting this treatment. Do not use Prolia if you are pregnant. It could harm the unborn baby or cause birth defects. Use effective birth control to prevent pregnancy while you are using this medicine and for at least 5 months after your last dose. Tell your doctor right away if you become pregnant. You should not breastfeed while using denosumab. How is Prolia given? Denosumab is injected under the skin of your stomach, upper thigh, or upper arm. A healthcare provider will give you this injection. Prolia is usually given once every 6 months. Your doctor may have you take extra calcium and vitamin D while you are being treated with denosumab. Take only the amount of calcium and vitamin D that your doctor has prescribed. If you need to have any dental work (especially surgery), tell the dentist ahead of time that you are receiving denosumab. Pay special attention to your dental hygiene. Brush and floss your teeth regularly while receiving this medication. You may need to have a dental exam before you begin treatment with Prolia. Follow your doctor's instructions.   Your risk of bone fractures can increase when you stop using Prolia. Do not stop using this medicine without first talking to your doctor. If you keep this medicine at home, store it in the original carton in a refrigerator. Protect from light and do not freeze. Do not shake the prefilled syringe. You may take the carton out of the refrigerator and allow it to reach room temperature before the injection is given. After you have taken Prolia out of the refrigerator, you may keep it at room temperature for up to 14 days. Store in the original container away from heat and light. Each prefilled syringe is for one use only. Throw it away after one use, even if there is still medicine left inside. Use a needle and syringe only once and then place them in a puncture-proof \"sharps\" container. Follow state or local laws about how to dispose of this container. Keep it out of the reach of children and pets. Do not share this medicine with another person, even if they have the same symptoms you have. What happens if I miss a dose? Call your doctor for instructions if you miss a dose or miss an appointment for your Prolia injection. You should receive your missed injection as soon as possible. What happens if I overdose? Seek emergency medical attention or call the Poison Help line at 1-632.845.5924. What should I avoid while receiving Prolia? Follow your doctor's instructions about any restrictions on food, beverages, or activity. What are the possible side effects of Prolia? Get emergency medical help if you have signs of an allergic reaction: hives, itching, rash; difficult breathing, feeling light-headed; swelling of your face, lips, tongue, or throat.   Call your doctor at once if you have:  · new or unusual pain in your thigh, hip, or groin;  · severe pain in your joints, muscles, or bones;  · skin problems such as dryness, peeling, redness, itching, blisters, bumps, oozing, or crusting; or  · low calcium level --muscle spasms or contractions, numbness or tingly feeling (around your mouth, or in your fingers and toes). Serious infections may occur during treatment with Prolia. Call your doctor right away if you have signs of infection such as:  · fever, chills, night sweats;  · swelling, pain, tenderness, warmth, or redness anywhere on your body;  · pain or burning when you urinate;  · increased or urgent need to urinate;  · severe stomach pain; or  · cough, wheezing, feeling short of breath. Common side effects may include:  · bladder infection (painful or difficult urination);  · lung infection (cough, shortness of breath);  · headache;  · back pain, muscle pain, joint pain;  · increased blood pressure;  · cold symptoms such as stuffy nose, sneezing, sore throat;  · high cholesterol; or  · pain in your arms or legs. This is not a complete list of side effects and others may occur. Call your doctor for medical advice about side effects. You may report side effects to FDA at 4-442-FDA-6270. What other drugs will affect Prolia? Other drugs may affect Prolia, including prescription and over-the-counter medicines, vitamins, and herbal products. Tell your doctor about all your current medicines and any medicine you start or stop using. Where can I get more information? Your doctor or pharmacist can provide more information about denosumab (Prolia). Remember, keep this and all other medicines out of the reach of children, never share your medicines with others, and use this medication only for the indication prescribed. Every effort has been made to ensure that the information provided by [address] is accurate, up-to-date, and complete, but no guarantee is made to that effect. Drug information contained herein may be time sensitive.  Mult information has been compiled for use by healthcare practitioners and consumers in the [place] and therefore Mult does not warrant that uses outside of the [place] are appropriate, unless specifically indicated otherwise. [facility] drug information does not endorse drugs, diagnose patients or recommend therapy. [facility] drug information is an informational resource designed to assist licensed healthcare practitioners in caring for their patients and/or to serve consumers viewing this service as a supplement to, and not a substitute for, the expertise, skill, knowledge and judgment of healthcare practitioners. The absence of a warning for a given drug or drug combination in no way should be construed to indicate that the drug or drug combination is safe, effective or appropriate for any given patient. [facility] does not assume any responsibility for any aspect of healthcare administered with the aid of information [facility] provides. The information contained herein is not intended to cover all possible uses, directions, precautions, warnings, drug interactions, allergic reactions, or adverse effects. If you have questions about the drugs you are taking, check with your doctor, nurse or pharmacist.  Copyright 6910-9279 [address]: 12.01. Revision date: 2/18/2020. Care instructions adapted under license by [facility] ([facility]). If you have questions about a medical condition or this instruction, always ask your healthcare professional. [place] [address] any warranty or liability for your use of this information.

## 2021-12-07 ENCOUNTER — TELEPHONE (OUTPATIENT)
Dept: INTERNAL MEDICINE CLINIC | Age: 86
End: 2021-12-07

## 2021-12-07 NOTE — TELEPHONE ENCOUNTER
----- Message from Jose Francisco Harish sent at 12/7/2021 10:41 AM EST -----  Subject: Message to Provider    QUESTIONS  Information for Provider? Patient is not interested in the shot due to   recent articles about the side effects of this medicine.  ---------------------------------------------------------------------------  --------------  CALL BACK INFO  What is the best way for the office to contact you? OK to leave message on   voicemail  Preferred Call Back Phone Number? [phone]  ---------------------------------------------------------------------------  --------------  SCRIPT ANSWERS  Relationship to Patient?  Self

## 2021-12-07 NOTE — TELEPHONE ENCOUNTER
----- Message from Louisa Campuzano sent at 12/7/2021 10:41 AM EST -----  Subject: Message to Provider    QUESTIONS  Information for Provider? Patient is not interested in the shot due to   recent articles about the side effects of this medicine.  ---------------------------------------------------------------------------  --------------  CALL BACK INFO  What is the best way for the office to contact you? OK to leave message on   voicemail  Preferred Call Back Phone Number? [phone]  ---------------------------------------------------------------------------  --------------  SCRIPT ANSWERS  Relationship to Patient?  Self

## 2021-12-07 NOTE — TELEPHONE ENCOUNTER
----- Message from Elvia Wright sent at 12/7/2021 10:41 AM EST -----  Subject: Message to Provider    QUESTIONS  Information for Provider? Patient is not interested in the shot due to   recent articles about the side effects of this medicine.  ---------------------------------------------------------------------------  --------------  CALL BACK INFO  What is the best way for the office to contact you? OK to leave message on   voicemail  Preferred Call Back Phone Number? [phone]  ---------------------------------------------------------------------------  --------------  SCRIPT ANSWERS  Relationship to Patient?  Self

## 2021-12-14 ENCOUNTER — OFFICE VISIT (OUTPATIENT)
Dept: ORTHOPEDIC SURGERY | Age: 86
End: 2021-12-14
Payer: COMMERCIAL

## 2021-12-14 VITALS — HEIGHT: 63 IN | WEIGHT: 145 LBS | BODY MASS INDEX: 25.69 KG/M2

## 2021-12-14 DIAGNOSIS — S92.512A CLOSED DISPLACED FRACTURE OF PROXIMAL PHALANX OF LESSER TOE OF LEFT FOOT, INITIAL ENCOUNTER: Primary | ICD-10-CM

## 2021-12-14 DIAGNOSIS — S40.011A CONTUSION OF RIGHT SHOULDER, INITIAL ENCOUNTER: ICD-10-CM

## 2021-12-14 PROCEDURE — 99213 OFFICE O/P EST LOW 20 MIN: CPT | Performed by: ORTHOPAEDIC SURGERY

## 2021-12-14 NOTE — PROGRESS NOTES
CHIEF COMPLAINT:   1- Right shoulder pain/ contusion. 2- Left foot pain/ 4th toe proximal phalanx neck fracture. DATE OF INJURY: 9/24/2021    HISTORY:  Ms. Jill Krabbe is a 80 y.o.  female right handed who presents today for f/u evaluation of a right shoulder and left foot injury. The patient reports that this injury occurred after a fall. She was first seen and evaluated in , when she was x-rayed, and asked to f/u with Orthopedics. The patient denies any other injuries. She reports that her right shoulder pain is much improved. She states that her left foot pain is persistent but is starting to improve. She has only able to wear 1 pair of soft shoes due to pain. Rates pain a 5/10 VAS in her left foot fourth toe. Pain is worse with shoe wear and better with rest and elevation. No numbness or tingling sensation. She had right TEA in 2012 by Dr Sulaiman Ruth. She also had ORIF right humerus greater tuberosity by Dr Mundo Navarro.     Past Medical History:   Diagnosis Date    Bleeding ulcer     Cancer ([address]     melanoma L IRIS    Gastroesophagitis     GERD (gastroesophageal reflux disease)     Hyperlipidemia     Hypertension     Moderate persistent asthma 2/12/2021    Pneumonia     Rheumatic fever with heart involvement     Trigger ring finger of right hand 7/1/2013    Unspecified diseases of blood and blood-forming organs     chronic anemia       Past Surgical History:   Procedure Laterality Date    BLADDER REPAIR N/A 2002    BREAST SURGERY      L breast tumor-benign    CYST REMOVAL Right 8-1-13    GANGLION CYST EXCISION RIGHT WRIST, TRIGGER FINGER RELEASE RIGHT FOURTH    EYE SURGERY      , cataract right eye    FRACTURE SURGERY      R shoulderx2-pinned    HYSTERECTOMY      JOINT REPLACEMENT Right 03/28/2012    Total Elbow - Dr. Сергей Sharma TUMOR REMOVAL  2013    carcinoid    UPPER GASTROINTESTINAL ENDOSCOPY  3/7/12 with bx    UPPER GASTROINTESTINAL ENDOSCOPY  11/8/13    EUS    UPPER GASTROINTESTINAL ENDOSCOPY  4/24/14    UPPER GASTROINTESTINAL ENDOSCOPY  3/17/16    push enteroscopy with ablation    UPPER GASTROINTESTINAL ENDOSCOPY  06/29/2017    UPPER GASTROINTESTINAL ENDOSCOPY N/A 8/11/2021    EGD ULTRASOUND OF STOMACH performed by Shelley Beltrán MD at [address] History     Socioeconomic History    Marital status:      Spouse name: Not on file    Number of children: 1    Years of education: Not on file    Highest education level: Not on file   Occupational History    Occupation: Retired   Tobacco Use    Smoking status: Never Smoker    Smokeless tobacco: Never Used   Vaping Use    Vaping Use: Never used   Substance and Sexual Activity    Alcohol use: No    Drug use: No    Sexual activity: Yes     Partners: Male   Other Topics Concern    Not on file   Social History Narrative    Not on file     Social Determinants of Health     Financial Resource Strain: Low Risk     Difficulty of Paying Living Expenses: Not hard at all   Food Insecurity: No Food Insecurity    Worried About [address] in the Last Year: Never true    [address] in the Last Year: Never true   Transportation Needs:     Lack of Transportation (Medical): Not on file    Lack of Transportation (Non-Medical):  Not on file   Physical Activity:     Days of Exercise per Week: Not on file    Minutes of Exercise per Session: Not on file   Stress:     Feeling of Stress : Not on file   Social Connections:     Frequency of Communication with Friends and Family: Not on file    Frequency of Social Gatherings with Friends and Family: Not on file    Attends Temple Services: Not on file    Active Member of Clubs or Organizations: Not on file    Attends Club or Organization Meetings: Not on file    Marital Status: Not on file   Intimate Partner Violence:     Fear of Current or Ex-Partner: Not on file  Emotionally Abused: Not on file    Physically Abused: Not on file    Sexually Abused: Not on file   Housing Stability:     Unable to Pay for Housing in the Last Year: Not on file    Number of Places Lived in the Last Year: Not on file    Unstable Housing in the Last Year: Not on file       Family History   Problem Relation Age of Onset    Heart Disease Mother     High Blood Pressure Mother     High Cholesterol Mother     Heart Disease Father     High Blood Pressure Father     High Cholesterol Father     Cancer Sister     Stroke Sister     High Blood Pressure Sister     High Cholesterol Sister     High Blood Pressure Brother     High Cholesterol Brother     Heart Attack Brother     Cancer Brother     Heart Attack Brother     Parkinsonism Brother        Current Outpatient Medications on File Prior to Visit   Medication Sig Dispense Refill    denosumab (PROLIA) 60 MG/ML SOSY SC injection Inject 1 mL into the skin once for 1 dose 1 each 1    ibuprofen (ADVIL;MOTRIN) 400 MG tablet Take 400 mg by mouth every 6 hours as needed for Pain      olmesartan-hydroCHLOROthiazide (BENICAR HCT) 40-12.5 MG per tablet TAKE 1 TABLET BY MOUTH DAILY 90 tablet 3    fluticasone-vilanterol (BREO ELLIPTA) 100-25 MCG/INH AEPB inhaler Inhale 1 puff into the lungs daily (Patient not taking: Reported on 8/3/2021) 1 each 11    albuterol sulfate HFA (VENTOLIN HFA) 108 (90 Base) MCG/ACT inhaler Inhale 1 puff into the lungs 4 times daily as needed for Wheezing or Shortness of Breath (Patient not taking: Reported on 8/3/2021) 1 Inhaler 1    Iodine, Kelp, (KELP PO) Take by mouth daily      TURMERIC PO Take by mouth daily       Famotidine-Ca Carb-Mag Hydrox (PEPCID COMPLETE PO) Take 1 tablet by mouth nightly      esomeprazole (NEXIUM) 40 MG delayed release capsule Take 40 mg by mouth every morning (before breakfast)      Coenzyme Q10 (COQ-10) 100 MG CPCR Take 1 capsule by mouth daily      cetirizine (ZYRTEC) 10 MG tablet Take 10 mg by mouth as needed       Flaxseed, Linseed, (FLAX SEED OIL) 1000 MG CAPS Take 1,200 mg by mouth daily. No current facility-administered medications on file prior to visit. Pertinent items are noted in HPI  Review of systems reviewed from Patient History Form dated on 9/29/2021 and available in the patient's chart under the Media tab. No change noted. PHYSICAL EXAMINATION:  Ms. Maeve Hoffman is a very pleasant 80 y.o.  female who presents today in no acute distress, awake, alert, and oriented. She is well dressed, nourished and  groomed. Patient with normal affect. Height is  5' 3\" (1.6 m), weight is 145 lb (65.8 kg), Body mass index is 25.69 kg/m². Resting respiratory rate is 16. The patient walks with no limp. On evaluation of her bilateral upper extremity, there is no obvious deformity right shoulder. There is no swelling and no ecchymosis. She is non tender to palpation over the shoulder, and otherwise non tender over the remainder of the extremity. Range of motion is full right shoulder. The skin overlying the right shoulder is intact without evidence of lesion, laceration. Distal pulses are 2+ and symmetric bilaterally. Sensation is grossly intact to light touch and symmetric bilaterally. Examination of both ankles showing a full range of motion of the left ankle compare to the other side. because of foot pain. There is mild swelling that can be seen, no ecchymosis over lateral side of the left foot. She has intact sensation and good pedal pulses. She has mild tenderness on deep palpation over the left foot 4th toe. IMAGING:  Xrays taken 11/2/2021 in the office, 3 views of right shoulder were reviewed, and showed no acute fracture. Xray's were reviewed, taken today in the office, 3 views of the left foot, and showed 4th toe displaced proximal phalanx neck fracture. IMPRESSION:    1- Right shoulder pain/ contusion.   2- Left foot pain/ 4th toe proximal phalanx neck fracture. PLAN:   I discussed with [place] the treatment options and that the overall alignment of the 4th toe fracture is good. She can be WBAT in regular shoes as tolerated. For the right shoulder, she can return to normal activities as tolerated. We will see her  back in 6 weeks at which time we will get xray left foot, may consider PT if indicated.       Renetta Cartwright MD

## 2021-12-27 ENCOUNTER — HOSPITAL ENCOUNTER (OUTPATIENT)
Dept: CT IMAGING | Age: 86
Discharge: HOME | End: 2021-12-27
Payer: COMMERCIAL

## 2021-12-27 DIAGNOSIS — R10.12 LEFT UPPER QUADRANT ABDOMINAL PAIN: ICD-10-CM

## 2021-12-27 DIAGNOSIS — D3A.00 CARCINOID TUMOR (EXCEPT OF APPENDIX): ICD-10-CM

## 2021-12-27 PROCEDURE — 74177 CT ABD & PELVIS W/CONTRAST: CPT

## 2021-12-27 PROCEDURE — 6360000004 HC RX CONTRAST MEDICATION: Performed by: INTERNAL MEDICINE

## 2021-12-27 RX ADMIN — IOHEXOL 50 ML: 240 INJECTION, SOLUTION INTRATHECAL; INTRAVASCULAR; INTRAVENOUS; ORAL at 11:36

## 2021-12-27 RX ADMIN — IOPAMIDOL 75 ML: 755 INJECTION, SOLUTION INTRAVENOUS at 11:36

## 2022-02-24 ENCOUNTER — OFFICE VISIT (OUTPATIENT)
Dept: INTERNAL MEDICINE CLINIC | Age: 87
End: 2022-02-24
Payer: COMMERCIAL

## 2022-02-24 VITALS
OXYGEN SATURATION: 98 % | WEIGHT: 145.4 LBS | HEIGHT: 63 IN | DIASTOLIC BLOOD PRESSURE: 80 MMHG | BODY MASS INDEX: 25.76 KG/M2 | SYSTOLIC BLOOD PRESSURE: 138 MMHG

## 2022-02-24 DIAGNOSIS — Z85.060 PERSONAL HISTORY OF MALIGNANT CARCINOID TUMOR OF SMALL INTESTINE: ICD-10-CM

## 2022-02-24 DIAGNOSIS — K90.9 INTESTINAL MALABSORPTION, UNSPECIFIED TYPE: ICD-10-CM

## 2022-02-24 DIAGNOSIS — F41.9 ANXIETY: Primary | ICD-10-CM

## 2022-02-24 DIAGNOSIS — D50.0 IRON DEFICIENCY ANEMIA DUE TO CHRONIC BLOOD LOSS: ICD-10-CM

## 2022-02-24 DIAGNOSIS — D50.9 MICROCYTIC ANEMIA: ICD-10-CM

## 2022-02-24 PROCEDURE — 99214 OFFICE O/P EST MOD 30 MIN: CPT | Performed by: NURSE PRACTITIONER

## 2022-02-24 RX ORDER — FERROUS SULFATE 325(65) MG
325 TABLET ORAL 2 TIMES DAILY
Qty: 60 TABLET | Refills: 5 | Status: SHIPPED | OUTPATIENT
Start: 2022-02-24

## 2022-02-24 RX ORDER — DIAZEPAM 2 MG/1
2 TABLET ORAL EVERY 12 HOURS PRN
Qty: 15 TABLET | Refills: 0 | Status: SHIPPED | OUTPATIENT
Start: 2022-02-24 | End: 2022-03-06

## 2022-02-24 ASSESSMENT — ENCOUNTER SYMPTOMS
ABDOMINAL DISTENTION: 0
CHEST TIGHTNESS: 0
VOMITING: 0
DIARRHEA: 0
COUGH: 0
EYE PAIN: 0
SHORTNESS OF BREATH: 0
WHEEZING: 0
ABDOMINAL PAIN: 0
CONSTIPATION: 0
NAUSEA: 0
RHINORRHEA: 0
BACK PAIN: 0
EYE REDNESS: 0
SINUS PRESSURE: 0
BLOOD IN STOOL: 0
APNEA: 0

## 2022-02-24 NOTE — PROGRESS NOTES
2/24/22     Chief Complaint   Patient presents with    Results     From [facility], pt. did not bring results. Lad and CT- Very nervous today son is currently in the ICU    Other     DUE FOR AWV     HPI     Here for a follow-up on anemia/recent blood work. Is very upset today - states her son is in the ICU. Has been on valium in the past for anxiety with benefit. She has also been on lexapro. Had been doing well until this week when her son became ill. She has not been able to see him. Recent cbc 2/15 noted to be improved s/p iron infusion. States she was told she could take an iron supplementation but that she should follow-up with PCP for further instruction. History of carcinoid tumor of GI tract. Last visit with Dr Ye Arriaza 2/15/22. CT chest/abd/pelvis 12//2021 noted to be stable from her scan in 2019. Last PET was 7/2021 which is noted to be negative.     Allergies   Allergen Reactions    Simvastatin Other (See Comments)     Body aches       Current Outpatient Medications   Medication Sig Dispense Refill    ferrous sulfate (IRON 325) 325 (65 Fe) MG tablet Take 1 tablet by mouth 2 times daily 60 tablet 5    denosumab (PROLIA) 60 MG/ML SOSY SC injection Inject 1 mL into the skin once for 1 dose 1 each 1    ibuprofen (ADVIL;MOTRIN) 400 MG tablet Take 400 mg by mouth every 6 hours as needed for Pain      olmesartan-hydroCHLOROthiazide (BENICAR HCT) 40-12.5 MG per tablet TAKE 1 TABLET BY MOUTH DAILY 90 tablet 3    Iodine, Kelp, (KELP PO) Take by mouth daily      TURMERIC PO Take by mouth daily       Famotidine-Ca Carb-Mag Hydrox (PEPCID COMPLETE PO) Take 1 tablet by mouth nightly      esomeprazole (NEXIUM) 40 MG delayed release capsule Take 40 mg by mouth every morning (before breakfast)      Coenzyme Q10 (COQ-10) 100 MG CPCR Take 1 capsule by mouth daily      cetirizine (ZYRTEC) 10 MG tablet Take 10 mg by mouth as needed       Flaxseed, Linseed, (FLAX SEED OIL) 1000 MG CAPS Take 1,200 mg by mouth daily.  fluticasone-vilanterol (BREO ELLIPTA) 100-25 MCG/INH AEPB inhaler Inhale 1 puff into the lungs daily (Patient not taking: Reported on 8/3/2021) 1 each 11    albuterol sulfate HFA (VENTOLIN HFA) 108 (90 Base) MCG/ACT inhaler Inhale 1 puff into the lungs 4 times daily as needed for Wheezing or Shortness of Breath (Patient not taking: Reported on 8/3/2021) 1 Inhaler 1     No current facility-administered medications for this visit. Review of Systems   Constitutional: Negative for appetite change, chills, fatigue and fever. HENT: Negative for congestion, ear pain, rhinorrhea and sinus pressure. Eyes: Negative for pain, redness and visual disturbance. Respiratory: Negative for apnea, cough, chest tightness, shortness of breath and wheezing. Cardiovascular: Negative for chest pain, palpitations and leg swelling. Gastrointestinal: Negative for abdominal distention, abdominal pain, blood in stool, constipation, diarrhea, nausea and vomiting. Endocrine: Negative for cold intolerance, heat intolerance, polydipsia, polyphagia and polyuria. Genitourinary: Negative for difficulty urinating, dysuria, enuresis, frequency, hematuria and urgency. Musculoskeletal: Negative for back pain, gait problem, joint swelling and neck pain. Skin: Negative for rash and wound. Allergic/Immunologic: Negative for environmental allergies, food allergies and immunocompromised state. Neurological: Negative for dizziness, syncope, light-headedness, numbness and headaches. Hematological: Negative for adenopathy. Does not bruise/bleed easily. Psychiatric/Behavioral: Negative for agitation, behavioral problems and confusion. The patient is nervous/anxious (recurrent, situational). Vitals:    02/24/22 1112   BP: 138/80   SpO2: 98%   Weight: 145 lb 6.4 oz (66 kg)   Height: 5' 3\" (1.6 m)      Physical Exam  Vitals reviewed. Constitutional:       General: She is not in acute distress. Appearance: She is well-developed. She is not ill-appearing or diaphoretic. HENT:      Head: Normocephalic and atraumatic. Cardiovascular:      Rate and Rhythm: Normal rate and regular rhythm. Heart sounds: Normal heart sounds. No murmur heard. Pulmonary:      Effort: Pulmonary effort is normal. No respiratory distress. Breath sounds: Normal breath sounds. No wheezing or rhonchi. Skin:     General: Skin is warm and dry. Neurological:      General: No focal deficit present. Mental Status: She is alert and oriented to person, place, and time. Psychiatric:         Mood and Affect: Mood is anxious. Affect is tearful. Behavior: Behavior normal.       Assessment/Plan:  Iron deficiency anemia/ Microcytic anemia / malignant carcinoid tumor of small intestine   Stable, controlled. Start taking iron BID as IV infusions are complete. This has been ordered - she will get OTC pending cost  Seeing [facility] for anemia and    Anxiety   Uncontrolled. Situational   Discussed options in detail - pt declined SSRI today since it seems to be situational.   Start taking Valium 1 mg BID PRN. Can increase to full 2 mg dose if needed. This has been ordered. If anxiety is prolonged, make an appointment to discuss restarting SSRI vs alternative options. Discussed medications with patient, who voiced understanding of their use and indications. All questions answered. Return 6/6/22 for AWV and PRN.     Electronically signed by ROSE Boss CNP on 2/24/2022 at 12:46 PM

## 2022-03-31 NOTE — TELEPHONE ENCOUNTER
Alise Occupational Therapist  with [facility] care called to report elevated blood pressure of this patient. 193/83 left arm sitting position. The patient was outside and walking around prior to taking her blood pressure.      Second BP taken at 4:24pm : 188/91 Surinder Carney (: 1950) is a 70 y.o. female, established patient, here for evaluation of the following chief complaint(s):  Chief Complaint   Patient presents with    Anxiety     upcoming loss of loved one       Assessment and Plan:       ICD-10-CM ICD-9-CM    1. Adjustment disorder with mixed anxiety and depressed mood  F43.23 309.28 LORazepam (ATIVAN) 0.5 mg tablet       Medication(s), management and follow-up based on response reviewed at visit. Reviewed PRN dosing, titration to dose effect as needed, and refills based on pattern of use. SendinBlue message sent to pt after visit, as reviewed for dose titration/refills if needed. Follow-up and Dispositions    · Return if symptoms worsen or fail to improve. reviewed medications and side effects in detail    For additional documentation of information and/or recommendations discussed this visit, please see notes in instructions. Plan and evaluation (above) reviewed with pt at visit  Patient voiced understanding of plan and provided with time to ask/review questions. After Visit Summary (AVS) provided to pt after visit with additional instructions as needed/reviewed. Future Appointments   Date Time Provider Kika Rutledge   2022  8:00 AM Jules Meneses MD [facility]   --Updated future visits after patient check-out. History of Present Illness:     Notes (nursing/rooming note copied below in italics):  As above    Here to review plan for above. Her  is in hospice due to memory problems and other medical problems. Has been told by hospice that his is likely to die any day now, and pt is having problems dealing with this loss. Prescription Monitoring Program ([place]) database query with fills:  No fills in last 2yrs. She has no chart history (with query) for benzo use in past (none for diazepam, lorazepam, or alprazolam queries).     She notes no medication use like this in past.    Reviewed medication options, plan and mgt for acute grief/anxiety/stress related to above. Dosing and titration reviewed. Nursing screenings reviewed by provider at visit. Prior to Admission medications    Medication Sig Start Date End Date Taking? Authorizing Provider   multivitamin (ONE A DAY) tablet Take 1 Tablet by mouth daily. Yes Provider, Historical   buPROPion XL (WELLBUTRIN XL) 150 mg tablet Take 1 Tablet by mouth daily. 10/5/21  Yes Vahe Davila MD   levothyroxine (SYNTHROID) 88 mcg tablet TAKE 1 TABLET EVERY DAY  SEVEN  DAYS  A  WEEK 8/7/21  Yes Vahe Davila MD   naproxen sodium (Aleve) 220 mg cap Take  by mouth. Yes Provider, Historical   Ibuprofen-diphenhydrAMINE (Advil PM) 200-38 mg tab by Mouth/Throat route daily. Yes Provider, Historical   estradiol (ESTRACE) 1 mg tablet Take 1 Tab by mouth daily. 12/28/19  Yes Vahe Davila MD   VITAMIN D2 50,000 unit capsule TAKE ONE CAPSULE BY MOUTH ONCE A WEEK  Patient taking differently: 2,000 Units daily. 1/13/17  Yes Eleazar Johnson MD   GLUCOSAMINE SULFATE (GLUCOSAMINE PO) Take 2 Tabs by mouth daily. Yes Provider, Historical        ROS    Vitals:    03/31/22 1201 03/31/22 1205   BP: (!) 142/90 137/88   Pulse: 91    Resp: 18    Temp: 98.1 °F (36.7 °C)    TempSrc: Oral    SpO2: 99%    Weight: 129 lb (58.5 kg)    Height: 5' 4\" (1.626 m)       Body mass index is 22.14 kg/m². Physical Exam:     Physical Exam  Vitals and nursing note reviewed. Constitutional:       General: She is not in acute distress. Appearance: Normal appearance. She is well-developed. She is not diaphoretic. HENT:      Head: Normocephalic and atraumatic. Eyes:      General: No scleral icterus. Right eye: No discharge. Left eye: No discharge. Conjunctiva/sclera: Conjunctivae normal.   Cardiovascular:      Rate and Rhythm: Normal rate. Pulmonary:      Effort: Pulmonary effort is normal. No respiratory distress. Breath sounds: No stridor. Abdominal:      General: There is no distension. Musculoskeletal:         General: No swelling, tenderness, deformity or signs of injury. Skin:     Coloration: Skin is not jaundiced or pale. Findings: No bruising, erythema or rash. Neurological:      General: No focal deficit present. Mental Status: She is alert. Coordination: Coordination normal.      Gait: Gait normal.   Psychiatric:         Behavior: Behavior normal.         Thought Content: Thought content normal.         Judgment: Judgment normal.         An electronic signature was used to authenticate this note.   -- Nahid Disla MD

## 2022-04-26 RX ORDER — OLMESARTAN MEDOXOMIL AND HYDROCHLOROTHIAZIDE 40/12.5 40; 12.5 MG/1; MG/1
TABLET ORAL
Qty: 90 TABLET | Refills: 3 | Status: SHIPPED | OUTPATIENT
Start: 2022-04-26

## 2022-06-16 ENCOUNTER — OFFICE VISIT (OUTPATIENT)
Dept: INTERNAL MEDICINE CLINIC | Age: 87
End: 2022-06-16
Payer: COMMERCIAL

## 2022-06-16 VITALS
HEIGHT: 63 IN | WEIGHT: 140 LBS | HEART RATE: 98 BPM | BODY MASS INDEX: 24.8 KG/M2 | SYSTOLIC BLOOD PRESSURE: 138 MMHG | OXYGEN SATURATION: 99 % | DIASTOLIC BLOOD PRESSURE: 88 MMHG

## 2022-06-16 DIAGNOSIS — F41.9 ANXIETY: ICD-10-CM

## 2022-06-16 DIAGNOSIS — D50.0 IRON DEFICIENCY ANEMIA DUE TO CHRONIC BLOOD LOSS: ICD-10-CM

## 2022-06-16 DIAGNOSIS — R14.1 GAS PAIN: ICD-10-CM

## 2022-06-16 DIAGNOSIS — I10 PRIMARY HYPERTENSION: ICD-10-CM

## 2022-06-16 DIAGNOSIS — D50.9 MICROCYTIC ANEMIA: ICD-10-CM

## 2022-06-16 DIAGNOSIS — T14.8XXA FRACTURE: ICD-10-CM

## 2022-06-16 DIAGNOSIS — Z00.00 MEDICARE ANNUAL WELLNESS VISIT, SUBSEQUENT: Primary | ICD-10-CM

## 2022-06-16 DIAGNOSIS — M81.0 AGE RELATED OSTEOPOROSIS, UNSPECIFIED PATHOLOGICAL FRACTURE PRESENCE: ICD-10-CM

## 2022-06-16 DIAGNOSIS — M35.00 SICCA SYNDROME (HCC): ICD-10-CM

## 2022-06-16 LAB
ANION GAP SERPL CALCULATED.3IONS-SCNC: 13 MMOL/L (ref 3–16)
BUN BLDV-MCNC: 11 MG/DL (ref 7–20)
CALCIUM SERPL-MCNC: 10.5 MG/DL (ref 8.3–10.6)
CHLORIDE BLD-SCNC: 99 MMOL/L (ref 99–110)
CO2: 26 MMOL/L (ref 21–32)
CREAT SERPL-MCNC: 0.7 MG/DL (ref 0.6–1.2)
GFR AFRICAN AMERICAN: >60
GFR NON-AFRICAN AMERICAN: >60
GLUCOSE BLD-MCNC: 110 MG/DL (ref 70–99)
HCT VFR BLD CALC: 39.8 % (ref 36–48)
HEMOGLOBIN: 13.6 G/DL (ref 12–16)
MCH RBC QN AUTO: 27.7 PG (ref 26–34)
MCHC RBC AUTO-ENTMCNC: 34.1 G/DL (ref 31–36)
MCV RBC AUTO: 81.4 FL (ref 80–100)
PDW BLD-RTO: 14.6 % (ref 12.4–15.4)
PLATELET # BLD: 258 K/UL (ref 135–450)
PMV BLD AUTO: 7.1 FL (ref 5–10.5)
POTASSIUM SERPL-SCNC: 4 MMOL/L (ref 3.5–5.1)
RBC # BLD: 4.89 M/UL (ref 4–5.2)
SODIUM BLD-SCNC: 138 MMOL/L (ref 136–145)
WBC # BLD: 5.5 K/UL (ref 4–11)

## 2022-06-16 PROCEDURE — G0439 PPPS, SUBSEQ VISIT: HCPCS | Performed by: NURSE PRACTITIONER

## 2022-06-16 PROCEDURE — 1123F ACP DISCUSS/DSCN MKR DOCD: CPT | Performed by: NURSE PRACTITIONER

## 2022-06-16 ASSESSMENT — LIFESTYLE VARIABLES: HOW OFTEN DO YOU HAVE A DRINK CONTAINING ALCOHOL: NEVER

## 2022-06-16 ASSESSMENT — PATIENT HEALTH QUESTIONNAIRE - PHQ9
SUM OF ALL RESPONSES TO PHQ QUESTIONS 1-9: 2
2. FEELING DOWN, DEPRESSED OR HOPELESS: 1
SUM OF ALL RESPONSES TO PHQ QUESTIONS 1-9: 2
SUM OF ALL RESPONSES TO PHQ QUESTIONS 1-9: 2
SUM OF ALL RESPONSES TO PHQ9 QUESTIONS 1 & 2: 2
1. LITTLE INTEREST OR PLEASURE IN DOING THINGS: 1
SUM OF ALL RESPONSES TO PHQ QUESTIONS 1-9: 2

## 2022-06-16 NOTE — PROGRESS NOTES
Medicare Annual Wellness Visit    Ivana Antunez is here for Medicare AWV and Gas (issues with having a lot of gas)    Assessment & Plan   Medicare annual wellness visit, subsequent  AWV complete  HM reviewed  Primary hypertension/ Sicca syndrome (HCC)/ Anxiety  Chronic, stable, controlled. 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  Recommended screening schedule for the next 5-10 years is provided to the patient in written form: see Patient Instructions/AVS.     Return for Medicare Annual Wellness Visit in 1 year. Subjective   The following acute and/or chronic problems were also addressed today:    AWV today     Has chronic gas pains and belching -using Terviu has helped with the discomfort. Iron def anemia secondary to blood loss - is no longer on iron due to GI upset. Seeing [facility] - hematology appt in August for follow up. Anxiety - has been okay     OA/ DD all over pain - left foot pain has improved since injury but has not resolved. Pain has been persistent and unchanged since last appt with Dr. Mary Ann Tripp. Takes ibuprofen if pain is really bad, uses sparingly. prolia for osteoperosis - has not had injection, unclear why. Patient's complete Health Risk Assessment and screening values have been reviewed and are found in Flowsheets. The following problems were reviewed today and where indicated follow up appointments were made and/or referrals ordered.     Positive Risk Factor Screenings with Interventions:    Fall Risk:  Do you feel unsteady or are you worried about falling? : (!) yes  2 or more falls in past year?: no  Fall with injury in past year?: (!) yes     Fall Risk Interventions:    · Home safety tips provided  · completed PT               General Health and ACP:  General  In general, how would you say your health is?: Fair  In the past 7 days, have you experienced any of the following: New or Increased Pain, New or Increased Fatigue, Loneliness, Social Isolation, Stress or Anger?: (!) Yes  Select all that apply: (!) New or Increased Pain,Stress  Do you get the social and emotional support that you need?: Yes  Do you have a Living Will?: Yes    Advance Directives     Power of  Living Will ACP-Advance Directive ACP-Power of     Not on File Not on File Filed Not on File      General Health Risk Interventions:  · No Living Will: ACP documents already completed- patient asked to provide copy to the office    Health Habits/Nutrition:     Physical Activity: Inactive    Days of Exercise per Week: 0 days    Minutes of Exercise per Session: 0 min     Have you lost any weight without trying in the past 3 months?: (!) Yes  Body mass index: 24.8  Have you seen the dentist within the past year?: N/A - wear dentures    Health Habits/Nutrition Interventions:  · Inadequate physical activity:  patient is not ready to increase his/her physical activity level at this time  · Nutritional issues:  patient is not ready to address his/her nutritional/weight issues at this time    Hearing/Vision:  Do you or your family notice any trouble with your hearing that hasn't been managed with hearing aids?: (!) Yes  Do you have difficulty driving, watching TV, or doing any of your daily activities because of your eyesight?: No  Have you had an eye exam within the past year?: (!) No  No exam data present    Hearing/Vision Interventions:  · Hearing concerns:  has hearing aids  · Vision concerns:  patient encouraged to make appointment with his/her eye specialist Objective   Vitals:    06/16/22 1331   BP: 138/88   Pulse: 98   SpO2: 99%   Weight: 140 lb (63.5 kg)   Height: 5' 3\" (1.6 m)      Body mass index is 24.8 kg/m². Physical Exam  Constitutional:       General: She is not in acute distress. Appearance: Normal appearance. She is not ill-appearing. HENT:      Head: Normocephalic and atraumatic. Cardiovascular:      Rate and Rhythm: Normal rate and regular rhythm. Heart sounds: Normal heart sounds. Pulmonary:      Effort: Pulmonary effort is normal. No respiratory distress. Breath sounds: Normal breath sounds. Skin:     General: Skin is warm and dry. Neurological:      General: No focal deficit present. Mental Status: She is alert and oriented to person, place, and time. Mental status is at baseline. Psychiatric:         Mood and Affect: Mood normal.         Behavior: Behavior normal.           Allergies   Allergen Reactions    Simvastatin Other (See Comments)     Body aches     Prior to Visit Medications    Medication Sig Taking?  Authorizing Provider   Probiotic Product ([address]) Take by mouth Yes Historical Provider, MD   diclofenac sodium (VOLTAREN) 1 % GEL Apply 4 g topically 4 times daily Yes ROSE Garcia CNP   olmesartan-hydroCHLOROthiazide (BENICAR HCT) 40-12.5 MG per tablet TAKE 1 TABLET BY MOUTH DAILY Yes Trey Lee MD   ferrous sulfate (IRON 325) 325 (65 Fe) MG tablet Take 1 tablet by mouth 2 times daily Yes ROSE Garcia CNP   ibuprofen (ADVIL;MOTRIN) 400 MG tablet Take 400 mg by mouth every 6 hours as needed for Pain Yes Historical Provider, MD   IodineArlene, (KELP PO) Take by mouth daily Yes Historical Provider, MD   TURMERIC PO Take by mouth daily  Yes Historical Provider, MD   Famotidine-Ca Carb-Mag Hydrox (PEPCID COMPLETE PO) Take 1 tablet by mouth nightly Yes Historical Provider, MD   esomeprazole (NEXIUM) 40 MG delayed release capsule Take 40 mg by mouth every morning (before breakfast) Yes Historical Provider, MD   Coenzyme Q10 (COQ-10) 100 MG CPCR Take 1 capsule by mouth daily Yes Historical Provider, MD   cetirizine (ZYRTEC) 10 MG tablet Take 10 mg by mouth as needed  Yes Historical Provider, MD   Flaxseed, Linseed, (FLAX SEED OIL) 1000 MG CAPS Take 1,200 mg by mouth daily.    Yes Historical Provider, MD   denosumab (PROLIA) 60 MG/ML SOSY SC injection Inject 1 mL into the skin once for 1 dose  ROSE Rich CNP   fluticasone-vilanterol (BREO ELLIPTA) 100-25 MCG/INH AEPB inhaler Inhale 1 puff into the lungs daily  Patient not taking: Reported on 8/3/2021  Addis Field MD   albuterol sulfate HFA (VENTOLIN HFA) 108 (90 Base) MCG/ACT inhaler Inhale 1 puff into the lungs 4 times daily as needed for Wheezing or Shortness of Breath  Patient not taking: Reported on 8/3/2021  ROSE Rich CNP       CareTeam (Including outside providers/suppliers regularly involved in providing care):   Patient Care Team:  ROSE Rich CNP as PCP - General (Certified Nurse Practitioner)  ROSE Rich CNP as PCP - [facility] Empaneled Provider  Siomara Driscoll MD as Consulting Physician (Otolaryngology)  Alok Barnett MD as Consulting Physician (Internal Medicine)     Reviewed and updated this visit:  Allergies  Meds           Electronically signed by Monique Sicard, APRN - CNP on 6/16/2022 at 3:04 PM

## 2022-06-16 NOTE — PATIENT INSTRUCTIONS
Personalized Preventive Plan for Debi Fearing - 6/16/2022  Medicare offers a range of preventive health benefits. Some of the tests and screenings are paid in full while other may be subject to a deductible, co-insurance, and/or copay. Some of these benefits include a comprehensive review of your medical history including lifestyle, illnesses that may run in your family, and various assessments and screenings as appropriate. After reviewing your medical record and screening and assessments performed today your provider may have ordered immunizations, labs, imaging, and/or referrals for you. A list of these orders (if applicable) as well as your Preventive Care list are included within your After Visit Summary for your review. Other Preventive Recommendations:    · A preventive eye exam performed by an eye specialist is recommended every 1-2 years to screen for glaucoma; cataracts, macular degeneration, and other eye disorders. · A preventive dental visit is recommended every 6 months. · Try to get at least 150 minutes of exercise per week or 10,000 steps per day on a pedometer . · Order or download the FREE \"Exercise & Physical Activity: Your Everyday Guide\" from The "[employer]. Call 2-924.287.8017 or search The [employer] online. · You need 6931-1440 mg of calcium and 4736-0877 IU of vitamin D per day. It is possible to meet your calcium requirement with diet alone, but a vitamin D supplement is usually necessary to meet this goal.  · When exposed to the sun, use a sunscreen that protects against both UVA and UVB radiation with an SPF of 30 or greater. Reapply every 2 to 3 hours or after sweating, drying off with a towel, or swimming. · Always wear a seat belt when traveling in a car. Always wear a helmet when riding a bicycle or motorcycle.

## 2022-07-05 ENCOUNTER — OFFICE VISIT (OUTPATIENT)
Dept: INTERNAL MEDICINE CLINIC | Age: 87
End: 2022-07-05
Payer: COMMERCIAL

## 2022-07-05 VITALS
SYSTOLIC BLOOD PRESSURE: 150 MMHG | BODY MASS INDEX: 25.05 KG/M2 | WEIGHT: 141.4 LBS | OXYGEN SATURATION: 99 % | DIASTOLIC BLOOD PRESSURE: 100 MMHG | HEART RATE: 98 BPM | HEIGHT: 63 IN

## 2022-07-05 DIAGNOSIS — M70.72 BURSITIS OF LEFT HIP, UNSPECIFIED BURSA: Primary | ICD-10-CM

## 2022-07-05 DIAGNOSIS — I10 PRIMARY HYPERTENSION: Chronic | ICD-10-CM

## 2022-07-05 DIAGNOSIS — S33.6XXA SACROILIAC SPRAIN, INITIAL ENCOUNTER: ICD-10-CM

## 2022-07-05 DIAGNOSIS — Z85.060 PERSONAL HISTORY OF MALIGNANT CARCINOID TUMOR OF SMALL INTESTINE: ICD-10-CM

## 2022-07-05 PROCEDURE — 99214 OFFICE O/P EST MOD 30 MIN: CPT | Performed by: INTERNAL MEDICINE

## 2022-07-05 PROCEDURE — 1123F ACP DISCUSS/DSCN MKR DOCD: CPT | Performed by: INTERNAL MEDICINE

## 2022-07-05 RX ORDER — TIZANIDINE 2 MG/1
2 TABLET ORAL EVERY 6 HOURS PRN
Qty: 30 TABLET | Refills: 0 | Status: SHIPPED | OUTPATIENT
Start: 2022-07-05

## 2022-07-05 RX ORDER — METHYLPREDNISOLONE 4 MG/1
TABLET ORAL
Qty: 1 KIT | Refills: 0 | Status: SHIPPED | OUTPATIENT
Start: 2022-07-05 | End: 2022-07-11

## 2022-07-05 ASSESSMENT — ENCOUNTER SYMPTOMS
BACK PAIN: 1
CHEST TIGHTNESS: 0

## 2022-07-05 NOTE — ASSESSMENT & PLAN NOTE
Same as above, symptoms appear to be triggered by recent yard work, if no relief with current measures refer to orthopedic surgery and may also require physical therapy once acute pain subsides

## 2022-07-05 NOTE — ASSESSMENT & PLAN NOTE
As stated above she is scheduled to follow-up in August with oncology, used to see Dr. Nicki Holliday who left and she is scheduled to see new provider in near future.   Recent imaging negative and not showing any tumors however patient is reporting blood test showed tumor activity but unable to find any records in epic regarding the blood tests

## 2022-07-05 NOTE — PROGRESS NOTES
ASSESSMENT/PLAN:  1. Bursitis of left hip, unspecified bursa  Assessment & Plan:   Advised patient will do Medrol pack and as needed Zanaflex, can continue heating pad and topical diclofenac gel, if still no significant relief will need to see orthopedic surgery for intra-articular steroid injection. Advised to continue PPI therapy with famotidine and take Medrol with food given history of ongoing acid reflux symptoms and past history of GI bleed  Orders:  -     methylPREDNISolone (MEDROL DOSEPACK) 4 MG tablet; Take by mouth., Disp-1 kit, R-0Normal  -     tiZANidine (ZANAFLEX) 2 MG tablet; Take 1 tablet by mouth every 6 hours as needed (back pain), Disp-30 tablet, R-0Normal  2. Sacroiliac sprain, initial encounter  Assessment & Plan:   Same as above, symptoms appear to be triggered by recent yard work, if no relief with current measures refer to orthopedic surgery and may also require physical therapy once acute pain subsides  Orders:  -     methylPREDNISolone (MEDROL DOSEPACK) 4 MG tablet; Take by mouth., Disp-1 kit, R-0Normal  -     tiZANidine (ZANAFLEX) 2 MG tablet; Take 1 tablet by mouth every 6 hours as needed (back pain), Disp-30 tablet, R-0Normal  3. Primary hypertension  Assessment & Plan:   Blood pressure rechecked twice and continues to be significantly elevated not sure if secondary to ongoing pain, advised patient continue current meds, continue monitoring blood pressure and follow-up in 2 weeks for blood pressure check, she does have history of carcinoid tumor with recent activity although imaging was negative however this may also be contributing to blood pressure elevation. She is scheduled  to follow-up with her specialist next month  4. Personal history of malignant carcinoid tumor of small intestine  Assessment & Plan:   As stated above she is scheduled to follow-up in August with oncology, used to see Dr. Sha Watson who left and she is scheduled to see new provider in near future.   Recent imaging negative and not showing any tumors however patient is reporting blood test showed tumor activity but unable to find any records in epic regarding the blood tests      Return in about 2 weeks (around 7/19/2022) for with Brian Caruso. SUBJECTIVE  HPI:   Patient seen for acute visit complaining of back and hip pain for the past 4 days. States she did yard work earlier last week which caused some lower back discomfort however since Friday pain has becoming more intense and bothering her when walking or standing. She does have known history of arthritis and osteoporosis    Hip Pain   The incident occurred 5 to 7 days ago. The injury mechanism is unknown. The pain is present in the left hip and left thigh. The quality of the pain is described as stabbing and aching. The pain is at a severity of 8/10. The pain is severe. The pain has been fluctuating since onset. Pertinent negatives include no inability to bear weight, loss of motion, loss of sensation, muscle weakness, numbness or tingling. She reports no foreign bodies present. The symptoms are aggravated by movement and weight bearing. She has tried NSAIDs, heat and immobilization for the symptoms. The treatment provided mild relief. Review of Systems   Constitutional: Negative for activity change and appetite change. HENT: Positive for hearing loss. Respiratory: Negative for chest tightness. Musculoskeletal: Positive for arthralgias, back pain and gait problem. Neurological: Negative for tingling and numbness. OBJECTIVE:    BP (!) 150/100   Pulse 98   Ht 5' 3\" (1.6 m)   Wt 141 lb 6.4 oz (64.1 kg)   SpO2 99%   BMI 25.05 kg/m²    Physical Exam  Constitutional:       General: She is not in acute distress. Appearance: She is normal weight. HENT:      Head: Normocephalic. Pulmonary:      Effort: Pulmonary effort is normal. No respiratory distress. Abdominal:      Palpations: Abdomen is soft.    Musculoskeletal:         General: Tenderness present. No swelling, deformity or signs of injury. Cervical back: Neck supple. Right lower leg: No edema. Left lower leg: No edema. Neurological:      General: No focal deficit present. Mental Status: She is alert. Cranial Nerves: No cranial nerve deficit. Psychiatric:         Mood and Affect: Mood normal.         Behavior: Behavior normal.           Electronically signed by Salas Felipe MD on 7/5/2022 at 2:22 PM.    This dictation was generated by voice recognition computer software. Although all attempts are made to edit the dictation for accuracy, there may be errors in the transcription that are not intended.

## 2022-07-05 NOTE — ASSESSMENT & PLAN NOTE
Blood pressure rechecked twice and continues to be significantly elevated not sure if secondary to ongoing pain, advised patient continue current meds, continue monitoring blood pressure and follow-up in 2 weeks for blood pressure check, she does have history of carcinoid tumor with recent activity although imaging was negative however this may also be contributing to blood pressure elevation.   She is scheduled  to follow-up with her specialist next month

## 2022-07-06 ENCOUNTER — TELEPHONE (OUTPATIENT)
Dept: INTERNAL MEDICINE CLINIC | Age: 87
End: 2022-07-06

## 2022-07-06 NOTE — TELEPHONE ENCOUNTER
Pts. Prolia approval is scanned in. I called the pt. And she said she does not want to do the Prolia. She will not get it.

## 2022-07-09 DIAGNOSIS — S33.6XXA SACROILIAC SPRAIN, INITIAL ENCOUNTER: ICD-10-CM

## 2022-07-09 DIAGNOSIS — M70.72 BURSITIS OF LEFT HIP, UNSPECIFIED BURSA: ICD-10-CM

## 2022-07-11 RX ORDER — TIZANIDINE 2 MG/1
TABLET ORAL
Qty: 30 TABLET | Refills: 0 | OUTPATIENT
Start: 2022-07-11

## 2022-07-15 ENCOUNTER — APPOINTMENT (OUTPATIENT)
Dept: CT IMAGING | Age: 87
End: 2022-07-15
Payer: COMMERCIAL

## 2022-07-15 ENCOUNTER — HOSPITAL ENCOUNTER (EMERGENCY)
Age: 87
Discharge: HOME | End: 2022-07-15
Payer: COMMERCIAL

## 2022-07-15 VITALS
TEMPERATURE: 98.3 F | DIASTOLIC BLOOD PRESSURE: 81 MMHG | RESPIRATION RATE: 21 BRPM | WEIGHT: 140 LBS | HEIGHT: 63 IN | BODY MASS INDEX: 24.8 KG/M2 | SYSTOLIC BLOOD PRESSURE: 158 MMHG | HEART RATE: 92 BPM | OXYGEN SATURATION: 100 %

## 2022-07-15 DIAGNOSIS — S09.90XA CLOSED HEAD INJURY, INITIAL ENCOUNTER: Primary | ICD-10-CM

## 2022-07-15 DIAGNOSIS — S00.03XA HEMATOMA OF SCALP, INITIAL ENCOUNTER: ICD-10-CM

## 2022-07-15 PROCEDURE — 72125 CT NECK SPINE W/O DYE: CPT

## 2022-07-15 PROCEDURE — 70450 CT HEAD/BRAIN W/O DYE: CPT

## 2022-07-15 PROCEDURE — 6370000000 HC RX 637 (ALT 250 FOR IP): Performed by: PHYSICIAN ASSISTANT

## 2022-07-15 PROCEDURE — 99284 EMERGENCY DEPT VISIT MOD MDM: CPT

## 2022-07-15 RX ORDER — ACETAMINOPHEN 500 MG
1000 TABLET ORAL ONCE
Status: COMPLETED | OUTPATIENT
Start: 2022-07-15 | End: 2022-07-15

## 2022-07-15 RX ADMIN — ACETAMINOPHEN 1000 MG: 500 TABLET ORAL at 20:11

## 2022-07-15 ASSESSMENT — ENCOUNTER SYMPTOMS
ABDOMINAL PAIN: 0
VOMITING: 0
RHINORRHEA: 0
DIARRHEA: 0
NAUSEA: 0
SHORTNESS OF BREATH: 0
COUGH: 0

## 2022-07-15 ASSESSMENT — PAIN SCALES - GENERAL: PAINLEVEL_OUTOF10: 8

## 2022-07-16 NOTE — ED PROVIDER NOTES
(2-9 systems for level 4, 10 or more for level 5)     Review of Systems   Constitutional:  Negative for activity change, appetite change, chills and fever. HENT:  Negative for congestion and rhinorrhea. Eyes:  Negative for visual disturbance. Respiratory:  Negative for cough and shortness of breath. Cardiovascular:  Negative for chest pain. Gastrointestinal:  Negative for abdominal pain, diarrhea, nausea and vomiting. Genitourinary:  Negative for difficulty urinating, dysuria and hematuria. Neurological:  Negative for weakness, numbness and headaches. Positives and Pertinent negatives as per HPI. Except as noted above in the ROS, all other systems were reviewed and negative.        PAST MEDICAL HISTORY     Past Medical History:   Diagnosis Date    Anxiety 2/24/2022    Bleeding ulcer     Cancer ([address]     melanoma L IRIS    Gastroesophagitis     GERD (gastroesophageal reflux disease)     Hyperlipidemia     Hypertension     Moderate persistent asthma 2/12/2021    Pneumonia     Rheumatic fever with heart involvement     Trigger ring finger of right hand 7/1/2013    Unspecified diseases of blood and blood-forming organs     chronic anemia         SURGICAL HISTORY     Past Surgical History:   Procedure Laterality Date    BLADDER REPAIR N/A 2002    BREAST SURGERY      L breast tumor-benign    CYST REMOVAL Right 8-1-13    GANGLION CYST EXCISION RIGHT WRIST, TRIGGER FINGER RELEASE RIGHT FOURTH    EYE SURGERY      , cataract right eye    FRACTURE SURGERY      R shoulderx2-pinned    HYSTERECTOMY      JOINT REPLACEMENT Right 03/28/2012    Total Elbow - Dr. Chris Lai REMOVAL  2013    carcinoid    UPPER GASTROINTESTINAL ENDOSCOPY  3/7/12    with bx    UPPER GASTROINTESTINAL ENDOSCOPY  11/8/13    EUS    UPPER GASTROINTESTINAL ENDOSCOPY  4/24/14    UPPER GASTROINTESTINAL ENDOSCOPY  3/17/16    push enteroscopy with ablation    UPPER GASTROINTESTINAL ENDOSCOPY  06/29/2017    UPPER GASTROINTESTINAL ENDOSCOPY N/A 8/11/2021    EGD ULTRASOUND OF STOMACH performed by Sonia Hinds MD at [id number]       Discharge Medication List as of 7/15/2022  8:18 PM        CONTINUE these medications which have NOT CHANGED    Details   tiZANidine (ZANAFLEX) 2 MG tablet Take 1 tablet by mouth every 6 hours as needed (back pain), Disp-30 tablet, R-0Normal      Probiotic Product ([address]) Take by mouthHistorical Med      diclofenac sodium (VOLTAREN) 1 % GEL Apply 4 g topically 4 times daily, Topical, 4 TIMES DAILY Starting Thu 6/16/2022, Disp-50 g, R-0, Normal      olmesartan-hydroCHLOROthiazide (BENICAR HCT) 40-12.5 MG per tablet TAKE 1 TABLET BY MOUTH DAILY, Disp-90 tablet, R-3Normal      ferrous sulfate (IRON 325) 325 (65 Fe) MG tablet Take 1 tablet by mouth 2 times daily, Disp-60 tablet, R-5Normal      ibuprofen (ADVIL;MOTRIN) 400 MG tablet Take 400 mg by mouth every 6 hours as needed for PainHistorical Med      Iodine, Kelp, (KELP PO) Take by mouth dailyHistorical Med      TURMERIC PO Take by mouth daily Historical Med      Famotidine-Ca Carb-Mag Hydrox (PEPCID COMPLETE PO) Take 1 tablet by mouth nightlyHistorical Med      esomeprazole (NEXIUM) 40 MG delayed release capsule Take 40 mg by mouth every morning (before breakfast)Historical Med      Coenzyme Q10 (COQ-10) 100 MG CPCR Take 1 capsule by mouth dailyHistorical Med      cetirizine (ZYRTEC) 10 MG tablet Take 10 mg by mouth as needed Historical Med      Flaxseed, Linseed, (FLAX SEED OIL) 1000 MG CAPS Take 1,200 mg by mouth daily.                  ALLERGIES     Simvastatin    FAMILYHISTORY       Family History   Problem Relation Age of Onset    Heart Disease Mother     High Blood Pressure Mother     High Cholesterol Mother     Heart Disease Father     High Blood Pressure Father     High Cholesterol Father     Cancer Sister     Stroke Sister     High Blood range of motion and neck supple. Comments: There is no midline spinal tenderness   Skin:     General: Skin is warm and dry. Neurological:      General: No focal deficit present. Mental Status: She is alert and oriented to person, place, and time. Psychiatric:         Behavior: Behavior normal.       DIAGNOSTIC RESULTS   LABS:    Labs Reviewed - No data to display    When ordered only abnormal lab results are displayed. All other labs were within normal range or not returned as of this dictation. EKG: When ordered, EKG's are interpreted by the Emergency Department Physician in the absence of a cardiologist.  Please see their note for interpretation of EKG. RADIOLOGY:   Non-plain film images such as CT, Ultrasound and MRI are read by the radiologist. Plain radiographic images are visualized and preliminarily interpreted by the ED Provider with the below findings:        Interpretation per the Radiologist below, if available at the time of this note:    CT CERVICAL SPINE WO CONTRAST   Final Result   No acute abnormality of the cervical spine. CT HEAD WO CONTRAST   Final Result   No acute intracranial abnormality. RECOMMENDATIONS:   Unavailable           CT HEAD WO CONTRAST    Result Date: 7/15/2022  EXAMINATION: CT OF THE HEAD WITHOUT CONTRAST  7/15/2022 7:18 pm TECHNIQUE: CT of the head was performed without the administration of intravenous contrast. Automated exposure control, iterative reconstruction, and/or weight based adjustment of the mA/kV was utilized to reduce the radiation dose to as low as reasonably achievable. COMPARISON: 05/27/2021 HISTORY: ORDERING SYSTEM PROVIDED HISTORY: Diane Higgins TECHNOLOGIST PROVIDED HISTORY: Reason for exam:->chi, ro ich Has a \"code stroke\" or \"stroke alert\" been called? ->No Decision Support Exception - unselect if not a suspected or confirmed emergency medical condition->Emergency Medical Condition (MA) Reason for Exam: CHI, ro ICH.  FINDINGS: BRAIN/VENTRICLES: There is no acute intracranial hemorrhage, mass effect or midline shift. No abnormal extra-axial fluid collection. The gray-white differentiation is maintained without evidence of an acute infarct. There is no evidence of hydrocephalus. Mild chronic microvascular ischemic change. ORBITS: The visualized portion of the orbits demonstrate no acute abnormality. SINUSES: The visualized paranasal sinuses and mastoid air cells demonstrate no acute abnormality. SOFT TISSUES/SKULL:  No acute abnormality of the visualized skull or soft tissues. No acute intracranial abnormality. RECOMMENDATIONS: Unavailable     CT CERVICAL SPINE WO CONTRAST    Result Date: 7/15/2022  EXAMINATION: CT OF THE CERVICAL SPINE WITHOUT CONTRAST 7/15/2022 7:18 pm TECHNIQUE: CT of the cervical spine was performed without the administration of intravenous contrast. Multiplanar reformatted images are provided for review. Automated exposure control, iterative reconstruction, and/or weight based adjustment of the mA/kV was utilized to reduce the radiation dose to as low as reasonably achievable. COMPARISON: CT cervical spine 05/27/2021. HISTORY: ORDERING SYSTEM PROVIDED HISTORY: trauma, ro fx TECHNOLOGIST PROVIDED HISTORY: Reason for exam:->trauma, ro fx Decision Support Exception - unselect if not a suspected or confirmed emergency medical condition->Emergency Medical Condition (MA) Reason for Exam: Trauma, ro fx. FINDINGS: BONES/ALIGNMENT: The craniocervical and atlantoaxial junctions are intact. Normal alignment is maintained. Mild chronic T1 and T2 superior endplate compression fractures without significant change. The remaining vertebral body heights are preserved. No fracture or other acute osseous abnormality. DEGENERATIVE CHANGES: Multilevel degenerative changes throughout the cervical spine. SOFT TISSUES: There is no prevertebral soft tissue swelling. No acute abnormality of the cervical spine.            PROCEDURES Unless otherwise noted below, none     Procedures    CRITICAL CARE TIME       CONSULTS:  None      EMERGENCY DEPARTMENT COURSE and DIFFERENTIAL DIAGNOSIS/MDM:   Vitals:    Vitals:    07/15/22 1948 07/15/22 1951   BP:  (!) 158/81   Pulse: 92    Resp: 21    Temp: 98.3 °F (36.8 °C)    TempSrc: Oral    SpO2: 100%    Weight: 140 lb (63.5 kg)    Height: 5' 3\" (1.6 m)        Patient was given the following medications:  Medications   acetaminophen (TYLENOL) tablet 1,000 mg (1,000 mg Oral Given 7/15/22 2011)         Is this patient to be included in the SEP-1 Core Measure due to severe sepsis or septic shock? No   Exclusion criteria - the patient is NOT to be included for SEP-1 Core Measure due to: Infection is not suspected    Briefly, this is a 80-year-old female who presents to the emergency department today for evaluation for a fall and closed head injury after tripping, and falling wall vacuuming. No loss of consciousness. No vomiting. On examination she does have a hematoma noted to the left occipital scalp, there are no neurological deficits. CT of head and cervical spine are negative. Recommended supportive care discussed at home. She is routine follow-up with her primary care physician within 1 week. She is to return to the ED for any new or worsening symptoms, the patient was understanding is agreeable with plan. Stable for discharge. No results found for this visit on 07/15/22. I estimate there is LOW risk for SUBARACHNOID HEMORRHAGE, MENINGITIS, INTRACRANIAL HEMORRHAGE, SUBDURAL HEMATOMA, OR STROKE, thus I consider the discharge disposition reasonable. Mariama Campuzano and I have discussed the diagnosis and risks, and we agree with discharging home to follow-up with their primary doctor. We also discussed returning to the Emergency Department immediately if new or worsening symptoms occur.  We have discussed the symptoms which are most concerning (e.g., changing or worsening pain, weakness, vomiting, fever) that necessitate immediate return. FINAL IMPRESSION      1. Closed head injury, initial encounter    2.  Hematoma of scalp, initial encounter          DISPOSITION/PLAN   DISPOSITION Decision To Discharge 07/15/2022 08:13:48 PM      PATIENT REFERRED TO:  ROSE Pinedo - CNP  [address] [facility]  [phone]    Schedule an appointment as soon as possible for a visit in 3 days      [facility] Emergency Department  [address] [place] [address]  [phone]    As needed, If symptoms worsen      DISCHARGE MEDICATIONS:  Discharge Medication List as of 7/15/2022  8:18 PM          DISCONTINUED MEDICATIONS:  Discharge Medication List as of 7/15/2022  8:18 PM                 (Please note that portions of this note were completed with a voice recognition program.  Efforts were made to edit the dictations but occasionally words are mis-transcribed.)    Maurisio Mclaughlin PA-C (electronically signed)            Maurisio Mclaughlin PA-C  07/15/22 2028

## 2022-07-16 NOTE — ED NOTES
Discharge and education instructions reviewed. Patient verbalized understanding, teach-back successful. Patient denied questions at this time. No acute distress noted. Patient instructed to follow-up as noted - return to emergency department if symptoms worsen. Patient verbalized understanding. Discharged per EDMD with discharged instructions.      Luc Walsh RN  07/15/22 2018

## 2022-07-19 ENCOUNTER — OFFICE VISIT (OUTPATIENT)
Dept: INTERNAL MEDICINE CLINIC | Age: 87
End: 2022-07-19
Payer: COMMERCIAL

## 2022-07-19 VITALS
WEIGHT: 141.6 LBS | BODY MASS INDEX: 25.08 KG/M2 | DIASTOLIC BLOOD PRESSURE: 80 MMHG | SYSTOLIC BLOOD PRESSURE: 170 MMHG | HEART RATE: 100 BPM | OXYGEN SATURATION: 100 %
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
## 2022-07-19 DIAGNOSIS — I10 PRIMARY HYPERTENSION: Chronic | ICD-10-CM

## 2022-07-19 DIAGNOSIS — R07.82 INTERCOSTAL PAIN: ICD-10-CM

## 2022-07-19 LAB
BILIRUBIN, POC: NORMAL
BLOOD URINE, POC: NORMAL
CLARITY, POC: CLEAR
COLOR, POC: YELLOW
GLUCOSE URINE, POC: NORMAL
KETONES, POC: NORMAL
LEUKOCYTE EST, POC: NORMAL
NITRITE, POC: NORMAL
PH, POC: 7
PROTEIN, POC: NORMAL
SPECIFIC GRAVITY, POC: 1.02
UROBILINOGEN, POC: 0.2

## 2022-07-19 PROCEDURE — 99214 OFFICE O/P EST MOD 30 MIN: CPT | Performed by: INTERNAL MEDICINE

## 2022-07-19 PROCEDURE — 1123F ACP DISCUSS/DSCN MKR DOCD: CPT | Performed by: INTERNAL MEDICINE

## 2022-07-19 PROCEDURE — 81002 URINALYSIS NONAUTO W/O SCOPE: CPT | Performed by: INTERNAL MEDICINE

## 2022-07-19 ASSESSMENT — ENCOUNTER SYMPTOMS
ABDOMINAL PAIN: 0
SHORTNESS OF BREATH: 0
VISUAL CHANGE: 0
VOMITING: 0
BOWEL INCONTINENCE: 0

## 2022-07-19 NOTE — PROGRESS NOTES
Ne Moreno (:  1934) is a 80 y.o. female,New patient, here for evaluation of the following chief complaint(s):  Fall (C/o sternal discomfort, hit back of head -CT. neck stiff, head sore.) and Chest Pain (Notes 5/10 for ernesto)         ASSESSMENT/PLAN:  1. Fall, initial encounter  -     POCT Urinalysis no Micro  2. Intercostal pain  3. Primary hypertension  At this point patient examination is revealing that she has a large hematoma in the posterior aspect of her scalp with tracking of the blood to the upper parts of the neck the patient is having some tenderness there is reviewing CAT scan of her head and cervical spine did not show any abnormalities but interestingly did not also report the soft tissue swelling or hematoma    Discussed with patient the natural progression of this and how to monitor for any change in symptomatology. The patient is having a very localized area of the costochondral junction that probably is traumatized with the transfer some of the trauma to the front we will get a utilize heat packs on that and just monitor clinically    Patient blood pressure is elevated after repeating it when she is rested the patient blood pressure still had an elevated range of 164/80 I think the patient is extremely anxious today and we will going to have her check it at home and reevaluated  When she is in the office  No follow-ups on file.          Subjective   SUBJECTIVE/OBJECTIVE:    Lab Review   Lab Results   Component Value Date/Time     2022 02:26 PM     05/10/2021 03:54 PM     2020 11:27 AM    K 4.0 2022 02:26 PM    K 4.2 05/10/2021 03:54 PM    K 3.6 2020 11:27 AM    K 3.0 2019 05:07 AM    CO2 26 2022 02:26 PM    CO2 26 05/10/2021 03:54 PM    CO2 27 2020 11:27 AM    BUN 11 2022 02:26 PM    BUN 14 05/10/2021 03:54 PM    BUN 12 2020 11:27 AM    CREATININE 0.7 2022 02:26 PM    CREATININE 0.7 05/10/2021 03:54 PM CREATININE 0.8 12/07/2020 11:27 AM    GLUCOSE 110 06/16/2022 02:26 PM    GLUCOSE 115 05/10/2021 03:54 PM    GLUCOSE 116 12/07/2020 11:27 AM    CALCIUM 10.5 06/16/2022 02:26 PM    CALCIUM 10.4 05/10/2021 03:54 PM    CALCIUM 10.3 12/07/2020 11:27 AM     Lab Results   Component Value Date/Time    WBC 5.5 06/16/2022 02:26 PM    WBC 6.4 05/10/2021 03:54 PM    WBC 6.0 06/20/2019 05:06 AM    HGB 13.6 06/16/2022 02:26 PM    HGB 10.8 05/10/2021 03:54 PM    HGB 13.4 06/20/2019 05:06 AM    HCT 39.8 06/16/2022 02:26 PM    HCT 32.8 05/10/2021 03:54 PM    HCT 38.8 06/20/2019 05:06 AM    MCV 81.4 06/16/2022 02:26 PM    MCV 70.6 05/10/2021 03:54 PM    MCV 81.9 06/20/2019 05:06 AM     06/16/2022 02:26 PM     05/10/2021 03:54 PM     06/20/2019 05:06 AM     Lab Results   Component Value Date/Time    CHOL 246 06/19/2019 05:07 AM    CHOL 143 07/18/2013 10:30 AM    CHOL 178 01/11/2013 10:05 AM    TRIG 76 06/19/2019 05:07 AM    TRIG 137 07/18/2013 10:30 AM    TRIG 146 01/11/2013 10:05 AM    HDL 64 06/19/2019 05:07 AM    HDL 46 07/18/2013 10:30 AM    HDL 61 01/11/2013 10:05 AM    HDL 60 03/07/2012 08:15 AM       Vitals 7/19/2022 7/19/2022 4/56/3821   SYSTOLIC 850 872 556   DIASTOLIC 80 84 81   Site Left Upper Arm - -   Position Sitting - -   Cuff Size Medium Adult - -   Pulse - 100 92   Temp - - 98.3   Resp - - 21   SpO2 - 100 100   Weight - 141 lb 9.6 oz 140 lb   Height - - 5' 3\"   Body mass index - - 24.8 kg/m2   Pain Level - - 8   Some recent data might be hidden       Fall  The accident occurred 5 to 7 days ago. She landed on [facility]. There was no blood loss. The point of impact was the head. The pain is present in the head. Pertinent negatives include no abdominal pain, bowel incontinence, fever, headaches, hearing loss, hematuria, numbness, tingling, visual change or vomiting. Chest Pain   This is a new problem. The current episode started in the past 7 days. The onset quality is sudden.  The problem occurs constantly. The pain is present in the lateral region. The quality of the pain is described as sharp. Pertinent negatives include no abdominal pain, fever, headaches, numbness, PND, shortness of breath or vomiting. Hypertension  This is a chronic problem. The current episode started more than 1 year ago. The problem is unchanged. The problem is uncontrolled. Associated symptoms include chest pain. Pertinent negatives include no headaches, peripheral edema, PND or shortness of breath. Review of Systems   Constitutional:  Negative for fever. Respiratory:  Negative for shortness of breath. Cardiovascular:  Positive for chest pain. Negative for PND. Gastrointestinal:  Negative for abdominal pain, bowel incontinence and vomiting. Genitourinary:  Negative for hematuria. Neurological:  Negative for tingling, numbness and headaches. Objective   Physical Exam  Vitals and nursing note reviewed. Constitutional:       General: She is not in acute distress. Appearance: Normal appearance. HENT:      Head: Normocephalic and atraumatic. Right Ear: Tympanic membrane normal.      Left Ear: Tympanic membrane normal.      Nose: Nose normal.   Eyes:      Extraocular Movements: Extraocular movements intact. Conjunctiva/sclera: Conjunctivae normal.      Pupils: Pupils are equal, round, and reactive to light. Neck:      Vascular: No carotid bruit. Cardiovascular:      Rate and Rhythm: Normal rate and regular rhythm. Pulses: Normal pulses. Heart sounds: No murmur heard. Pulmonary:      Effort: Pulmonary effort is normal. No respiratory distress. Breath sounds: Normal breath sounds. Chest:      Chest wall: Tenderness present. Abdominal:      General: Abdomen is flat. Bowel sounds are normal. There is no distension. Palpations: Abdomen is soft. Tenderness: There is no abdominal tenderness. Musculoskeletal:         General: No swelling, tenderness or deformity. Cervical back: Normal range of motion and neck supple. No rigidity or tenderness. Right lower leg: No edema. Left lower leg: No edema. Lymphadenopathy:      Cervical: No cervical adenopathy. Skin:     Coloration: Skin is not jaundiced. Findings: No bruising, erythema or lesion. Neurological:      General: No focal deficit present. Mental Status: She is alert and oriented to person, place, and time. Cranial Nerves: No cranial nerve deficit. Motor: No weakness. Gait: Gait normal.          This dictation was generated by voice recognition computer software. Although all attempts are made to edit the dictation for accuracy, there may be errors in the transcription that are not intended. An electronic signature was used to authenticate this note.     --Carol Ann Shaw MD

## 2022-07-25 ENCOUNTER — OFFICE VISIT (OUTPATIENT)
Dept: INTERNAL MEDICINE CLINIC | Age: 87
End: 2022-07-25
Payer: COMMERCIAL

## 2022-07-25 VITALS
OXYGEN SATURATION: 99 % | BODY MASS INDEX: 25.51 KG/M2 | WEIGHT: 144 LBS | DIASTOLIC BLOOD PRESSURE: 80 MMHG | SYSTOLIC BLOOD PRESSURE: 168 MMHG | HEART RATE: 85 BPM

## 2022-07-25 DIAGNOSIS — M81.0 AGE RELATED OSTEOPOROSIS, UNSPECIFIED PATHOLOGICAL FRACTURE PRESENCE: ICD-10-CM

## 2022-07-25 DIAGNOSIS — Z91.81 AT HIGH RISK FOR FALLS: Primary | ICD-10-CM

## 2022-07-25 DIAGNOSIS — I10 PRIMARY HYPERTENSION: ICD-10-CM

## 2022-07-25 PROCEDURE — 1123F ACP DISCUSS/DSCN MKR DOCD: CPT | Performed by: NURSE PRACTITIONER

## 2022-07-25 PROCEDURE — 99214 OFFICE O/P EST MOD 30 MIN: CPT | Performed by: NURSE PRACTITIONER

## 2022-07-25 NOTE — PROGRESS NOTES
7/25/22     Chief Complaint   Patient presents with    Hypertension     2 week follow up. Running mid to high 130s over mid 70s at home with her automatic arm cuff     HPI    HTN follow up - she is on omesartan- HCTZ 40/12.5 daily. It was elevated in the ED following a mechanical fall and during ED FU appt with Dr. Luis M Donaldson a few weeks ago. Pt denies any CP, palpitations, SOB, dizziness. Her past few home BP readings have been okay:   7/20 142/74   7/21 131/72  7/25 136/78    PA previously complete for prolia, however pt is now declining prolia due to concern for s/e. Allergies   Allergen Reactions    Simvastatin Other (See Comments)     Body aches     Current Outpatient Medications   Medication Sig Dispense Refill    olmesartan-hydroCHLOROthiazide (BENICAR HCT) 40-12.5 MG per tablet TAKE 1 TABLET BY MOUTH DAILY 90 tablet 3    ferrous sulfate (IRON 325) 325 (65 Fe) MG tablet Take 1 tablet by mouth 2 times daily 60 tablet 5    ibuprofen (ADVIL;MOTRIN) 400 MG tablet Take 400 mg by mouth every 6 hours as needed for Pain      TURMERIC PO Take by mouth daily       esomeprazole (NEXIUM) 40 MG delayed release capsule Take 40 mg by mouth every morning (before breakfast)      Coenzyme Q10 (COQ-10) 100 MG CPCR Take 1 capsule by mouth daily      cetirizine (ZYRTEC) 10 MG tablet Take 10 mg by mouth as needed       tiZANidine (ZANAFLEX) 2 MG tablet Take 1 tablet by mouth every 6 hours as needed (back pain) (Patient not taking: Reported on 7/25/2022) 30 tablet 0     No current facility-administered medications for this visit. Review of Systems  Negative other than HPI    Vitals:    07/25/22 1343 07/25/22 1344 07/25/22 1402   BP: (!) 184/92 (!) 180/90 (!) 168/80   Pulse: 85     SpO2: 99%     Weight: 144 lb (65.3 kg)        Physical Exam  Constitutional:       General: She is not in acute distress. Appearance: Normal appearance. She is not ill-appearing. HENT:      Head: Normocephalic and atraumatic. Cardiovascular:      Rate and Rhythm: Normal rate and regular rhythm. Pulmonary:      Effort: Pulmonary effort is normal. No respiratory distress. Breath sounds: Normal breath sounds. Neurological:      Mental Status: She is alert and oriented to person, place, and time. Mental status is at baseline. Psychiatric:         Mood and Affect: Affect normal. Mood is anxious. Behavior: Behavior normal.     Assessment/Plan:  1. At high risk for falls  Improving since recent fall  Reviewed ED follow up note with pt   No concerns today     2. Primary hypertension  Uncontrolled, asymptomatic   Slight improvement on recheck up still elevated   Reports well controlled at home   Pt is anxious today during appt   Discussed in detail - she declines medication changes today. Pt will monitor BP at home and bring log and BP cuff to appt in 2 weeks     3. 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 All questions answered. Return in about 2 weeks (around 8/8/2022) for HTN - bring log and cuff to appt . Electronically signed by ROSE Kwok CNP on 7/25/2022 at 2:07 PM    On the basis of positive falls risk screening, assessment and plan is as follows: home safety tips provided.

## 2022-08-08 ENCOUNTER — APPOINTMENT (OUTPATIENT)
Dept: CT IMAGING | Age: 87
End: 2022-08-08
Payer: COMMERCIAL

## 2022-08-08 ENCOUNTER — HOSPITAL ENCOUNTER (EMERGENCY)
Age: 87
Discharge: HOME | End: 2022-08-08
Attending: EMERGENCY MEDICINE
Payer: COMMERCIAL

## 2022-08-08 ENCOUNTER — TELEPHONE (OUTPATIENT)
Dept: INTERNAL MEDICINE CLINIC | Age: 87
End: 2022-08-08

## 2022-08-08 VITALS
OXYGEN SATURATION: 100 % | DIASTOLIC BLOOD PRESSURE: 89 MMHG | HEIGHT: 63 IN | SYSTOLIC BLOOD PRESSURE: 147 MMHG | RESPIRATION RATE: 27 BRPM | HEART RATE: 83 BPM | WEIGHT: 140.2 LBS | BODY MASS INDEX: 24.84 KG/M2 | TEMPERATURE: 98.2 F

## 2022-08-08 DIAGNOSIS — U07.1 COVID: Primary | ICD-10-CM

## 2022-08-08 DIAGNOSIS — E87.6 HYPOKALEMIA: ICD-10-CM

## 2022-08-08 DIAGNOSIS — E83.42 HYPOMAGNESEMIA: ICD-10-CM

## 2022-08-08 DIAGNOSIS — B34.9 VIRAL SYNDROME: ICD-10-CM

## 2022-08-08 LAB
A/G RATIO: 1.9 (ref 1.1–2.2)
ALBUMIN SERPL-MCNC: 4.3 G/DL (ref 3.4–5)
ALP BLD-CCNC: 140 U/L (ref 40–129)
ALT SERPL-CCNC: 12 U/L (ref 10–40)
ANION GAP SERPL CALCULATED.3IONS-SCNC: 11 MMOL/L (ref 3–16)
AST SERPL-CCNC: 18 U/L (ref 15–37)
BASOPHILS ABSOLUTE: 0.1 K/UL (ref 0–0.2)
BASOPHILS RELATIVE PERCENT: 1.3 %
BILIRUB SERPL-MCNC: 0.6 MG/DL (ref 0–1)
BILIRUBIN URINE: NEGATIVE
BLOOD, URINE: NEGATIVE
BUN BLDV-MCNC: 10 MG/DL (ref 7–20)
CALCIUM SERPL-MCNC: 9.9 MG/DL (ref 8.3–10.6)
CHLORIDE BLD-SCNC: 94 MMOL/L (ref 99–110)
CLARITY: CLEAR
CO2: 27 MMOL/L (ref 21–32)
COLOR: YELLOW
CREAT SERPL-MCNC: 0.6 MG/DL (ref 0.6–1.2)
EOSINOPHILS ABSOLUTE: 0.1 K/UL (ref 0–0.6)
EOSINOPHILS RELATIVE PERCENT: 2 %
GFR AFRICAN AMERICAN: >60
GFR NON-AFRICAN AMERICAN: >60
GLUCOSE BLD-MCNC: 117 MG/DL (ref 70–99)
GLUCOSE URINE: NEGATIVE MG/DL
HCT VFR BLD CALC: 39.2 % (ref 36–48)
HEMOGLOBIN: 12.9 G/DL (ref 12–16)
KETONES, URINE: NEGATIVE MG/DL
LACTIC ACID, SEPSIS: 1.6 MMOL/L (ref 0.4–1.9)
LEUKOCYTE ESTERASE, URINE: NEGATIVE
LIPASE: 52 U/L (ref 13–60)
LYMPHOCYTES ABSOLUTE: 1.5 K/UL (ref 1–5.1)
LYMPHOCYTES RELATIVE PERCENT: 22.4 %
MAGNESIUM: 1.7 MG/DL (ref 1.8–2.4)
MCH RBC QN AUTO: 26.8 PG (ref 26–34)
MCHC RBC AUTO-ENTMCNC: 33 G/DL (ref 31–36)
MCV RBC AUTO: 81.1 FL (ref 80–100)
MICROSCOPIC EXAMINATION: NORMAL
MONOCYTES ABSOLUTE: 0.6 K/UL (ref 0–1.3)
MONOCYTES RELATIVE PERCENT: 8.4 %
NEUTROPHILS ABSOLUTE: 4.5 K/UL (ref 1.7–7.7)
NEUTROPHILS RELATIVE PERCENT: 65.9 %
NITRITE, URINE: NEGATIVE
PDW BLD-RTO: 14.6 % (ref 12.4–15.4)
PH UA: 7 (ref 5–8)
PLATELET # BLD: 260 K/UL (ref 135–450)
PMV BLD AUTO: 7.4 FL (ref 5–10.5)
POTASSIUM REFLEX MAGNESIUM: 3.2 MMOL/L (ref 3.5–5.1)
PRO-BNP: 215 PG/ML (ref 0–449)
PROCALCITONIN: 0.04 NG/ML (ref 0–0.15)
PROTEIN UA: NEGATIVE MG/DL
RBC # BLD: 4.84 M/UL (ref 4–5.2)
SARS-COV-2, NAAT: DETECTED
SODIUM BLD-SCNC: 132 MMOL/L (ref 136–145)
SPECIFIC GRAVITY UA: 1.01 (ref 1–1.03)
TOTAL PROTEIN: 6.6 G/DL (ref 6.4–8.2)
TROPONIN: <0.01 NG/ML
URINE REFLEX TO CULTURE: NORMAL
URINE TYPE: NORMAL
UROBILINOGEN, URINE: 0.2 E.U./DL
WBC # BLD: 6.8 K/UL (ref 4–11)

## 2022-08-08 PROCEDURE — 6370000000 HC RX 637 (ALT 250 FOR IP): Performed by: EMERGENCY MEDICINE

## 2022-08-08 PROCEDURE — 6360000004 HC RX CONTRAST MEDICATION: Performed by: EMERGENCY MEDICINE

## 2022-08-08 PROCEDURE — 83605 ASSAY OF LACTIC ACID: CPT

## 2022-08-08 PROCEDURE — 74177 CT ABD & PELVIS W/CONTRAST: CPT

## 2022-08-08 PROCEDURE — 81003 URINALYSIS AUTO W/O SCOPE: CPT

## 2022-08-08 PROCEDURE — 2580000003 HC RX 258: Performed by: EMERGENCY MEDICINE

## 2022-08-08 PROCEDURE — 96365 THER/PROPH/DIAG IV INF INIT: CPT | Performed by: EMERGENCY MEDICINE

## 2022-08-08 PROCEDURE — 87635 SARS-COV-2 COVID-19 AMP PRB: CPT

## 2022-08-08 PROCEDURE — 71260 CT THORAX DX C+: CPT | Performed by: EMERGENCY MEDICINE

## 2022-08-08 PROCEDURE — 83690 ASSAY OF LIPASE: CPT

## 2022-08-08 PROCEDURE — 83880 ASSAY OF NATRIURETIC PEPTIDE: CPT

## 2022-08-08 PROCEDURE — 6360000002 HC RX W HCPCS: Performed by: EMERGENCY MEDICINE

## 2022-08-08 PROCEDURE — 80053 COMPREHEN METABOLIC PANEL: CPT

## 2022-08-08 PROCEDURE — 83735 ASSAY OF MAGNESIUM: CPT

## 2022-08-08 PROCEDURE — 93005 ELECTROCARDIOGRAM TRACING: CPT | Performed by: EMERGENCY MEDICINE

## 2022-08-08 PROCEDURE — 85025 COMPLETE CBC W/AUTO DIFF WBC: CPT

## 2022-08-08 PROCEDURE — 84484 ASSAY OF TROPONIN QUANT: CPT

## 2022-08-08 PROCEDURE — 84145 PROCALCITONIN (PCT): CPT

## 2022-08-08 PROCEDURE — 36415 COLL VENOUS BLD VENIPUNCTURE: CPT

## 2022-08-08 PROCEDURE — 99285 EMERGENCY DEPT VISIT HI MDM: CPT | Performed by: EMERGENCY MEDICINE

## 2022-08-08 RX ORDER — ACETAMINOPHEN 500 MG
1000 TABLET ORAL ONCE
Status: COMPLETED | OUTPATIENT
Start: 2022-08-08 | End: 2022-08-08

## 2022-08-08 RX ORDER — ONDANSETRON 4 MG/1
4 TABLET, ORALLY DISINTEGRATING ORAL ONCE
Status: COMPLETED | OUTPATIENT
Start: 2022-08-08 | End: 2022-08-08

## 2022-08-08 RX ORDER — MAGNESIUM SULFATE 1 G/100ML
1000 INJECTION INTRAVENOUS ONCE
Status: COMPLETED | OUTPATIENT
Start: 2022-08-08 | End: 2022-08-08

## 2022-08-08 RX ORDER — SODIUM CHLORIDE, SODIUM LACTATE, POTASSIUM CHLORIDE, AND CALCIUM CHLORIDE .6; .31; .03; .02 G/100ML; G/100ML; G/100ML; G/100ML
1000 INJECTION, SOLUTION INTRAVENOUS ONCE
Status: COMPLETED | OUTPATIENT
Start: 2022-08-08 | End: 2022-08-08

## 2022-08-08 RX ORDER — ONDANSETRON 4 MG/1
4 TABLET, ORALLY DISINTEGRATING ORAL EVERY 8 HOURS PRN
Qty: 20 TABLET | Refills: 0 | Status: SHIPPED | OUTPATIENT
Start: 2022-08-08

## 2022-08-08 RX ORDER — POTASSIUM CHLORIDE 750 MG/1
40 TABLET, FILM COATED, EXTENDED RELEASE ORAL ONCE
Status: COMPLETED | OUTPATIENT
Start: 2022-08-08 | End: 2022-08-08

## 2022-08-08 RX ADMIN — POTASSIUM CHLORIDE 40 MEQ: 750 TABLET, FILM COATED, EXTENDED RELEASE ORAL at 18:11

## 2022-08-08 RX ADMIN — SODIUM CHLORIDE, POTASSIUM CHLORIDE, SODIUM LACTATE AND CALCIUM CHLORIDE 1000 ML: 600; 310; 30; 20 INJECTION, SOLUTION INTRAVENOUS at 15:15

## 2022-08-08 RX ADMIN — ONDANSETRON 4 MG: 4 TABLET, ORALLY DISINTEGRATING ORAL at 15:20

## 2022-08-08 RX ADMIN — ACETAMINOPHEN 1000 MG: 500 TABLET ORAL at 15:20

## 2022-08-08 RX ADMIN — IOPAMIDOL 75 ML: 755 INJECTION, SOLUTION INTRAVENOUS at 16:25

## 2022-08-08 RX ADMIN — MAGNESIUM SULFATE HEPTAHYDRATE 1000 MG: 1 INJECTION, SOLUTION INTRAVENOUS at 18:10

## 2022-08-08 ASSESSMENT — PAIN DESCRIPTION - LOCATION: LOCATION: ABDOMEN

## 2022-08-08 ASSESSMENT — PAIN SCALES - GENERAL
PAINLEVEL_OUTOF10: 3
PAINLEVEL_OUTOF10: 5

## 2022-08-08 ASSESSMENT — PAIN - FUNCTIONAL ASSESSMENT: PAIN_FUNCTIONAL_ASSESSMENT: NONE - DENIES PAIN

## 2022-08-08 ASSESSMENT — PAIN DESCRIPTION - ORIENTATION: ORIENTATION: MID

## 2022-08-08 NOTE — TELEPHONE ENCOUNTER
Pt calling tested positive for covid on Sat---very SOB-achy all over BP up---can not do VV---has not been feeling well at all for several weeks--per Deborah Jaquez she should go to urgent care or ER. Thanks.

## 2022-08-08 NOTE — ED PROVIDER NOTES
[facility] Emergency Department    Marisel Cuadra MD, am the primary clinician of record. CHIEF COMPLAINT  Chief Complaint   Patient presents with    Positive For Covid-19     Tested positive for covid on Saturday states she started having diarrhea yesterday also has an intermittent pain under left breast      HISTORY OF PRESENT ILLNESS  Teagan Smith is a 80 y.o. female  who presents to the ED complaining of several days of body aches malaise and fatigue. She has had fevers a few days ago but not since. She has had a dry cough, some left-sided chest discomfort and some nausea with poor appetite but no vomiting. She is also had some diarrhea and some epigastric/left upper quadrant abdominal discomfort. She does feel short of breath. She says she tested herself at home on Saturday for [place] and was positive and is concerned about this. She has a history of anxiety, GERD, HTN, HLD. No other complaints, modifying factors or associated symptoms. I have reviewed the following from the nursing documentation.     Past Medical History:   Diagnosis Date    Anxiety 2/24/2022    Bleeding ulcer     Cancer (HCC)     melanoma L IRIS    Gastroesophagitis     GERD (gastroesophageal reflux disease)     Hyperlipidemia     Hypertension     Moderate persistent asthma 2/12/2021    Pneumonia     Rheumatic fever with heart involvement     Trigger ring finger of right hand 7/1/2013    Unspecified diseases of blood and blood-forming organs     chronic anemia     Past Surgical History:   Procedure Laterality Date    BLADDER REPAIR N/A 2002    BREAST SURGERY      L breast tumor-benign    CYST REMOVAL Right 8-1-13    GANGLION CYST EXCISION RIGHT WRIST, TRIGGER FINGER RELEASE RIGHT FOURTH    EYE SURGERY      , cataract right eye    FRACTURE SURGERY      R shoulderx2-pinned    HYSTERECTOMY (CERVIX STATUS UNKNOWN)      JOINT REPLACEMENT Right 03/28/2012    Total Elbow - Dr. Jas Jerry SHOULDER SURGERY Right     TONSILLECTOMY      TUMOR REMOVAL  2013    carcinoid    UPPER GASTROINTESTINAL ENDOSCOPY  3/7/12    with bx    UPPER GASTROINTESTINAL ENDOSCOPY  11/8/13    EUS    UPPER GASTROINTESTINAL ENDOSCOPY  4/24/14    UPPER GASTROINTESTINAL ENDOSCOPY  3/17/16    push enteroscopy with ablation    UPPER GASTROINTESTINAL ENDOSCOPY  06/29/2017    UPPER GASTROINTESTINAL ENDOSCOPY N/A 8/11/2021    EGD ULTRASOUND OF STOMACH performed by Nilay D eOliveira MD at [address] [place] [address]     Family History   Problem Relation Age of Onset    Heart Disease Mother     High Blood Pressure Mother     High Cholesterol Mother     Heart Disease Father     High Blood Pressure Father     High Cholesterol Father     Cancer Sister     Stroke Sister     High Blood Pressure Sister     High Cholesterol Sister     High Blood Pressure Brother     High Cholesterol Brother     Heart Attack Brother     Cancer Brother     Heart Attack Brother     Parkinsonism Brother      Social History     Socioeconomic History    Marital status:      Spouse name: Not on file    Number of children: 1    Years of education: Not on file    Highest education level: Not on file   Occupational History    Occupation: Retired   Tobacco Use    Smoking status: Never    Smokeless tobacco: Never   Vaping Use    Vaping Use: Never used   Substance and Sexual Activity    Alcohol use: No    Drug use: No    Sexual activity: Yes     Partners: Male   Other Topics Concern    Not on file   Social History Narrative    Not on file     Social Determinants of Health     Financial Resource Strain: Low Risk     Difficulty of Paying Living Expenses: Not hard at all   Food Insecurity: No Food Insecurity    Worried About Running Out of Food in the Last Year: Never true    Ran Out of Food in the Last Year: Never true   Transportation Needs: Not on file   Physical Activity: Inactive    Days of Exercise per Week: 0 days    Minutes of Exercise per Session: 0 min   Stress: Not on file   Social Connections: Not on file   Intimate Partner Violence: Not on file   Housing Stability: Not on file     No current facility-administered medications for this encounter. Current Outpatient Medications   Medication Sig Dispense Refill    ondansetron (ZOFRAN ODT) 4 MG disintegrating tablet Take 1 tablet by mouth every 8 hours as needed for Nausea or Vomiting 20 tablet 0    tiZANidine (ZANAFLEX) 2 MG tablet Take 1 tablet by mouth every 6 hours as needed (back pain) (Patient not taking: No sig reported) 30 tablet 0    olmesartan-hydroCHLOROthiazide (BENICAR HCT) 40-12.5 MG per tablet TAKE 1 TABLET BY MOUTH DAILY 90 tablet 3    ferrous sulfate (IRON 325) 325 (65 Fe) MG tablet Take 1 tablet by mouth 2 times daily 60 tablet 5    ibuprofen (ADVIL;MOTRIN) 400 MG tablet Take 400 mg by mouth every 6 hours as needed for Pain      TURMERIC PO Take by mouth daily       esomeprazole (NEXIUM) 40 MG delayed release capsule Take 40 mg by mouth every morning (before breakfast)      Coenzyme Q10 (COQ-10) 100 MG CPCR Take 1 capsule by mouth daily      cetirizine (ZYRTEC) 10 MG tablet Take 10 mg by mouth as needed        Allergies   Allergen Reactions    Simvastatin Other (See Comments)     Body aches       REVIEW OF SYSTEMS  10 systems reviewed, pertinent positives per HPI otherwise noted to be negative. PHYSICAL EXAM  BP (!) 160/88   Pulse 82   Temp 98.2 °F (36.8 °C) (Oral)   Resp 18   Ht 5' 3\" (1.6 m)   Wt 140 lb 3.2 oz (63.6 kg)   SpO2 100%   BMI 24.84 kg/m²    GENERAL APPEARANCE: Awake and alert. Cooperative. No distress. HENT: Normocephalic. Atraumatic. Mucous membranes are moist.  NECK: Supple. EYES: PERRL. EOM's grossly intact. HEART/CHEST: RRR. No murmurs. No chest wall tenderness. LUNGS: Respirations unlabored. Mild cough noted, mild scattered rhonchi at bases, no wheezing/rales. Good air exchange. Speaking comfortably in full sentences.    ABDOMEN: Mild epig and LUQ ttp, no other abdominal tenderness. Soft. Non-distended. No masses. No organomegaly. No guarding or rebound. Normal bowel sounds throughout. MUSCULOSKELETAL: No extremity edema. Compartments soft. No deformity. No tenderness in the extremities. All extremities neurovascularly intact. SKIN: Warm and dry. No acute rashes. NEUROLOGICAL: Alert and oriented. CN's 2-12 intact. No gross facial drooping. Strength 5/5, sensation intact. 2 plus DTR's in knees bilaterally. Gait normal.  PSYCHIATRIC: Normal mood and affect. LABS  I have reviewed all labs for this visit.    Results for orders placed or performed during the hospital encounter of 08/08/22   COVID-19, Rapid    Specimen: Nasopharyngeal Swab   Result Value Ref Range    SARS-CoV-2, NAAT DETECTED (AA) Not Detected   CBC with Auto Differential   Result Value Ref Range    WBC 6.8 4.0 - 11.0 K/uL    RBC 4.84 4.00 - 5.20 M/uL    Hemoglobin 12.9 12.0 - 16.0 g/dL    Hematocrit 39.2 36.0 - 48.0 %    MCV 81.1 80.0 - 100.0 fL    MCH 26.8 26.0 - 34.0 pg    MCHC 33.0 31.0 - 36.0 g/dL    RDW 14.6 12.4 - 15.4 %    Platelets 720 209 - 530 K/uL    MPV 7.4 5.0 - 10.5 fL    Neutrophils % 65.9 %    Lymphocytes % 22.4 %    Monocytes % 8.4 %    Eosinophils % 2.0 %    Basophils % 1.3 %    Neutrophils Absolute 4.5 1.7 - 7.7 K/uL    Lymphocytes Absolute 1.5 1.0 - 5.1 K/uL    Monocytes Absolute 0.6 0.0 - 1.3 K/uL    Eosinophils Absolute 0.1 0.0 - 0.6 K/uL    Basophils Absolute 0.1 0.0 - 0.2 K/uL   Comprehensive Metabolic Panel w/ Reflex to MG   Result Value Ref Range    Sodium 132 (L) 136 - 145 mmol/L    Potassium reflex Magnesium 3.2 (L) 3.5 - 5.1 mmol/L    Chloride 94 (L) 99 - 110 mmol/L    CO2 27 21 - 32 mmol/L    Anion Gap 11 3 - 16    Glucose 117 (H) 70 - 99 mg/dL    BUN 10 7 - 20 mg/dL    Creatinine 0.6 0.6 - 1.2 mg/dL    GFR Non-African American >60 >60    GFR African American >60 >60    Calcium 9.9 8.3 - 10.6 mg/dL    Total Protein 6.6 6.4 - 8.2 g/dL    Albumin 4.3 3.4 - 5.0 g/dL Albumin/Globulin Ratio 1.9 1.1 - 2.2    Total Bilirubin 0.6 0.0 - 1.0 mg/dL    Alkaline Phosphatase 140 (H) 40 - 129 U/L    ALT 12 10 - 40 U/L    AST 18 15 - 37 U/L   Troponin   Result Value Ref Range    Troponin <0.01 <0.01 ng/mL   Brain Natriuretic Peptide   Result Value Ref Range    Pro- 0 - 449 pg/mL   Procalcitonin   Result Value Ref Range    Procalcitonin 0.04 0.00 - 0.15 ng/mL   Lipase   Result Value Ref Range    Lipase 52.0 13.0 - 60.0 U/L   Lactate, Sepsis   Result Value Ref Range    Lactic Acid, Sepsis 1.6 0.4 - 1.9 mmol/L   Urinalysis with Reflex to Culture    Specimen: Urine   Result Value Ref Range    Color, UA Yellow Straw/Yellow    Clarity, UA Clear Clear    Glucose, Ur Negative Negative mg/dL    Bilirubin Urine Negative Negative    Ketones, Urine Negative Negative mg/dL    Specific Gravity, UA 1.010 1.005 - 1.030    Blood, Urine Negative Negative    pH, UA 7.0 5.0 - 8.0    Protein, UA Negative Negative mg/dL    Urobilinogen, Urine 0.2 <2.0 E.U./dL    Nitrite, Urine Negative Negative    Leukocyte Esterase, Urine Negative Negative    Microscopic Examination Not Indicated     Urine Type NotGiven     Urine Reflex to Culture Not Indicated    Magnesium   Result Value Ref Range    Magnesium 1.70 (L) 1.80 - 2.40 mg/dL   EKG 12 Lead   Result Value Ref Range    Ventricular Rate 86 BPM    Atrial Rate 86 BPM    P-R Interval 180 ms    QRS Duration 138 ms    Q-T Interval 420 ms    QTc Calculation (Bazett) 502 ms    P Axis 50 degrees    R Axis 4 degrees    T Axis 24 degrees    Diagnosis       Normal sinus rhythmRight bundle branch blockAbnormal ECG       The 12 lead EKG was interpreted by me as follows:  Rate: normal with a rate of 86  Rhythm: sinus  Axis: normal  Intervals: right bundle branch block  ST segments: no ST elevations or depressions  T waves: no abnormal inversions  Non-specific T wave changes: not present  Prior EKG comparison: EKG dated 6/18/19 is not significantly different    RADIOLOGY    CT HEAD WO CONTRAST    Result Date: 7/15/2022  EXAMINATION: CT OF THE HEAD WITHOUT CONTRAST  7/15/2022 7:18 pm TECHNIQUE: CT of the head was performed without the administration of intravenous contrast. Automated exposure control, iterative reconstruction, and/or weight based adjustment of the mA/kV was utilized to reduce the radiation dose to as low as reasonably achievable. COMPARISON: 05/27/2021 HISTORY: ORDERING SYSTEM PROVIDED HISTORY: Aline Crawford TECHNOLOGIST PROVIDED HISTORY: Reason for exam:->chi, ro ich Has a \"code stroke\" or \"stroke alert\" been called? ->No Decision Support Exception - unselect if not a suspected or confirmed emergency medical condition->Emergency Medical Condition (MA) Reason for Exam: CHI, ro ICH. FINDINGS: BRAIN/VENTRICLES: There is no acute intracranial hemorrhage, mass effect or midline shift. No abnormal extra-axial fluid collection. The gray-white differentiation is maintained without evidence of an acute infarct. There is no evidence of hydrocephalus. Mild chronic microvascular ischemic change. ORBITS: The visualized portion of the orbits demonstrate no acute abnormality. SINUSES: The visualized paranasal sinuses and mastoid air cells demonstrate no acute abnormality. SOFT TISSUES/SKULL:  No acute abnormality of the visualized skull or soft tissues. No acute intracranial abnormality. RECOMMENDATIONS: Unavailable     CT CERVICAL SPINE WO CONTRAST    Result Date: 7/15/2022  EXAMINATION: CT OF THE CERVICAL SPINE WITHOUT CONTRAST 7/15/2022 7:18 pm TECHNIQUE: CT of the cervical spine was performed without the administration of intravenous contrast. Multiplanar reformatted images are provided for review. Automated exposure control, iterative reconstruction, and/or weight based adjustment of the mA/kV was utilized to reduce the radiation dose to as low as reasonably achievable. COMPARISON: CT cervical spine 05/27/2021.  HISTORY: ORDERING SYSTEM PROVIDED HISTORY: trauma, ro fx TECHNOLOGIST PROVIDED HISTORY: Reason for exam:->trauma, ro fx Decision Support Exception - unselect if not a suspected or confirmed emergency medical condition->Emergency Medical Condition (MA) Reason for Exam: Trauma, ro fx. FINDINGS: BONES/ALIGNMENT: The craniocervical and atlantoaxial junctions are intact. Normal alignment is maintained. Mild chronic T1 and T2 superior endplate compression fractures without significant change. The remaining vertebral body heights are preserved. No fracture or other acute osseous abnormality. DEGENERATIVE CHANGES: Multilevel degenerative changes throughout the cervical spine. SOFT TISSUES: There is no prevertebral soft tissue swelling. No acute abnormality of the cervical spine. CT ABDOMEN PELVIS W IV CONTRAST Additional Contrast? None    Result Date: 8/8/2022  EXAMINATION: CT OF THE ABDOMEN AND PELVIS WITH CONTRAST; CTA OF THE CHEST 8/8/2022 TECHNIQUE: CT of the abdomen and pelvis was performed with the administration of intravenous contrast. Multiplanar reformatted images are provided for review. Automated exposure control, iterative reconstruction, and/or weight based adjustment of the mA/kV was utilized to reduce the radiation dose to as low as reasonably achievable.; CTA of the chest was performed after the administration of intravenous contrast.  Multiplanar reformatted images are provided for review. MIP images are provided for review. Automated exposure control, iterative reconstruction, and/or weight based adjustment of the mA/kV was utilized to reduce the radiation dose to as low as reasonably achievable. COMPARISON: 12/27/2021.  HISTORY: ORDERING SYSTEM PROVIDED HISTORY: epig/LUQ pain, +COVID TECHNOLOGIST PROVIDED HISTORY: Additional Contrast?->None Reason for exam:->epig/LUQ pain, +COVID Decision Support Exception - unselect if not a suspected or confirmed emergency medical condition->Emergency Medical Condition (MA) Reason for Exam: epig/LUQ pain, +COVID; ORDERING SYSTEM PROVIDED HISTORY: L sided CP, +COVID TECHNOLOGIST PROVIDED HISTORY: Reason for exam:->L sided CP, +COVID Decision Support Exception - unselect if not a suspected or confirmed emergency medical condition->Emergency Medical Condition (MA) Reason for Exam: L sided CP, +COVID FINDINGS: CTA CHEST: Pulmonary Arteries: Pulmonary arteries are adequately opacified for evaluation. No evidence of intraluminal filling defect to suggest pulmonary embolism. Main pulmonary artery is normal in caliber. Mediastinum: The thoracic aorta is normal in caliber with calcified plaque and mild tortuosity. The heart is borderline in size with no pericardial effusion. The esophagus is unremarkable. No pathologic mediastinal adenopathy. Heterogeneity of the thyroid parenchyma with no dominant nodule identified. Lungs/pleura: No acute pulmonary infiltrate, consolidation or edema. Mild dependent atelectasis in the lower lung fields, similar to the previous study. No pleural fluid or pneumothorax. The central airways are patent. Soft Tissues/Bones: No acute osseous or soft tissue abnormality. The bones are osteopenic. There are stable mild compression deformities of the T1 and T2 vertebral bodies. CT ABDOMEN AND PELVIS Organs: Stable low-attenuation hepatic lesions measuring up to 1.0 cm in the left lobe, likely cysts. The spleen, pancreas and adrenal glands are unremarkable. Possible small stone within the gallbladder with no acute features. Mild bilateral caliectasis and ureterectasis, similar to the previous study. No obstructing calculi. Small bilateral renal cysts with no imaging follow-up indicated. GI/Bowel: The stomach and duodenal sweep are unremarkable. No small bowel distension. The appendix is unremarkable. No pericolonic inflammatory changes. Pelvis: No pelvic mass or free pelvic fluid. Previous hysterectomy. Partial distention of the urinary bladder.  Peritoneum/Retroperitoneum: The abdominal aorta is normal in caliber with calcified atherosclerotic plaque. No retroperitoneal adenopathy or upper abdominal ascites. Bones/Soft Tissues: No acute osseous or soft tissue abnormality. The bones are diffusely osteopenic. Degenerative changes are present throughout the lumbar spine. 1. No evidence of pulmonary embolic disease. 2. No acute findings within the chest. Stable mild bibasilar atelectasis with no acute pulmonary infiltrate. 3. No acute findings within the abdomen or pelvis. No acute bowel pathology. CT CHEST PULMONARY EMBOLISM W CONTRAST    Result Date: 8/8/2022  EXAMINATION: CT OF THE ABDOMEN AND PELVIS WITH CONTRAST; CTA OF THE CHEST 8/8/2022 TECHNIQUE: CT of the abdomen and pelvis was performed with the administration of intravenous contrast. Multiplanar reformatted images are provided for review. Automated exposure control, iterative reconstruction, and/or weight based adjustment of the mA/kV was utilized to reduce the radiation dose to as low as reasonably achievable.; CTA of the chest was performed after the administration of intravenous contrast.  Multiplanar reformatted images are provided for review. MIP images are provided for review. Automated exposure control, iterative reconstruction, and/or weight based adjustment of the mA/kV was utilized to reduce the radiation dose to as low as reasonably achievable. COMPARISON: 12/27/2021.  HISTORY: ORDERING SYSTEM PROVIDED HISTORY: epig/LUQ pain, +COVID TECHNOLOGIST PROVIDED HISTORY: Additional Contrast?->None Reason for exam:->epig/LUQ pain, +COVID Decision Support Exception - unselect if not a suspected or confirmed emergency medical condition->Emergency Medical Condition (MA) Reason for Exam: epig/LUQ pain, +COVID; ORDERING SYSTEM PROVIDED HISTORY: L sided CP, +COVID TECHNOLOGIST PROVIDED HISTORY: Reason for exam:->L sided CP, +COVID Decision Support Exception - unselect if not a suspected or confirmed emergency medical condition->Emergency Medical Condition (MA) Reason for Exam: L sided CP, +COVID FINDINGS: CTA CHEST: Pulmonary Arteries: Pulmonary arteries are adequately opacified for evaluation. No evidence of intraluminal filling defect to suggest pulmonary embolism. Main pulmonary artery is normal in caliber. Mediastinum: The thoracic aorta is normal in caliber with calcified plaque and mild tortuosity. The heart is borderline in size with no pericardial effusion. The esophagus is unremarkable. No pathologic mediastinal adenopathy. Heterogeneity of the thyroid parenchyma with no dominant nodule identified. Lungs/pleura: No acute pulmonary infiltrate, consolidation or edema. Mild dependent atelectasis in the lower lung fields, similar to the previous study. No pleural fluid or pneumothorax. The central airways are patent. Soft Tissues/Bones: No acute osseous or soft tissue abnormality. The bones are osteopenic. There are stable mild compression deformities of the T1 and T2 vertebral bodies. CT ABDOMEN AND PELVIS Organs: Stable low-attenuation hepatic lesions measuring up to 1.0 cm in the left lobe, likely cysts. The spleen, pancreas and adrenal glands are unremarkable. Possible small stone within the gallbladder with no acute features. Mild bilateral caliectasis and ureterectasis, similar to the previous study. No obstructing calculi. Small bilateral renal cysts with no imaging follow-up indicated. GI/Bowel: The stomach and duodenal sweep are unremarkable. No small bowel distension. The appendix is unremarkable. No pericolonic inflammatory changes. Pelvis: No pelvic mass or free pelvic fluid. Previous hysterectomy. Partial distention of the urinary bladder. Peritoneum/Retroperitoneum: The abdominal aorta is normal in caliber with calcified atherosclerotic plaque. No retroperitoneal adenopathy or upper abdominal ascites. Bones/Soft Tissues: No acute osseous or soft tissue abnormality.   The bones are diffusely osteopenic. Degenerative changes are present throughout the lumbar spine. 1. No evidence of pulmonary embolic disease. 2. No acute findings within the chest. Stable mild bibasilar atelectasis with no acute pulmonary infiltrate. 3. No acute findings within the abdomen or pelvis. No acute bowel pathology. ED COURSE/MDM  Patient seen and evaluated. Old records reviewed. Labs and imaging reviewed and results discussed with patient. After initial evaluation, differential diagnostic considerations included: acute coronary syndrome, pulmonary embolism, COPD/asthma, pneumonia, sepsis, pericardial tamponade, pneumothorax, CHF, thoracic aortic dissection, anxiety    The patient's ED workup was notable for COVID diagnosis, with symptoms of a viral syndrome. That being said she is not septic, lactate and procalcitonin are normal, she does not have a leukocytosis or hypoxia or signs of sepsis or respiratory failure. She has mildly low potassium and magnesium which will be repleted and the patient will be discharged home with symptomatic management. [facility] CMT for COVID risk of decompensation utilized for MDM. Is this patient to be included in the SEP-1 Core Measure?   No   Exclusion criteria - the patient is NOT to be included for SEP-1 Core Measure due to:  Viral etiology found or highly suspected (including COVID-19) without concomitant bacterial infection      During the patient's ED course, the patient was given:  Medications   acetaminophen (TYLENOL) tablet 1,000 mg (1,000 mg Oral Given 8/8/22 1520)   ondansetron (ZOFRAN-ODT) disintegrating tablet 4 mg (4 mg Oral Given 8/8/22 1520)   lactated ringers bolus (0 mLs IntraVENous Stopped 8/8/22 1630)   iopamidol (ISOVUE-370) 76 % injection 75 mL (75 mLs IntraVENous Given 8/8/22 1625)   potassium chloride (KLOR-CON) extended release tablet 40 mEq (40 mEq Oral Given 8/8/22 1811)   magnesium sulfate 1000 mg in dextrose 5% 100 mL IVPB (1,000 mg IntraVENous New Bag 8/8/22 1810)        CLINICAL IMPRESSION  1. COVID    2. Viral syndrome    3. Hypokalemia    4. Hypomagnesemia        Blood pressure (!) 160/88, pulse 82, temperature 98.2 °F (36.8 °C), temperature source Oral, resp. rate 18, height 5' 3\" (1.6 m), weight 140 lb 3.2 oz (63.6 kg), SpO2 100 %, not currently breastfeeding. DISPOSITION  Nicole Obrien was discharged to home in stable condition. I have discussed the findings of today's workup with the patient and addressed the patient's questions and concerns. Important warning signs as well as new or worsening symptoms which would necessitate immediate return to the ED were discussed. The plan is to discharge from the ED at this time, and the patient is in stable condition. The patient acknowledged understanding is agreeable with this plan. Patient was given scripts for the following medications. I counseled patient how to take these medications. New Prescriptions    ONDANSETRON (ZOFRAN ODT) 4 MG DISINTEGRATING TABLET    Take 1 tablet by mouth every 8 hours as needed for Nausea or Vomiting       Follow-up with:  ROSE Brambila - CNP  [address] [facility]  [phone]    Schedule an appointment as soon as possible for a visit in 2 days  For symptom re-evaluation    [facility] Emergency Department  [address]  [address]  Go to   If symptoms worsen    DISCLAIMER: This chart was created using Dragon dictation software. Efforts were made by me to ensure accuracy, however some errors may be present due to limitations of this technology and occasionally words are not transcribed correctly.         Deanna Isaac MD  08/08/22 [id number]

## 2022-08-09 ENCOUNTER — TELEPHONE (OUTPATIENT)
Dept: INTERNAL MEDICINE CLINIC | Age: 87
End: 2022-08-09

## 2022-08-09 LAB
EKG ATRIAL RATE: 86 BPM
EKG DIAGNOSIS: NORMAL
EKG P AXIS: 50 DEGREES
EKG P-R INTERVAL: 180 MS
EKG Q-T INTERVAL: 420 MS
EKG QRS DURATION: 138 MS
EKG QTC CALCULATION (BAZETT): 502 MS
EKG R AXIS: 4 DEGREES
EKG T AXIS: 24 DEGREES
EKG VENTRICULAR RATE: 86 BPM

## 2022-08-09 PROCEDURE — 93010 ELECTROCARDIOGRAM REPORT: CPT | Performed by: INTERNAL MEDICINE

## 2022-08-09 NOTE — TELEPHONE ENCOUNTER
----- Message from Shilpa Driscoll sent at 8/9/2022 12:04 PM EDT -----  Subject: Message to Provider    QUESTIONS  Information for Provider? pt was diagnosed with covid on Saturday 7/30/2022 she want to the hospital due to covid results she was released   from hospital on 8/8/2022 she has a sarah on Thursday 8/11/2022 she wants to   know what she should do about sarah she does not want a virtual sarah but was   advised to do a hospital f/u sarah   ---------------------------------------------------------------------------  --------------  [address] [employer]  [phone]; OK to leave message on voicemail  ---------------------------------------------------------------------------  --------------  SCRIPT ANSWERS  Relationship to Patient?  Self

## 2022-08-19 ENCOUNTER — OFFICE VISIT (OUTPATIENT)
Dept: INTERNAL MEDICINE CLINIC | Age: 87
End: 2022-08-19
Payer: COMMERCIAL

## 2022-08-19 VITALS
OXYGEN SATURATION: 99 % | HEART RATE: 98 BPM | BODY MASS INDEX: 25.33 KG/M2 | DIASTOLIC BLOOD PRESSURE: 86 MMHG | WEIGHT: 143 LBS | SYSTOLIC BLOOD PRESSURE: 138 MMHG

## 2022-08-19 DIAGNOSIS — I10 PRIMARY HYPERTENSION: ICD-10-CM

## 2022-08-19 DIAGNOSIS — U07.1 COVID-19: Primary | ICD-10-CM

## 2022-08-19 DIAGNOSIS — Z85.060 PERSONAL HISTORY OF MALIGNANT CARCINOID TUMOR OF SMALL INTESTINE: ICD-10-CM

## 2022-08-19 DIAGNOSIS — R10.84 CHRONIC GENERALIZED ABDOMINAL PAIN: ICD-10-CM

## 2022-08-19 DIAGNOSIS — G89.29 CHRONIC GENERALIZED ABDOMINAL PAIN: ICD-10-CM

## 2022-08-19 DIAGNOSIS — R53.83 FATIGUE, UNSPECIFIED TYPE: ICD-10-CM

## 2022-08-19 DIAGNOSIS — F41.9 ANXIETY: ICD-10-CM

## 2022-08-19 PROCEDURE — 1123F ACP DISCUSS/DSCN MKR DOCD: CPT | Performed by: NURSE PRACTITIONER

## 2022-08-19 PROCEDURE — 99214 OFFICE O/P EST MOD 30 MIN: CPT | Performed by: NURSE PRACTITIONER

## 2022-08-19 RX ORDER — AMITRIPTYLINE HYDROCHLORIDE 10 MG/1
10 TABLET, FILM COATED ORAL NIGHTLY
Qty: 90 TABLET | Refills: 0 | Status: SHIPPED | OUTPATIENT
Start: 2022-08-19

## 2022-08-19 ASSESSMENT — ENCOUNTER SYMPTOMS
COUGH: 0
WHEEZING: 0
SHORTNESS OF BREATH: 0
CHEST TIGHTNESS: 0

## 2022-08-19 NOTE — PROGRESS NOTES
8/19/22     Chief Complaint   Patient presents with    Hypertension     Follow up    Post-COVID Symptoms     [facility] - ER on 88 dx'd with COVID. Still feels tired      HPI    Here for HTN follow up   She was here on 7/25 and BP was elevated  Reported better control at home and declined medication adjustments   She had a covid test on 8/6 that was positive at home. She was then in the ED on 8/8/2022   Covid test was + in ED  Hypokalemia and hypomagnesia - treated in ED   Imaging in ED was negative   Overall feeling better - still having some fatigue   Fatigue is improving slowly     Had some abd discomfort and nausea - Zofran helped with nausea. States she has had trouble with her bowels for years and years. Bloating and chronic generalized abd pain are unchanged. history of bleeding ulcers. Has not seen GI in a long time, used to see Dr. Emily Wood. Pt is on PPI which helps some. She states nothing else helps. She reports increased anxiety and \"everything gets on her nerves\" lately. Feeling anxious for a long time but worsening especially over the past past few months. No clear triggers. 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      Allergies   Allergen Reactions    Simvastatin Other (See Comments)     Body aches       Current Outpatient Medications   Medication Sig Dispense Refill    amitriptyline (ELAVIL) 10 MG tablet Take 1 tablet by mouth nightly 90 tablet 0    ondansetron (ZOFRAN ODT) 4 MG disintegrating tablet Take 1 tablet by mouth every 8 hours as needed for Nausea or Vomiting 20 tablet 0    olmesartan-hydroCHLOROthiazide (BENICAR HCT) 40-12.5 MG per tablet TAKE 1 TABLET BY MOUTH DAILY 90 tablet 3    ferrous sulfate (IRON 325) 325 (65 Fe) MG tablet Take 1 tablet by mouth 2 times daily 60 tablet 5    ibuprofen (ADVIL;MOTRIN) 400 MG tablet Take 400 mg by mouth every 6 hours as needed for Pain      TURMERIC PO Take by mouth daily       esomeprazole (NEXIUM) 40 MG delayed release capsule Take 40 mg by mouth every morning (before breakfast)      Coenzyme Q10 (COQ-10) 100 MG CPCR Take 1 capsule by mouth daily      cetirizine (ZYRTEC) 10 MG tablet Take 10 mg by mouth as needed       tiZANidine (ZANAFLEX) 2 MG tablet Take 1 tablet by mouth every 6 hours as needed (back pain) (Patient not taking: No sig reported) 30 tablet 0     No current facility-administered medications for this visit. Review of Systems   Constitutional:  Positive for fatigue (improving). Negative for chills and fever. Respiratory:  Negative for cough, chest tightness, shortness of breath and wheezing. Cardiovascular:  Negative for chest pain, palpitations and leg swelling. Neurological:  Negative for dizziness, tremors, light-headedness and headaches. Vitals:    08/19/22 1304 08/19/22 1305   BP: (!) 140/86 138/86   Pulse: 98    SpO2: 99%    Weight: 143 lb (64.9 kg)       Physical Exam  Constitutional:       General: She is not in acute distress. Appearance: Normal appearance. She is not ill-appearing. HENT:      Head: Normocephalic and atraumatic. Ears:      Comments: [place]  Cardiovascular:      Rate and Rhythm: Normal rate and regular rhythm. Pulmonary:      Effort: Pulmonary effort is normal. No respiratory distress. Abdominal:      General: Bowel sounds are normal.      Palpations: Abdomen is soft. Musculoskeletal:      Right lower leg: No edema. Left lower leg: No edema. Neurological:      Mental Status: She is alert. Mental status is at baseline. Psychiatric:         Mood and Affect: Mood is anxious. Assessment/Plan:  Covid - 19/ fatigue   Improving and feeling much better   Reviewed ED notes, imaging and work up in detail with pt   Fatigue is slowly improving     Primary hypertension  Chronic, stable, better controlled today.    Continue benicar 40/12.5    Anxiety/ Chronic generalized abdominal pain  Uncontrolled   Discussed in detail   Trail of low dose amitriptyline for dual benefits of chronic abd pain and anxiety   Reviewed medication use, risk and s/e  Encouraged follow up with GI if not improving or worsening   - amitriptyline (ELAVIL) 10 MG tablet; Take 1 tablet by mouth nightly  Dispense: 90 tablet; Refill: 0    Personal history of malignant carcinoid tumor of small intestine  Continue with plan to see [facility] as scheduled     Discussed medications with patient, who voiced understanding of their use and indications. All questions answered. Return in about 3 months (around 11/19/2022), or if symptoms worsen or fail to improve, for HTN .       Electronically signed by ROSE Benoit CNP on 8/19/2022 at 1:39 PM

## 2022-11-14 DIAGNOSIS — G89.29 CHRONIC GENERALIZED ABDOMINAL PAIN: ICD-10-CM

## 2022-11-14 DIAGNOSIS — F41.9 ANXIETY: ICD-10-CM

## 2022-11-14 DIAGNOSIS — R10.84 CHRONIC GENERALIZED ABDOMINAL PAIN: ICD-10-CM

## 2022-11-14 RX ORDER — AMITRIPTYLINE HYDROCHLORIDE 10 MG/1
TABLET, FILM COATED ORAL
Qty: 90 TABLET | Refills: 0 | Status: SHIPPED | OUTPATIENT
Start: 2022-11-14

## 2022-12-01 ENCOUNTER — OFFICE VISIT (OUTPATIENT)
Dept: INTERNAL MEDICINE CLINIC | Age: 87
End: 2022-12-01
Payer: COMMERCIAL

## 2022-12-01 VITALS
SYSTOLIC BLOOD PRESSURE: 138 MMHG | OXYGEN SATURATION: 96 % | DIASTOLIC BLOOD PRESSURE: 80 MMHG | WEIGHT: 142 LBS | BODY MASS INDEX: 25.15 KG/M2 | HEART RATE: 84 BPM

## 2022-12-01 DIAGNOSIS — J45.41 MODERATE PERSISTENT ASTHMA WITH EXACERBATION: ICD-10-CM

## 2022-12-01 DIAGNOSIS — F41.9 ANXIETY: ICD-10-CM

## 2022-12-01 DIAGNOSIS — J45.41 MODERATE PERSISTENT ASTHMA WITH EXACERBATION: Primary | ICD-10-CM

## 2022-12-01 DIAGNOSIS — J06.9 ACUTE URI: ICD-10-CM

## 2022-12-01 PROCEDURE — 1123F ACP DISCUSS/DSCN MKR DOCD: CPT | Performed by: NURSE PRACTITIONER

## 2022-12-01 PROCEDURE — 99214 OFFICE O/P EST MOD 30 MIN: CPT | Performed by: NURSE PRACTITIONER

## 2022-12-01 RX ORDER — DOXYCYCLINE HYCLATE 100 MG
100 TABLET ORAL 2 TIMES DAILY
Qty: 20 TABLET | Refills: 0 | Status: SHIPPED | OUTPATIENT
Start: 2022-12-01 | End: 2022-12-11

## 2022-12-01 RX ORDER — ALBUTEROL SULFATE 90 UG/1
2 AEROSOL, METERED RESPIRATORY (INHALATION) 4 TIMES DAILY PRN
Qty: 18 G | Refills: 0 | Status: SHIPPED | OUTPATIENT
Start: 2022-12-01

## 2022-12-01 RX ORDER — ALBUTEROL SULFATE 90 UG/1
AEROSOL, METERED RESPIRATORY (INHALATION)
Qty: 54 G | OUTPATIENT
Start: 2022-12-01

## 2022-12-01 SDOH — ECONOMIC STABILITY: FOOD INSECURITY: WITHIN THE PAST 12 MONTHS, THE FOOD YOU BOUGHT JUST DIDN'T LAST AND YOU DIDN'T HAVE MONEY TO GET MORE.: NEVER TRUE

## 2022-12-01 SDOH — ECONOMIC STABILITY: FOOD INSECURITY: WITHIN THE PAST 12 MONTHS, YOU WORRIED THAT YOUR FOOD WOULD RUN OUT BEFORE YOU GOT MONEY TO BUY MORE.: NEVER TRUE

## 2022-12-01 ASSESSMENT — SOCIAL DETERMINANTS OF HEALTH (SDOH): HOW HARD IS IT FOR YOU TO PAY FOR THE VERY BASICS LIKE FOOD, HOUSING, MEDICAL CARE, AND HEATING?: NOT HARD AT ALL

## 2022-12-01 NOTE — PROGRESS NOTES
12/1/22     Chief Complaint   Patient presents with    Hypertension     3 month- no refills needed     HPI    Here for 3 month follow up. BP at home has been well controlled. Has chronic sinus issues and allergies. The past 2 weeks has been really bad. Having sinus pain, pressure, drainage, cough, fatigue, body aches. Denies fever, chills. Chronic SOB has been worse this week, waxing and waning. She was on inhalers for asthma but has not been using them. Used to see Dr. Cornelia Limon, not recently. Feeling overwhelmed with the amount she has to do. Not wanting to do anything about her anxiety at this time. Allergies   Allergen Reactions    Simvastatin Other (See Comments)     Body aches       Current Outpatient Medications   Medication Sig Dispense Refill    albuterol sulfate HFA (VENTOLIN HFA) 108 (90 Base) MCG/ACT inhaler Inhale 2 puffs into the lungs 4 times daily as needed for Wheezing 18 g 0    doxycycline hyclate (VIBRA-TABS) 100 MG tablet Take 1 tablet by mouth 2 times daily for 10 days 20 tablet 0    amitriptyline (ELAVIL) 10 MG tablet TAKE 1 TABLET BY MOUTH EVERY NIGHT 90 tablet 0    ondansetron (ZOFRAN ODT) 4 MG disintegrating tablet Take 1 tablet by mouth every 8 hours as needed for Nausea or Vomiting 20 tablet 0    olmesartan-hydroCHLOROthiazide (BENICAR HCT) 40-12.5 MG per tablet TAKE 1 TABLET BY MOUTH DAILY 90 tablet 3    ferrous sulfate (IRON 325) 325 (65 Fe) MG tablet Take 1 tablet by mouth 2 times daily 60 tablet 5    ibuprofen (ADVIL;MOTRIN) 400 MG tablet Take 400 mg by mouth every 6 hours as needed for Pain      TURMERIC PO Take by mouth daily       esomeprazole (NEXIUM) 40 MG delayed release capsule Take 40 mg by mouth every morning (before breakfast)      Coenzyme Q10 (COQ-10) 100 MG CPCR Take 1 capsule by mouth daily      cetirizine (ZYRTEC) 10 MG tablet Take 10 mg by mouth as needed        No current facility-administered medications for this visit.      Review of Systems  Negative other than HPI     Vitals:    12/01/22 1153   BP: 138/80   Pulse: 84   SpO2: 96%   Weight: 142 lb (64.4 kg)      Physical Exam  Constitutional:       General: She is not in acute distress. Appearance: Normal appearance. She is not ill-appearing. HENT:      Head: Normocephalic and atraumatic. Nose:      Right Sinus: Maxillary sinus tenderness present. Left Sinus: Maxillary sinus tenderness present. Cardiovascular:      Rate and Rhythm: Normal rate and regular rhythm. Pulmonary:      Effort: Pulmonary effort is normal. No respiratory distress. Breath sounds: Wheezing (scattered) present. No rhonchi. Neurological:      Mental Status: She is alert and oriented to person, place, and time. Mental status is at baseline. Psychiatric:         Mood and Affect: Mood normal.         Behavior: Behavior normal.     Assessment/Plan:  Acute URI/ Moderate persistent asthma with exacerbation  Acute, uncontrolled. Continue with supportive care   Refilling albuterol - use every 6 hrs while awake and sick  Covering with abx due to duration   She declined steroids due to osteoporosis   Reviewed strict criteria for follow up and when to seek emergency medical attention   - albuterol sulfate HFA (VENTOLIN HFA) 108 (90 Base) MCG/ACT inhaler; Inhale 2 puffs into the lungs 4 times daily as needed for Wheezing  Dispense: 18 g; Refill: 0  - doxycycline hyclate (VIBRA-TABS) 100 MG tablet; Take 1 tablet by mouth 2 times daily for 10 days  Dispense: 20 tablet; Refill: 0    Anxiety  Chronic, moderately controlled. Declined further intervention     Discussed medications with patient, who voiced understanding of their use and indications. All questions answered. Return in about 3 months (around 3/1/2023), or if symptoms worsen or fail to improve.       Electronically signed by ROSE Sequeira CNP on 12/1/2022 at 12:43 PM

## 2022-12-12 NOTE — PROGRESS NOTES
5/10/21     Chief Complaint   Patient presents with    3 Month Follow-Up     HPI     Here for follow up of HTN, HLD, falls. She reports good BP control at home, running 120-130s - following low sodium diet and doing well. Always up a little in the office, she gets nervous. Last visit she was given an order for PT to help with balance and strength. She had a fall requiring an ED visit. She was given information for [facility] and social work to help with support at home. She has family who checks on her frequently. Reports PT has gone well.  went to the hospital and so she did not complete PT. He is doing pretty well now. Would like to wait before going back to PT. Seeing pulmonology for chronic cough, asthma, GERD, LPR, pulmonary nodule, mild subpleural fibrosis, chronic pansinusitis. She last saw Dr. Dave Lee in March. She was started on Breo. She finds the albuterol helpful with her symptoms but makes her anxious. Reports she is doing well on her breo and is not coughing much. She reports gas and belches - does not last long and resolves quickly without intervention. Has been going on for years. Used to have issues with GI bleeds, she denies any recent bleeding. She reports she feels tired at times very similar to pervious anemia. Denies any CP,palpitations, dizziness. Reports dyspnea is slightly better than prior. [facility] not [facility] them recently.      Allergies   Allergen Reactions    Simvastatin Other (See Comments)     Body aches    Codeine Hives     Current Outpatient Medications   Medication Sig Dispense Refill    olmesartan-hydroCHLOROthiazide (BENICAR HCT) 40-12.5 MG per tablet TAKE 1 TABLET BY MOUTH DAILY 90 tablet 3    fluticasone-vilanterol (BREO ELLIPTA) 100-25 MCG/INH AEPB inhaler Inhale 1 puff into the lungs daily 1 each 11    albuterol sulfate HFA (VENTOLIN HFA) 108 (90 Base) MCG/ACT inhaler Inhale 1 puff into the lungs 4 times daily as needed for Wheezing or Shortness of Breath 1 Inhaler 1    Iodine, Kelp, (KELP PO) Take by mouth daily      TURMERIC PO Take by mouth      Famotidine-Ca Carb-Mag Hydrox (PEPCID COMPLETE PO) Take 1 tablet by mouth nightly      ibuprofen (ADVIL;MOTRIN) 600 MG tablet Take 1 tablet by mouth every 6 hours as needed for Pain 40 tablet 0    esomeprazole (NEXIUM) 40 MG delayed release capsule Take 40 mg by mouth every morning (before breakfast)      Coenzyme Q10 (COQ-10) 100 MG CPCR Take 1 capsule by mouth daily      cetirizine (ZYRTEC) 10 MG tablet Take 10 mg by mouth as needed       Saline (SIMPLY SALINE) 0.9 % AERS by Nasal route daily as needed       Flaxseed, Linseed, (FLAX SEED OIL) 1000 MG CAPS Take 1,200 mg by mouth daily.  aspirin 81 MG chewable tablet Take 81 mg by mouth daily. No current facility-administered medications for this visit. Review of Systems   Constitutional: Positive for fatigue (mild and intermittent). Negative for chills and fever. Respiratory: Negative for cough, chest tightness, shortness of breath and wheezing. Cardiovascular: Negative for chest pain, palpitations and leg swelling. Neurological: Negative for dizziness, tremors, weakness, light-headedness and headaches. Vitals:    05/10/21 1512 05/10/21 1515   BP: (!) 142/88 138/84   Weight: 146 lb 6.4 oz (66.4 kg)    Height: 5' 3\" (1.6 m)       Physical Exam  Vitals signs reviewed. Constitutional:       General: She is not in acute distress. Appearance: She is well-developed. She is not ill-appearing or diaphoretic. HENT:      Head: Normocephalic and atraumatic. Cardiovascular:      Rate and Rhythm: Normal rate and regular rhythm. Heart sounds: Normal heart sounds. No murmur. Pulmonary:      Effort: Pulmonary effort is normal. No respiratory distress. Breath sounds: Normal breath sounds. No wheezing or rhonchi.    Abdominal:      General: Bowel sounds are normal. There is no distension. Palpations: Abdomen is soft. Tenderness: There is no abdominal tenderness. Skin:     General: Skin is warm and dry. Neurological:      General: No focal deficit present. Mental Status: She is alert and oriented to person, place, and time. Psychiatric:         Mood and Affect: Mood and affect normal.         Behavior: Behavior normal.       Assessment/Plan:  1. Essential hypertension  Stable, controlled on current regimen. Checking labs   - Basic Metabolic Panel; Future    2. High cholesterol  Stable, controlled on current regimen. 3. Microcytic anemia  Stable, checking labs   - CBC Auto Differential; Future    4. Mixed hyperlipidemia  Stable, controlled on current regimen. 5. At high risk for falls  Stable, controlled on current regimen. Discussed restarting PT - declined today   Reports needs time to care for her    Denies any falls since last OV     6. Iron deficiency anemia due to chronic blood loss  Stable, checking labs  History of iron infusion with [facility] - has not been following up   - CBC Auto Differential; Future    7. Elevated glucose  Stable, checking A1c for hyperglycemia on labs   - Hemoglobin A1C; Future    8. Vitamin D deficiency  - Vitamin D 25 Hydroxy; Future    Discussed medications with patient, who voiced understanding of their use and indications. All questions answered. Return in about 6 months (around 11/10/2021), or if symptoms worsen or fail to improve.       Electronically signed by ROSE Fisher CNP on 5/10/2021 at 3:36 PM Detail Level: Detailed Show Applicator Variable?: Yes Render Note In Bullet Format When Appropriate: No Post-Care Instructions: I reviewed with the patient in detail post-care instructions. Patient is to wear sunprotection, and avoid picking at any of the treated lesions. Pt may apply Vaseline to crusted or scabbing areas. Number Of Freeze-Thaw Cycles: 1 freeze-thaw cycle Duration Of Freeze Thaw-Cycle (Seconds): 3 Consent: The patient's consent was obtained including but not limited to risks of crusting, scabbing, blistering, scarring, darker or lighter pigmentary change, recurrence, incomplete removal and infection.

## 2022-12-17 ENCOUNTER — HOSPITAL ENCOUNTER (EMERGENCY)
Age: 87
Discharge: HOME | End: 2022-12-17
Payer: COMMERCIAL

## 2022-12-17 ENCOUNTER — APPOINTMENT (OUTPATIENT)
Dept: GENERAL RADIOLOGY | Age: 87
End: 2022-12-17
Payer: COMMERCIAL

## 2022-12-17 VITALS
WEIGHT: 139 LBS | HEIGHT: 63 IN | OXYGEN SATURATION: 97 % | HEART RATE: 99 BPM | RESPIRATION RATE: 16 BRPM | SYSTOLIC BLOOD PRESSURE: 178 MMHG | DIASTOLIC BLOOD PRESSURE: 91 MMHG | TEMPERATURE: 98 F | BODY MASS INDEX: 24.63 KG/M2

## 2022-12-17 DIAGNOSIS — J06.9 ACUTE UPPER RESPIRATORY INFECTION: Primary | ICD-10-CM

## 2022-12-17 LAB
RAPID INFLUENZA  B AGN: NEGATIVE
RAPID INFLUENZA A AGN: NEGATIVE
SARS-COV-2, NAAT: NOT DETECTED

## 2022-12-17 PROCEDURE — 87635 SARS-COV-2 COVID-19 AMP PRB: CPT

## 2022-12-17 PROCEDURE — 71045 X-RAY EXAM CHEST 1 VIEW: CPT

## 2022-12-17 PROCEDURE — 99284 EMERGENCY DEPT VISIT MOD MDM: CPT

## 2022-12-17 PROCEDURE — 87804 INFLUENZA ASSAY W/OPTIC: CPT

## 2022-12-17 RX ORDER — PREDNISONE 20 MG/1
20 TABLET ORAL DAILY
Qty: 5 TABLET | Refills: 0 | Status: SHIPPED | OUTPATIENT
Start: 2022-12-17 | End: 2022-12-22

## 2022-12-17 RX ORDER — DOXYCYCLINE HYCLATE 100 MG
100 TABLET ORAL 2 TIMES DAILY
Qty: 14 TABLET | Refills: 0 | Status: SHIPPED | OUTPATIENT
Start: 2022-12-17 | End: 2022-12-24

## 2022-12-17 RX ORDER — BENZONATATE 100 MG/1
100 CAPSULE ORAL 3 TIMES DAILY PRN
Qty: 20 CAPSULE | Refills: 0 | Status: SHIPPED | OUTPATIENT
Start: 2022-12-17

## 2022-12-17 ASSESSMENT — PAIN - FUNCTIONAL ASSESSMENT
PAIN_FUNCTIONAL_ASSESSMENT: 0-10
PAIN_FUNCTIONAL_ASSESSMENT: NONE - DENIES PAIN

## 2022-12-17 ASSESSMENT — PAIN SCALES - GENERAL: PAINLEVEL_OUTOF10: 5

## 2022-12-17 ASSESSMENT — LIFESTYLE VARIABLES: HOW OFTEN DO YOU HAVE A DRINK CONTAINING ALCOHOL: NEVER

## 2022-12-17 ASSESSMENT — PAIN DESCRIPTION - LOCATION: LOCATION: CHEST

## 2022-12-20 ENCOUNTER — OFFICE VISIT (OUTPATIENT)
Dept: INTERNAL MEDICINE CLINIC | Age: 87
End: 2022-12-20
Payer: COMMERCIAL

## 2022-12-20 VITALS
WEIGHT: 141.8 LBS | HEIGHT: 63 IN | SYSTOLIC BLOOD PRESSURE: 150 MMHG | DIASTOLIC BLOOD PRESSURE: 86 MMHG | OXYGEN SATURATION: 98 % | BODY MASS INDEX: 25.12 KG/M2 | HEART RATE: 72 BPM

## 2022-12-20 DIAGNOSIS — I10 PRIMARY HYPERTENSION: Primary | Chronic | ICD-10-CM

## 2022-12-20 DIAGNOSIS — J32.4 CHRONIC PANSINUSITIS: ICD-10-CM

## 2022-12-20 DIAGNOSIS — J45.40 MODERATE PERSISTENT ASTHMA WITHOUT COMPLICATION: ICD-10-CM

## 2022-12-20 PROCEDURE — 99214 OFFICE O/P EST MOD 30 MIN: CPT | Performed by: INTERNAL MEDICINE

## 2022-12-20 PROCEDURE — 1123F ACP DISCUSS/DSCN MKR DOCD: CPT | Performed by: INTERNAL MEDICINE

## 2022-12-20 RX ORDER — METHYLPREDNISOLONE 4 MG/1
TABLET ORAL
Qty: 1 KIT | Refills: 0 | Status: SHIPPED | OUTPATIENT
Start: 2022-12-20 | End: 2022-12-26

## 2022-12-20 ASSESSMENT — ENCOUNTER SYMPTOMS
RHINORRHEA: 0
SHORTNESS OF BREATH: 0
SORE THROAT: 0
ABDOMINAL PAIN: 0
COUGH: 0
DIARRHEA: 0
SINUS PAIN: 0

## 2022-12-20 NOTE — PROGRESS NOTES
Alicia Whelan (:  1934) is a 80 y.o. female,Established patient, here for evaluation of the following chief complaint(s):  Follow-up ()         ASSESSMENT/PLAN:  1. Primary hypertension  2. Chronic pansinusitis  -     methylPREDNISolone (MEDROL DOSEPACK) 4 MG tablet; Take by mouth., Disp-1 kit, R-0Normal  3. Moderate persistent asthma without complication  Reviewed with patient the progress she had since her last visit the patient had progressively increasing coughing and wheezing she was seen in the emergency room a chest x-ray was negative after that the patient was started on doxycycline and prednisone daily for 5 days the patient's symptoms are very gradually improving the patient does have history of asthma as well as chronic sinusitis at this point patient will be given a Medrol Dosepak to taper down with it after she is done with the daily dose that she has currently    Patient blood pressure is elevated today patient seems to be very anxious she is known to have a whitecoat syndrome at home her blood pressure is running in the 543A systolically and I think that we will need to adjust her treatment for now and she is to be reassessed next visit  Return if symptoms worsen or fail to improve, for As scheduled.          Subjective   SUBJECTIVE/OBJECTIVE:    Lab Review   Lab Results   Component Value Date/Time     2022 03:18 PM     2022 02:26 PM     05/10/2021 03:54 PM    K 3.2 2022 03:18 PM    K 4.0 2022 02:26 PM    K 4.2 05/10/2021 03:54 PM    K 3.6 2020 11:27 AM    K 3.0 2019 05:07 AM    CO2 27 2022 03:18 PM    CO2 26 2022 02:26 PM    CO2 26 05/10/2021 03:54 PM    BUN 10 2022 03:18 PM    BUN 11 2022 02:26 PM    BUN 14 05/10/2021 03:54 PM    CREATININE 0.6 2022 03:18 PM    CREATININE 0.7 2022 02:26 PM    CREATININE 0.7 05/10/2021 03:54 PM    GLUCOSE 117 2022 03:18 PM    GLUCOSE 110 2022 02:26 PM GLUCOSE 115 05/10/2021 03:54 PM    CALCIUM 9.9 08/08/2022 03:18 PM    CALCIUM 10.5 06/16/2022 02:26 PM    CALCIUM 10.4 05/10/2021 03:54 PM     Lab Results   Component Value Date/Time    WBC 6.8 08/08/2022 03:18 PM    WBC 5.5 06/16/2022 02:26 PM    WBC 6.4 05/10/2021 03:54 PM    HGB 12.9 08/08/2022 03:18 PM    HGB 13.6 06/16/2022 02:26 PM    HGB 10.8 05/10/2021 03:54 PM    HCT 39.2 08/08/2022 03:18 PM    HCT 39.8 06/16/2022 02:26 PM    HCT 32.8 05/10/2021 03:54 PM    MCV 81.1 08/08/2022 03:18 PM    MCV 81.4 06/16/2022 02:26 PM    MCV 70.6 05/10/2021 03:54 PM     08/08/2022 03:18 PM     06/16/2022 02:26 PM     05/10/2021 03:54 PM     Lab Results   Component Value Date/Time    CHOL 246 06/19/2019 05:07 AM    CHOL 143 07/18/2013 10:30 AM    CHOL 178 01/11/2013 10:05 AM    TRIG 76 06/19/2019 05:07 AM    TRIG 137 07/18/2013 10:30 AM    TRIG 146 01/11/2013 10:05 AM    HDL 64 06/19/2019 05:07 AM    HDL 46 07/18/2013 10:30 AM    HDL 61 01/11/2013 10:05 AM    HDL 60 03/07/2012 08:15 AM       Vitals 12/20/2022 12/17/2022 91/3/0403   SYSTOLIC 981 502 799   DIASTOLIC 86 91 80   Site - - -   Position - - -   Cuff Size - - -   Pulse 72 99 84   Temp - 98 -   Resp - 16 -   SpO2 98 97 96   Weight 141 lb 12.8 oz 139 lb 142 lb   Height 5' 3\" 5' 3\" -   Body mass index 25.12 kg/m2 24.62 kg/m2 -   Pain Level - 5 -   Some recent data might be hidden       URI   This is a recurrent problem. The current episode started 1 to 4 weeks ago. The problem has been rapidly improving. Pertinent negatives include no abdominal pain, chest pain, congestion, coughing, diarrhea, dysuria, rhinorrhea, sinus pain, sneezing or sore throat. Hypertension  This is a chronic problem. The problem is uncontrolled. Pertinent negatives include no chest pain, peripheral edema, PND, shortness of breath or sweats. Review of Systems   HENT:  Negative for congestion, rhinorrhea, sinus pain, sneezing and sore throat.     Respiratory:  Negative for cough and shortness of breath. Cardiovascular:  Negative for chest pain and PND. Gastrointestinal:  Negative for abdominal pain and diarrhea. Genitourinary:  Negative for dysuria. Objective   Physical Exam  Constitutional:       General: She is not in acute distress. Appearance: Normal appearance. HENT:      Head: Normocephalic and atraumatic. Right Ear: Tympanic membrane normal.      Left Ear: Tympanic membrane normal.      Nose: Nose normal.   Eyes:      Extraocular Movements: Extraocular movements intact. Conjunctiva/sclera: Conjunctivae normal.      Pupils: Pupils are equal, round, and reactive to light. Neck:      Vascular: No carotid bruit. Cardiovascular:      Rate and Rhythm: Normal rate and regular rhythm. Pulses: Normal pulses. Heart sounds: No murmur heard. Pulmonary:      Effort: Pulmonary effort is normal. No respiratory distress. Breath sounds: Normal breath sounds. Abdominal:      General: Abdomen is flat. Bowel sounds are normal. There is no distension. Palpations: Abdomen is soft. Tenderness: There is no abdominal tenderness. Musculoskeletal:         General: No swelling, tenderness or deformity. Cervical back: Normal range of motion and neck supple. No rigidity or tenderness. Right lower leg: No edema. Left lower leg: No edema. Lymphadenopathy:      Cervical: No cervical adenopathy. Skin:     Coloration: Skin is not jaundiced. Findings: No bruising, erythema or lesion. Neurological:      General: No focal deficit present. Mental Status: She is alert and oriented to person, place, and time. Cranial Nerves: No cranial nerve deficit. Motor: No weakness. Gait: Gait normal.          This dictation was generated by voice recognition computer software. Although all attempts are made to edit the dictation for accuracy, there may be errors in the transcription that are not intended.     An electronic signature was used to authenticate this note.     --Chavo Ty MD

## 2022-12-23 DIAGNOSIS — J45.41 MODERATE PERSISTENT ASTHMA WITH EXACERBATION: ICD-10-CM

## 2022-12-27 RX ORDER — ALBUTEROL SULFATE 90 UG/1
AEROSOL, METERED RESPIRATORY (INHALATION)
Qty: 54 G | Refills: 0 | Status: SHIPPED | OUTPATIENT
Start: 2022-12-27

## 2023-02-06 NOTE — TELEPHONE ENCOUNTER
Patient advised, vu  She followed up with Dr. Batres So yesterday - supposed to f/u again in 4 weeks. PT scheduled for today. Attending statement:  I have personally seen this patient, and formed a face to face diagnostic evaluation on this patient on this date.  I have reviewed the PA, NP and or Medical/PA student and/or Resident documentation and agree with the history, physical exam and plan of care except if noted otherwise.      Patient has been diagnosed with a T12 compression fracture after a slip and fall we have exhausted conservative care and a graduated pain management program based upon persistent pain discomfort patient is elected to undergo a kyphoplasty.  Patient is aware of incomplete resolution of pain potential adjacent segment fractures.  Based upon an attempt to improve her pain profile patient wishes to pursue kyphoplasty at T12

## 2023-02-27 ENCOUNTER — TELEPHONE (OUTPATIENT)
Dept: ORTHOPEDIC SURGERY | Age: 88
End: 2023-02-27

## 2023-02-27 NOTE — TELEPHONE ENCOUNTER
Question     Subject: RT HAND APPT REQUEST  Patient Request: Court Conrad  Contact Number: [phone]    PATIENT HAS UPCOMING APPT FOR RT [facility], WHICH SHE HAS BEEN SEEN FOR IN THE PAST BY DR. Isatu Caballero. SHE IS ASKING IF SHE CAN BE SEEN FOR HER RT HAND AT THIS APPT AS WELL. HAS BEEN SEEN FOR THIS IN THE PAST. PLEASE RETURN CALL TO THE ABOVE NUMBER.

## 2023-03-01 ENCOUNTER — OFFICE VISIT (OUTPATIENT)
Dept: INTERNAL MEDICINE CLINIC | Age: 88
End: 2023-03-01

## 2023-03-01 VITALS
BODY MASS INDEX: 25.51 KG/M2 | OXYGEN SATURATION: 97 % | DIASTOLIC BLOOD PRESSURE: 94 MMHG | HEART RATE: 91 BPM | WEIGHT: 144 LBS | SYSTOLIC BLOOD PRESSURE: 158 MMHG

## 2023-03-01 DIAGNOSIS — M35.00 SICCA SYNDROME (HCC): ICD-10-CM

## 2023-03-01 DIAGNOSIS — M14.60 NEUROPATHIC ARTHRITIS: ICD-10-CM

## 2023-03-01 DIAGNOSIS — M81.0 AGE RELATED OSTEOPOROSIS, UNSPECIFIED PATHOLOGICAL FRACTURE PRESENCE: ICD-10-CM

## 2023-03-01 DIAGNOSIS — M70.72 BURSITIS OF LEFT HIP, UNSPECIFIED BURSA: ICD-10-CM

## 2023-03-01 DIAGNOSIS — J45.30 MILD PERSISTENT ASTHMA WITHOUT COMPLICATION: Primary | ICD-10-CM

## 2023-03-01 DIAGNOSIS — S33.6XXA SACROILIAC SPRAIN, INITIAL ENCOUNTER: ICD-10-CM

## 2023-03-01 RX ORDER — ALBUTEROL SULFATE 90 UG/1
AEROSOL, METERED RESPIRATORY (INHALATION)
Qty: 54 G | Refills: 2 | Status: SHIPPED | OUTPATIENT
Start: 2023-03-01

## 2023-03-01 SDOH — ECONOMIC STABILITY: HOUSING INSECURITY
IN THE LAST 12 MONTHS, WAS THERE A TIME WHEN YOU DID NOT HAVE A STEADY PLACE TO SLEEP OR SLEPT IN A SHELTER (INCLUDING NOW)?: NO

## 2023-03-01 SDOH — ECONOMIC STABILITY: INCOME INSECURITY: HOW HARD IS IT FOR YOU TO PAY FOR THE VERY BASICS LIKE FOOD, HOUSING, MEDICAL CARE, AND HEATING?: NOT HARD AT ALL

## 2023-03-01 SDOH — ECONOMIC STABILITY: FOOD INSECURITY: WITHIN THE PAST 12 MONTHS, YOU WORRIED THAT YOUR FOOD WOULD RUN OUT BEFORE YOU GOT MONEY TO BUY MORE.: NEVER TRUE

## 2023-03-01 SDOH — ECONOMIC STABILITY: FOOD INSECURITY: WITHIN THE PAST 12 MONTHS, THE FOOD YOU BOUGHT JUST DIDN'T LAST AND YOU DIDN'T HAVE MONEY TO GET MORE.: NEVER TRUE

## 2023-03-01 ASSESSMENT — PATIENT HEALTH QUESTIONNAIRE - PHQ9
SUM OF ALL RESPONSES TO PHQ QUESTIONS 1-9: 0
SUM OF ALL RESPONSES TO PHQ9 QUESTIONS 1 & 2: 0
1. LITTLE INTEREST OR PLEASURE IN DOING THINGS: 0
2. FEELING DOWN, DEPRESSED OR HOPELESS: 0
SUM OF ALL RESPONSES TO PHQ QUESTIONS 1-9: 0

## 2023-03-01 ASSESSMENT — ENCOUNTER SYMPTOMS
CHEST TIGHTNESS: 0
GASTROINTESTINAL NEGATIVE: 1
SHORTNESS OF BREATH: 0
COUGH: 0
WHEEZING: 0

## 2023-03-01 NOTE — PROGRESS NOTES
3/1/23     Chief Complaint   Patient presents with    Hypertension     3 month f/u. HTN and Anxiety. Refill pended. HPI    Here for follow up for HTN and anxiety. Overall doing well on current medication. BP at home runs 130's/80-90. Today BP high d/t stress and taking  to doctor. C/o burning and tingling pain in bilateral feet and knees. Not currently taking medications for pain or arthritic neuropathy. She has not tried using diclofenac gel. She is not interested in taking medication. She feels the pain in the morning when she first awakes and it improves throughout the day. She states overuse can make the pain worse. The patient reports a history of a mechanical fall but cannot remember when that resulted in a broken left great toe on her stairs. She has an upcoming appt with Dr. Carrie Loyd for her chronic and ongoing right elbow pain, had elbow replacement in 2012. Has been having intermittent pain and soreness. Has become more frequent. She has declined prolia injections.      Allergies   Allergen Reactions    Simvastatin Other (See Comments)     Body aches       Current Outpatient Medications   Medication Sig Dispense Refill    albuterol sulfate HFA (PROVENTIL;VENTOLIN;PROAIR) 108 (90 Base) MCG/ACT inhaler INHALE 2 PUFFS INTO THE LUNGS FOUR TIMES DAILY AS NEEDED FOR WHEEZING 54 g 2    ondansetron (ZOFRAN ODT) 4 MG disintegrating tablet Take 1 tablet by mouth every 8 hours as needed for Nausea or Vomiting 20 tablet 0    olmesartan-hydroCHLOROthiazide (BENICAR HCT) 40-12.5 MG per tablet TAKE 1 TABLET BY MOUTH DAILY 90 tablet 3    ferrous sulfate (IRON 325) 325 (65 Fe) MG tablet Take 1 tablet by mouth 2 times daily 60 tablet 5    ibuprofen (ADVIL;MOTRIN) 400 MG tablet Take 400 mg by mouth every 6 hours as needed for Pain      TURMERIC PO Take by mouth daily       esomeprazole (NEXIUM) 40 MG delayed release capsule Take 40 mg by mouth every morning (before breakfast)      Coenzyme Q10 (COQ-10) 100 MG CPCR Take 1 capsule by mouth daily      cetirizine (ZYRTEC) 10 MG tablet Take 10 mg by mouth as needed        No current facility-administered medications for this visit. Review of Systems   Constitutional:  Negative for chills, fatigue and fever. HENT: Negative. Respiratory:  Negative for cough, chest tightness, shortness of breath and wheezing. Cardiovascular:  Negative for chest pain, palpitations and leg swelling. Gastrointestinal: Negative. Genitourinary: Negative. Musculoskeletal:  Positive for arthralgias and myalgias. Skin: Negative. Neurological:  Negative for dizziness, tremors, light-headedness and headaches. Psychiatric/Behavioral: Negative. Vitals:    03/01/23 1443 03/01/23 1445   BP: (!) 160/98 (!) 158/94   Pulse: 91    SpO2: 97%    Weight: 144 lb (65.3 kg)       Physical Exam  Constitutional:       General: She is not in acute distress. Appearance: Normal appearance. She is not ill-appearing. HENT:      Head: Normocephalic and atraumatic. Cardiovascular:      Rate and Rhythm: Normal rate and regular rhythm. Pulses: Normal pulses. Heart sounds: Normal heart sounds. Pulmonary:      Effort: Pulmonary effort is normal. No respiratory distress. Breath sounds: Normal breath sounds. Musculoskeletal:      Right knee: Bony tenderness present. Left knee: Bony tenderness present. Right lower leg: Edema present. Left lower leg: Edema present. Right ankle: Swelling present. Tenderness present. Left ankle: Swelling present. Tenderness present. Right foot: Bony tenderness present. Left foot: Bony tenderness present. Skin:     Capillary Refill: Capillary refill takes less than 2 seconds. Neurological:      Mental Status: She is alert and oriented to person, place, and time. Mental status is at baseline.    Psychiatric:         Mood and Affect: Mood normal.         Behavior: Behavior normal.       Assessment/Plan:  Moderate persistent asthma without complication   Chronic, stable. Continue prn albuterol.   - albuterol sulfate HFA (PROVENTIL;VENTOLIN;PROAIR) 108 (90 Base) MCG/ACT inhaler; INHALE 2 PUFFS INTO THE LUNGS FOUR TIMES DAILY AS NEEDED FOR WHEEZING  Dispense: 54 g; Refill: 2    Age related osteoporosis, unspecified pathological fracture presence  Chronic, uncontrolled. Declined any medication or intervention. Encouraged supportive care with healthy diet/ exercise, encouraged appropriate dietary Ca/D.     Sicca syndrome (HCC)/ Neuropathic Arthritis   Chronic, uncontrolled. She has not seen rheumatology and declined referral. Discussed options in detail and declined PT and medications (new or previously prescribed and not tried)     Discussed medications with patient, who voiced understanding of their use and indications. All questions answered.    Return in about 6 months (around 9/1/2023), or if symptoms worsen or fail to improve, for chronic disease management .  Continue seeing specialists as recommended.     Electronically signed by ROSE Ribeiro CNP on 3/1/2023 at 4:01 PM

## 2023-03-31 ENCOUNTER — OFFICE VISIT (OUTPATIENT)
Dept: INTERNAL MEDICINE CLINIC | Age: 88
End: 2023-03-31

## 2023-03-31 VITALS
BODY MASS INDEX: 24.8 KG/M2 | DIASTOLIC BLOOD PRESSURE: 90 MMHG | HEIGHT: 63 IN | HEART RATE: 104 BPM | OXYGEN SATURATION: 99 % | SYSTOLIC BLOOD PRESSURE: 180 MMHG | WEIGHT: 140 LBS

## 2023-03-31 DIAGNOSIS — J45.41 MODERATE PERSISTENT ASTHMATIC BRONCHITIS WITH ACUTE EXACERBATION: Primary | ICD-10-CM

## 2023-03-31 DIAGNOSIS — J45.41 MODERATE PERSISTENT ASTHMATIC BRONCHITIS WITH ACUTE EXACERBATION: ICD-10-CM

## 2023-03-31 RX ORDER — FLUTICASONE PROPIONATE AND SALMETEROL 250; 50 UG/1; UG/1
1 POWDER RESPIRATORY (INHALATION) EVERY 12 HOURS
Qty: 60 EACH | Refills: 3 | Status: SHIPPED | OUTPATIENT
Start: 2023-03-31

## 2023-03-31 RX ORDER — BENZONATATE 100 MG/1
100 CAPSULE ORAL EVERY 6 HOURS PRN
Qty: 30 CAPSULE | Refills: 0 | Status: SHIPPED | OUTPATIENT
Start: 2023-03-31 | End: 2023-04-10

## 2023-03-31 RX ORDER — PREDNISONE 20 MG/1
40 TABLET ORAL DAILY
Qty: 10 TABLET | Refills: 0 | Status: SHIPPED | OUTPATIENT
Start: 2023-03-31 | End: 2023-04-05

## 2023-03-31 RX ORDER — DOXYCYCLINE HYCLATE 100 MG
100 TABLET ORAL 2 TIMES DAILY
Qty: 14 TABLET | Refills: 0 | Status: SHIPPED | OUTPATIENT
Start: 2023-03-31 | End: 2023-04-07

## 2023-03-31 RX ORDER — FLUTICASONE PROPIONATE AND SALMETEROL 250; 50 UG/1; UG/1
POWDER RESPIRATORY (INHALATION)
Qty: 180 EACH | OUTPATIENT
Start: 2023-03-31

## 2023-03-31 ASSESSMENT — ENCOUNTER SYMPTOMS
COUGH: 1
WHEEZING: 1
CHEST TIGHTNESS: 1

## 2023-03-31 NOTE — PROGRESS NOTES
SUBJECTIVE:    Patient ID: Lionel Hernández is a 80 y.o. female. CC: cough    HPI: The patient presents to the office for an acute visit. Chronic cough off and on for 2 years. Seen by PCP one month and has been worse with coughing since. She has sinus drainage. She is coughing up clear phlegm. She is wheezing, worse at night. No fever. Using albuterol as needed which helps. Previously seen by pulmonology and diagnosed with chronic cough, moderate persistent asthma. She was started on Breo but hasn't used this in a while. Stopped due to cost.  She would like to go back to pulmonology       Current Outpatient Medications   Medication Sig Dispense Refill    albuterol sulfate HFA (PROVENTIL;VENTOLIN;PROAIR) 108 (90 Base) MCG/ACT inhaler INHALE 2 PUFFS INTO THE LUNGS FOUR TIMES DAILY AS NEEDED FOR WHEEZING 54 g 2    ondansetron (ZOFRAN ODT) 4 MG disintegrating tablet Take 1 tablet by mouth every 8 hours as needed for Nausea or Vomiting 20 tablet 0    olmesartan-hydroCHLOROthiazide (BENICAR HCT) 40-12.5 MG per tablet TAKE 1 TABLET BY MOUTH DAILY 90 tablet 3    ferrous sulfate (IRON 325) 325 (65 Fe) MG tablet Take 1 tablet by mouth 2 times daily 60 tablet 5    ibuprofen (ADVIL;MOTRIN) 400 MG tablet Take 400 mg by mouth every 6 hours as needed for Pain      TURMERIC PO Take by mouth daily       esomeprazole (NEXIUM) 40 MG delayed release capsule Take 40 mg by mouth every morning (before breakfast)      Coenzyme Q10 (COQ-10) 100 MG CPCR Take 1 capsule by mouth daily      cetirizine (ZYRTEC) 10 MG tablet Take 10 mg by mouth as needed        No current facility-administered medications for this visit. Review of Systems   Constitutional:  Negative for fever. HENT:  Positive for congestion and postnasal drip. Respiratory:  Positive for cough, chest tightness and wheezing. OBJECTIVE:  Physical Exam  Constitutional:       Appearance: Normal appearance.    Pulmonary:      Effort: Pulmonary effort

## 2023-04-19 ENCOUNTER — OFFICE VISIT (OUTPATIENT)
Dept: PULMONOLOGY | Age: 88
End: 2023-04-19
Payer: COMMERCIAL

## 2023-04-19 VITALS — OXYGEN SATURATION: 100 % | HEART RATE: 78 BPM

## 2023-04-19 DIAGNOSIS — R05.3 CHRONIC COUGH: Primary | ICD-10-CM

## 2023-04-19 DIAGNOSIS — J45.40 MODERATE PERSISTENT ASTHMA WITHOUT COMPLICATION: ICD-10-CM

## 2023-04-19 DIAGNOSIS — K21.9 LARYNGOPHARYNGEAL REFLUX (LPR): ICD-10-CM

## 2023-04-19 PROCEDURE — 1123F ACP DISCUSS/DSCN MKR DOCD: CPT | Performed by: INTERNAL MEDICINE

## 2023-04-19 PROCEDURE — 99214 OFFICE O/P EST MOD 30 MIN: CPT | Performed by: INTERNAL MEDICINE

## 2023-04-19 NOTE — PROGRESS NOTES
[facility]  Consult Note  Chato Dave MD       Lorene Micheltrue   YOB: 1934    Date of Visit:  4/19/2023    Assessment/Plan:  1. Chronic cough  2. Moderate persistent asthma without complication  3. Chronic pansinusitis  4. Laryngopharyngeal reflux (LPR)  5. Pulmonary nodule ==> resolved    I have reviewed laboratories, medical records and images for this visit  Her last CT chest was when she was in ER 8/22 with a flare up. No worrisome findings on that. CXR 12/22 around another flare up is also negative. I think her frequent flare ups are 2/2 uncontrolled asthma. She has trouble understanding this b/c she feels so good right now. I tried to tell her she will likely flare again without controller medication. She is obviously reluctant since Breo and Advair have both led to complications for her. They share Fluticasone as and ingredient. Perhaps we should try Symbicort. Recommend starting that when she has her next flare up (if she is willing). Additionally, she does have eosinophilia as much as 6%. She could be a Dupixent (or similar) candidate but that seems like overkill at the moment. She does have GERD which could be an additional factor in destabilizing her asthma. She is on Nexium daily. She should be aggressive in treating breakthrough symptoms. Chief Complaint   Patient presents with    Cough     Referred back by [facility] for chronic cough but since he treated pt for asthmatic bronchitis with Doxycycline, Pred Taper, and Tessalon Pearls pt states her cough is just about gone        HPI  The patient presents with a chief complaint of chronic cough. She had Steroid, Abx and Tessalon Perles from [facility] and that helped. She is not coughing now. She has had Prednisone at least three times this year. She has not been seen here since 2021. At that time we thought she had asthma. Her flare ups are likely symptom of poorly controlled asthma.  She is

## 2023-04-21 RX ORDER — OLMESARTAN MEDOXOMIL AND HYDROCHLOROTHIAZIDE 40/12.5 40; 12.5 MG/1; MG/1
TABLET ORAL
Qty: 90 TABLET | Refills: 0 | Status: SHIPPED | OUTPATIENT
Start: 2023-04-21

## 2023-04-26 ENCOUNTER — OFFICE VISIT (OUTPATIENT)
Dept: ORTHOPEDIC SURGERY | Age: 88
End: 2023-04-26

## 2023-04-26 VITALS — HEIGHT: 63 IN | RESPIRATION RATE: 16 BRPM | BODY MASS INDEX: 24.8 KG/M2 | WEIGHT: 140 LBS

## 2023-04-26 DIAGNOSIS — Z96.621 HISTORY OF ARTHROPLASTY OF RIGHT ELBOW: Primary | ICD-10-CM

## 2023-04-26 NOTE — PROGRESS NOTES
Ms. Erich Feng  returns today in follow-up of her  previous  right total elbow arthroplasty. She was last seen in February, 2020 at which time she  was functioning fairly well with moderate elbow difficulty or symptoms. She returns today without new complaint or symptoms of the right elbow, for routine screening visit. I have today reviewed with Erich Feng the clinically relevant, past medical history, medications, allergies,  family history, social history, and Review Of Systems & I have documented any details relevant to today's presenting complaints in my history above. Ms. Eulalia Tadeo self-reported past medical history, medications, allergies,  family history, social history, and Review Of Systems have been scanned into the chart under the \"Media\" tab. Physical Exam:  Vitals  Resp: 16  Height: 5' 3\" (160 cm)  Weight: 140 lb (63.5 kg)  Ms. Erich Feng appears well, she is in no apparent distress, she demonstrates appropriate mood & affect. Skin: Normal Color, Free of Lesions, she does have a small abrasion over the dorsal olecranon from recent fall, and There is no erythema, drainage, or sign of infection. Wrist range of motion is without significant limitation bilaterally  Elbow range of motion is flexion arc 50*-115*, pronation near full, and supination near full on the Right, normal on the Left  Sensation is subjectively present bilaterally  Vascular examination reveals normal and good capillary refill bilaterally  Swelling is mild around the elbow  She has mild pain with elbow excursion, no crepitance, no laxity on the Right, normal on the Left  Triceps function is weak on the Right, normal on the Left    Radiographic Evaluation:  Radiographs were obtained today (3 views of the right elbow & wrist ). They demonstrate no evidence of acute fracture, subluxation, or dislocation.   There is marked but stable sign of loosening,  no new displacement of the implants, mild sign of wear or

## 2023-04-26 NOTE — PATIENT INSTRUCTIONS
Thank you for choosing [facility]) Physicians for your Hand and Upper Extremity needs. If we can be of any further assistance to you, please do not hesitate to contact us.     Office Phone Number:  [phone]  or  [phone]

## 2023-06-14 RX ORDER — BUDESONIDE AND FORMOTEROL FUMARATE DIHYDRATE 160; 4.5 UG/1; UG/1
AEROSOL RESPIRATORY (INHALATION)
Qty: 30.6 G | OUTPATIENT
Start: 2023-06-14

## 2023-07-13 RX ORDER — BUDESONIDE AND FORMOTEROL FUMARATE DIHYDRATE 160; 4.5 UG/1; UG/1
AEROSOL RESPIRATORY (INHALATION)
Qty: 30.6 G | Refills: 3 | Status: SHIPPED | OUTPATIENT
Start: 2023-07-13

## 2023-07-24 RX ORDER — OLMESARTAN MEDOXOMIL AND HYDROCHLOROTHIAZIDE 40/12.5 40; 12.5 MG/1; MG/1
TABLET ORAL
Qty: 90 TABLET | Refills: 3 | Status: SHIPPED | OUTPATIENT
Start: 2023-07-24

## 2023-07-25 ENCOUNTER — HOSPITAL ENCOUNTER (OUTPATIENT)
Age: 88
Setting detail: OBSERVATION
Discharge: HOME | End: 2023-07-26
Attending: EMERGENCY MEDICINE | Admitting: STUDENT IN AN ORGANIZED HEALTH CARE EDUCATION/TRAINING PROGRAM
Payer: COMMERCIAL

## 2023-07-25 ENCOUNTER — APPOINTMENT (OUTPATIENT)
Dept: CT IMAGING | Age: 88
End: 2023-07-25
Payer: COMMERCIAL

## 2023-07-25 ENCOUNTER — APPOINTMENT (OUTPATIENT)
Dept: GENERAL RADIOLOGY | Age: 88
End: 2023-07-25
Payer: COMMERCIAL

## 2023-07-25 DIAGNOSIS — E87.6 HYPOKALEMIA: ICD-10-CM

## 2023-07-25 DIAGNOSIS — R10.12 ABDOMINAL PAIN, LEFT UPPER QUADRANT: Primary | ICD-10-CM

## 2023-07-25 DIAGNOSIS — I10 UNCONTROLLED HYPERTENSION: ICD-10-CM

## 2023-07-25 DIAGNOSIS — R19.7 DIARRHEA, UNSPECIFIED TYPE: ICD-10-CM

## 2023-07-25 LAB
ALBUMIN SERPL-MCNC: 4.4 G/DL (ref 3.4–5)
ALBUMIN/GLOB SERPL: 2.4 {RATIO} (ref 1.1–2.2)
ALP SERPL-CCNC: 163 U/L (ref 40–129)
ALT SERPL-CCNC: 11 U/L (ref 10–40)
ANION GAP SERPL CALCULATED.3IONS-SCNC: 10 MMOL/L (ref 3–16)
AST SERPL-CCNC: 21 U/L (ref 15–37)
BASOPHILS # BLD: 0.1 K/UL (ref 0–0.2)
BASOPHILS NFR BLD: 1 %
BILIRUB SERPL-MCNC: 0.5 MG/DL (ref 0–1)
BUN SERPL-MCNC: 8 MG/DL (ref 7–20)
CALCIUM SERPL-MCNC: 10.2 MG/DL (ref 8.3–10.6)
CHLORIDE SERPL-SCNC: 95 MMOL/L (ref 99–110)
CO2 SERPL-SCNC: 26 MMOL/L (ref 21–32)
CREAT SERPL-MCNC: 0.7 MG/DL (ref 0.6–1.2)
DEPRECATED RDW RBC AUTO: 14 % (ref 12.4–15.4)
EOSINOPHIL # BLD: 0.5 K/UL (ref 0–0.6)
EOSINOPHIL NFR BLD: 7.1 %
GFR SERPLBLD CREATININE-BSD FMLA CKD-EPI: >60 ML/MIN/{1.73_M2}
GLUCOSE SERPL-MCNC: 112 MG/DL (ref 70–99)
HCT VFR BLD AUTO: 40.7 % (ref 36–48)
HGB BLD-MCNC: 13.5 G/DL (ref 12–16)
LACTATE BLDV-SCNC: 1.1 MMOL/L (ref 0.4–1.9)
LIPASE SERPL-CCNC: 43 U/L (ref 13–60)
LYMPHOCYTES # BLD: 2 K/UL (ref 1–5.1)
LYMPHOCYTES NFR BLD: 30.4 %
MAGNESIUM SERPL-MCNC: 2 MG/DL (ref 1.8–2.4)
MCH RBC QN AUTO: 27.5 PG (ref 26–34)
MCHC RBC AUTO-ENTMCNC: 33.2 G/DL (ref 31–36)
MCV RBC AUTO: 82.8 FL (ref 80–100)
MONOCYTES # BLD: 0.5 K/UL (ref 0–1.3)
MONOCYTES NFR BLD: 7.6 %
NEUTROPHILS # BLD: 3.6 K/UL (ref 1.7–7.7)
NEUTROPHILS NFR BLD: 53.9 %
NT-PROBNP SERPL-MCNC: 183 PG/ML (ref 0–449)
PLATELET # BLD AUTO: 223 K/UL (ref 135–450)
PMV BLD AUTO: 6.7 FL (ref 5–10.5)
POTASSIUM SERPL-SCNC: 2.7 MMOL/L (ref 3.5–5.1)
PROT SERPL-MCNC: 6.2 G/DL (ref 6.4–8.2)
RBC # BLD AUTO: 4.91 M/UL (ref 4–5.2)
SODIUM SERPL-SCNC: 131 MMOL/L (ref 136–145)
TROPONIN, HIGH SENSITIVITY: 8 NG/L (ref 0–14)
WBC # BLD AUTO: 6.7 K/UL (ref 4–11)

## 2023-07-25 PROCEDURE — 96365 THER/PROPH/DIAG IV INF INIT: CPT

## 2023-07-25 PROCEDURE — 96375 TX/PRO/DX INJ NEW DRUG ADDON: CPT

## 2023-07-25 PROCEDURE — 99285 EMERGENCY DEPT VISIT HI MDM: CPT

## 2023-07-25 PROCEDURE — 85025 COMPLETE CBC W/AUTO DIFF WBC: CPT

## 2023-07-25 PROCEDURE — 93005 ELECTROCARDIOGRAM TRACING: CPT | Performed by: EMERGENCY MEDICINE

## 2023-07-25 PROCEDURE — 6360000004 HC RX CONTRAST MEDICATION: Performed by: EMERGENCY MEDICINE

## 2023-07-25 PROCEDURE — 83605 ASSAY OF LACTIC ACID: CPT

## 2023-07-25 PROCEDURE — 83735 ASSAY OF MAGNESIUM: CPT

## 2023-07-25 PROCEDURE — 74177 CT ABD & PELVIS W/CONTRAST: CPT

## 2023-07-25 PROCEDURE — 84484 ASSAY OF TROPONIN QUANT: CPT

## 2023-07-25 PROCEDURE — 80053 COMPREHEN METABOLIC PANEL: CPT

## 2023-07-25 PROCEDURE — 6360000002 HC RX W HCPCS: Performed by: EMERGENCY MEDICINE

## 2023-07-25 PROCEDURE — 83690 ASSAY OF LIPASE: CPT

## 2023-07-25 PROCEDURE — 83880 ASSAY OF NATRIURETIC PEPTIDE: CPT

## 2023-07-25 PROCEDURE — 6370000000 HC RX 637 (ALT 250 FOR IP): Performed by: EMERGENCY MEDICINE

## 2023-07-25 PROCEDURE — C9113 INJ PANTOPRAZOLE SODIUM, VIA: HCPCS | Performed by: EMERGENCY MEDICINE

## 2023-07-25 PROCEDURE — 81001 URINALYSIS AUTO W/SCOPE: CPT

## 2023-07-25 PROCEDURE — 71045 X-RAY EXAM CHEST 1 VIEW: CPT

## 2023-07-25 RX ORDER — 0.9 % SODIUM CHLORIDE 0.9 %
1000 INTRAVENOUS SOLUTION INTRAVENOUS ONCE
Status: COMPLETED | OUTPATIENT
Start: 2023-07-26 | End: 2023-07-26

## 2023-07-25 RX ORDER — POTASSIUM CHLORIDE 750 MG/1
40 TABLET, FILM COATED, EXTENDED RELEASE ORAL ONCE
Status: COMPLETED | OUTPATIENT
Start: 2023-07-26 | End: 2023-07-26

## 2023-07-25 RX ORDER — PANTOPRAZOLE SODIUM 40 MG/10ML
80 INJECTION, POWDER, LYOPHILIZED, FOR SOLUTION INTRAVENOUS ONCE
Status: COMPLETED | OUTPATIENT
Start: 2023-07-25 | End: 2023-07-25

## 2023-07-25 RX ORDER — MORPHINE SULFATE 4 MG/ML
4 INJECTION, SOLUTION INTRAMUSCULAR; INTRAVENOUS
Status: DISCONTINUED | OUTPATIENT
Start: 2023-07-25 | End: 2023-07-26

## 2023-07-25 RX ORDER — ONDANSETRON 2 MG/ML
4 INJECTION INTRAMUSCULAR; INTRAVENOUS
Status: DISCONTINUED | OUTPATIENT
Start: 2023-07-25 | End: 2023-07-26

## 2023-07-25 RX ORDER — POTASSIUM CHLORIDE 7.45 MG/ML
10 INJECTION INTRAVENOUS ONCE
Status: COMPLETED | OUTPATIENT
Start: 2023-07-26 | End: 2023-07-26

## 2023-07-25 RX ADMIN — PANTOPRAZOLE SODIUM 80 MG: 40 INJECTION, POWDER, FOR SOLUTION INTRAVENOUS at 23:22

## 2023-07-25 RX ADMIN — ALUMINUM HYDROXIDE, MAGNESIUM HYDROXIDE, AND SIMETHICONE: 200; 200; 20 SUSPENSION ORAL at 23:22

## 2023-07-25 RX ADMIN — ONDANSETRON 4 MG: 2 INJECTION INTRAMUSCULAR; INTRAVENOUS at 23:20

## 2023-07-25 RX ADMIN — IOPAMIDOL 75 ML: 755 INJECTION, SOLUTION INTRAVENOUS at 23:36

## 2023-07-25 ASSESSMENT — PAIN - FUNCTIONAL ASSESSMENT: PAIN_FUNCTIONAL_ASSESSMENT: 0-10

## 2023-07-25 ASSESSMENT — PAIN SCALES - GENERAL: PAINLEVEL_OUTOF10: 4

## 2023-07-26 VITALS
HEART RATE: 83 BPM | SYSTOLIC BLOOD PRESSURE: 152 MMHG | DIASTOLIC BLOOD PRESSURE: 78 MMHG | OXYGEN SATURATION: 97 % | BODY MASS INDEX: 24.8 KG/M2 | TEMPERATURE: 98.2 F | HEIGHT: 63 IN | RESPIRATION RATE: 16 BRPM | WEIGHT: 140 LBS

## 2023-07-26 PROBLEM — R10.9 ABDOMINAL PAIN: Status: ACTIVE | Noted: 2023-07-26

## 2023-07-26 LAB
AMORPH SED URNS QL MICRO: ABNORMAL /HPF
ANION GAP SERPL CALCULATED.3IONS-SCNC: 10 MMOL/L (ref 3–16)
BACTERIA URNS QL MICRO: ABNORMAL /HPF
BASOPHILS # BLD: 0 K/UL (ref 0–0.2)
BASOPHILS NFR BLD: 0.8 %
BILIRUB UR QL STRIP.AUTO: NEGATIVE
BUN SERPL-MCNC: 6 MG/DL (ref 7–20)
CALCIUM SERPL-MCNC: 10.1 MG/DL (ref 8.3–10.6)
CHLORIDE SERPL-SCNC: 104 MMOL/L (ref 99–110)
CLARITY UR: CLEAR
CO2 SERPL-SCNC: 25 MMOL/L (ref 21–32)
COLOR UR: YELLOW
CREAT SERPL-MCNC: 0.6 MG/DL (ref 0.6–1.2)
DEPRECATED RDW RBC AUTO: 14 % (ref 12.4–15.4)
EKG ATRIAL RATE: 77 BPM
EKG DIAGNOSIS: NORMAL
EKG P AXIS: 30 DEGREES
EKG P-R INTERVAL: 192 MS
EKG Q-T INTERVAL: 456 MS
EKG QRS DURATION: 156 MS
EKG QTC CALCULATION (BAZETT): 516 MS
EKG R AXIS: -42 DEGREES
EKG T AXIS: 17 DEGREES
EKG VENTRICULAR RATE: 77 BPM
EOSINOPHIL # BLD: 0.3 K/UL (ref 0–0.6)
EOSINOPHIL NFR BLD: 5.4 %
GFR SERPLBLD CREATININE-BSD FMLA CKD-EPI: >60 ML/MIN/{1.73_M2}
GLUCOSE SERPL-MCNC: 104 MG/DL (ref 70–99)
GLUCOSE UR STRIP.AUTO-MCNC: NEGATIVE MG/DL
HCT VFR BLD AUTO: 41 % (ref 36–48)
HGB BLD-MCNC: 14.1 G/DL (ref 12–16)
HGB UR QL STRIP.AUTO: ABNORMAL
KETONES UR STRIP.AUTO-MCNC: NEGATIVE MG/DL
LEUKOCYTE ESTERASE UR QL STRIP.AUTO: ABNORMAL
LYMPHOCYTES # BLD: 1.3 K/UL (ref 1–5.1)
LYMPHOCYTES NFR BLD: 22 %
MCH RBC QN AUTO: 28.2 PG (ref 26–34)
MCHC RBC AUTO-ENTMCNC: 34.4 G/DL (ref 31–36)
MCV RBC AUTO: 82 FL (ref 80–100)
MONOCYTES # BLD: 0.4 K/UL (ref 0–1.3)
MONOCYTES NFR BLD: 7.1 %
NEUTROPHILS # BLD: 3.9 K/UL (ref 1.7–7.7)
NEUTROPHILS NFR BLD: 64.7 %
NITRITE UR QL STRIP.AUTO: NEGATIVE
PH UR STRIP.AUTO: 7.5 [PH] (ref 5–8)
PLATELET # BLD AUTO: 231 K/UL (ref 135–450)
PMV BLD AUTO: 7 FL (ref 5–10.5)
POTASSIUM SERPL-SCNC: 4 MMOL/L (ref 3.5–5.1)
PROT UR STRIP.AUTO-MCNC: NEGATIVE MG/DL
RBC # BLD AUTO: 4.99 M/UL (ref 4–5.2)
RBC #/AREA URNS HPF: ABNORMAL /HPF (ref 0–4)
SODIUM SERPL-SCNC: 139 MMOL/L (ref 136–145)
SP GR UR STRIP.AUTO: 1.01 (ref 1–1.03)
TROPONIN, HIGH SENSITIVITY: 8 NG/L (ref 0–14)
UA COMPLETE W REFLEX CULTURE PNL UR: ABNORMAL
UA DIPSTICK W REFLEX MICRO PNL UR: YES
URN SPEC COLLECT METH UR: ABNORMAL
UROBILINOGEN UR STRIP-ACNC: 0.2 E.U./DL
WBC # BLD AUTO: 6 K/UL (ref 4–11)
WBC #/AREA URNS HPF: ABNORMAL /HPF (ref 0–5)

## 2023-07-26 PROCEDURE — 6360000002 HC RX W HCPCS: Performed by: PHYSICIAN ASSISTANT

## 2023-07-26 PROCEDURE — G0378 HOSPITAL OBSERVATION PER HR: HCPCS

## 2023-07-26 PROCEDURE — 6370000000 HC RX 637 (ALT 250 FOR IP): Performed by: EMERGENCY MEDICINE

## 2023-07-26 PROCEDURE — 2580000003 HC RX 258: Performed by: EMERGENCY MEDICINE

## 2023-07-26 PROCEDURE — 6370000000 HC RX 637 (ALT 250 FOR IP): Performed by: PHYSICIAN ASSISTANT

## 2023-07-26 PROCEDURE — 36415 COLL VENOUS BLD VENIPUNCTURE: CPT

## 2023-07-26 PROCEDURE — 96366 THER/PROPH/DIAG IV INF ADDON: CPT

## 2023-07-26 PROCEDURE — 6360000002 HC RX W HCPCS: Performed by: EMERGENCY MEDICINE

## 2023-07-26 PROCEDURE — 85025 COMPLETE CBC W/AUTO DIFF WBC: CPT

## 2023-07-26 PROCEDURE — 80048 BASIC METABOLIC PNL TOTAL CA: CPT

## 2023-07-26 PROCEDURE — 93010 ELECTROCARDIOGRAM REPORT: CPT | Performed by: INTERNAL MEDICINE

## 2023-07-26 PROCEDURE — 96372 THER/PROPH/DIAG INJ SC/IM: CPT

## 2023-07-26 RX ORDER — ENOXAPARIN SODIUM 100 MG/ML
40 INJECTION SUBCUTANEOUS DAILY
Status: DISCONTINUED | OUTPATIENT
Start: 2023-07-26 | End: 2023-07-26 | Stop reason: HOSPADM

## 2023-07-26 RX ORDER — HYDROCHLOROTHIAZIDE 25 MG/1
12.5 TABLET ORAL DAILY
Status: DISCONTINUED | OUTPATIENT
Start: 2023-07-26 | End: 2023-07-26 | Stop reason: HOSPADM

## 2023-07-26 RX ORDER — PANTOPRAZOLE SODIUM 40 MG/1
40 TABLET, DELAYED RELEASE ORAL
Status: DISCONTINUED | OUTPATIENT
Start: 2023-07-26 | End: 2023-07-26 | Stop reason: HOSPADM

## 2023-07-26 RX ORDER — ACETAMINOPHEN 325 MG/1
650 TABLET ORAL EVERY 6 HOURS PRN
Status: DISCONTINUED | OUTPATIENT
Start: 2023-07-26 | End: 2023-07-26 | Stop reason: HOSPADM

## 2023-07-26 RX ORDER — FERROUS SULFATE 325(65) MG
325 TABLET ORAL 2 TIMES DAILY
Status: DISCONTINUED | OUTPATIENT
Start: 2023-07-26 | End: 2023-07-26 | Stop reason: ALTCHOICE

## 2023-07-26 RX ORDER — SODIUM CHLORIDE 0.9 % (FLUSH) 0.9 %
5-40 SYRINGE (ML) INJECTION EVERY 12 HOURS SCHEDULED
Status: DISCONTINUED | OUTPATIENT
Start: 2023-07-26 | End: 2023-07-26 | Stop reason: HOSPADM

## 2023-07-26 RX ORDER — MORPHINE SULFATE 4 MG/ML
4 INJECTION, SOLUTION INTRAMUSCULAR; INTRAVENOUS
Status: DISCONTINUED | OUTPATIENT
Start: 2023-07-26 | End: 2023-07-26 | Stop reason: HOSPADM

## 2023-07-26 RX ORDER — ONDANSETRON 2 MG/ML
4 INJECTION INTRAMUSCULAR; INTRAVENOUS EVERY 6 HOURS PRN
Status: DISCONTINUED | OUTPATIENT
Start: 2023-07-26 | End: 2023-07-26 | Stop reason: HOSPADM

## 2023-07-26 RX ORDER — HYDRALAZINE HYDROCHLORIDE 20 MG/ML
10 INJECTION INTRAMUSCULAR; INTRAVENOUS EVERY 6 HOURS PRN
Status: DISCONTINUED | OUTPATIENT
Start: 2023-07-26 | End: 2023-07-26 | Stop reason: HOSPADM

## 2023-07-26 RX ORDER — ACETAMINOPHEN 650 MG/1
650 SUPPOSITORY RECTAL EVERY 6 HOURS PRN
Status: DISCONTINUED | OUTPATIENT
Start: 2023-07-26 | End: 2023-07-26 | Stop reason: HOSPADM

## 2023-07-26 RX ORDER — SENNOSIDES A AND B 8.6 MG/1
1 TABLET, FILM COATED ORAL DAILY PRN
Status: DISCONTINUED | OUTPATIENT
Start: 2023-07-26 | End: 2023-07-26 | Stop reason: HOSPADM

## 2023-07-26 RX ORDER — MORPHINE SULFATE 2 MG/ML
2 INJECTION, SOLUTION INTRAMUSCULAR; INTRAVENOUS
Status: DISCONTINUED | OUTPATIENT
Start: 2023-07-26 | End: 2023-07-26 | Stop reason: HOSPADM

## 2023-07-26 RX ORDER — LOSARTAN POTASSIUM 100 MG/1
100 TABLET ORAL DAILY
Status: DISCONTINUED | OUTPATIENT
Start: 2023-07-26 | End: 2023-07-26 | Stop reason: HOSPADM

## 2023-07-26 RX ORDER — ALBUTEROL SULFATE 90 UG/1
2 AEROSOL, METERED RESPIRATORY (INHALATION) EVERY 6 HOURS PRN
Status: DISCONTINUED | OUTPATIENT
Start: 2023-07-26 | End: 2023-07-26 | Stop reason: HOSPADM

## 2023-07-26 RX ORDER — OLMESARTAN MEDOXOMIL AND HYDROCHLOROTHIAZIDE 40/12.5 40; 12.5 MG/1; MG/1
1 TABLET ORAL DAILY
Status: DISCONTINUED | OUTPATIENT
Start: 2023-07-26 | End: 2023-07-26 | Stop reason: CLARIF

## 2023-07-26 RX ORDER — SODIUM CHLORIDE 9 MG/ML
INJECTION, SOLUTION INTRAVENOUS PRN
Status: DISCONTINUED | OUTPATIENT
Start: 2023-07-26 | End: 2023-07-26 | Stop reason: HOSPADM

## 2023-07-26 RX ORDER — POTASSIUM CHLORIDE 7.45 MG/ML
10 INJECTION INTRAVENOUS PRN
Status: DISCONTINUED | OUTPATIENT
Start: 2023-07-26 | End: 2023-07-26 | Stop reason: HOSPADM

## 2023-07-26 RX ORDER — POTASSIUM CHLORIDE 20 MEQ/1
40 TABLET, EXTENDED RELEASE ORAL PRN
Status: DISCONTINUED | OUTPATIENT
Start: 2023-07-26 | End: 2023-07-26 | Stop reason: HOSPADM

## 2023-07-26 RX ORDER — SODIUM CHLORIDE 0.9 % (FLUSH) 0.9 %
5-40 SYRINGE (ML) INJECTION PRN
Status: DISCONTINUED | OUTPATIENT
Start: 2023-07-26 | End: 2023-07-26 | Stop reason: HOSPADM

## 2023-07-26 RX ADMIN — POTASSIUM CHLORIDE 10 MEQ: 7.46 INJECTION, SOLUTION INTRAVENOUS at 00:59

## 2023-07-26 RX ADMIN — ENOXAPARIN SODIUM 40 MG: 100 INJECTION SUBCUTANEOUS at 09:33

## 2023-07-26 RX ADMIN — SODIUM CHLORIDE 1000 ML: 9 INJECTION, SOLUTION INTRAVENOUS at 00:56

## 2023-07-26 RX ADMIN — LOSARTAN POTASSIUM 100 MG: 100 TABLET, FILM COATED ORAL at 09:33

## 2023-07-26 RX ADMIN — HYDROCHLOROTHIAZIDE 12.5 MG: 25 TABLET ORAL at 09:33

## 2023-07-26 RX ADMIN — PANTOPRAZOLE SODIUM 40 MG: 40 TABLET, DELAYED RELEASE ORAL at 05:41

## 2023-07-26 RX ADMIN — POTASSIUM CHLORIDE 40 MEQ: 750 TABLET, FILM COATED, EXTENDED RELEASE ORAL at 00:57

## 2023-07-26 NOTE — ED PROVIDER NOTES
[facility]) Emergency [address]    Bairon Alexandra MD, am the primary clinician of record. CHIEF COMPLAINT  Chief Complaint   Patient presents with    Abdominal Pain     Pt brought from home by EMS, pt states that she has left upper quadrant pain into the midline. Pt takes BP meds. Pt states nausea, gas and diarrhea yesterday. Pt given 4mg of zofran on EMS. HISTORY OF PRESENT ILLNESS  Jairo Villar is a 80 y.o. female  who presents to the ED complaining of about a day of left upper quadrant and generalized abdominal discomfort, with bloating, nausea and diarrhea. No bloody stool or hematemesis or vomiting. She says that the symptoms have progressed since they began. She arrives hypertensive and denies any chest pain or shortness of breath though. No fevers. No dysuria or hematuria. She has a hysterectomy in the past but no other abdominal surgeries when asked. No other complaints, modifying factors or associated symptoms. I have reviewed the following from the nursing documentation.     Past Medical History:   Diagnosis Date    Anxiety 2/24/2022    Bleeding ulcer     Cancer (HCC)     melanoma L IRIS    Gastroesophagitis     GERD (gastroesophageal reflux disease)     Hyperlipidemia     Hypertension     Moderate persistent asthma 2/12/2021    Pneumonia     Rheumatic fever with heart involvement     Trigger ring finger of right hand 7/1/2013    Unspecified diseases of blood and blood-forming organs     chronic anemia     Past Surgical History:   Procedure Laterality Date    BLADDER REPAIR N/A 2002    BREAST SURGERY      L breast tumor-benign    CYST REMOVAL Right 8-1-13    GANGLION CYST EXCISION RIGHT WRIST, TRIGGER FINGER RELEASE RIGHT FOURTH    EYE SURGERY      , cataract right eye    FRACTURE SURGERY      R shoulderx2-pinned    HYSTERECTOMY (CERVIX STATUS UNKNOWN)      JOINT REPLACEMENT Right 03/28/2012    Total Elbow - Dr. Shiva Swift

## 2023-07-26 NOTE — ED NOTES
Report given to [facility], RN     Abad Nunes RN  07/26/23 9716

## 2023-07-26 NOTE — DISCHARGE SUMMARY
Hospital Medicine Discharge Summary    Patient: North Valladares     Gender: female  : 1934   Age: 80 y.o. MRN: [id number]    Admitting Physician: Serjio Jeffery DO  Discharge Physician: Sydnie Chou MD     Code Status: Full Code     Admit Date: 2023   Discharge Date:   23    Disposition:  Home    Discharge Diagnoses:    Abdominal pain, diarrhea  CT negative for acute process  No need to FU C diff test as her diarrhoea has resolved. Vertigo- resolved. Hypokalemia from GI losses  Replace PRN and recheck     HTN  Continue home Olmesartan/HCTZ  IV hydralazine PRN     Asthma without exacerbation  Continue home Dulera and PRN Albuterol     GERD  Continue home PPI     Hx of duodenal bulb carcinoid ()  Follow-up appointments:  with PCP as arranged with the patient. Outpatient to do list: none    Condition at Discharge:  Stable    Hospital Course:   [place] Valladares is a 80 y.o. female who presented to ED for evaluation of LUQ abdominal pain which has been ongoing for the last day. She reports associated nausea and diarrhea. She denies vomiting. She denies blood in stool or black tarry stools. She reports symptoms have progressively worsened since they began. She reports pain has improved significantly with treatments provided in ED. BP elevated on arrival.  Patient reports she did take her BP med yesterday morning.  Patient denies fever, chest pain shortness of breath, edema, urinary symptoms    Discharge Medications:   Current Discharge Medication List        Current Discharge Medication List        Current Discharge Medication List        CONTINUE these medications which have NOT CHANGED    Details   olmesartan-hydroCHLOROthiazide (BENICAR HCT) 40-12.5 MG per tablet TAKE 1 TABLET BY MOUTH DAILY  Qty: 90 tablet, Refills: 3      SYMBICORT 160-4.5 MCG/ACT AERO INHALE 2 PUFFS INTO THE LUNGS TWICE DAILY  Qty: 30.6 g, Refills: 3    Comments: **Patient requests 90 days supply**

## 2023-07-26 NOTE — CONSULTS
Gastroenterology Consult Note        Patient: Scar Drain  : 1934  Acct#:      Date:  2023    Subjective:       History of Present Illness  Patient is a 80 y.o.  female admitted with Abdominal pain, left upper quadrant [R10.12]  Hypokalemia [E87.6]  Abdominal pain [R10.9]  Uncontrolled hypertension [I10]  Diarrhea, unspecified type [R19.7] who is seen in consult for Abdominal pain/diarrhea. Past medical history of htn, hld, asthma. Came to the ED with the primary complaint of dizziness. She reports a 1 day history of lower abdominal pain A/W nausea and diarrhea. No bloody or black stools. No recent antibiotics. She does take intermittent NSAIDs at home (about 1 every other day). Also takes Nexium daily for GERD. She reports chronic LUQ abdominal discomfort which she follows with dr. Christian Carrillo for, no complaints of this recently. Since admission her abdominal pain is resolved. She has not had a BM since admission. Dizziness ongoing.        Past Medical History:   Diagnosis Date    Anxiety 2022    Bleeding ulcer     Cancer (HCC)     melanoma L IRIS    Gastroesophagitis     GERD (gastroesophageal reflux disease)     Hyperlipidemia     Hypertension     Moderate persistent asthma 2021    Pneumonia     Rheumatic fever with heart involvement     Trigger ring finger of right hand 2013    Unspecified diseases of blood and blood-forming organs     chronic anemia      Past Surgical History:   Procedure Laterality Date    BLADDER REPAIR N/A     BREAST SURGERY      L breast tumor-benign    CYST REMOVAL Right 13    GANGLION CYST EXCISION RIGHT WRIST, TRIGGER FINGER RELEASE RIGHT FOURTH    EYE SURGERY      , cataract right eye    FRACTURE SURGERY      R shoulderx2-pinned    HYSTERECTOMY (CERVIX STATUS UNKNOWN)      JOINT REPLACEMENT Right 2012    Total Elbow - Dr. Bob Montiel   07/26/23  0555   WBC 6.7 6.0   HGB 13.5 14.1   HCT 40.7 41.0   MCV 82.8 82.0    231     Recent Labs     07/25/23  2243   *   K 2.7*   CL 95*   CO2 26   BUN 8   CREATININE 0.7     Recent Labs     07/25/23  2243   AST 21   ALT 11   BILITOT 0.5   ALKPHOS 163*     Recent Labs     07/25/23  2243   LIPASE 43.0     No results for input(s): PROTIME, INR in the last 72 hours. No results for input(s): PTT in the last 72 hours. No results for input(s): OCCULTBLD in the last 72 hours. Imaging Studies:                                    CT-scan of abdomen and pelvis W IV contrast 7/6/23:    FINDINGS:  Lower Chest: No acute findings. Organs: The liver, gallbladder, pancreas, spleen, and adrenals reveal no  acute findings. Bilateral renal cysts. Mild dilatation of the renal  collecting system bilaterally, likely due to bladder distension. No stones  identified. GI/Bowel: There is no bowel dilatation or wall thickening identified. Normal  appendix. Pelvis: The bladder is well distended. Peritoneum/Retroperitoneum: No free air or free fluid. The aorta is normal  in caliber. The visceral branches are patent. No lymphadenopathy. Bones/Soft Tissues: No abnormality identified. *Unless otherwise specified, incidental findings do not require dedicated  imaging follow-up. IMPRESSION:  1. No acute inflammatory process identified. 2.  Distended bladder. Assessment/Plan:     Abdominal pain: CT with no acute process. CBC, Lipase normal. LFT with mild Alkphos elevation. Pain has since resolved since admission. Diarrhea: For one day, wattery BM. No recent antibotics. No BM since admission.  No colitis on CT scan    Hypokalemia: K 2.7    Plan:  - Stool studies for cdiff, gi bacterial pathogens, EIA  - Continue PPI  - avoid NSAIDs  - Continue diet, okay to d/c if tolerating  - follow up with dr. Hearn Mercury   - potassium replacement per primary service     Discussed with Dr. Roberto

## 2023-07-26 NOTE — PROGRESS NOTES
4 Eyes Skin Assessment     NAME:  Daisha Benedict  YOB: 1934  MEDICAL RECORD NUMBER:  [id number]    The patient is being assessed for  Admission    I agree that at least one RN has performed a thorough Head to Toe Skin Assessment on the patient. ALL assessment sites listed below have been assessed. Areas assessed by both nurses:    Head, Face, Ears, Shoulders, Back, Chest, Arms, Elbows, Hands, Sacrum. Buttock, Coccyx, Ischium, Legs. Feet and Heels, and Under Medical Devices         Does the Patient have a Wound?  No noted wound(s)       Billy Prevention initiated by RN: Yes  Wound Care Orders initiated by RN: No    Pressure Injury (Stage 3,4, Unstageable, DTI, NWPT, and Complex wounds) if present, place Wound referral order by RN under : No    New Ostomies, if present place, Ostomy referral order under : No     Nurse 1 eSignature: Electronically signed by Sophy Wheeler RN on 7/26/23 at 3:27 AM EDT    **SHARE this note so that the co-signing nurse can place an eSignature**    Nurse 2 eSignature: Electronically signed by Sonia Mcmullen RN on 7/26/23 at 3:47 AM EDT

## 2023-07-26 NOTE — PROGRESS NOTES
Discharge instructions given, all questions answered. No medications given. IV removed with tip intact. Nurse wheeled patient to car.   Unknown Fines, RN

## 2023-07-26 NOTE — H&P
[facility] Medicine History & Physical      Patient Name: Koko Montaño    : 1934    PCP: Tess Rinaldi, APRN - CNP    Date of Service:  Patient seen and examined on 2023     Chief Complaint:  Abdominal pain    History Of Present Illness:    Koko Montaño is a 80 y.o. female who presented to ED for evaluation of LUQ abdominal pain which has been ongoing for the last day. She reports associated nausea and diarrhea. She denies vomiting. She denies blood in stool or black tarry stools. She reports symptoms have progressively worsened since they began. She reports pain has improved significantly with treatments provided in ED. BP elevated on arrival.  Patient reports she did take her BP med yesterday morning. Patient denies fever, chest pain shortness of breath, edema, urinary symptoms. Past Medical History:    Patient has a past medical history of Anxiety, Bleeding ulcer, Cancer ([address]), Gastroesophagitis, GERD (gastroesophageal reflux disease), Hyperlipidemia, Hypertension, Moderate persistent asthma, Pneumonia, Rheumatic fever with heart involvement, Trigger ring finger of right hand, and Unspecified diseases of blood and blood-forming organs. Past Surgical History:    Patient has a past surgical history that includes Hysterectomy; Tonsillectomy; tumor removal (); Rectocele repair; Breast surgery; fracture surgery; Upper gastrointestinal endoscopy (3/7/12); bladder repair (N/A, ); cyst removal (Right, 13); joint replacement (Right, 2012); shoulder surgery (Right); Upper gastrointestinal endoscopy (13); Upper gastrointestinal endoscopy (14); eye surgery; Upper gastrointestinal endoscopy (3/17/16); Upper gastrointestinal endoscopy (2017); and Upper gastrointestinal endoscopy (N/A, 2021). Medications Prior to Admission:      Prior to Admission medications    Medication Sig Start Date End Date Taking?  Authorizing Provider 06/19/2019 05:07 AM 64 (H) 40 - 60 mg/dL Final   03/07/2012 08:15 AM 60 40 - 60 mg/dl Final     LDL Calculated   Date/Time Value Ref Range Status   06/19/2019 05:07  (H) <100 mg/dL Final         Radiology:     CT ABDOMEN PELVIS W IV CONTRAST Additional Contrast? None   Final Result   1. No acute inflammatory process identified. 2.  Distended bladder.          XR CHEST PORTABLE   Final Result      Lungs are clear             ASSESSMENT/PLAN:    Abdominal pain, diarrhea  CT negative for acute process  Check C diff culture, GI pathogen by PCR  Pain control  GI consulted    Hypokalemia  Likely due to diarrhea  Replace PRN and recheck    HTN  Continue home Olmesartan/HCTZ  IV hydralazine PRN    Asthma without exacerbation  Continue home Dulera and PRN Albuterol    GERD  Continue home PPI    Hx of duodenal bulb carcinoid (2013)       DVT prophylaxis: Lovenox  Probiotic if on abx: N/A    Diet: Diet NPO Exceptions are: Sips of Water with Meds  Code Status: Full Code    Consults:  IP CONSULT TO HOSPITALIST  IP CONSULT TO GI    Disposition: Place in observation  ELOS: Less than two midnights    Milla Hoyt PA-C

## 2023-07-27 ENCOUNTER — TELEPHONE (OUTPATIENT)
Dept: INTERNAL MEDICINE CLINIC | Age: 88
End: 2023-07-27

## 2023-07-27 NOTE — TELEPHONE ENCOUNTER
Care Transitions Initial Follow Up Call    Outreach made within 2 business days of discharge: Yes    Patient: Mayela Ravi Patient : 1934   MRN: [id number]  Reason for Admission: There are no discharge diagnoses documented for the most recent discharge.   Discharge Date: 23       Spoke with: JAIME     Discharge department/facility: - [facility]        Scheduled appointment with PCP within 7-14 days    Follow Up  Future Appointments   Date Time Provider [address] [place] [address]   2023  1:00 PM ROSE Anderson - CNP [facility] - JENNIFER   10/20/2023 11:00 AM Brayan Ingram MD [facility] & [facility]       Vasile Varghese MA

## 2023-07-31 ENCOUNTER — OFFICE VISIT (OUTPATIENT)
Dept: ORTHOPEDIC SURGERY | Age: 88
End: 2023-07-31
Payer: COMMERCIAL

## 2023-07-31 VITALS — HEIGHT: 63 IN | RESPIRATION RATE: 14 BRPM | BODY MASS INDEX: 24.8 KG/M2 | WEIGHT: 140 LBS

## 2023-07-31 DIAGNOSIS — M25.511 RIGHT SHOULDER PAIN, UNSPECIFIED CHRONICITY: Primary | ICD-10-CM

## 2023-07-31 DIAGNOSIS — S42.024A CLOSED NONDISPLACED FRACTURE OF SHAFT OF RIGHT CLAVICLE, INITIAL ENCOUNTER: ICD-10-CM

## 2023-07-31 PROCEDURE — 99213 OFFICE O/P EST LOW 20 MIN: CPT | Performed by: PHYSICIAN ASSISTANT

## 2023-07-31 PROCEDURE — 1123F ACP DISCUSS/DSCN MKR DOCD: CPT | Performed by: PHYSICIAN ASSISTANT

## 2023-07-31 PROCEDURE — L3660 SO 8 AB RSTR CAN/WEB PRE OTS: HCPCS | Performed by: PHYSICIAN ASSISTANT

## 2023-07-31 NOTE — PROGRESS NOTES
Subjective:      Patient ID: Nimco Romeo is a 80 y.o. female who presents to the in the [address] [place] [address]  for an initial evaluation of right mid clavicle pain after into a door frame on 7/30/2023. Pain Scale 10/10 VAS. Location of pain right clavicle. Pain is worse with movement of the right shoulder. Pain improves with rest.   Previous treatments have included tylenol. Review of Systems:  I have reviewed the clinically relevant past medical history, medications, allergies, family history, social history, and 13 point Review of Systems from the patient's recent history form & documented any details relevant to today's presenting complaints in the history above. The patient's self-reported past medical history, medications, allergies, family history, social history, and Review of Systems form from today's date have been scanned into the chart under the \"Media\" tab. As noted in the HPI. Negative for fever or chills. Negative for poly-joint pain, swelling and stiffness. Negative for numbness or tingling.      Past Medical History:   Diagnosis Date    Anxiety 2/24/2022    Bleeding ulcer     Cancer (HCC)     melanoma L IRIS    Gastroesophagitis     GERD (gastroesophageal reflux disease)     Hyperlipidemia     Hypertension     Moderate persistent asthma 2/12/2021    Pneumonia     Rheumatic fever with heart involvement     Trigger ring finger of right hand 7/1/2013    Unspecified diseases of blood and blood-forming organs     chronic anemia       Family History   Problem Relation Age of Onset    Heart Disease Mother     High Blood Pressure Mother     High Cholesterol Mother     Heart Disease Father     High Blood Pressure Father     High Cholesterol Father     Cancer Sister     Stroke Sister     High Blood Pressure Sister     High Cholesterol Sister     High Blood Pressure Brother     High Cholesterol Brother     Heart Attack Brother     Cancer Brother     Heart Attack

## 2023-08-03 ENCOUNTER — OFFICE VISIT (OUTPATIENT)
Dept: INTERNAL MEDICINE CLINIC | Age: 88
End: 2023-08-03

## 2023-08-03 VITALS
HEART RATE: 94 BPM | BODY MASS INDEX: 25.72 KG/M2 | OXYGEN SATURATION: 98 % | DIASTOLIC BLOOD PRESSURE: 87 MMHG | SYSTOLIC BLOOD PRESSURE: 148 MMHG | WEIGHT: 145.2 LBS

## 2023-08-03 DIAGNOSIS — R42 DIZZINESS: ICD-10-CM

## 2023-08-03 DIAGNOSIS — Z09 HOSPITAL DISCHARGE FOLLOW-UP: Primary | ICD-10-CM

## 2023-08-03 DIAGNOSIS — E87.6 HYPOKALEMIA: ICD-10-CM

## 2023-08-03 DIAGNOSIS — R51.9 NONINTRACTABLE HEADACHE, UNSPECIFIED CHRONICITY PATTERN, UNSPECIFIED HEADACHE TYPE: ICD-10-CM

## 2023-08-03 DIAGNOSIS — M54.2 NECK PAIN: ICD-10-CM

## 2023-08-04 LAB
ANION GAP SERPL CALCULATED.3IONS-SCNC: 11 MMOL/L (ref 3–16)
BUN SERPL-MCNC: 10 MG/DL (ref 7–20)
CALCIUM SERPL-MCNC: 10.6 MG/DL (ref 8.3–10.6)
CHLORIDE SERPL-SCNC: 94 MMOL/L (ref 99–110)
CO2 SERPL-SCNC: 28 MMOL/L (ref 21–32)
CREAT SERPL-MCNC: 0.8 MG/DL (ref 0.6–1.2)
GFR SERPLBLD CREATININE-BSD FMLA CKD-EPI: >60 ML/MIN/{1.73_M2}
GLUCOSE SERPL-MCNC: 105 MG/DL (ref 70–99)
MAGNESIUM SERPL-MCNC: 2 MG/DL (ref 1.8–2.4)
POTASSIUM SERPL-SCNC: 4.2 MMOL/L (ref 3.5–5.1)
SODIUM SERPL-SCNC: 133 MMOL/L (ref 136–145)

## 2023-08-04 NOTE — RESULT ENCOUNTER NOTE
Patient potassium is back to normal.  Slightly low sodium v chronic probably related to using antihypertensive medicine.   No change of medicine

## 2023-08-08 ENCOUNTER — TELEPHONE (OUTPATIENT)
Dept: INTERNAL MEDICINE CLINIC | Age: 88
End: 2023-08-08

## 2023-08-08 NOTE — TELEPHONE ENCOUNTER
----- Message from Angelita Rosas sent at 8/8/2023 11:29 AM EDT -----  Subject: Message to Provider    QUESTIONS  Information for Provider? Pt is requesting more steroids and antibiotic   for a cough. She received these back in 6/14 and would like another dose. She said her cough has returned. She is unable to come in to the practice   due to transportation and she broke her collarbone. Please call back to   advise if this can be prescribed  ---------------------------------------------------------------------------  --------------  Stephanie HILL  [phone]; OK to leave message on voicemail  ---------------------------------------------------------------------------  --------------  SCRIPT ANSWERS  Relationship to Patient?  Self

## 2023-08-10 ENCOUNTER — HOSPITAL ENCOUNTER (OUTPATIENT)
Age: 88
Discharge: HOME | End: 2023-08-10
Payer: COMMERCIAL

## 2023-08-10 ENCOUNTER — HOSPITAL ENCOUNTER (OUTPATIENT)
Dept: GENERAL RADIOLOGY | Age: 88
Discharge: HOME | End: 2023-08-10
Payer: COMMERCIAL

## 2023-08-10 ENCOUNTER — OFFICE VISIT (OUTPATIENT)
Dept: INTERNAL MEDICINE CLINIC | Age: 88
End: 2023-08-10

## 2023-08-10 ENCOUNTER — OFFICE VISIT (OUTPATIENT)
Dept: ORTHOPEDIC SURGERY | Age: 88
End: 2023-08-10

## 2023-08-10 VITALS — BODY MASS INDEX: 25.16 KG/M2 | HEIGHT: 63 IN | WEIGHT: 142 LBS

## 2023-08-10 VITALS
DIASTOLIC BLOOD PRESSURE: 82 MMHG | SYSTOLIC BLOOD PRESSURE: 136 MMHG | OXYGEN SATURATION: 96 % | BODY MASS INDEX: 25.26 KG/M2 | HEART RATE: 87 BPM | WEIGHT: 142.6 LBS

## 2023-08-10 DIAGNOSIS — S42.021A CLOSED DISPLACED FRACTURE OF SHAFT OF RIGHT CLAVICLE, INITIAL ENCOUNTER: Primary | ICD-10-CM

## 2023-08-10 DIAGNOSIS — J20.9 ACUTE BRONCHITIS, UNSPECIFIED ORGANISM: Primary | ICD-10-CM

## 2023-08-10 DIAGNOSIS — R42 DIZZINESS: ICD-10-CM

## 2023-08-10 DIAGNOSIS — R06.2 WHEEZING: ICD-10-CM

## 2023-08-10 DIAGNOSIS — J45.41 MODERATE PERSISTENT ASTHMATIC BRONCHITIS WITH ACUTE EXACERBATION: ICD-10-CM

## 2023-08-10 DIAGNOSIS — J20.9 ACUTE BRONCHITIS, UNSPECIFIED ORGANISM: ICD-10-CM

## 2023-08-10 DIAGNOSIS — M54.2 NECK PAIN: ICD-10-CM

## 2023-08-10 PROCEDURE — 72040 X-RAY EXAM NECK SPINE 2-3 VW: CPT

## 2023-08-10 PROCEDURE — 71046 X-RAY EXAM CHEST 2 VIEWS: CPT

## 2023-08-10 RX ORDER — BENZONATATE 100 MG/1
100 CAPSULE ORAL EVERY 6 HOURS PRN
Qty: 30 CAPSULE | Refills: 2 | Status: SHIPPED | OUTPATIENT
Start: 2023-08-10

## 2023-08-10 RX ORDER — PREDNISONE 20 MG/1
40 TABLET ORAL DAILY
Qty: 14 TABLET | Refills: 0 | Status: SHIPPED | OUTPATIENT
Start: 2023-08-10 | End: 2023-08-17

## 2023-08-10 RX ORDER — DOXYCYCLINE HYCLATE 100 MG
100 TABLET ORAL 2 TIMES DAILY
Qty: 20 TABLET | Refills: 0 | Status: SHIPPED | OUTPATIENT
Start: 2023-08-10 | End: 2023-08-20

## 2023-08-10 ASSESSMENT — ENCOUNTER SYMPTOMS
COUGH: 1
TROUBLE SWALLOWING: 0
WHEEZING: 1
VOICE CHANGE: 0

## 2023-08-10 NOTE — RESULT ENCOUNTER NOTE
Atypical pneumonia. Antibiotic doxycycline as prescribed. Continue prednisone. Pending COVID-19. Please advise patient if any worsening shortness of breath or wheezing, or any worsening of any symptoms she should call us back or go to emergency room.

## 2023-08-11 LAB — SARS-COV-2 RNA RESP QL NAA+PROBE: NOT DETECTED

## 2023-08-12 PROBLEM — M25.511 RIGHT SHOULDER PAIN: Status: ACTIVE | Noted: 2023-08-12

## 2023-08-12 NOTE — PROGRESS NOTES
CHIEF COMPLAINT: Right shoulder pain/ minimally displaced midshaft clavicle fracture. DATE OF INJURY: 7/30/2023, DOT 8/10/2023    HISTORY:  Ms. Arina Pak is a 80 y.o.  female right handed who presents today for evaluation of a right shoulder injury. The patient reports that this injury occurred when fell. She was first seen and evaluated in [address] [place] [address], when she was x-rayed and splinted, and asked to f/u with me. The patient denies any other injuries. Movement makes the pain worse, the sling and resting makes the pain better. No numbness or tingling sensation.       Past Medical History:   Diagnosis Date    Anxiety 2/24/2022    Bleeding ulcer     Cancer ([address])     melanoma L IRIS    Gastroesophagitis     GERD (gastroesophageal reflux disease)     Hyperlipidemia     Hypertension     Moderate persistent asthma 2/12/2021    Pneumonia     Rheumatic fever with heart involvement     Trigger ring finger of right hand 7/1/2013    Unspecified diseases of blood and blood-forming organs     chronic anemia       Past Surgical History:   Procedure Laterality Date    BLADDER REPAIR N/A 2002    BREAST SURGERY      L breast tumor-benign    CYST REMOVAL Right 8-1-13    GANGLION CYST EXCISION RIGHT WRIST, TRIGGER FINGER RELEASE RIGHT FOURTH    EYE SURGERY      , cataract right eye    FRACTURE SURGERY      R shoulderx2-pinned    HYSTERECTOMY (CERVIX STATUS UNKNOWN)      JOINT REPLACEMENT Right 03/28/2012    Total Elbow - Dr. Raissa Cabello REMOVAL  2013    carcinoid    UPPER GASTROINTESTINAL ENDOSCOPY  3/7/12    with bx    UPPER GASTROINTESTINAL ENDOSCOPY  11/8/13    EUS    UPPER GASTROINTESTINAL ENDOSCOPY  4/24/14    UPPER GASTROINTESTINAL ENDOSCOPY  3/17/16    push enteroscopy with ablation    UPPER GASTROINTESTINAL ENDOSCOPY  06/29/2017    UPPER GASTROINTESTINAL ENDOSCOPY N/A 8/11/2021    EGD ULTRASOUND OF STOMACH performed by Bridgett Park MD at [address] [place] [address]

## 2023-08-15 ENCOUNTER — HOSPITAL ENCOUNTER (OUTPATIENT)
Dept: MRI IMAGING | Age: 88
Discharge: HOME | End: 2023-08-15
Attending: INTERNAL MEDICINE
Payer: COMMERCIAL

## 2023-08-15 DIAGNOSIS — R42 DIZZINESS: ICD-10-CM

## 2023-08-15 DIAGNOSIS — R51.9 NONINTRACTABLE HEADACHE, UNSPECIFIED CHRONICITY PATTERN, UNSPECIFIED HEADACHE TYPE: ICD-10-CM

## 2023-08-15 PROCEDURE — 70551 MRI BRAIN STEM W/O DYE: CPT

## 2023-08-17 NOTE — RESULT ENCOUNTER NOTE
Chronic microvascular disease affecting the brain also brain size has been decreased. This finding should not cause her dizziness.   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

## 2023-09-06 ENCOUNTER — OFFICE VISIT (OUTPATIENT)
Dept: INTERNAL MEDICINE CLINIC | Age: 88
End: 2023-09-06

## 2023-09-06 VITALS
HEIGHT: 63 IN | SYSTOLIC BLOOD PRESSURE: 142 MMHG | OXYGEN SATURATION: 97 % | DIASTOLIC BLOOD PRESSURE: 90 MMHG | BODY MASS INDEX: 25.02 KG/M2 | HEART RATE: 108 BPM | WEIGHT: 141.2 LBS

## 2023-09-06 DIAGNOSIS — J45.40 MODERATE PERSISTENT ASTHMA WITHOUT COMPLICATION: ICD-10-CM

## 2023-09-06 DIAGNOSIS — H91.93 BILATERAL HEARING LOSS, UNSPECIFIED HEARING LOSS TYPE: ICD-10-CM

## 2023-09-06 DIAGNOSIS — R42 DIZZINESS: Primary | ICD-10-CM

## 2023-09-06 DIAGNOSIS — I10 WHITE COAT SYNDROME WITH DIAGNOSIS OF HYPERTENSION: ICD-10-CM

## 2023-09-06 DIAGNOSIS — M54.2 NECK PAIN: ICD-10-CM

## 2023-09-06 DIAGNOSIS — M47.812 CERVICAL SPINE ARTHRITIS: ICD-10-CM

## 2023-09-06 ASSESSMENT — ENCOUNTER SYMPTOMS
NAUSEA: 0
WHEEZING: 0
TROUBLE SWALLOWING: 0
VOICE CHANGE: 0
SHORTNESS OF BREATH: 0
PHOTOPHOBIA: 0

## 2023-09-06 NOTE — PROGRESS NOTES
[facility] Carlos Keita, Audiology, [facility]    White coat syndrome with diagnosis of hypertension  Also have hypertension. Patient report her blood pressure always excellent at home. She will continue to monitor blood pressure.   Continue current medical hydrochlorothiazide          Orders Placed This Encounter   Procedures    [address]ise MANDUJANO, Sneha Singleton DO, Otolaryngology, [facility]     Referral Priority:   Routine     Referral Type:   Eval and Treat     Referral Reason:   Specialty Services Required     Referred to Provider:   Candice Scott DO     Requested Specialty:   Otolaryngology     Number of Visits Requested:   [facility] Carlos Keita, Audiology, [facility]     Referral Priority:   Routine     Referral Type:   Eval and Treat     Referral Reason:   Specialty Services Required     Referred to Provider:   Idalia Keita     Requested Specialty:   Audiology     Number of Visits Requested:   1     Current Outpatient Medications   Medication Sig Dispense Refill    benzonatate (TESSALON PERLES) 100 MG capsule Take 1 capsule by mouth every 6 hours as needed for Cough 30 capsule 2    olmesartan-hydroCHLOROthiazide (BENICAR HCT) 40-12.5 MG per tablet TAKE 1 TABLET BY MOUTH DAILY 90 tablet 3    SYMBICORT 160-4.5 MCG/ACT AERO INHALE 2 PUFFS INTO THE LUNGS TWICE DAILY 30.6 g 3    albuterol sulfate HFA (PROVENTIL;VENTOLIN;PROAIR) 108 (90 Base) MCG/ACT inhaler INHALE 2 PUFFS INTO THE LUNGS FOUR TIMES DAILY AS NEEDED FOR WHEEZING 54 g 2    ondansetron (ZOFRAN ODT) 4 MG disintegrating tablet Take 1 tablet by mouth every 8 hours as needed for Nausea or Vomiting 20 tablet 0    ibuprofen (ADVIL;MOTRIN) 400 MG tablet Take 1 tablet by mouth every 6 hours as needed for Pain      TURMERIC PO Take by mouth daily       esomeprazole (NEXIUM) 40 MG delayed release capsule Take 1 capsule by mouth every morning (before breakfast)      Coenzyme Q10 (COQ-10) 100 MG CPCR

## 2023-09-21 ENCOUNTER — OFFICE VISIT (OUTPATIENT)
Dept: ORTHOPEDIC SURGERY | Age: 88
End: 2023-09-21

## 2023-09-21 VITALS — WEIGHT: 141 LBS | HEIGHT: 63 IN | BODY MASS INDEX: 24.98 KG/M2

## 2023-09-21 DIAGNOSIS — M25.511 RIGHT SHOULDER PAIN, UNSPECIFIED CHRONICITY: Primary | ICD-10-CM

## 2023-09-21 PROCEDURE — 99024 POSTOP FOLLOW-UP VISIT: CPT | Performed by: ORTHOPAEDIC SURGERY

## 2023-09-21 NOTE — PROGRESS NOTES
deformity right shoulder. There is minimal swelling and no ecchymosis. She is not tender to palpation over the clavicle, and otherwise nontender over the remainder of the extremity. Range of motion is good of the right shoulder. The skin overlying the right shoulder is intact without evidence of lesion, laceration or skin tenting. Distal pulses are 2+ and symmetric bilaterally. Sensation is grossly intact to light touch and symmetric bilaterally. IMAGING:  Xrays taken today in the office, 2 views of right clavicle were reviewed, and showed minimally displaced clavicle fracture with callus formation. IMPRESSION:  Right minimally displaced clavicle fracture. PLAN:  I discussed that the overall alignment of this fracture is still good and healing well. We discussed the risk of nonunion and or malunion. We will see her  back in 2 months at which time we will get a new xray of the right clavicle.          Chikis Jasso MD

## 2023-09-27 ENCOUNTER — PROCEDURE VISIT (OUTPATIENT)
Dept: AUDIOLOGY | Age: 88
End: 2023-09-27
Payer: COMMERCIAL

## 2023-09-27 ENCOUNTER — OFFICE VISIT (OUTPATIENT)
Dept: ENT CLINIC | Age: 88
End: 2023-09-27
Payer: COMMERCIAL

## 2023-09-27 VITALS
OXYGEN SATURATION: 97 % | HEIGHT: 63 IN | WEIGHT: 144 LBS | BODY MASS INDEX: 25.52 KG/M2 | DIASTOLIC BLOOD PRESSURE: 87 MMHG | SYSTOLIC BLOOD PRESSURE: 141 MMHG | HEART RATE: 69 BPM | TEMPERATURE: 97.6 F

## 2023-09-27 DIAGNOSIS — H90.3 BILATERAL SENSORINEURAL HEARING LOSS: Primary | Chronic | ICD-10-CM

## 2023-09-27 DIAGNOSIS — R42 DIZZINESS: ICD-10-CM

## 2023-09-27 DIAGNOSIS — H90.3 SENSORINEURAL HEARING LOSS, BILATERAL: Primary | ICD-10-CM

## 2023-09-27 PROCEDURE — 99203 OFFICE O/P NEW LOW 30 MIN: CPT | Performed by: OTOLARYNGOLOGY

## 2023-09-27 PROCEDURE — 92567 TYMPANOMETRY: CPT

## 2023-09-27 PROCEDURE — 1123F ACP DISCUSS/DSCN MKR DOCD: CPT | Performed by: OTOLARYNGOLOGY

## 2023-09-27 PROCEDURE — 92557 COMPREHENSIVE HEARING TEST: CPT

## 2023-09-27 NOTE — PROGRESS NOTES
[address]         Chief Complaint   Patient presents with    Hearing Loss       HISTORY       Zackary Marie is a 80 y.o. female here for follow up of hearing loss. Has had noss of hearing with inability to hear in the right ear since 15years old. Has had progressive loss of hearing in the left ear for about two years. Would like to have a better functioning hearing aid for the left ear. She expressed that she is not interested in a BICROS hearing aid or a cochlear implant. ROS  No Fever or chills. PND, \"I always have that. \"  No ENT pain or drainage       EXAMINATION   WDWN, NAD  Ears: TM and EACs appeared to be normal.          AUDIOGRAM  (today):  I reviewed the results of the audiogram, showing bilateral left mild to profound sensorineural hearing loss and a profound right sensorineural hearing loss, essentially anacusic right ear. COUNSELING    Audiogram results were reviewed and discussed with Mitch Judge. I advised of type and severity of hearing loss and the probable etiology of hearing loss of aging (presbycusis). I discussed the possible associated impairment. I advised of the need for avoidance of prolonged or frequent exposure to excessive noise and the need for noise protection, if such exposure is unavoidable. I discussed the possible benefits of hearing aids, or other amplification therapy. I discussed BICROS hearing aids, left direct hearing aid and cochlear implant. She expressed interest in a direct left hearing aid and lack of interest in BICROS hearing aids or cochlear implant. I discussed the possible etiology of tinnitus and treatment/coping measures including masking techniques. I advised of the need for annual and prn hearing tests.   Patient was advised of the greater decrease in word and speech understanding in the presence of background noise and of the expected difficulty

## 2023-09-27 NOTE — PROGRESS NOTES
Mohsen Mancera   2/23/1934, 80 y.o. female   [id number]       Referring Provider: Mildred Resendez MD   Referral Type: In an order in [address] [place] [address]    Reason for Visit: Evaluation of suspected change in hearing, tinnitus, or balance. ADULT AUDIOLOGIC EVALUATION      Mohsen Mancera is a 80 y.o. female seen today, 9/27/2023 , for an initial audiologic evaluation. Patient was seen by Pedro Luis Del Castillo MD following today's evaluation. AUDIOLOGIC AND OTHER PERTINENT MEDICAL HISTORY:      Mohsen Mancera reports history of right ear deafness since she was 15years old after having mumps. She has noticed a decline in hearing in the left ear over the years and it has been quite some time since her last hearing test. She notes some occasional tinnitus bilaterally, but her main concern is dizziness within the last couple of months. She notes things are starting to move around her and can last for a moment before going away. Positive for several falls throughout the years for various reasons aside from dizziness. She denied otalgia, aural fullness, history of noise exposure, history of head trauma, history of ear surgery, and family history of hearing loss    Date: 9/27/2023     IMPRESSIONS:      Today's results revealed asymmetric sensorineural hearing loss ,RE worse than left. Excellent speech understanding when in quiet in the left, could not test in the right due to profound hearing loss. Tympanometry indicates normal middle ear function. Discussed test results and implications with patient. Briefly discussed options for traditional hearing aid in the left ear versus BiCROS amplification. Return for hearing aid evaluation if motivated. .   Follow medical recommendations of Pedro Luis Del Castillo MD.     ASSESSMENT AND FINDINGS:     Otoscopy revealed clear canals Au .     RIGHT EAR:  Hearing Sensitivity: Profound sensorineural hearing loss   Speech Recognition Threshold: NR dB HL  Word Recognition: CNT  Tympanometry: Normal peak pressure

## 2023-09-27 NOTE — PATIENT INSTRUCTIONS
of this treatment. NO Q-TIPS IN THE EARS  You should never clean your ears with a Q-tip, cotton tipped applicator, Adriana pin, paper clip, pen cap, nail file, or any other instrument. I recommend only use of one the several ear wax removal kits available \"over the counter\" (eg. [employer] or [employer], or [employer]) if you feel a need to try to remove ear wax. No other methods should be self used for this purpose as there is danger of injury to the ear and risk of irreparable and irreversible permanent hearing loss and permanent dizziness.

## 2023-10-19 DIAGNOSIS — J45.30 MILD PERSISTENT ASTHMA WITHOUT COMPLICATION: ICD-10-CM

## 2023-10-19 RX ORDER — ALBUTEROL SULFATE 90 UG/1
AEROSOL, METERED RESPIRATORY (INHALATION)
Qty: 54 G | Refills: 2 | Status: SHIPPED | OUTPATIENT
Start: 2023-10-19

## 2023-10-25 ENCOUNTER — TELEPHONE (OUTPATIENT)
Dept: INTERNAL MEDICINE CLINIC | Age: 88
End: 2023-10-25

## 2023-10-25 NOTE — TELEPHONE ENCOUNTER
Az Fox at [facility] is calling ---need verbal orders for nursing-PT and OT---he can be reached at [phone]---ISMAEL

## 2023-10-25 NOTE — TELEPHONE ENCOUNTER
Patient stated she will call her insurance at some point  - she doesn't feel the need to call now - she is breathing fine. She has a HFU visit 10/30 and can discuss this further with Dr. Bob Ramos then. She will call us back should symptoms change/worsen or if she is knows of an alternative covered by insurance.

## 2023-10-25 NOTE — TELEPHONE ENCOUNTER
----- Message from Rafal Bansal sent at 10/25/2023 12:28 PM EDT -----  Subject: Hospital Follow Up    QUESTIONS  What hospital [facility] the [facility]   Date of Discharge? 2023-10-23  Discharge Location? Home  Reason for hospitalization as patient stated? Chest pains & trouble   breathing. What question does the patient have, if applicable? Patient has current   appt on 10/30/2023 @ 4:20 PM. She needs to try & reschedule to the morning   due to granddaughter can bring her at that time. ---------------------------------------------------------------------------  --------------  [employer]  What is the best way for the office to contact you? OK to leave message on   voicemail  Preferred Call Back Phone Number? [phone]  ---------------------------------------------------------------------------  --------------  SCRIPT ANSWERS  Relationship to Patient? Self  \"Have your symptoms changed? \" (If routine visit such as AWV, Physical, PAP   or Well Child Visit then answer no)?  No

## 2023-10-25 NOTE — TELEPHONE ENCOUNTER
----- Message from Rigo Hung sent at 10/25/2023 12:28 PM EDT -----  Subject: Hospital Follow Up    QUESTIONS  What hospital [facility] the [facility]   Date of Discharge? 2023-10-23  Discharge Location? Home  Reason for hospitalization as patient stated? Chest pains & trouble   breathing. What question does the patient have, if applicable? Patient has current   appt on 10/30/2023 @ 4:20 PM. She needs to try & reschedule to the morning   due to granddaughter can bring her at that time. ---------------------------------------------------------------------------  --------------  Kae January SERGIO  What is the best way for the office to contact you? OK to leave message on   voicemail  Preferred Call Back Phone Number? [phone]  ---------------------------------------------------------------------------  --------------  SCRIPT ANSWERS  Relationship to Patient? Self  \"Have your symptoms changed? \" (If routine visit such as AWV, Physical, PAP   or Well Child Visit then answer no)?  No

## 2023-10-25 NOTE — TELEPHONE ENCOUNTER
HFU visit changed to 10/30 @ [phone]. Pt asking about mometasone-formoterol (DULERA) 200-5 MCG/ACT AERO which was prescribed during her hospital stay. Med is too costly. Is there an alternative for patient? She was not given any info from pharmacy about an alternative. Please advise.

## 2023-10-30 ENCOUNTER — OFFICE VISIT (OUTPATIENT)
Dept: INTERNAL MEDICINE CLINIC | Age: 88
End: 2023-10-30

## 2023-10-30 ENCOUNTER — TELEPHONE (OUTPATIENT)
Dept: INTERNAL MEDICINE CLINIC | Age: 88
End: 2023-10-30

## 2023-10-30 VITALS
HEART RATE: 92 BPM | BODY MASS INDEX: 24.8 KG/M2 | WEIGHT: 140 LBS | SYSTOLIC BLOOD PRESSURE: 130 MMHG | DIASTOLIC BLOOD PRESSURE: 70 MMHG | OXYGEN SATURATION: 99 %

## 2023-10-30 DIAGNOSIS — E87.1 HYPONATREMIA: ICD-10-CM

## 2023-10-30 DIAGNOSIS — J45.40 MODERATE PERSISTENT ASTHMA WITHOUT COMPLICATION: ICD-10-CM

## 2023-10-30 DIAGNOSIS — R41.0 CONFUSION: ICD-10-CM

## 2023-10-30 DIAGNOSIS — R93.89 ABNORMAL CT OF THE CHEST: ICD-10-CM

## 2023-10-30 DIAGNOSIS — Z09 HOSPITAL DISCHARGE FOLLOW-UP: Primary | ICD-10-CM

## 2023-10-30 DIAGNOSIS — I21.4 NSTEMI (NON-ST ELEVATED MYOCARDIAL INFARCTION) (HCC): ICD-10-CM

## 2023-10-30 DIAGNOSIS — R10.13 EPIGASTRIC PAIN: ICD-10-CM

## 2023-10-30 RX ORDER — ECHINACEA 400 MG
1000 CAPSULE ORAL
COMMUNITY

## 2023-10-30 RX ORDER — VALSARTAN 80 MG/1
80 TABLET ORAL DAILY
COMMUNITY
Start: 2023-10-23

## 2023-10-30 RX ORDER — PREDNISONE 10 MG/1
TABLET ORAL
COMMUNITY
Start: 2023-10-23

## 2023-10-30 RX ORDER — AMLODIPINE BESYLATE 5 MG/1
5 TABLET ORAL DAILY
COMMUNITY
Start: 2023-10-23

## 2023-10-30 RX ORDER — NITROGLYCERIN 0.4 MG/1
TABLET SUBLINGUAL
COMMUNITY
Start: 2023-10-23

## 2023-10-30 NOTE — TELEPHONE ENCOUNTER
Zeeshan Alamo from [facility] called in regards to needing verbal orders for OT Plan of Care for 2 weeks for upper body strengthening. Please advise. Zeeshan Alamo can be reached at [phone].

## 2023-10-30 NOTE — PROGRESS NOTES
Post-Discharge Transitional Care Management Progress Note      Zenda Runner   YOB: 1934    Date of Office Visit:  10/30/2023  Date of Hospital Admission: 10/19/2023 in [facility] system  Date of Hospital Discharge: 10/23/2023    Care management risk score Rising risk (score 2-5) and Complex Care (Scores >=6): No Risk Score On File     N  ASSESSMENT/PLAN:   Hospital discharge follow-up  -     HI DISCHARGE MEDS RECONCILED W/ CURRENT OUTPATIENT MED LIST  NSTEMI (non-ST elevated myocardial infarction) ([address])  Patient had a negative nuclear medicine stress test.  She has some fixed  area in myocardium however there is no evidence of reversible ischemia. Patient is still getting pain in the left lower chest.  On further questioning it appears mostly the pain in the epigastric area. She is taking ibuprofen for neck pain. She is also taking Nexium. As per patient and family she is a scheduled to have an endoscopy very soon. Hyponatremia  Patient is currently off hydrochlorothiazide. We will get  blood work in 2 weeks. -     Basic Metabolic Panel; Future  -     CBC with Auto Differential; Future  Moderate persistent asthma without complication  Abnormal CT of the chest  Showed mostly mucus plugging. We will try alternate long-acting beta agonist as well as inhaled corticosteroid for history of moderate asthma and shortness of breath. Patient using albuterol as needed up to 2-3 times daily. Epigastric pain  Patient will let us know when she is scheduled for possible endoscopy. -     CBC with Auto Differential; Future    Confusion  Increasing confusion over the last few months. CT scan of the brain shows mild to moderate severity bilateral atrophy. Patient has been taking B12 supplement. At this time our plan will be to get B12 folate checked. She had normal TSH while in the hospital.  No significant deterioration of cognitive function per family member.   We will get a neurology

## 2023-11-01 ENCOUNTER — OFFICE VISIT (OUTPATIENT)
Dept: NEUROLOGY | Age: 88
End: 2023-11-01
Payer: COMMERCIAL

## 2023-11-01 VITALS
SYSTOLIC BLOOD PRESSURE: 147 MMHG | HEART RATE: 91 BPM | WEIGHT: 141 LBS | DIASTOLIC BLOOD PRESSURE: 78 MMHG | BODY MASS INDEX: 24.98 KG/M2

## 2023-11-01 DIAGNOSIS — F03.90 DEMENTIA ARISING IN THE SENIUM AND PRESENIUM (HCC): ICD-10-CM

## 2023-11-01 DIAGNOSIS — E87.1 HYPONATREMIA: ICD-10-CM

## 2023-11-01 DIAGNOSIS — I10 HTN (HYPERTENSION), BENIGN: ICD-10-CM

## 2023-11-01 DIAGNOSIS — F03.90 DEMENTIA ARISING IN THE SENIUM AND PRESENIUM (HCC): Primary | ICD-10-CM

## 2023-11-01 LAB
FOLATE SERPL-MCNC: 16.19 NG/ML (ref 4.78–24.2)
VIT B12 SERPL-MCNC: 1580 PG/ML (ref 211–911)

## 2023-11-01 PROCEDURE — 99203 OFFICE O/P NEW LOW 30 MIN: CPT | Performed by: PSYCHIATRY & NEUROLOGY

## 2023-11-01 PROCEDURE — 1123F ACP DISCUSS/DSCN MKR DOCD: CPT | Performed by: PSYCHIATRY & NEUROLOGY

## 2023-11-01 NOTE — PROGRESS NOTES
The patient is a 80y.o. years old female who  was referred by Aleyda Malhotra MD  for consultation regarding memory loss. HPI:  The patient describes history of intermittent short-term memory loss over the last year or so. She lives with her  who is 80years old. She is completely independent. Symptoms are waxing and waning. Degree is mild to moderate. Description forgetfulness to recent events but able to function her own. No recent falling or injury. No history of head trauma or history of strokes in the past.  Last MRI from August showed small vessel disease and diffuse atrophy. She was admitted few times to the hospital and had received some new medications for COPD including inhalers and prednisone. She has been having intermittent visual hallucination at night whenever she changed her inhalers or start a new medication according the patient. She will see imaginary color or people at night upon awakening but the symptoms are not frequent enough to interfere with quality of life. No other parasomnia, insomnia or symptoms of sleep apnea. She denies any headache, dysphagia or dysarthria or visual changes. She drives locally only and so far no history of MVA or getting lost in direction. Other review of system was unremarkable. ROS : A 10-14 system review of constitutional, cardiovascular, respiratory, GI, eyes, , ENT, musculoskeletal, endocrine, skin, SHEENT, genitourinary, psychiatric and neurologic systems was obtained and updated today and is unremarkable except as mentioned in my HPI        Exam:   Constitutional:   Vitals:    11/01/23 1104   BP: (!) 147/78   Pulse: 91   Weight: 64 kg (141 lb)       General appearance:  Normal development and appear in no acute distress. Mental Status:     MMSE 23     Cranial Nerves:   II: Visual fields: Full. Pupils: equal, round, reactive to light  III,IV,VI: Extra Ocular Movements are intact.  No nystagmus  V: Facial sensation is

## 2023-11-02 LAB
ANION GAP SERPL CALCULATED.3IONS-SCNC: 11 MMOL/L (ref 3–16)
BUN SERPL-MCNC: 16 MG/DL (ref 7–20)
CALCIUM SERPL-MCNC: 10.4 MG/DL (ref 8.3–10.6)
CHLORIDE SERPL-SCNC: 104 MMOL/L (ref 99–110)
CO2 SERPL-SCNC: 28 MMOL/L (ref 21–32)
CREAT SERPL-MCNC: 0.9 MG/DL (ref 0.6–1.2)
GFR SERPLBLD CREATININE-BSD FMLA CKD-EPI: >60 ML/MIN/{1.73_M2}
GLUCOSE SERPL-MCNC: 89 MG/DL (ref 70–99)
POTASSIUM SERPL-SCNC: 4.4 MMOL/L (ref 3.5–5.1)
SODIUM SERPL-SCNC: 143 MMOL/L (ref 136–145)
TSH SERPL DL<=0.005 MIU/L-ACNC: 0.44 UIU/ML (ref 0.27–4.2)

## 2023-11-21 ENCOUNTER — OFFICE VISIT (OUTPATIENT)
Dept: ORTHOPEDIC SURGERY | Age: 88
End: 2023-11-21
Payer: COMMERCIAL

## 2023-11-21 VITALS — WEIGHT: 141 LBS | HEIGHT: 63 IN | BODY MASS INDEX: 24.98 KG/M2

## 2023-11-21 DIAGNOSIS — S42.021A CLOSED DISPLACED FRACTURE OF SHAFT OF RIGHT CLAVICLE, INITIAL ENCOUNTER: Primary | ICD-10-CM

## 2023-11-21 PROCEDURE — 99213 OFFICE O/P EST LOW 20 MIN: CPT | Performed by: ORTHOPAEDIC SURGERY

## 2023-11-21 PROCEDURE — 1123F ACP DISCUSS/DSCN MKR DOCD: CPT | Performed by: ORTHOPAEDIC SURGERY

## 2023-11-21 NOTE — PROGRESS NOTES
CHIEF COMPLAINT: Right shoulder pain/ minimally displaced midshaft clavicle fracture. DATE OF INJURY: 7/30/2023, DOT 8/10/2023    HISTORY:  Ms. Myriam Finnegan is a 80 y.o.  female right handed who presents today for f/u evaluation of a right shoulder injury. The patient reports that this injury occurred when fell. She was first seen and evaluated in [address] [place] [address], when she was x-rayed and splinted, and asked to f/u with me. The patient denies any other injuries. Rated her pain 0/10. No numbness or tingling sensation.       Past Medical History:   Diagnosis Date    Anxiety 2/24/2022    Bleeding ulcer     Cancer ([address])     melanoma L IRIS    Confusion 10/30/2023    Gastroesophagitis     GERD (gastroesophageal reflux disease)     Hyperlipidemia     Hypertension     Moderate persistent asthma 2/12/2021    Pneumonia     Rheumatic fever with heart involvement     Trigger ring finger of right hand 7/1/2013    Unspecified diseases of blood and blood-forming organs     chronic anemia       Past Surgical History:   Procedure Laterality Date    BLADDER REPAIR N/A 2002    BREAST SURGERY      L breast tumor-benign    CYST REMOVAL Right 8-1-13    GANGLION CYST EXCISION RIGHT WRIST, TRIGGER FINGER RELEASE RIGHT FOURTH    EYE SURGERY      , cataract right eye    FRACTURE SURGERY      R shoulderx2-pinned    HYSTERECTOMY (CERVIX STATUS UNKNOWN)      JOINT REPLACEMENT Right 03/28/2012    Total Elbow - Dr. Minaya Milks REMOVAL  2013    carcinoid    UPPER GASTROINTESTINAL ENDOSCOPY  3/7/12    with bx    UPPER GASTROINTESTINAL ENDOSCOPY  11/8/13    EUS    UPPER GASTROINTESTINAL ENDOSCOPY  4/24/14    UPPER GASTROINTESTINAL ENDOSCOPY  3/17/16    push enteroscopy with ablation    UPPER GASTROINTESTINAL ENDOSCOPY  06/29/2017    UPPER GASTROINTESTINAL ENDOSCOPY N/A 8/11/2021    EGD ULTRASOUND OF STOMACH performed by Hung Lewis MD at [address]

## 2023-11-27 RX ORDER — AMLODIPINE BESYLATE 5 MG/1
5 TABLET ORAL DAILY
Qty: 30 TABLET | Refills: 3 | Status: SHIPPED | OUTPATIENT
Start: 2023-11-27

## 2023-11-27 NOTE — TELEPHONE ENCOUNTER
Pt  calling requesting refill of Amlodipine 5 mg      Last written 10/23/23 (in hospital)  Last OV 10/30/23  Next OV 12/6/23  Last recommended OV NA     [facility]

## 2023-12-06 ENCOUNTER — OFFICE VISIT (OUTPATIENT)
Dept: INTERNAL MEDICINE CLINIC | Age: 88
End: 2023-12-06

## 2023-12-06 VITALS
HEIGHT: 63 IN | WEIGHT: 142 LBS | BODY MASS INDEX: 25.16 KG/M2 | TEMPERATURE: 97.3 F | HEART RATE: 97 BPM | DIASTOLIC BLOOD PRESSURE: 80 MMHG | SYSTOLIC BLOOD PRESSURE: 140 MMHG | OXYGEN SATURATION: 97 %

## 2023-12-06 VITALS — OXYGEN SATURATION: 97 %

## 2023-12-06 DIAGNOSIS — R06.2 WHEEZING: ICD-10-CM

## 2023-12-06 DIAGNOSIS — J20.9 ACUTE BRONCHITIS, UNSPECIFIED ORGANISM: ICD-10-CM

## 2023-12-06 DIAGNOSIS — J45.41 MODERATE PERSISTENT ASTHMATIC BRONCHITIS WITH ACUTE EXACERBATION: ICD-10-CM

## 2023-12-06 DIAGNOSIS — R60.0 BILATERAL LEG EDEMA: ICD-10-CM

## 2023-12-06 DIAGNOSIS — J45.51 SEVERE PERSISTENT REACTIVE AIRWAY DISEASE WITH ACUTE EXACERBATION: Primary | ICD-10-CM

## 2023-12-06 DIAGNOSIS — J45.51 SEVERE PERSISTENT REACTIVE AIRWAY DISEASE WITH ACUTE EXACERBATION: ICD-10-CM

## 2023-12-06 DIAGNOSIS — Z00.00 MEDICARE ANNUAL WELLNESS VISIT, SUBSEQUENT: Primary | ICD-10-CM

## 2023-12-06 DIAGNOSIS — I11.0 HYPERTENSIVE HEART DISEASE WITH HEART FAILURE (HCC): ICD-10-CM

## 2023-12-06 RX ORDER — DOXYCYCLINE HYCLATE 100 MG
100 TABLET ORAL 2 TIMES DAILY
Qty: 20 TABLET | Refills: 0 | Status: SHIPPED | OUTPATIENT
Start: 2023-12-06 | End: 2023-12-16

## 2023-12-06 RX ORDER — BENZONATATE 100 MG/1
100 CAPSULE ORAL EVERY 6 HOURS PRN
Qty: 30 CAPSULE | Refills: 2 | Status: SHIPPED | OUTPATIENT
Start: 2023-12-06

## 2023-12-06 RX ORDER — PREDNISONE 20 MG/1
20 TABLET ORAL 2 TIMES DAILY
Qty: 14 TABLET | Refills: 0 | Status: SHIPPED | OUTPATIENT
Start: 2023-12-06 | End: 2023-12-13

## 2023-12-06 RX ORDER — ALBUTEROL SULFATE 2.5 MG/3ML
2.5 SOLUTION RESPIRATORY (INHALATION) ONCE
Status: COMPLETED | OUTPATIENT
Start: 2023-12-06 | End: 2023-12-06

## 2023-12-06 RX ADMIN — ALBUTEROL SULFATE 2.5 MG: 2.5 SOLUTION RESPIRATORY (INHALATION) at 14:21

## 2023-12-06 ASSESSMENT — ENCOUNTER SYMPTOMS
COUGH: 1
TROUBLE SWALLOWING: 0
VOICE CHANGE: 0
WHEEZING: 1
NAUSEA: 0
VOMITING: 0
ABDOMINAL PAIN: 0
PHOTOPHOBIA: 0

## 2023-12-06 ASSESSMENT — PATIENT HEALTH QUESTIONNAIRE - PHQ9
SUM OF ALL RESPONSES TO PHQ QUESTIONS 1-9: 1
2. FEELING DOWN, DEPRESSED OR HOPELESS: 1
SUM OF ALL RESPONSES TO PHQ QUESTIONS 1-9: 1
1. LITTLE INTEREST OR PLEASURE IN DOING THINGS: 0
SUM OF ALL RESPONSES TO PHQ9 QUESTIONS 1 & 2: 1
SUM OF ALL RESPONSES TO PHQ QUESTIONS 1-9: 1
SUM OF ALL RESPONSES TO PHQ QUESTIONS 1-9: 1

## 2023-12-06 ASSESSMENT — LIFESTYLE VARIABLES
HOW OFTEN DO YOU HAVE A DRINK CONTAINING ALCOHOL: NEVER
HOW MANY STANDARD DRINKS CONTAINING ALCOHOL DO YOU HAVE ON A TYPICAL DAY: PATIENT DOES NOT DRINK

## 2023-12-06 NOTE — PROGRESS NOTES
Chelsie Melgar  2/23/1934  female  80 y.o. SUBJECTIVE:       Chief Complaint   Patient presents with    3 Month Follow-Up     Cough, congestion X 2 weeks       HPI:  Follow-up visit for chronic problems. Since last visit patient has been evaluated by neurologist.  She continues to get occasional visual hallucination. Patient not taking any medication. Over the last 2 weeks patient is complaining of cough congestion wheezing. She is requesting antibiotic and prednisone.     Past Medical History:   Diagnosis Date    Anxiety 2/24/2022    Bleeding ulcer     Cancer ([address])     melanoma L IRIS    Confusion 10/30/2023    Gastroesophagitis     GERD (gastroesophageal reflux disease)     Hyperlipidemia     Hypertension     Moderate persistent asthma 2/12/2021    Pneumonia     Rheumatic fever with heart involvement     Trigger ring finger of right hand 7/1/2013    Unspecified diseases of blood and blood-forming organs     chronic anemia     Past Surgical History:   Procedure Laterality Date    BLADDER REPAIR N/A 2002    BREAST SURGERY      L breast tumor-benign    CYST REMOVAL Right 8-1-13    GANGLION CYST EXCISION RIGHT WRIST, TRIGGER FINGER RELEASE RIGHT FOURTH    EYE SURGERY      , cataract right eye    FRACTURE SURGERY      R shoulderx2-pinned    HYSTERECTOMY (CERVIX STATUS UNKNOWN)      JOINT REPLACEMENT Right 03/28/2012    Total Elbow - Dr. Khan [place] REMOVAL  2013    carcinoid    UPPER GASTROINTESTINAL ENDOSCOPY  3/7/12    with bx    UPPER GASTROINTESTINAL ENDOSCOPY  11/8/13    EUS    UPPER GASTROINTESTINAL ENDOSCOPY  4/24/14    UPPER GASTROINTESTINAL ENDOSCOPY  3/17/16    push enteroscopy with ablation    UPPER GASTROINTESTINAL ENDOSCOPY  06/29/2017    UPPER GASTROINTESTINAL ENDOSCOPY N/A 8/11/2021    EGD ULTRASOUND OF STOMACH performed by Octavia Bauman MD at [address] History     Socioeconomic History    Marital

## 2023-12-06 NOTE — PROGRESS NOTES
Medicare Annual Wellness Visit    Nimco Romeo is here for Medicare AWV    Assessment & Plan   Medicare annual wellness visit, subsequent  Recommendations for Preventive Services Due: see orders and patient instructions/AVS.  Recommended screening schedule for the next 5-10 years is provided to the patient in written form: see Patient Instructions/AVS.     Return in 1 year (on 12/6/2024). Subjective     Patient's complete Health Risk Assessment and screening values have been reviewed and are found in Flowsheets. The following problems were reviewed today and where indicated follow up appointments were made and/or referrals ordered. Positive Risk Factor Screenings with Interventions:    Fall Risk:  Do you feel unsteady or are you worried about falling? : no  2 or more falls in past year?: no  Fall with injury in past year?: (!) yes (was having dizziness)     Interventions:    Exercises provided to patient to work on when felling well again. Patient had a fall due to dizziness that she is no longer having on a continuous basis. General HRA Questions:  Select all that apply: (!) New or Increased Pain, Stress (feeling unwell and needing to do things and take care of home)    Pain Interventions:  Encouraged patient to discuss and follow up with PCP if no improvement. Patient has been having a bit more pain to both ankles. Stress is due to being ill recently and being able to do household tasks. Patient has home health at this time and they have assisted her to reach out to [employer] to get on list to get home aid to assist with needs. Stress Interventions:  See AVS for additional education material      Social and Emotional Support:  Do you get the social and emotional support that you need?: (!) No (not a lot of support)    Interventions:  Patient comments: I don't feel like I get a lot of support.  Patient did state that she has been able to have someone do shopping for her recently due to issues with

## 2023-12-07 ENCOUNTER — HOSPITAL ENCOUNTER (OUTPATIENT)
Age: 88
Discharge: HOME | End: 2023-12-07
Payer: COMMERCIAL

## 2023-12-07 ENCOUNTER — HOSPITAL ENCOUNTER (OUTPATIENT)
Dept: GENERAL RADIOLOGY | Age: 88
Discharge: HOME | End: 2023-12-07
Payer: COMMERCIAL

## 2023-12-07 DIAGNOSIS — R91.8 OPACITY OF LUNG ON IMAGING STUDY: ICD-10-CM

## 2023-12-07 DIAGNOSIS — J45.51 SEVERE PERSISTENT REACTIVE AIRWAY DISEASE WITH ACUTE EXACERBATION: ICD-10-CM

## 2023-12-07 DIAGNOSIS — R93.89 ABNORMAL CHEST X-RAY: Primary | ICD-10-CM

## 2023-12-07 LAB
ANION GAP SERPL CALCULATED.3IONS-SCNC: 12 MMOL/L (ref 3–16)
BUN SERPL-MCNC: 10 MG/DL (ref 7–20)
CALCIUM SERPL-MCNC: 10.2 MG/DL (ref 8.3–10.6)
CHLORIDE SERPL-SCNC: 97 MMOL/L (ref 99–110)
CO2 SERPL-SCNC: 27 MMOL/L (ref 21–32)
CREAT SERPL-MCNC: 0.8 MG/DL (ref 0.6–1.2)
DEPRECATED RDW RBC AUTO: 15.2 % (ref 12.4–15.4)
GFR SERPLBLD CREATININE-BSD FMLA CKD-EPI: >60 ML/MIN/{1.73_M2}
GLUCOSE SERPL-MCNC: 104 MG/DL (ref 70–99)
HCT VFR BLD AUTO: 39.6 % (ref 36–48)
HGB BLD-MCNC: 13.7 G/DL (ref 12–16)
MCH RBC QN AUTO: 29.6 PG (ref 26–34)
MCHC RBC AUTO-ENTMCNC: 34.6 G/DL (ref 31–36)
MCV RBC AUTO: 85.6 FL (ref 80–100)
NT-PROBNP SERPL-MCNC: 245 PG/ML (ref 0–449)
PLATELET # BLD AUTO: 237 K/UL (ref 135–450)
PMV BLD AUTO: 7.8 FL (ref 5–10.5)
POTASSIUM SERPL-SCNC: 3.8 MMOL/L (ref 3.5–5.1)
RBC # BLD AUTO: 4.62 M/UL (ref 4–5.2)
SODIUM SERPL-SCNC: 136 MMOL/L (ref 136–145)
WBC # BLD AUTO: 11.1 K/UL (ref 4–11)

## 2023-12-07 PROCEDURE — 71046 X-RAY EXAM CHEST 2 VIEWS: CPT

## 2023-12-07 NOTE — RESULT ENCOUNTER NOTE
Medications as prescribed yesterday.  Heart failure marker is good.  Slightly elevated white cell count.  Patient was placed on antibiotic.  Keep appointment as scheduled

## 2023-12-07 NOTE — RESULT ENCOUNTER NOTE
Patient continued to have opacity in the left lung base along with thickening around the bronchus. Medication as prescribed.   Considering her recurrent symptoms, I recommend patient at least see 1 time a pulmonologist.  Order placed

## 2023-12-12 ENCOUNTER — OFFICE VISIT (OUTPATIENT)
Dept: PULMONOLOGY | Age: 88
End: 2023-12-12
Payer: COMMERCIAL

## 2023-12-12 VITALS
DIASTOLIC BLOOD PRESSURE: 88 MMHG | HEART RATE: 100 BPM | BODY MASS INDEX: 25.02 KG/M2 | OXYGEN SATURATION: 99 % | WEIGHT: 141.2 LBS | SYSTOLIC BLOOD PRESSURE: 158 MMHG | HEIGHT: 63 IN

## 2023-12-12 DIAGNOSIS — J44.89 COPD WITH ASTHMA: Primary | ICD-10-CM

## 2023-12-12 DIAGNOSIS — R91.1 PULMONARY NODULE: ICD-10-CM

## 2023-12-12 DIAGNOSIS — K21.9 LARYNGOPHARYNGEAL REFLUX (LPR): ICD-10-CM

## 2023-12-12 DIAGNOSIS — I21.4 NSTEMI (NON-ST ELEVATED MYOCARDIAL INFARCTION) (HCC): ICD-10-CM

## 2023-12-12 PROCEDURE — 99214 OFFICE O/P EST MOD 30 MIN: CPT | Performed by: INTERNAL MEDICINE

## 2023-12-12 PROCEDURE — 1123F ACP DISCUSS/DSCN MKR DOCD: CPT | Performed by: INTERNAL MEDICINE

## 2023-12-12 NOTE — PROGRESS NOTES
[facility]  Consult Note  Marta Fowler MD       Scar Dangelo   YOB: 1934    Date of Visit:  12/12/2023    Assessment/Plan:  1. Chronic cough  2. COPD with asthma  3. NSTEMI  4. Laryngopharyngeal reflux (LPR)  5. Pulmonary nodule ==> resolved    I have reviewed laboratories, medical records and images for this visit  CXR 12/7 reviewed and shows peribronchial thickening  CBC did not show eos this time    Recommend that she continue Anoro but move the dose to the morning. Albuterol prn    F/u with Cardiology for her NSTEMI    She does have GERD which could be an additional factor in destabilizing her asthma. She is on Nexium daily. She should be aggressive in treating breakthrough symptoms. F/u in 6 months      Chief Complaint   Patient presents with    Results     CXR    Shortness of Breath    Cough       HPI  The patient presents with a chief complaint of chronic cough. She had Steroid, Abx and Tessalon Perles from [place] and that helped. She is not coughing now. She has had Prednisone at least three times this year. She has not been seen here since 2021. At that time we thought she had asthma. She was hospitalized at an OSH and started on Anoro. She again feels like it is giving her nightmares. She takes the medication just before she goes to bed. She has struggled to find a controller medication that she feels she can take. She also had NSTEMI when she was hospitalized. She does have significant GERD. She is on Nexium and needs that daily    Review of Systems  No complaint of chest pain, nausea or vomiting. History  I have reviewed past medical, surgical, social and family history. This is documented elsewhere in themedical record. Physical Exam:  Well developed, well nourished  Alert and oriented  Sclera is clear  No cervical adenopathy  No JVD. Chest examination is clear.   Cardiac examination reveals regular rate and rhythm without

## 2023-12-14 ENCOUNTER — OFFICE VISIT (OUTPATIENT)
Dept: INTERNAL MEDICINE CLINIC | Age: 88
End: 2023-12-14

## 2023-12-14 VITALS
OXYGEN SATURATION: 94 % | HEART RATE: 99 BPM | WEIGHT: 141.6 LBS | DIASTOLIC BLOOD PRESSURE: 80 MMHG | BODY MASS INDEX: 25.08 KG/M2 | SYSTOLIC BLOOD PRESSURE: 140 MMHG

## 2023-12-14 DIAGNOSIS — I25.10 CORONARY ARTERY DISEASE DUE TO LIPID RICH PLAQUE: ICD-10-CM

## 2023-12-14 DIAGNOSIS — I25.83 CORONARY ARTERY DISEASE DUE TO LIPID RICH PLAQUE: ICD-10-CM

## 2023-12-14 DIAGNOSIS — I21.4 NSTEMI (NON-ST ELEVATED MYOCARDIAL INFARCTION) (HCC): ICD-10-CM

## 2023-12-14 DIAGNOSIS — R60.0 BILATERAL LEG EDEMA: Primary | ICD-10-CM

## 2023-12-14 RX ORDER — METOPROLOL SUCCINATE 25 MG/1
25 TABLET, EXTENDED RELEASE ORAL DAILY
Qty: 90 TABLET | Refills: 1 | Status: SHIPPED | OUTPATIENT
Start: 2023-12-14

## 2023-12-14 NOTE — PROGRESS NOTES
Placed This Encounter   Procedures    Ebony Bolden MD, Cardiology, [facility]     Referral Priority:   Routine     Referral Type:   Eval and Treat     Referral Reason:   Specialty Services Required     Referred to Provider:   Jeff Chavarria MD     Requested Specialty:   Cardiology     Number of Visits Requested:   1     Current Outpatient Medications   Medication Sig Dispense Refill    metoprolol succinate (TOPROL XL) 25 MG extended release tablet Take 1 tablet by mouth daily 90 tablet 1    benzonatate (TESSALON PERLES) 100 MG capsule Take 1 capsule by mouth every 6 hours as needed for Cough 30 capsule 2    doxycycline hyclate (VIBRA-TABS) 100 MG tablet Take 1 tablet by mouth 2 times daily for 10 days 20 tablet 0    ASPIRIN 81 PO Take 81 mg by mouth      Flaxseed, Linseed, (FLAXSEED OIL) 1000 MG CAPS Take 1,000 mg by mouth      Iodine, Kelp, 0.15 MG TABS Take by mouth daily      nitroGLYCERIN (NITROSTAT) 0.4 MG SL tablet       valsartan (DIOVAN) 80 MG tablet Take 1 tablet by mouth daily      umeclidinium-vilanterol (ANORO ELLIPTA) 62.5-25 MCG/ACT inhaler Inhale 1 puff into the lungs daily 60 each 1    albuterol sulfate HFA (PROVENTIL;VENTOLIN;PROAIR) 108 (90 Base) MCG/ACT inhaler INHALE 2 PUFFS INTO THE LUNGS FOUR TIMES DAILY AS NEEDED FOR WHEEZING 54 g 2    ondansetron (ZOFRAN ODT) 4 MG disintegrating tablet Take 1 tablet by mouth every 8 hours as needed for Nausea or Vomiting 20 tablet 0    ibuprofen (ADVIL;MOTRIN) 400 MG tablet Take 1 tablet by mouth every 6 hours as needed for Pain      TURMERIC PO Take by mouth daily       esomeprazole (NEXIUM) 40 MG delayed release capsule Take 1 capsule by mouth every morning (before breakfast)      Coenzyme Q10 (COQ-10) 100 MG CPCR Take 1 capsule by mouth daily      cetirizine (ZYRTEC) 10 MG tablet Take 1 tablet by mouth as needed      ferrous sulfate (IRON 325) 325 (65 Fe) MG tablet Take 1 tablet by mouth 2 times daily (Patient not taking: Reported on

## 2023-12-15 ENCOUNTER — TELEPHONE (OUTPATIENT)
Dept: INTERNAL MEDICINE CLINIC | Age: 88
End: 2023-12-15

## 2023-12-15 DIAGNOSIS — B37.0 ORAL THRUSH: Primary | ICD-10-CM

## 2023-12-15 NOTE — TELEPHONE ENCOUNTER
Eric Holloway from [facility] is calling----pt [facility] [address] had pt change her inhaler to mornings and she now has white patches on her tongue----believes getting thrush--asking for nystatin whish and swallow be called in to [facility] [address]---pastor let her know at [phone]. Thanks.

## 2023-12-15 NOTE — TELEPHONE ENCOUNTER
Nystatin is sent to the pharmacy. Please call and advise patient to do good mouth rinsing after every time she uses Anoro.

## 2023-12-26 RX ORDER — UMECLIDINIUM BROMIDE AND VILANTEROL TRIFENATATE 62.5; 25 UG/1; UG/1
1 POWDER RESPIRATORY (INHALATION) DAILY
Qty: 60 EACH | Refills: 1 | Status: SHIPPED | OUTPATIENT
Start: 2023-12-26

## 2024-01-11 PROBLEM — I25.2 HISTORY OF NON-ST ELEVATION MYOCARDIAL INFARCTION (NSTEMI): Status: ACTIVE | Noted: 2024-01-11

## 2024-01-11 PROBLEM — I25.10 CORONARY ARTERY DISEASE INVOLVING NATIVE HEART: Status: ACTIVE | Noted: 2024-01-11

## 2024-02-12 ENCOUNTER — OFFICE VISIT (OUTPATIENT)
Dept: INTERNAL MEDICINE CLINIC | Age: 89
End: 2024-02-12
Payer: COMMERCIAL

## 2024-02-12 VITALS
OXYGEN SATURATION: 98 % | DIASTOLIC BLOOD PRESSURE: 82 MMHG | BODY MASS INDEX: 25.33 KG/M2 | SYSTOLIC BLOOD PRESSURE: 158 MMHG | HEART RATE: 79 BPM | WEIGHT: 143 LBS

## 2024-02-12 DIAGNOSIS — M79.89 LEG SWELLING: ICD-10-CM

## 2024-02-12 DIAGNOSIS — J45.51 SEVERE PERSISTENT REACTIVE AIRWAY DISEASE WITH ACUTE EXACERBATION: ICD-10-CM

## 2024-02-12 DIAGNOSIS — I11.0 HYPERTENSIVE HEART DISEASE WITH HEART FAILURE (HCC): ICD-10-CM

## 2024-02-12 DIAGNOSIS — J44.89 COPD WITH ASTHMA: Primary | ICD-10-CM

## 2024-02-12 DIAGNOSIS — F03.90 DEMENTIA ARISING IN THE SENIUM AND PRESENIUM (HCC): ICD-10-CM

## 2024-02-12 DIAGNOSIS — M35.00 SICCA SYNDROME (HCC): ICD-10-CM

## 2024-02-12 PROCEDURE — 1123F ACP DISCUSS/DSCN MKR DOCD: CPT | Performed by: INTERNAL MEDICINE

## 2024-02-12 PROCEDURE — 99213 OFFICE O/P EST LOW 20 MIN: CPT | Performed by: INTERNAL MEDICINE

## 2024-02-12 RX ORDER — AMLODIPINE BESYLATE 10 MG/1
10 TABLET ORAL DAILY
Qty: 30 TABLET | Refills: 5 | Status: SHIPPED | OUTPATIENT
Start: 2024-02-12 | End: 2024-02-13

## 2024-02-12 RX ORDER — PREDNISONE 20 MG/1
40 TABLET ORAL DAILY
Qty: 10 TABLET | Refills: 0 | Status: SHIPPED | OUTPATIENT
Start: 2024-02-12 | End: 2024-02-17

## 2024-02-12 NOTE — PROGRESS NOTES
Catherine Rebolledo (:  1934) is a 89 y.o. female here for evaluation of the following chief complaint(s):  Leg Swelling (Bilateral leg/ankle swelling x 2 months), Cough (Worsening x 2 wks), and Wheezing (Worsening x 2 wks)      ASSESSMENT/PLAN:  1. COPD with asthma  Assessment & Plan:     Patient advised to use rescue inhaler when she is having symptoms. She seems to have been educated to not use when she does not need it and she therefore has become hesitant to use it at all. I educated her that it is okay to use for relief of symptoms.  Wheezing is moderate on exam, will prescribe 40mg prednisone x 5 days.  The following orders have not been finalized:  Orders:  -     predniSONE (DELTASONE) 20 MG tablet  2. Severe persistent reactive airway disease with acute exacerbation  3. Hypertensive heart disease with heart failure (HCC)  The following orders have not been finalized:  -     amLODIPine (NORVASC) 10 MG tablet  4. Sicca syndrome (HCC)  5. 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 Leg swelling      No follow-ups on file.    SUBJECTIVE/OBJECTIVE:  This is an 89 year old woman with history of CAD, COPD, HTN. She presents as an acute visit.  She complains of a flare up of her \"asthmatic bronchitis.\"    She is having a mild cough. She is taking her anora as prescribed.  She has used her rescue inhaler a few times but has hardly used it.     Her BP is elevated today - she notes no change to her leg swelling being off the amlodipine.   She is willing to restart amlodipine.   She continues to complain of leg swelling which has been a chronic complaint. It seems similar to her baseline from prior notes.        Past Medical History:   Diagnosis Date    Anxiety 2022    Bleeding ulcer     Cancer (HCC)     melanoma L IRIS    Confusion 10/30/2023    COPD with asthma 2023    Coronary artery disease involving native heart 2024    Gastroesophagitis     GERD (gastroesophageal reflux disease)

## 2024-02-12 NOTE — ASSESSMENT & PLAN NOTE
Patient advised to use rescue inhaler when she is having symptoms. She seems to have been educated to not use when she does not need it and she therefore has become hesitant to use it at all. I educated her that it is okay to use for relief of symptoms.  Wheezing is moderate on exam, will prescribe 40mg prednisone x 5 days.

## 2024-02-13 RX ORDER — AMLODIPINE BESYLATE 10 MG/1
10 TABLET ORAL DAILY
Qty: 90 TABLET | Refills: 1 | Status: SHIPPED | OUTPATIENT
Start: 2024-02-13

## 2024-02-23 PROBLEM — Z87.898 HISTORY OF SYNCOPE: Status: ACTIVE | Noted: 2024-02-23

## 2024-02-23 NOTE — PROGRESS NOTES
Hysterectomy; Tonsillectomy; tumor removal (2013); Rectocele repair; Breast surgery; fracture surgery; Upper gastrointestinal endoscopy (3/7/12); bladder repair (N/A, 2002); cyst removal (Right, 8-1-13); joint replacement (Right, 03/28/2012); shoulder surgery (Right); Upper gastrointestinal endoscopy (11/8/13); Upper gastrointestinal endoscopy (4/24/14); eye surgery; Upper gastrointestinal endoscopy (3/17/16); Upper gastrointestinal endoscopy (06/29/2017); and Upper gastrointestinal endoscopy (N/A, 8/11/2021).   SocHx:  reports that she has never smoked. She has never used smokeless tobacco. She reports that she does not drink alcohol and does not use drugs.   FamHx: Reviewed, noncontributory  Allergies: Simvastatin, Advair hfa [fluticasone-salmeterol], and Symbicort [budesonide-formoterol fumarate]     MEDICATIONS      Prior to Admission medications    Medication Sig Start Date End Date Taking? Authorizing Provider   furosemide (LASIX) 20 MG tablet Take 1 tablet by mouth daily for 14 days 3/14/24 3/28/24 Yes OLGA Rosa MD   valsartan (DIOVAN) 160 MG tablet Take 1 tablet by mouth daily 3/8/24  Yes OLGA Rosa MD   umeclidinium-vilanterol (ANORO ELLIPTA) 62.5-25 MCG/ACT inhaler Inhale 1 puff into the lungs daily 2/26/24  Yes OLGA Rosa MD   Compression Stockings MISC by Does not apply route 2/12/24  Yes Arsenio Lawrence MD   metoprolol succinate (TOPROL XL) 25 MG extended release tablet Take 1 tablet by mouth daily 12/14/23  Yes OLGA Rosa MD   ASPIRIN 81 PO Take 81 mg by mouth   Yes Thomas Myers MD   Flaxseed, Linseed, (FLAXSEED OIL) 1000 MG CAPS Take 1,000 mg by mouth   Yes Thomas Myers MD   Iodine, Kelp, 0.15 MG TABS Take by mouth daily   Yes Thomas Myers MD   nitroGLYCERIN (NITROSTAT) 0.4 MG SL tablet  10/23/23  Yes Thomas Myers MD   albuterol sulfate HFA (PROVENTIL;VENTOLIN;PROAIR) 108 (90 Base) MCG/ACT inhaler INHALE 2 PUFFS INTO THE LUNGS FOUR

## 2024-02-26 RX ORDER — UMECLIDINIUM BROMIDE AND VILANTEROL TRIFENATATE 62.5; 25 UG/1; UG/1
1 POWDER RESPIRATORY (INHALATION) DAILY
Qty: 3 EACH | Refills: 1 | Status: SHIPPED | OUTPATIENT
Start: 2024-02-26

## 2024-02-26 RX ORDER — UMECLIDINIUM BROMIDE AND VILANTEROL TRIFENATATE 62.5; 25 UG/1; UG/1
1 POWDER RESPIRATORY (INHALATION) DAILY
Qty: 60 EACH | Refills: 1 | OUTPATIENT
Start: 2024-02-26

## 2024-03-08 ENCOUNTER — OFFICE VISIT (OUTPATIENT)
Dept: INTERNAL MEDICINE CLINIC | Age: 89
End: 2024-03-08

## 2024-03-08 VITALS
OXYGEN SATURATION: 99 % | HEART RATE: 101 BPM | SYSTOLIC BLOOD PRESSURE: 140 MMHG | BODY MASS INDEX: 26.22 KG/M2 | DIASTOLIC BLOOD PRESSURE: 76 MMHG | WEIGHT: 148 LBS

## 2024-03-08 DIAGNOSIS — I11.0 HYPERTENSIVE HEART DISEASE WITH HEART FAILURE (HCC): ICD-10-CM

## 2024-03-08 DIAGNOSIS — L03.119 CELLULITIS OF LOWER EXTREMITY, UNSPECIFIED LATERALITY: ICD-10-CM

## 2024-03-08 DIAGNOSIS — R60.0 BILATERAL LEG EDEMA: Primary | ICD-10-CM

## 2024-03-08 LAB
ANION GAP SERPL CALCULATED.3IONS-SCNC: 8 MMOL/L (ref 3–16)
BUN SERPL-MCNC: 12 MG/DL (ref 7–20)
CALCIUM SERPL-MCNC: 9.8 MG/DL (ref 8.3–10.6)
CHLORIDE SERPL-SCNC: 103 MMOL/L (ref 99–110)
CO2 SERPL-SCNC: 29 MMOL/L (ref 21–32)
CREAT SERPL-MCNC: 1.2 MG/DL (ref 0.6–1.2)
DEPRECATED RDW RBC AUTO: 14 % (ref 12.4–15.4)
GFR SERPLBLD CREATININE-BSD FMLA CKD-EPI: 43 ML/MIN/{1.73_M2}
GLUCOSE SERPL-MCNC: 112 MG/DL (ref 70–99)
HCT VFR BLD AUTO: 34.3 % (ref 36–48)
HGB BLD-MCNC: 11.8 G/DL (ref 12–16)
MCH RBC QN AUTO: 27.4 PG (ref 26–34)
MCHC RBC AUTO-ENTMCNC: 34.3 G/DL (ref 31–36)
MCV RBC AUTO: 79.9 FL (ref 80–100)
NT-PROBNP SERPL-MCNC: 352 PG/ML (ref 0–449)
PLATELET # BLD AUTO: 249 K/UL (ref 135–450)
PMV BLD AUTO: 7.8 FL (ref 5–10.5)
POTASSIUM SERPL-SCNC: 3.6 MMOL/L (ref 3.5–5.1)
RBC # BLD AUTO: 4.29 M/UL (ref 4–5.2)
SODIUM SERPL-SCNC: 140 MMOL/L (ref 136–145)
WBC # BLD AUTO: 5.8 K/UL (ref 4–11)

## 2024-03-08 RX ORDER — VALSARTAN 160 MG/1
160 TABLET ORAL DAILY
Qty: 90 TABLET | Refills: 1 | Status: SHIPPED | OUTPATIENT
Start: 2024-03-08

## 2024-03-08 RX ORDER — DOXYCYCLINE HYCLATE 100 MG
100 TABLET ORAL 2 TIMES DAILY
Qty: 20 TABLET | Refills: 0 | Status: SHIPPED | OUTPATIENT
Start: 2024-03-08 | End: 2024-03-18

## 2024-03-08 RX ORDER — FUROSEMIDE 40 MG/1
40 TABLET ORAL DAILY
Qty: 7 TABLET | Refills: 0 | Status: SHIPPED | OUTPATIENT
Start: 2024-03-08 | End: 2024-03-15

## 2024-03-08 SDOH — ECONOMIC STABILITY: INCOME INSECURITY: HOW HARD IS IT FOR YOU TO PAY FOR THE VERY BASICS LIKE FOOD, HOUSING, MEDICAL CARE, AND HEATING?: NOT HARD AT ALL

## 2024-03-08 SDOH — ECONOMIC STABILITY: FOOD INSECURITY: WITHIN THE PAST 12 MONTHS, YOU WORRIED THAT YOUR FOOD WOULD RUN OUT BEFORE YOU GOT MONEY TO BUY MORE.: NEVER TRUE

## 2024-03-08 SDOH — ECONOMIC STABILITY: FOOD INSECURITY: WITHIN THE PAST 12 MONTHS, THE FOOD YOU BOUGHT JUST DIDN'T LAST AND YOU DIDN'T HAVE MONEY TO GET MORE.: NEVER TRUE

## 2024-03-08 ASSESSMENT — PATIENT HEALTH QUESTIONNAIRE - PHQ9
2. FEELING DOWN, DEPRESSED OR HOPELESS: 0
SUM OF ALL RESPONSES TO PHQ QUESTIONS 1-9: 0
1. LITTLE INTEREST OR PLEASURE IN DOING THINGS: 0
SUM OF ALL RESPONSES TO PHQ9 QUESTIONS 1 & 2: 0
SUM OF ALL RESPONSES TO PHQ QUESTIONS 1-9: 0

## 2024-03-08 ASSESSMENT — ENCOUNTER SYMPTOMS
VOICE CHANGE: 0
TROUBLE SWALLOWING: 0
NAUSEA: 0
VOMITING: 0
ABDOMINAL PAIN: 0
CHEST TIGHTNESS: 0
WHEEZING: 0

## 2024-03-08 NOTE — PROGRESS NOTES
Catherinepankaj Rebolledo  2/23/1934  female  89 y.o.    SUBJECTIVE:       Chief Complaint   Patient presents with    Leg Swelling    Edema     Foot swelling       HPI:    Follow-up visit.  Patient has been complaining of gradually increasing bilateral lower leg swellings.  Over the last couple of days she also noticed some follicular like lesion at the anterior legs.  Patient denies fever chills nausea vomiting or systemic symptoms.  She did not notice any change of her baseline cough and shortness of breath.  Gained about 5 pounds over the last 3 weeks or so    Past Medical History:   Diagnosis Date    Anxiety 2/24/2022    Bleeding ulcer     Cancer (HCC)     melanoma L IRIS    Confusion 10/30/2023    COPD with asthma 12/12/2023    Coronary artery disease involving native heart 1/11/2024    Gastroesophagitis     GERD (gastroesophageal reflux disease)     Hyperlipidemia     Hypertension     Moderate persistent asthma 2/12/2021    Pneumonia     Rheumatic fever with heart involvement     Trigger ring finger of right hand 7/1/2013    Unspecified diseases of blood and blood-forming organs     chronic anemia     Past Surgical History:   Procedure Laterality Date    BLADDER REPAIR N/A 2002    BREAST SURGERY      L breast tumor-benign    CYST REMOVAL Right 8-1-13    GANGLION CYST EXCISION RIGHT WRIST, TRIGGER FINGER RELEASE RIGHT FOURTH    EYE SURGERY      , cataract right eye    FRACTURE SURGERY      R shoulderx2-pinned    HYSTERECTOMY (CERVIX STATUS UNKNOWN)      JOINT REPLACEMENT Right 03/28/2012    Total Elbow - Dr. Ramirez    RECTOCELE REPAIR      SHOULDER SURGERY Right     TONSILLECTOMY      TUMOR REMOVAL  2013    carcinoid    UPPER GASTROINTESTINAL ENDOSCOPY  3/7/12    with bx    UPPER GASTROINTESTINAL ENDOSCOPY  11/8/13    EUS    UPPER GASTROINTESTINAL ENDOSCOPY  4/24/14    UPPER GASTROINTESTINAL ENDOSCOPY  3/17/16    push enteroscopy with ablation    UPPER GASTROINTESTINAL ENDOSCOPY  06/29/2017    UPPER GASTROINTESTINAL

## 2024-03-10 DIAGNOSIS — D64.9 ANEMIA, UNSPECIFIED TYPE: Primary | ICD-10-CM

## 2024-03-10 NOTE — RESULT ENCOUNTER NOTE
Patient hemoglobin is down than 3 months ago.  Other labs are stable.  Patient needs to make a follow-up appointment with Dr. Sorenson-.  Gastroenterologist.  I recommended additional testing for anemia.  Order placed.

## 2024-03-12 DIAGNOSIS — R60.0 BILATERAL LEG EDEMA: ICD-10-CM

## 2024-03-12 DIAGNOSIS — I11.0 HYPERTENSIVE HEART DISEASE WITH HEART FAILURE (HCC): ICD-10-CM

## 2024-03-12 RX ORDER — FUROSEMIDE 40 MG/1
40 TABLET ORAL DAILY
Qty: 7 TABLET | Refills: 0 | OUTPATIENT
Start: 2024-03-12 | End: 2024-03-19

## 2024-03-13 DIAGNOSIS — D64.9 ANEMIA, UNSPECIFIED TYPE: ICD-10-CM

## 2024-03-13 LAB
BASOPHILS # BLD: 0.1 K/UL (ref 0–0.2)
BASOPHILS NFR BLD: 1.4 %
DEPRECATED RDW RBC AUTO: 14.5 % (ref 12.4–15.4)
EOSINOPHIL # BLD: 0.5 K/UL (ref 0–0.6)
EOSINOPHIL NFR BLD: 11.6 %
FERRITIN SERPL IA-MCNC: 28.4 NG/ML (ref 15–150)
FOLATE SERPL-MCNC: 14.33 NG/ML (ref 4.78–24.2)
HCT VFR BLD AUTO: 33.6 % (ref 36–48)
HGB BLD-MCNC: 11.3 G/DL (ref 12–16)
IMMATURE RETIC FRACT: 0.51 (ref 0.21–0.37)
IRON SATN MFR SERPL: 20 % (ref 15–50)
IRON SERPL-MCNC: 70 UG/DL (ref 37–145)
LYMPHOCYTES # BLD: 1.2 K/UL (ref 1–5.1)
LYMPHOCYTES NFR BLD: 27.9 %
MCH RBC QN AUTO: 27.1 PG (ref 26–34)
MCHC RBC AUTO-ENTMCNC: 33.8 G/DL (ref 31–36)
MCV RBC AUTO: 80.2 FL (ref 80–100)
MONOCYTES # BLD: 0.4 K/UL (ref 0–1.3)
MONOCYTES NFR BLD: 8.7 %
NEUTROPHILS # BLD: 2.2 K/UL (ref 1.7–7.7)
NEUTROPHILS NFR BLD: 50.4 %
PLATELET # BLD AUTO: 269 K/UL (ref 135–450)
PMV BLD AUTO: 7.7 FL (ref 5–10.5)
RBC # BLD AUTO: 4.19 M/UL (ref 4–5.2)
RETICS # AUTO: 0.03 M/UL (ref 0.02–0.1)
RETICS/RBC NFR AUTO: 0.8 % (ref 0.5–2.18)
TIBC SERPL-MCNC: 346 UG/DL (ref 260–445)
VIT B12 SERPL-MCNC: >2000 PG/ML (ref 211–911)
WBC # BLD AUTO: 4.3 K/UL (ref 4–11)

## 2024-03-14 ENCOUNTER — OFFICE VISIT (OUTPATIENT)
Dept: INTERNAL MEDICINE CLINIC | Age: 89
End: 2024-03-14

## 2024-03-14 VITALS
HEART RATE: 74 BPM | OXYGEN SATURATION: 98 % | WEIGHT: 146 LBS | BODY MASS INDEX: 25.86 KG/M2 | SYSTOLIC BLOOD PRESSURE: 140 MMHG | DIASTOLIC BLOOD PRESSURE: 80 MMHG

## 2024-03-14 DIAGNOSIS — D64.9 ANEMIA, UNSPECIFIED TYPE: ICD-10-CM

## 2024-03-14 DIAGNOSIS — M79.604 BILATERAL LEG PAIN: ICD-10-CM

## 2024-03-14 DIAGNOSIS — I11.0 HYPERTENSIVE HEART DISEASE WITH HEART FAILURE (HCC): ICD-10-CM

## 2024-03-14 DIAGNOSIS — M79.605 BILATERAL LEG PAIN: ICD-10-CM

## 2024-03-14 DIAGNOSIS — R60.0 BILATERAL LEG EDEMA: Primary | ICD-10-CM

## 2024-03-14 RX ORDER — FUROSEMIDE 20 MG/1
20 TABLET ORAL DAILY
Qty: 14 TABLET | Refills: 0 | Status: SHIPPED | OUTPATIENT
Start: 2024-03-14 | End: 2024-03-28

## 2024-03-14 ASSESSMENT — ENCOUNTER SYMPTOMS
CHEST TIGHTNESS: 0
VOMITING: 0
VOICE CHANGE: 0
WHEEZING: 0
NAUSEA: 0
PHOTOPHOBIA: 0
ABDOMINAL PAIN: 0
TROUBLE SWALLOWING: 0
SHORTNESS OF BREATH: 0

## 2024-03-14 NOTE — RESULT ENCOUNTER NOTE
continue to have anemia.  Hemoglobin even slightly down than 6 days ago.  She should make appointment with gastroenterologist.

## 2024-03-14 NOTE — PROGRESS NOTES
Subjective:      Chief Complaint   Patient presents with    Follow-up     3 month f/u    Edema       Patient ID: Catherine Rebolledo is a 89 y.o. female.    HPI  Follow-up visit.  Patient reports she took 7 days of Lasix.  She continues to have leg swelling and also pain at the lateral lower legs since last visit.  Her pedal edema improved   Her  Leg redness also improved  The patient denies cough, chest pain, dyspnea, wheezing or hemoptysis.    Review of Systems   Constitutional:  Negative for diaphoresis and unexpected weight change.   HENT:  Negative for trouble swallowing and voice change.    Eyes:  Negative for photophobia and visual disturbance.   Respiratory:  Negative for chest tightness, shortness of breath and wheezing.    Cardiovascular:  Negative for chest pain and palpitations.   Gastrointestinal:  Negative for abdominal pain, nausea and vomiting.   Neurological:  Negative for dizziness, speech difficulty and numbness.     Vitals:    03/14/24 1436   BP: (!) 140/80   Pulse: 74   SpO2: 98%   Weight: 66.2 kg (146 lb)         Objective:   Physical Exam  Vitals and nursing note reviewed.   Constitutional:       General: She is not in acute distress.     Appearance: She is not ill-appearing.   Cardiovascular:      Rate and Rhythm: Normal rate and regular rhythm.      Pulses: Normal pulses.      Heart sounds: Normal heart sounds.   Pulmonary:      Effort: Pulmonary effort is normal. No respiratory distress.      Breath sounds: Normal breath sounds. No wheezing or rhonchi.   Abdominal:      General: Bowel sounds are normal.   Musculoskeletal:      Right lower leg: Edema present.      Left lower leg: Edema present.      Comments: There is bilateral 1-2+ pretibial edema.  Few follicular lesions at the lower anterior legs is better.  Today patient has bilateral lateral leg tenderness   Neurological:      Mental Status: She is alert. Mental status is at baseline.       Vitals:    03/14/24 1436   BP: (!) 140/80   Pulse:

## 2024-03-15 ENCOUNTER — HOSPITAL ENCOUNTER (OUTPATIENT)
Dept: VASCULAR LAB | Age: 89
Discharge: HOME | End: 2024-03-15
Payer: COMMERCIAL

## 2024-03-15 DIAGNOSIS — M79.604 BILATERAL LEG PAIN: ICD-10-CM

## 2024-03-15 DIAGNOSIS — M79.605 BILATERAL LEG PAIN: ICD-10-CM

## 2024-03-15 DIAGNOSIS — R60.0 BILATERAL LEG EDEMA: ICD-10-CM

## 2024-03-15 PROCEDURE — 93970 EXTREMITY STUDY: CPT

## 2024-03-20 ENCOUNTER — TELEPHONE (OUTPATIENT)
Dept: CARDIOLOGY CLINIC | Age: 89
End: 2024-03-20

## 2024-03-20 ENCOUNTER — OFFICE VISIT (OUTPATIENT)
Dept: CARDIOLOGY CLINIC | Age: 89
End: 2024-03-20
Payer: COMMERCIAL

## 2024-03-20 VITALS
HEART RATE: 66 BPM | HEIGHT: 63 IN | BODY MASS INDEX: 26.05 KG/M2 | WEIGHT: 147 LBS | DIASTOLIC BLOOD PRESSURE: 60 MMHG | SYSTOLIC BLOOD PRESSURE: 124 MMHG | OXYGEN SATURATION: 97 %

## 2024-03-20 DIAGNOSIS — E78.2 MIXED HYPERLIPIDEMIA: ICD-10-CM

## 2024-03-20 DIAGNOSIS — I10 PRIMARY HYPERTENSION: Primary | Chronic | ICD-10-CM

## 2024-03-20 DIAGNOSIS — I21.4 NSTEMI (NON-ST ELEVATED MYOCARDIAL INFARCTION) (HCC): ICD-10-CM

## 2024-03-20 DIAGNOSIS — R06.02 SOB (SHORTNESS OF BREATH): ICD-10-CM

## 2024-03-20 DIAGNOSIS — R68.84 JAW PAIN: ICD-10-CM

## 2024-03-20 DIAGNOSIS — R07.89 CHEST PRESSURE: ICD-10-CM

## 2024-03-20 DIAGNOSIS — I25.10 CORONARY ARTERY DISEASE INVOLVING NATIVE CORONARY ARTERY OF NATIVE HEART, UNSPECIFIED WHETHER ANGINA PRESENT: ICD-10-CM

## 2024-03-20 DIAGNOSIS — Z87.898 HISTORY OF SYNCOPE: ICD-10-CM

## 2024-03-20 DIAGNOSIS — Z91.81 AT HIGH RISK FOR FALLS: ICD-10-CM

## 2024-03-20 PROCEDURE — 1123F ACP DISCUSS/DSCN MKR DOCD: CPT | Performed by: INTERNAL MEDICINE

## 2024-03-20 PROCEDURE — 99205 OFFICE O/P NEW HI 60 MIN: CPT | Performed by: INTERNAL MEDICINE

## 2024-03-20 PROCEDURE — 93000 ELECTROCARDIOGRAM COMPLETE: CPT | Performed by: INTERNAL MEDICINE

## 2024-03-20 NOTE — PATIENT INSTRUCTIONS
Follow up with Dr Boyd in 2-3 months    NP visit a week or 2 after heart cath.     Heart Cath- Gloria,  will call you to set this up.     Hold off on the Colonoscopy for now until we get the cath done.     Call for any questions or concerns.

## 2024-03-20 NOTE — TELEPHONE ENCOUNTER
Date of Procedure: Monday 3/25/24 @ [facility] with Dr. Mendiola     Time of arrival: 6:45 am     Procedure time: 8:00 am     Called and spoke to Catherine and she is agreeable to date and time. Reviewed instructions and she verbalized understanding. Encouraged to call with any questions or concerns.     Published on "[employer] scheduled in Epic and e-mailed to cath lab.

## 2024-03-20 NOTE — TELEPHONE ENCOUNTER
----- Message from Laura Prince RN sent at 3/20/2024 10:38 AM EDT -----  Regarding: [facility]  Please schedule with [facility] urgently. MALIKA spoke to him already.     Thank you!

## 2024-03-25 ENCOUNTER — HOSPITAL ENCOUNTER (OUTPATIENT)
Dept: CARDIAC CATH/INVASIVE PROCEDURES | Age: 89
Discharge: HOME | End: 2024-03-25
Attending: INTERNAL MEDICINE | Admitting: INTERNAL MEDICINE
Payer: COMMERCIAL

## 2024-03-25 VITALS
OXYGEN SATURATION: 100 % | TEMPERATURE: 97.7 F | WEIGHT: 147 LBS | SYSTOLIC BLOOD PRESSURE: 149 MMHG | RESPIRATION RATE: 15 BRPM | BODY MASS INDEX: 27.05 KG/M2 | HEART RATE: 64 BPM | HEIGHT: 62 IN | DIASTOLIC BLOOD PRESSURE: 72 MMHG

## 2024-03-25 LAB
ANION GAP SERPL CALCULATED.3IONS-SCNC: 12 MMOL/L (ref 3–16)
BUN SERPL-MCNC: 8 MG/DL (ref 7–20)
CALCIUM SERPL-MCNC: 9.4 MG/DL (ref 8.3–10.6)
CHLORIDE SERPL-SCNC: 106 MMOL/L (ref 99–110)
CO2 SERPL-SCNC: 24 MMOL/L (ref 21–32)
CREAT SERPL-MCNC: 0.8 MG/DL (ref 0.6–1.2)
DEPRECATED RDW RBC AUTO: 14.5 % (ref 12.4–15.4)
EKG ATRIAL RATE: 70 BPM
EKG DIAGNOSIS: NORMAL
EKG P AXIS: 51 DEGREES
EKG P-R INTERVAL: 180 MS
EKG Q-T INTERVAL: 436 MS
EKG QRS DURATION: 128 MS
EKG QTC CALCULATION (BAZETT): 470 MS
EKG R AXIS: -12 DEGREES
EKG T AXIS: 29 DEGREES
EKG VENTRICULAR RATE: 70 BPM
GFR SERPLBLD CREATININE-BSD FMLA CKD-EPI: >60 ML/MIN/{1.73_M2}
GLUCOSE SERPL-MCNC: 93 MG/DL (ref 70–99)
HCT VFR BLD AUTO: 36 % (ref 36–48)
HGB BLD-MCNC: 11.8 G/DL (ref 12–16)
LEFT VENTRICULAR EJECTION FRACTION MODE: NORMAL
LV EF: 60 %
MCH RBC QN AUTO: 26.1 PG (ref 26–34)
MCHC RBC AUTO-ENTMCNC: 32.8 G/DL (ref 31–36)
MCV RBC AUTO: 79.5 FL (ref 80–100)
PLATELET # BLD AUTO: 219 K/UL (ref 135–450)
PMV BLD AUTO: 7.6 FL (ref 5–10.5)
POTASSIUM SERPL-SCNC: 3.3 MMOL/L (ref 3.5–5.1)
RBC # BLD AUTO: 4.53 M/UL (ref 4–5.2)
SODIUM SERPL-SCNC: 142 MMOL/L (ref 136–145)
WBC # BLD AUTO: 5.2 K/UL (ref 4–11)

## 2024-03-25 PROCEDURE — 93010 ELECTROCARDIOGRAM REPORT: CPT | Performed by: INTERNAL MEDICINE

## 2024-03-25 PROCEDURE — 99152 MOD SED SAME PHYS/QHP 5/>YRS: CPT

## 2024-03-25 PROCEDURE — 85027 COMPLETE CBC AUTOMATED: CPT

## 2024-03-25 PROCEDURE — C1894 INTRO/SHEATH, NON-LASER: HCPCS | Performed by: INTERNAL MEDICINE

## 2024-03-25 PROCEDURE — 80048 BASIC METABOLIC PNL TOTAL CA: CPT

## 2024-03-25 PROCEDURE — 99152 MOD SED SAME PHYS/QHP 5/>YRS: CPT | Performed by: INTERNAL MEDICINE

## 2024-03-25 PROCEDURE — 2500000003 HC RX 250 WO HCPCS

## 2024-03-25 PROCEDURE — 6360000004 HC RX CONTRAST MEDICATION: Performed by: INTERNAL MEDICINE

## 2024-03-25 PROCEDURE — 6360000002 HC RX W HCPCS

## 2024-03-25 PROCEDURE — 99153 MOD SED SAME PHYS/QHP EA: CPT

## 2024-03-25 PROCEDURE — 93005 ELECTROCARDIOGRAM TRACING: CPT | Performed by: INTERNAL MEDICINE

## 2024-03-25 PROCEDURE — 93458 L HRT ARTERY/VENTRICLE ANGIO: CPT | Performed by: INTERNAL MEDICINE

## 2024-03-25 PROCEDURE — 93458 L HRT ARTERY/VENTRICLE ANGIO: CPT

## 2024-03-25 PROCEDURE — 2709999900 HC NON-CHARGEABLE SUPPLY: Performed by: INTERNAL MEDICINE

## 2024-03-25 PROCEDURE — C1769 GUIDE WIRE: HCPCS | Performed by: INTERNAL MEDICINE

## 2024-03-25 PROCEDURE — 36415 COLL VENOUS BLD VENIPUNCTURE: CPT

## 2024-03-25 RX ORDER — SODIUM CHLORIDE 0.9 % (FLUSH) 0.9 %
5-40 SYRINGE (ML) INJECTION EVERY 12 HOURS SCHEDULED
Status: DISCONTINUED | OUTPATIENT
Start: 2024-03-25 | End: 2024-03-25 | Stop reason: HOSPADM

## 2024-03-25 RX ORDER — SODIUM CHLORIDE 9 MG/ML
INJECTION, SOLUTION INTRAVENOUS PRN
Status: DISCONTINUED | OUTPATIENT
Start: 2024-03-25 | End: 2024-03-25 | Stop reason: HOSPADM

## 2024-03-25 RX ORDER — SODIUM CHLORIDE 0.9 % (FLUSH) 0.9 %
5-40 SYRINGE (ML) INJECTION PRN
Status: DISCONTINUED | OUTPATIENT
Start: 2024-03-25 | End: 2024-03-25 | Stop reason: HOSPADM

## 2024-03-25 RX ORDER — SODIUM CHLORIDE 9 MG/ML
INJECTION, SOLUTION INTRAVENOUS CONTINUOUS
Status: DISCONTINUED | OUTPATIENT
Start: 2024-03-25 | End: 2024-03-25 | Stop reason: HOSPADM

## 2024-03-25 RX ORDER — ONDANSETRON 2 MG/ML
4 INJECTION INTRAMUSCULAR; INTRAVENOUS EVERY 6 HOURS PRN
Status: DISCONTINUED | OUTPATIENT
Start: 2024-03-25 | End: 2024-03-25 | Stop reason: HOSPADM

## 2024-03-25 RX ORDER — ACETAMINOPHEN 325 MG/1
650 TABLET ORAL EVERY 4 HOURS PRN
Status: DISCONTINUED | OUTPATIENT
Start: 2024-03-25 | End: 2024-03-25 | Stop reason: HOSPADM

## 2024-03-25 RX ADMIN — IOPAMIDOL 52 ML: 755 INJECTION, SOLUTION INTRAVENOUS at 08:47

## 2024-03-25 NOTE — DISCHARGE INSTRUCTIONS
Radial Angiogram      Care of your puncture site:  Remove clear bandage 24 hours after the procedure.  May shower in 24 hours  Inspect the site daily and gently clean using soap and water.  Dry thoroughly and apply a Band-Aid.    Normal Observations:  Soreness or tenderness which may last one week.  Mild oozing from the incision site.  Possible bruising that could last 2 weeks.    Activity:  You may resume driving 24 hours following the procedure.  You may resume normal activity in 3 days or after the wound heals.  Avoid lifting more than 10 pounds for 3 days with affected arm.    Nutrition:  Regular diet   Drink at least 8 to 10 glasses of decaffeinated, non-alcoholic fluid for the next 24 hours to flush the x-ray dye used for your angiogram out of your body.    Call your doctor immediately if your condition worsens, for any other concerns, for a follow-up appointment or if you experience any of the following:  Significant bleeding that does not stop after 10 minutes of applying firm pressure on the puncture site.  Increased swelling of the wrist.  Unusual pain, numbness, or tingling of the wrist/arm.   Any signs of infection such as: redness, yellow drainage at the site, swelling or pain.

## 2024-03-25 NOTE — PROCEDURES
Patient:  Catherine Rebolledo   :   1934    PROCEDURAL SUMMARY  ~Consent:   Obtained written and verbal consent      Risks/benefits explained in detail  ~Procedure:    Left Heart Catheterization  ~Medications:    Procedural sedation with minimal conscious sedation  ~Complications:   None  ~Blood Loss:    <10cc  ~Specimens:    None obtained  ~Pre-sedation re-evaluation: Performed immediately prior to procedure.  ~Performing Physician:          Robby Mendiola MD    ~Assistants:       None    INDICATIONS:  Pre-Procedure Diagnosis:  New Onset Angina <= 2 months, Worsening Angina, and Suspected CAD    Post-Procedure Diagnosis: same    PROCEDURES PERFORMED:   Left heart catheterization with    .    PROCEDURE DETAILS/TECHNIQUE:  Local anesthetic was given and access was obtained in the right Radial Artery using a micropuncture technique and a (5/6) Fr Slender Terumo Sheath was placed without difficulty. Catheters were advanced over a 0.35 wire under fluoroscopic guidance  Left coronary angiography was done using a 5 Fr Kimmie L 3.5 diagnostic catheter.  Right coronary angiography was done using a 5 Fr Kimmie R4 diagnostic catheter.  Left ventriculography was done using a 5 Fr pigtail catheter. At the end of the procedure a TR band device was used for hemostasis.     ANGIOGRAM/CORONARY ARTERIOGRAM:     Left main artery Bifurcates into the left anterior descending artery and left circumflex artery nml   Left anterior descending artery Gives rise to no diagonal arteries nml   Diagonals  nml   Left circumflex artery Dominant vessel that gives rise to the posterior descending artery and posterolateral branch 2 obtuse marginal arteries nml   Obtuse Marginals  nml     Right coronary artery Non-dominant vessel nml   Posterior descending artery Posterior lateral branch  nml  nml       LEFT VENTRICULOGRAM:  Left ventricular angiogram was done in the 30° DUFF projection and revealed  LVEF- 60  LVG- nml  LVEDP- 14  Aortic pressure-

## 2024-03-25 NOTE — H&P
angina.  I got an EKG today to be sure she wasn't having a subtle STEMI.  Fortunately, there are no ST elevations.    Although she is 89, she is much healthier and independent than the average 90-year-old, and is still an appropriate candidate for a coronary intervention.    I suspect that either her Lexiscan was a false negative and/or her CAD has progressed since then.  Lexiscan showing \"scar\" isn't always accurate at ruling out additional ischemia.    Since she is so symptomatic, already had clear evidence of an MI, a recent stress test that was likely misleading, and at her age will likely not get a diagnostic coronary CTA due to calcifications in the coronaries, will plan to go straight to cath.  I discussed this in office today with Dr. Mendiola, my partner in office today who does coronary interventions, and he agrees that this plan is appropriate.    I discussed risks, benefits, and alternatives of heart cath and possible PCI to the patient. I discussed risks, incluiding risk of bleeding, infection, kidney injury (possibly requiring dialysis), vascular injury, arrhythmia, myocardial infarction, stroke, and even death, and the patient would like to proceed with heart cath.    Message sent to PCP and GI w/ need to delay C-scope until cath is done    Summary of Assessment and Plan:  3/20/24  [facility] urgently due to her current symptoms - message sent to SW to get set up   Postpone colonoscopy until LHC is completed.   NO medication changes today   Continue risk factor modifications   Call for any new or worsening symptoms.     All new cardiac testing and lab results personally reviewed by me during this office visit and discussed with patient.    Patient counseled on lifestyle modification, diet, and exercise.    Follow Up: 2-3 months   NP visit 1-2 weeks after LHC     I have reviewed the history and physical and examined the patient and find no relevant changes in the history and physical exam, including heart and  lung sounds  I have reviewed with the patient and/or family the risks, benefits, and alternatives to the procedure.    Based on these findings I recommend left heart cath for definitive evaluation of coronary arteries.  Risks, benefits, expectations, and alternative treatments were discussed.  Questions appropriately answered.  Catherine Rebolledo agrees to proceed and verbalized understanding.       Robby Mendiola MD 3/25/2024 8:05 AM

## 2024-03-25 NOTE — PROGRESS NOTES
PRE-PROCEDURE    DATE: 3/25/2024 ARRIVAL TO CATH LAB: 7:06 AM    ADMIT SOURCE: Outpatient    ID & ALLERGY BAND: On    CONSENT: Yes    NPO SINCE: Midnight    LABS/PREGNANCY TEST: N/A    PULSES:  Right Radial Artery 2+  Right DP 2+  Right PT 1+  Left DP 2+  Left PT 1+    IV SITE : Started in left arm.  with fluids infusing at kvo 7:06 AM     SURGERIES PLANNED: Yes; colonoscopy    BLEEDING PROBLEMS: No    BLEEDING RISK: Low Risk <2%    COMPLIANCE: No      PATIENT HISTORY    CHIEF COMPLAINT: Chest Pain    EKG:  Yes    Pre CATH Rhythm: Normal Sinus Rhythm    STRESS TEST PREFORMED:  Yes FINDINGS:  Stress Nuclear: Date:  10/23/2023  Result:  Positive: High     EF: 80    CARDIAC CTA PREFORMED:  No    AGATSTON CORONARY CALCIUM SCORE:   Assessed: No    ECHO: DATE:  Yes.  Most recent date: 6/20/2017      EF:  65    HYPERTENSION: Yes    DYSLIPIDEMIA: Yes    FAMILY HX OF CAD: Yes    PRIOR MI: Yes.  Most recent date: 10/23/2023    PRIOR PCI: No    PRIOR CABG: No    CEREBRALVASCULAR DX: No    PERIPHERAL ARTERIAL DISEASE: No    CHRONIC LUNG DISEASE: Yes    TOBACCO: Never    DIABETIC: No    CARDIAC ARREST: No    DIALYSIS: No    FRAILTY SCORE: 4 VULNERABLE (while not dependent on others for IADLs, symptoms limit activities)

## 2024-03-25 NOTE — PRE SEDATION
Brief Pre-Op Note/Sedation Assessment      Catherine Rebolledo  2/23/1934  [id number]  8:06 AM    Planned Procedure: Cardiac Catheterization Procedure  Post Procedure Plan: Return to same level of care  Consent: I have discussed with the patient and/or the patient representative the indication, alternatives, and the possible risks and/or complications of the planned procedure and the anesthesia methods. The patient and/or patient representative appear to understand and agree to proceed.        Chief Complaint:   Chest Pain/Pressure  Anginal Equivalent  NSTEMI      Indications for Cath Procedure:  Presentation:  ACS > 24 hrs, New Onset Angina <= 2 months, Worsening Angina, and Suspected CAD  2.  Anginal Classification within 2 weeks:  CCS III - Symptoms with everyday living activities, i.e., moderate limitation  3.  Angina Symptoms Assessment:  Atypical Chest Pain  4.  Heart Failure Class within last 2 weeks:  Yes:  Heart Failure Type: Diastolic Severity:  Class III - Symptoms of HF on less-than-ordinary exertion  5.  Cardiovascular Instability:  No    Prior Ischemic Workup/Eval:  Pre-Procedural Medications: Yes: Antiarrhythmic Agent Other, Aspirin, and STATIN  2.   Stress Test Completed?  Yes:  Stress or Imaging Studies Performed (within ANY time period):   Type:  Stress Nuclear  Results:  Indeterminate Extent of Ischemia:  Intermediate    Does Patient need surgery?  Cath Valve Surgery:  No    Pre-Procedure Medical History:  Vital Signs:  BP (!) 163/72   Pulse 68   Temp 97.7 °F (36.5 °C) (Infrared)   Resp 20   Ht 1.575 m (5' 2\")   Wt 66.7 kg (147 lb)   SpO2 99%   BMI 26.89 kg/m²     Allergies:    Allergies   Allergen Reactions    Simvastatin Other (See Comments)     Body aches  bodyaches    Advair Hfa [Fluticasone-Salmeterol] Hallucinations     Hallucinations with inhaled fluticasone    Symbicort [Budesonide-Formoterol Fumarate] Hallucinations     Medications:    Current Facility-Administered Medications

## 2024-03-26 DIAGNOSIS — I11.0 HYPERTENSIVE HEART DISEASE WITH HEART FAILURE (HCC): ICD-10-CM

## 2024-03-26 DIAGNOSIS — R60.0 BILATERAL LEG EDEMA: ICD-10-CM

## 2024-03-26 RX ORDER — FUROSEMIDE 20 MG/1
20 TABLET ORAL DAILY
Qty: 14 TABLET | Refills: 0 | OUTPATIENT
Start: 2024-03-26 | End: 2024-04-09

## 2024-04-01 NOTE — TELEPHONE ENCOUNTER
Called pt to advise Christa Neither is out and she will need an appt.  Scheduled for Thursday From: Felix Gaspar  To: Shruthi Nieves  Sent: 4/1/2024 3:58 PM CDT  Subject: Focalin    Hello. We have done a trial w Felix’s new med & found we don’t like it very much. He becomes borderline lethargic, on edge & upset, will not let you touch him, & just seems very out of it overall.

## 2024-04-05 NOTE — PROGRESS NOTES
[facility] PRE-OPERATIVE INSTRUCTIONS    Day of Procedure:4/23                Arrival time: 0900               Surgery time:1030    Take the following medications with a sip of water:  Follow your MD/Surgeons pre-procedure instructions regarding your medications     Do not eat or drink anything after 12:00 midnight prior to your surgery.  This includes water chewing gum, mints and ice chips.   You may brush your teeth and gargle the morning of your surgery, but do not swallow the water     Please see your family doctor/pediatrician for a history and physical and/or concerning medications.   Bring any test results/reports from your physicians office.   If you are under the care of a heart doctor or specialist doctor, please be aware that you may be asked to them for clearance    You may be asked to stop blood thinners such as Coumadin, Plavix, Fragmin, Lovenox, etc., or any anti-inflammatories such as:  Aspirin, Ibuprofen, Advil, Naproxen prior to your surgery.    We also ask that you stop any OTC medications such as fish oil, vitamin E, glucosamine, garlic, Multivitamins, COQ 10, etc.    We ask that you do not smoke 24 hours prior to surgery  We ask that you do not  drink any alcoholic beverages 24 hours prior to surgery     You must make arrangements for a responsible adult to take you home after your surgery.    For your safety you will not be allowed to leave alone or drive yourself home.  Your surgery will be cancelled if you do not have a ride home.     Also for your safety, it is strongly suggested that someone stay with you the first 24 hours after your surgery.     A parent or legal guardian must accompany a child scheduled for surgery and plan to stay at the hospital until the child is discharged.    Please do not bring other children with you.    For your comfort, please wear simple loose fitting clothing to the hospital.  Please do not bring valuables.    Do not wear any make-up or nail

## 2024-04-05 NOTE — PROGRESS NOTES
[facility] Pre-Admission Testing Electronic Communication Worksheet for OR/ENDO Procedures        Patient: Catherine Rebolledo    DOS: 4/23    Transportation Confirmed: [] YES    [x]  NO  Patient not sure at this time, educated on what is needed for the . IF unable to find a ride patient educated to call MD [facility] office    History and Physical:  [] YES    []  NO  [x] N/A  If yes, please list doctor or Urgent Care and date of H&P:     Additional Clearance(Cardiac, Pulmonary, etc):  [] YES    [x]  NO    Pre-Admission Testing Visit:  [] YES    [x]  NO If no, do labs/testing need to be done DOS?  [] YES    [x]  NO    Medication Reconciliation Complete:  [x] YES    []  NO        Additional Notes:        Interview Complete: [x] YES    []  NO          Lindsey Maciel RN  11:12 AM

## 2024-04-11 ENCOUNTER — OFFICE VISIT (OUTPATIENT)
Dept: INTERNAL MEDICINE CLINIC | Age: 89
End: 2024-04-11

## 2024-04-11 VITALS
WEIGHT: 146 LBS | SYSTOLIC BLOOD PRESSURE: 130 MMHG | BODY MASS INDEX: 25.87 KG/M2 | DIASTOLIC BLOOD PRESSURE: 78 MMHG | OXYGEN SATURATION: 95 % | HEIGHT: 63 IN | HEART RATE: 91 BPM

## 2024-04-11 DIAGNOSIS — J44.89 COPD WITH ASTHMA (HCC): Primary | ICD-10-CM

## 2024-04-11 DIAGNOSIS — J20.9 ACUTE BRONCHITIS, UNSPECIFIED ORGANISM: ICD-10-CM

## 2024-04-11 DIAGNOSIS — R06.2 WHEEZING: ICD-10-CM

## 2024-04-11 DIAGNOSIS — J45.41 MODERATE PERSISTENT ASTHMATIC BRONCHITIS WITH ACUTE EXACERBATION: ICD-10-CM

## 2024-04-11 DIAGNOSIS — K64.4 EXTERNAL HEMORRHOIDS: ICD-10-CM

## 2024-04-11 RX ORDER — DOXYCYCLINE HYCLATE 100 MG
100 TABLET ORAL 2 TIMES DAILY
Qty: 20 TABLET | Refills: 0 | Status: SHIPPED | OUTPATIENT
Start: 2024-04-11 | End: 2024-04-21

## 2024-04-11 RX ORDER — HYDROCORTISONE 25 MG/G
CREAM TOPICAL
Qty: 28 G | Refills: 0 | Status: SHIPPED | OUTPATIENT
Start: 2024-04-11

## 2024-04-11 RX ORDER — PREDNISONE 20 MG/1
40 TABLET ORAL DAILY
Qty: 14 TABLET | Refills: 0 | Status: SHIPPED | OUTPATIENT
Start: 2024-04-11 | End: 2024-04-18

## 2024-04-11 ASSESSMENT — ENCOUNTER SYMPTOMS
COUGH: 1
NAUSEA: 0
WHEEZING: 1
VOMITING: 0
ABDOMINAL PAIN: 0

## 2024-04-11 NOTE — PROGRESS NOTES
Catherinepankaj Rebolledo  2/23/1934  female  89 y.o.    SUBJECTIVE:       Chief Complaint   Patient presents with    Follow-up     4 week follow up, Pt having complaints of a cough and sob lots of congestion since Saturday, Pt states swelling in arms and legs has gotten better but is still there. Hemorid started this morning       HPI:  Follow-up visit.  Since last visit patient had normal cardiac cath.  She feels her blood pressure has been well-controlled.  Leg swelling also improved.    For the last 3 to 4 days she is having cough congestion wheezing rhonchi.  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    Coronary artery disease involving native heart 1/11/2024    Gastroesophagitis     GERD (gastroesophageal reflux disease)     Hyperlipidemia     Hypertension     Moderate persistent asthma 2/12/2021    Pneumonia     Rheumatic fever with heart involvement     Trigger ring finger of right hand 7/1/2013    Unspecified diseases of blood and blood-forming organs     chronic anemia     Past Surgical History:   Procedure Laterality Date    BLADDER REPAIR N/A 2002    BREAST SURGERY      L breast tumor-benign    CYST REMOVAL Right 8-1-13    GANGLION CYST EXCISION RIGHT WRIST, TRIGGER FINGER RELEASE RIGHT FOURTH    EYE SURGERY      , cataract right eye    FRACTURE SURGERY      R shoulderx2-pinned    HYSTERECTOMY (CERVIX STATUS UNKNOWN)      JOINT REPLACEMENT Right 03/28/2012    Total Elbow - Dr. Ramirez    RECTOCELE REPAIR      SHOULDER SURGERY Right     TONSILLECTOMY      TUMOR REMOVAL  2013    carcinoid    UPPER GASTROINTESTINAL ENDOSCOPY  3/7/12    with bx    UPPER GASTROINTESTINAL ENDOSCOPY  11/8/13    EUS    UPPER GASTROINTESTINAL ENDOSCOPY  4/24/14    UPPER

## 2024-04-12 ENCOUNTER — HOSPITAL ENCOUNTER (OUTPATIENT)
Dept: GENERAL RADIOLOGY | Age: 89
Discharge: HOME | End: 2024-04-12
Payer: COMMERCIAL

## 2024-04-12 ENCOUNTER — HOSPITAL ENCOUNTER (OUTPATIENT)
Age: 89
Discharge: HOME | End: 2024-04-12
Payer: COMMERCIAL

## 2024-04-12 DIAGNOSIS — J44.89 COPD WITH ASTHMA (HCC): ICD-10-CM

## 2024-04-12 PROCEDURE — 71046 X-RAY EXAM CHEST 2 VIEWS: CPT

## 2024-04-22 ENCOUNTER — ANESTHESIA EVENT (OUTPATIENT)
Dept: ENDOSCOPY | Age: 89
End: 2024-04-22
Payer: COMMERCIAL

## 2024-04-23 ENCOUNTER — HOSPITAL ENCOUNTER (OUTPATIENT)
Age: 89
Setting detail: OUTPATIENT SURGERY
Discharge: HOME | End: 2024-04-23
Attending: INTERNAL MEDICINE | Admitting: INTERNAL MEDICINE
Payer: COMMERCIAL

## 2024-04-23 ENCOUNTER — ANESTHESIA (OUTPATIENT)
Dept: ENDOSCOPY | Age: 89
End: 2024-04-23
Payer: COMMERCIAL

## 2024-04-23 VITALS
SYSTOLIC BLOOD PRESSURE: 143 MMHG | HEIGHT: 63 IN | WEIGHT: 139.4 LBS | TEMPERATURE: 97 F | DIASTOLIC BLOOD PRESSURE: 74 MMHG | OXYGEN SATURATION: 99 % | BODY MASS INDEX: 24.7 KG/M2 | HEART RATE: 77 BPM | RESPIRATION RATE: 16 BRPM

## 2024-04-23 DIAGNOSIS — D64.9 ANEMIA, UNSPECIFIED TYPE: ICD-10-CM

## 2024-04-23 PROCEDURE — 7100000000 HC PACU RECOVERY - FIRST 15 MIN: Performed by: INTERNAL MEDICINE

## 2024-04-23 PROCEDURE — 2500000003 HC RX 250 WO HCPCS: Performed by: NURSE ANESTHETIST, CERTIFIED REGISTERED

## 2024-04-23 PROCEDURE — 7100000011 HC PHASE II RECOVERY - ADDTL 15 MIN: Performed by: INTERNAL MEDICINE

## 2024-04-23 PROCEDURE — 6360000002 HC RX W HCPCS: Performed by: NURSE ANESTHETIST, CERTIFIED REGISTERED

## 2024-04-23 PROCEDURE — 7100000001 HC PACU RECOVERY - ADDTL 15 MIN: Performed by: INTERNAL MEDICINE

## 2024-04-23 PROCEDURE — 88305 TISSUE EXAM BY PATHOLOGIST: CPT

## 2024-04-23 PROCEDURE — 3609010600 HC COLONOSCOPY POLYPECTOMY SNARE/COLD BIOPSY: Performed by: INTERNAL MEDICINE

## 2024-04-23 PROCEDURE — 3700000001 HC ADD 15 MINUTES (ANESTHESIA): Performed by: INTERNAL MEDICINE

## 2024-04-23 PROCEDURE — 7100000010 HC PHASE II RECOVERY - FIRST 15 MIN: Performed by: INTERNAL MEDICINE

## 2024-04-23 PROCEDURE — 3609012400 HC EGD TRANSORAL BIOPSY SINGLE/MULTIPLE: Performed by: INTERNAL MEDICINE

## 2024-04-23 PROCEDURE — 2580000003 HC RX 258: Performed by: ANESTHESIOLOGY

## 2024-04-23 PROCEDURE — 3700000000 HC ANESTHESIA ATTENDED CARE: Performed by: INTERNAL MEDICINE

## 2024-04-23 PROCEDURE — 2709999900 HC NON-CHARGEABLE SUPPLY: Performed by: INTERNAL MEDICINE

## 2024-04-23 PROCEDURE — 2580000003 HC RX 258: Performed by: NURSE ANESTHETIST, CERTIFIED REGISTERED

## 2024-04-23 RX ORDER — LIDOCAINE HYDROCHLORIDE 20 MG/ML
INJECTION, SOLUTION EPIDURAL; INFILTRATION; INTRACAUDAL; PERINEURAL PRN
Status: DISCONTINUED | OUTPATIENT
Start: 2024-04-23 | End: 2024-04-23 | Stop reason: SDUPTHER

## 2024-04-23 RX ORDER — SODIUM CHLORIDE 0.9 % (FLUSH) 0.9 %
5-40 SYRINGE (ML) INJECTION EVERY 12 HOURS SCHEDULED
Status: DISCONTINUED | OUTPATIENT
Start: 2024-04-23 | End: 2024-04-23 | Stop reason: HOSPADM

## 2024-04-23 RX ORDER — PROPOFOL 10 MG/ML
INJECTION, EMULSION INTRAVENOUS PRN
Status: DISCONTINUED | OUTPATIENT
Start: 2024-04-23 | End: 2024-04-23 | Stop reason: SDUPTHER

## 2024-04-23 RX ORDER — ONDANSETRON 2 MG/ML
4 INJECTION INTRAMUSCULAR; INTRAVENOUS
Status: DISCONTINUED | OUTPATIENT
Start: 2024-04-23 | End: 2024-04-23 | Stop reason: HOSPADM

## 2024-04-23 RX ORDER — SODIUM CHLORIDE 0.9 % (FLUSH) 0.9 %
5-40 SYRINGE (ML) INJECTION PRN
Status: DISCONTINUED | OUTPATIENT
Start: 2024-04-23 | End: 2024-04-23 | Stop reason: HOSPADM

## 2024-04-23 RX ORDER — SODIUM CHLORIDE 9 MG/ML
INJECTION, SOLUTION INTRAVENOUS PRN
Status: DISCONTINUED | OUTPATIENT
Start: 2024-04-23 | End: 2024-04-23 | Stop reason: HOSPADM

## 2024-04-23 RX ORDER — NALOXONE HYDROCHLORIDE 0.4 MG/ML
INJECTION, SOLUTION INTRAMUSCULAR; INTRAVENOUS; SUBCUTANEOUS PRN
Status: DISCONTINUED | OUTPATIENT
Start: 2024-04-23 | End: 2024-04-23 | Stop reason: HOSPADM

## 2024-04-23 RX ORDER — SODIUM CHLORIDE 9 MG/ML
INJECTION, SOLUTION INTRAVENOUS CONTINUOUS PRN
Status: DISCONTINUED | OUTPATIENT
Start: 2024-04-23 | End: 2024-04-23 | Stop reason: SDUPTHER

## 2024-04-23 RX ADMIN — PROPOFOL 150 MCG/KG/MIN: 10 INJECTION, EMULSION INTRAVENOUS at 10:31

## 2024-04-23 RX ADMIN — SODIUM CHLORIDE: 9 INJECTION, SOLUTION INTRAVENOUS at 10:26

## 2024-04-23 RX ADMIN — SODIUM CHLORIDE: 9 INJECTION, SOLUTION INTRAVENOUS at 09:37

## 2024-04-23 RX ADMIN — LIDOCAINE HYDROCHLORIDE 50 MG: 20 INJECTION, SOLUTION EPIDURAL; INFILTRATION; INTRACAUDAL; PERINEURAL at 10:30

## 2024-04-23 RX ADMIN — PROPOFOL 50 MG: 10 INJECTION, EMULSION INTRAVENOUS at 10:30

## 2024-04-23 ASSESSMENT — PAIN - FUNCTIONAL ASSESSMENT
PAIN_FUNCTIONAL_ASSESSMENT: 0-10
PAIN_FUNCTIONAL_ASSESSMENT: FACE, LEGS, ACTIVITY, CRY, AND CONSOLABILITY (FLACC)
PAIN_FUNCTIONAL_ASSESSMENT: 0-10

## 2024-04-23 ASSESSMENT — ENCOUNTER SYMPTOMS: SHORTNESS OF BREATH: 0

## 2024-04-23 ASSESSMENT — PAIN DESCRIPTION - DESCRIPTORS: DESCRIPTORS: CRAMPING

## 2024-04-23 ASSESSMENT — PAIN SCALES - GENERAL: PAINLEVEL_OUTOF10: 0

## 2024-04-23 NOTE — H&P
Gastroenterology Note                 Pre-operative History and Physical    Patient: Catherine Rebolledo  : 1934  CSN:     History Obtained From:   Patient or guardian.      HISTORY OF PRESENT ILLNESS:    The patient is a 89 y.o. female here for Endoscopy.      Past Medical History:    Past Medical History:   Diagnosis Date    Anxiety 2022    Bleeding ulcer     Cancer (HCC)     melanoma L IRIS    Confusion 10/30/2023    COPD with asthma (HCC) 2023    Coronary artery disease involving native heart 2024    Gastroesophagitis     GERD (gastroesophageal reflux disease)     Hyperlipidemia     Hypertension     Moderate persistent asthma 2021    Pneumonia     Rheumatic fever with heart involvement     Trigger ring finger of right hand 2013    Unspecified diseases of blood and blood-forming organs     chronic anemia     Past Surgical History:    Past Surgical History:   Procedure Laterality Date    BLADDER REPAIR N/A     BREAST SURGERY      L breast tumor-benign    CYST REMOVAL Right 13    GANGLION CYST EXCISION RIGHT WRIST, TRIGGER FINGER RELEASE RIGHT FOURTH    EYE SURGERY      , cataract right eye    FRACTURE SURGERY      R shoulderx2-pinned    HYSTERECTOMY (CERVIX STATUS UNKNOWN)      JOINT REPLACEMENT Right 2012    Total Elbow - Dr. Ramirez    RECTOCELE REPAIR      SHOULDER SURGERY Right     TONSILLECTOMY      TUMOR REMOVAL      carcinoid    UPPER GASTROINTESTINAL ENDOSCOPY  3/7/12    with bx    UPPER GASTROINTESTINAL ENDOSCOPY  13    EUS    UPPER GASTROINTESTINAL ENDOSCOPY  14    UPPER GASTROINTESTINAL ENDOSCOPY  3/17/16    push enteroscopy with ablation    UPPER GASTROINTESTINAL ENDOSCOPY  2017    UPPER GASTROINTESTINAL ENDOSCOPY N/A 2021    EGD ULTRASOUND OF STOMACH performed by Kian Brar MD at [facility] END[facility]COPY     Medications Prior to Admission:   No current facility-administered medications on file prior to encounter.

## 2024-04-23 NOTE — PROGRESS NOTES
Pt has large purple bruise to her coccyx, states fell in her garden a couple days ago and landed on a rock, denies pain, denies difficult with walking since.

## 2024-04-23 NOTE — PROGRESS NOTES
Pt is alert and oriented and denies pain at this time, vital signs stable on room air. Tolerating a drink and snack. Discharge instructions given to Pt and family, all questions answered. Pt discharged to home with family to transport.

## 2024-04-23 NOTE — ANESTHESIA POSTPROCEDURE EVALUATION
Department of Anesthesiology  Postprocedure Note    Patient: Catherine Rebolledo  MRN: [id number]  YOB: 1934  Date of evaluation: 4/23/2024    Procedure Summary       Date: 04/23/24 Room / Location: [place] [id number] / [facility]    Anesthesia Start: 1027 Anesthesia Stop: 1114    Procedures:       COLONOSCOPY POLYPECTOMY SNARE BIOPSY      ESOPHAGOGASTRODUODENOSCOPY BIOPSY Diagnosis:       Anemia, unspecified type      (Anemia, unspecified type [D64.9])    Surgeons: Josue Sorenson MD Responsible Provider: Karey Davila MD    Anesthesia Type: MAC ASA Status: 3            Anesthesia Type: No value filed.    Jay Phase I: Jay Score: 8    Jay Phase II:      Anesthesia Post Evaluation    Patient location during evaluation: PACU  Patient participation: complete - patient participated  Level of consciousness: awake and alert  Airway patency: patent  Nausea & Vomiting: no nausea and no vomiting  Cardiovascular status: hemodynamically stable  Respiratory status: acceptable  Hydration status: stable  Pain management: adequate        No notable events documented.

## 2024-04-23 NOTE — ANESTHESIA PRE PROCEDURE
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    HTN (hypertension), benign I10    COPD with asthma ([facility]) J44.89    Coronary artery disease involving native heart I25.10    History of non-ST elevation myocardial infarction (NSTEMI) I25.2    Severe persistent reactive airway disease with acute exacerbation J45.51    Hypertensive heart disease with heart failure ([facility]) I11.0    Leg swelling M79.89    History of syncope Z87.898       Past Medical History:        Diagnosis Date    Anxiety 2/24/2022    Bleeding ulcer     Cancer ([facility])     melanoma L IRIS    Confusion 10/30/2023    COPD with asthma ([facility]) 12/12/2023    Coronary artery disease involving native heart 1/11/2024    Gastroesophagitis     GERD (gastroesophageal reflux disease)     Hyperlipidemia     Hypertension     Moderate persistent asthma 2/12/2021    Pneumonia     Rheumatic fever with heart involvement     Trigger ring finger of right hand 7/1/2013    Unspecified diseases of blood and blood-forming organs     chronic anemia       Past Surgical History:        Procedure Laterality Date    BLADDER REPAIR N/A 2002    BREAST SURGERY      L breast tumor-benign    CYST REMOVAL Right 8-1-13    GANGLION CYST EXCISION RIGHT WRIST, TRIGGER FINGER RELEASE RIGHT FOURTH    EYE SURGERY      , cataract right eye    FRACTURE SURGERY      R shoulderx2-pinned    HYSTERECTOMY (CERVIX STATUS UNKNOWN)      JOINT REPLACEMENT Right 03/28/2012

## 2024-05-08 ENCOUNTER — OFFICE VISIT (OUTPATIENT)
Dept: INTERNAL MEDICINE CLINIC | Age: 89
End: 2024-05-08

## 2024-05-08 VITALS
HEART RATE: 97 BPM | WEIGHT: 148 LBS | OXYGEN SATURATION: 96 % | SYSTOLIC BLOOD PRESSURE: 168 MMHG | DIASTOLIC BLOOD PRESSURE: 90 MMHG | BODY MASS INDEX: 26.22 KG/M2

## 2024-05-08 DIAGNOSIS — R60.0 BILATERAL LEG EDEMA: Primary | ICD-10-CM

## 2024-05-08 DIAGNOSIS — I87.2 VENOUS INSUFFICIENCY OF LEG: ICD-10-CM

## 2024-05-08 DIAGNOSIS — R60.0 BILATERAL LEG EDEMA: ICD-10-CM

## 2024-05-08 DIAGNOSIS — L03.119 CELLULITIS OF LOWER EXTREMITY, UNSPECIFIED LATERALITY: ICD-10-CM

## 2024-05-08 RX ORDER — SPIRONOLACTONE 25 MG/1
25 TABLET ORAL DAILY
Qty: 30 TABLET | Refills: 0 | Status: SHIPPED | OUTPATIENT
Start: 2024-05-08

## 2024-05-08 RX ORDER — SPIRONOLACTONE 25 MG/1
25 TABLET ORAL DAILY
Qty: 90 TABLET | OUTPATIENT
Start: 2024-05-08

## 2024-05-08 RX ORDER — FUROSEMIDE 40 MG/1
40 TABLET ORAL DAILY
Qty: 3 TABLET | Refills: 0 | Status: SHIPPED | OUTPATIENT
Start: 2024-05-08 | End: 2024-05-11

## 2024-05-08 RX ORDER — DOXYCYCLINE HYCLATE 100 MG
100 TABLET ORAL 2 TIMES DAILY
Qty: 20 TABLET | Refills: 0 | Status: SHIPPED | OUTPATIENT
Start: 2024-05-08 | End: 2024-05-18

## 2024-05-08 ASSESSMENT — ENCOUNTER SYMPTOMS
SHORTNESS OF BREATH: 0
WHEEZING: 0

## 2024-05-08 NOTE — PROGRESS NOTES
Catherine Rebolledo  2/23/1934  female  89 y.o.    SUBJECTIVE:       Chief Complaint   Patient presents with    Leg Swelling     Left one hurts the worst and going up her leg into her thight       HPI:  Patient is complaining of recurrence of bilateral leg swelling over the last few days.  She is having this episode frequently.  He underwent complete cardiac workup.  She could not tolerate loop diuretics and hydrochlorothiazide as her potassium would come down however diuretic would take care of her leg swelling.    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    Coronary artery disease involving native heart 1/11/2024    Gastroesophagitis     GERD (gastroesophageal reflux disease)     Hyperlipidemia     Hypertension     Moderate persistent asthma 2/12/2021    Pneumonia     Rheumatic fever with heart involvement     Trigger ring finger of right hand 7/1/2013    Unspecified diseases of blood and blood-forming organs     chronic anemia     Past Surgical History:   Procedure Laterality Date    BLADDER REPAIR N/A 2002    BREAST SURGERY      L breast tumor-benign    COLONOSCOPY N/A 4/23/2024    COLONOSCOPY POLYPECTOMY SNARE BIOPSY performed by Josue Sorenson MD at [facility] ENDOSCOPY    CYST REMOVAL Right 8-1-13    GANGLION CYST EXCISION RIGHT WRIST, TRIGGER FINGER RELEASE RIGHT FOURTH    EYE SURGERY      , cataract right eye    FRACTURE SURGERY      R shoulderx2-pinned    HYSTERECTOMY (CERVIX STATUS UNKNOWN)      JOINT REPLACEMENT Right 03/28/2012    Total Elbow - Dr. Ramirez    RECTOCELE REPAIR      SHOULDER SURGERY Right     TONSILLECTOMY      TUMOR REMOVAL  2013    carcinoid    UPPER GASTROINTESTINAL ENDOSCOPY  3/7/12    with bx    UPPER GASTROINTESTINAL ENDOSCOPY  11/8/13    EUS    UPPER GASTROINTESTINAL ENDOSCOPY  4/24/14    UPPER GASTROINTESTINAL ENDOSCOPY  3/17/16    push enteroscopy with

## 2024-05-09 DIAGNOSIS — R60.0 BILATERAL LEG EDEMA: ICD-10-CM

## 2024-05-09 RX ORDER — FUROSEMIDE 40 MG/1
TABLET ORAL
Qty: 3 TABLET | Refills: 0 | OUTPATIENT
Start: 2024-05-09

## 2024-05-30 ENCOUNTER — OFFICE VISIT (OUTPATIENT)
Dept: INTERNAL MEDICINE CLINIC | Age: 89
End: 2024-05-30

## 2024-05-30 VITALS
WEIGHT: 145.2 LBS | SYSTOLIC BLOOD PRESSURE: 160 MMHG | OXYGEN SATURATION: 97 % | BODY MASS INDEX: 25.72 KG/M2 | HEART RATE: 62 BPM | DIASTOLIC BLOOD PRESSURE: 78 MMHG

## 2024-05-30 DIAGNOSIS — R60.0 BILATERAL LEG EDEMA: ICD-10-CM

## 2024-05-30 DIAGNOSIS — J45.21 EXACERBATION OF INTERMITTENT ASTHMA, UNSPECIFIED ASTHMA SEVERITY: ICD-10-CM

## 2024-05-30 DIAGNOSIS — R06.02 SHORTNESS OF BREATH: ICD-10-CM

## 2024-05-30 DIAGNOSIS — I10 PRIMARY HYPERTENSION: ICD-10-CM

## 2024-05-30 DIAGNOSIS — R01.1 HEART MURMUR: ICD-10-CM

## 2024-05-30 DIAGNOSIS — I10 PRIMARY HYPERTENSION: Primary | ICD-10-CM

## 2024-05-30 RX ORDER — SPIRONOLACTONE 25 MG/1
25 TABLET ORAL DAILY
Qty: 90 TABLET | Refills: 0 | Status: SHIPPED | OUTPATIENT
Start: 2024-05-30 | End: 2024-08-28

## 2024-05-30 RX ORDER — PREDNISONE 10 MG/1
10 TABLET ORAL 2 TIMES DAILY
Qty: 14 TABLET | Refills: 0 | Status: SHIPPED | OUTPATIENT
Start: 2024-05-30 | End: 2024-06-06

## 2024-05-30 RX ORDER — AMOXICILLIN AND CLAVULANATE POTASSIUM 875; 125 MG/1; MG/1
1 TABLET, FILM COATED ORAL 2 TIMES DAILY
Qty: 14 TABLET | Refills: 0 | Status: SHIPPED | OUTPATIENT
Start: 2024-05-30 | End: 2024-06-06

## 2024-05-30 RX ORDER — FUROSEMIDE 40 MG/1
40 TABLET ORAL
Qty: 45 TABLET | Refills: 0 | Status: SHIPPED | OUTPATIENT
Start: 2024-05-30

## 2024-05-30 ASSESSMENT — ENCOUNTER SYMPTOMS
VOICE CHANGE: 0
TROUBLE SWALLOWING: 0
COUGH: 1
ABDOMINAL PAIN: 0
CHEST TIGHTNESS: 0
WHEEZING: 1

## 2024-05-30 NOTE — PROGRESS NOTES
rhonchi in both lungs.  Good air entry  Abdominal:      General: Bowel sounds are normal.   Musculoskeletal:      Comments: Bilateral 1+ pretibial edema   Neurological:      Mental Status: She is alert and oriented to person, place, and time.         CBC:   Lab Results   Component Value Date/Time    WBC 5.2 03/25/2024 07:10 AM    HGB 11.8 03/25/2024 07:10 AM    HCT 36.0 03/25/2024 07:10 AM     03/25/2024 07:10 AM     CMP:  Lab Results   Component Value Date/Time     03/25/2024 07:10 AM    K 3.3 03/25/2024 07:10 AM    K 2.7 07/25/2023 10:43 PM     03/25/2024 07:10 AM    CO2 24 03/25/2024 07:10 AM    ANIONGAP 12 03/25/2024 07:10 AM    GLUCOSE 93 03/25/2024 07:10 AM    BUN 8 03/25/2024 07:10 AM    CREATININE 0.8 03/25/2024 07:10 AM    GFRAA >60 08/08/2022 03:18 PM    GFRAA >60 01/11/2013 10:05 AM    CALCIUM 9.4 03/25/2024 07:10 AM    PROT 6.5 03/06/2012 01:42 PM    AGRATIO 2.4 07/25/2023 10:43 PM    BILITOT 0.5 07/25/2023 10:43 PM    ALKPHOS 163 07/25/2023 10:43 PM    ALT 11 07/25/2023 10:43 PM    AST 21 07/25/2023 10:43 PM    GLOB 2.2 06/19/2019 05:07 AM     URINALYSIS:  Lab Results   Component Value Date/Time    GLUCOSEU Negative 07/25/2023 10:43 PM    GLUCOSEU NEGATIVE 02/04/2012 08:41 AM    KETUA Negative 07/25/2023 10:43 PM    SPECGRAV 1.025 07/19/2022 10:28 AM    BLOODU TRACE-INTACT 07/25/2023 10:43 PM    PHUR 7.5 07/25/2023 10:43 PM    PROTEINU Negative 07/25/2023 10:43 PM    NITRU Negative 07/25/2023 10:43 PM    LEUKOCYTESUR TRACE 07/25/2023 10:43 PM    URINETYPE NotGiven 07/25/2023 10:43 PM     HBA1C:   Lab Results   Component Value Date/Time    LABA1C 5.5 05/10/2021 03:54 PM    .2 05/10/2021 03:54 PM       LIPID:  Lab Results   Component Value Date/Time    CHOL 246 06/19/2019 05:07 AM    TRIG 76 06/19/2019 05:07 AM    HDL 64 06/19/2019 05:07 AM    HDL 60 03/07/2012 08:15 AM       ASSESSMENT/PLAN:  Assessment/Plan:  Catherine was seen today for 2 week follow-up.    Diagnoses and all orders

## 2024-05-31 LAB
ANION GAP SERPL CALCULATED.3IONS-SCNC: 9 MMOL/L (ref 3–16)
BUN SERPL-MCNC: 11 MG/DL (ref 7–20)
CALCIUM SERPL-MCNC: 10.1 MG/DL (ref 8.3–10.6)
CHLORIDE SERPL-SCNC: 100 MMOL/L (ref 99–110)
CO2 SERPL-SCNC: 26 MMOL/L (ref 21–32)
CREAT SERPL-MCNC: 0.9 MG/DL (ref 0.6–1.2)
GFR SERPLBLD CREATININE-BSD FMLA CKD-EPI: 61 ML/MIN/{1.73_M2}
GLUCOSE SERPL-MCNC: 114 MG/DL (ref 70–99)
POTASSIUM SERPL-SCNC: 4.2 MMOL/L (ref 3.5–5.1)
SODIUM SERPL-SCNC: 135 MMOL/L (ref 136–145)

## 2024-05-31 NOTE — RESULT ENCOUNTER NOTE
Stable labs.  Medications as prescribed.  Keep appointment with me as advised.  Will need a basic metabolic panel in 4 weeks

## 2024-06-04 DIAGNOSIS — I10 PRIMARY HYPERTENSION: ICD-10-CM

## 2024-06-04 DIAGNOSIS — R60.0 BILATERAL LEG EDEMA: ICD-10-CM

## 2024-06-04 RX ORDER — SPIRONOLACTONE 25 MG/1
25 TABLET ORAL DAILY
Qty: 30 TABLET | OUTPATIENT
Start: 2024-06-04 | End: 2024-09-02

## 2024-06-19 NOTE — PROGRESS NOTES
[facility]      CONSULTATION  [phone]  7/11/24  Referring: OLGA Gipson MD (PCP)    REASON FOR CONSULT/CHIEF COMPLAINT/HPI     Reason for visit/ Chief complaint  Elevated troponin  Cath followup   HPI Catherine Rebolledo is a 89 y.o.female patient here for follow up.     She was initially seen by referral from Dr Rosa for suspected Coronary Artery Disease and NSTEMI.     She was admitted to another hospital a little over a year ago and was found to have hs-trop in ~200 range, although per chart without any symptoms.  She had a Lexiscan that showed scar but no active ischemia.    She follows with Dr Davis for COPD.     At our initial visit she reported that she had been having SOB, does have asthmatic bronchitis, along with a week of jaw pain, a little heavy/tightness in her chest at times. When she had her MI in October she did not even know, but was having the asthmatic bronchitis at that time as well.     Due to her symptoms when I met her and her history, I sent her for an Angiogram which showed normal cors.     Today she has an ache in her lower left chest that sometimes radiates to her back.  She has no exertional symptoms.    Patient is adherent with medications and is tolerating them well without side effects     HISTORY/ALLERGIES/ROS     MedHx:  has a past medical history of Anxiety, Bleeding ulcer, Cancer (HCC), Confusion, COPD with asthma (HCC), Coronary artery disease involving native heart, Gastroesophagitis, GERD (gastroesophageal reflux disease), Hyperlipidemia, Hypertension, Moderate persistent asthma, Pneumonia, Rheumatic fever with heart involvement, Trigger ring finger of right hand, and Unspecified diseases of blood and blood-forming organs.  SurgHx:  has a past surgical history that includes Hysterectomy; Tonsillectomy; tumor removal (2013); Rectocele repair; Breast surgery; fracture surgery; Upper gastrointestinal endoscopy (3/7/12); bladder repair (N/A, 2002); cyst

## 2024-06-25 ENCOUNTER — OFFICE VISIT (OUTPATIENT)
Dept: VASCULAR SURGERY | Age: 89
End: 2024-06-25
Payer: COMMERCIAL

## 2024-06-25 VITALS
HEIGHT: 63 IN | DIASTOLIC BLOOD PRESSURE: 76 MMHG | SYSTOLIC BLOOD PRESSURE: 118 MMHG | WEIGHT: 143 LBS | BODY MASS INDEX: 25.34 KG/M2

## 2024-06-25 DIAGNOSIS — I87.2 VENOUS INSUFFICIENCY (CHRONIC) (PERIPHERAL): Primary | ICD-10-CM

## 2024-06-25 PROCEDURE — 1123F ACP DISCUSS/DSCN MKR DOCD: CPT | Performed by: SURGERY

## 2024-06-25 PROCEDURE — 99203 OFFICE O/P NEW LOW 30 MIN: CPT | Performed by: SURGERY

## 2024-06-28 ENCOUNTER — OFFICE VISIT (OUTPATIENT)
Dept: INTERNAL MEDICINE CLINIC | Age: 89
End: 2024-06-28

## 2024-06-28 VITALS
HEART RATE: 80 BPM | OXYGEN SATURATION: 98 % | DIASTOLIC BLOOD PRESSURE: 82 MMHG | SYSTOLIC BLOOD PRESSURE: 144 MMHG | HEIGHT: 63 IN | WEIGHT: 141.6 LBS | TEMPERATURE: 97.3 F | BODY MASS INDEX: 25.09 KG/M2

## 2024-06-28 DIAGNOSIS — Z00.00 MEDICARE ANNUAL WELLNESS VISIT, SUBSEQUENT: Primary | ICD-10-CM

## 2024-06-28 DIAGNOSIS — R60.0 BILATERAL LEG EDEMA: ICD-10-CM

## 2024-06-28 DIAGNOSIS — F32.0 CURRENT MILD EPISODE OF MAJOR DEPRESSIVE DISORDER WITHOUT PRIOR EPISODE (HCC): ICD-10-CM

## 2024-06-28 DIAGNOSIS — I10 PRIMARY HYPERTENSION: Chronic | ICD-10-CM

## 2024-06-28 DIAGNOSIS — E87.1 HYPONATREMIA: ICD-10-CM

## 2024-06-28 DIAGNOSIS — F43.9 STRESS: ICD-10-CM

## 2024-06-28 RX ORDER — SERTRALINE HYDROCHLORIDE 25 MG/1
25 TABLET, FILM COATED ORAL DAILY
Qty: 30 TABLET | Refills: 3 | Status: SHIPPED | OUTPATIENT
Start: 2024-06-28

## 2024-06-28 ASSESSMENT — PATIENT HEALTH QUESTIONNAIRE - PHQ9
SUM OF ALL RESPONSES TO PHQ QUESTIONS 1-9: 9
6. FEELING BAD ABOUT YOURSELF - OR THAT YOU ARE A FAILURE OR HAVE LET YOURSELF OR YOUR FAMILY DOWN: SEVERAL DAYS
SUM OF ALL RESPONSES TO PHQ9 QUESTIONS 1 & 2: 3
10. IF YOU CHECKED OFF ANY PROBLEMS, HOW DIFFICULT HAVE THESE PROBLEMS MADE IT FOR YOU TO DO YOUR WORK, TAKE CARE OF THINGS AT HOME, OR GET ALONG WITH OTHER PEOPLE: SOMEWHAT DIFFICULT
9. THOUGHTS THAT YOU WOULD BE BETTER OFF DEAD, OR OF HURTING YOURSELF: NOT AT ALL
5. POOR APPETITE OR OVEREATING: NOT AT ALL
4. FEELING TIRED OR HAVING LITTLE ENERGY: NEARLY EVERY DAY
1. LITTLE INTEREST OR PLEASURE IN DOING THINGS: SEVERAL DAYS
2. FEELING DOWN, DEPRESSED OR HOPELESS: MORE THAN HALF THE DAYS
SUM OF ALL RESPONSES TO PHQ QUESTIONS 1-9: 9
3. TROUBLE FALLING OR STAYING ASLEEP: NOT AT ALL
8. MOVING OR SPEAKING SO SLOWLY THAT OTHER PEOPLE COULD HAVE NOTICED. OR THE OPPOSITE, BEING SO FIGETY OR RESTLESS THAT YOU HAVE BEEN MOVING AROUND A LOT MORE THAN USUAL: NOT AT ALL
SUM OF ALL RESPONSES TO PHQ QUESTIONS 1-9: 9
SUM OF ALL RESPONSES TO PHQ QUESTIONS 1-9: 9
7. TROUBLE CONCENTRATING ON THINGS, SUCH AS READING THE NEWSPAPER OR WATCHING TELEVISION: MORE THAN HALF THE DAYS

## 2024-06-28 NOTE — PATIENT INSTRUCTIONS
recommends aspirin, take the amount directed each day. Make sure you take aspirin and not another kind of pain reliever, such as acetaminophen (Tylenol).   When should you call for help?   Call 911 if you have symptoms of a heart attack. These may include:    Chest pain or pressure, or a strange feeling in the chest.     Sweating.     Shortness of breath.     Pain, pressure, or a strange feeling in the back, neck, jaw, or upper belly or in one or both shoulders or arms.     Lightheadedness or sudden weakness.     A fast or irregular heartbeat.   After you call 911, the  may tell you to chew 1 adult-strength or 2 to 4 low-dose aspirin. Wait for an ambulance. Do not try to drive yourself.  Watch closely for changes in your health, and be sure to contact your doctor if you have any problems.  Where can you learn more?  Go to https://www.[employer].net/patientEd and enter F075 to learn more about \"A Healthy Heart: Care Instructions.\"  Current as of: June 24, 2023  Content Version: 14.1  © 4562-5826 [employer].   Care instructions adapted under license by [employer]. If you have questions about a medical condition or this instruction, always ask your healthcare professional. [employer] disclaims any warranty or liability for your use of this information.      Personalized Preventive Plan for Catherine Rebolledo - 6/28/2024  Medicare offers a range of preventive health benefits. Some of the tests and screenings are paid in full while other may be subject to a deductible, co-insurance, and/or copay.    Some of these benefits include a comprehensive review of your medical history including lifestyle, illnesses that may run in your family, and various assessments and screenings as appropriate.    After reviewing your medical record and screening and assessments performed today your provider may have ordered immunizations, labs, imaging, and/or referrals for you.  A list of these orders (if

## 2024-06-28 NOTE — PROGRESS NOTES
Medicare Annual Wellness Visit    Catherine Rebolledo is here for Other and Follow-up (AWV. 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  Pt voiced understanding. Advise to call me if there is any concerning symptoms.    Stress  -     sertraline (ZOLOFT) 25 MG tablet; Take 1 tablet by mouth daily, Disp-30 tablet, R-3Normal  Primary hypertension  Blood pressure control could be slightly better patient report home blood pressure always below 140/80.  She is advised to take Aldactone as well as valsartan.  Will get basic metabolic panel  -     Basic Metabolic Panel; Future  Hyponatremia  No fatigue tiredness generalized weakness  -     Basic Metabolic Panel; Future  Bilateral leg edema  Patient has not used compression stockings yet.  However since last visit leg swelling much better.  She believes she is taking Aldactone every 48 hours that day she is not taking furosemide  Recommendations for Preventive Services Due: see orders and patient instructions/AVS.  Recommended screening schedule for the next 5-10 years is provided to the patient in written form: see Patient Instructions/AVS.     Return in about 2 months (around 2024).     Subjective   The following acute and/or chronic problems were also addressed today:  Follow-up visit for chronic problems.  Visit for bilateral leg edema /hypertension    Patient's complete Health Risk Assessment and screening values have been reviewed and are found in Flowsheets. The following problems were reviewed today and where indicated follow up appointments were

## 2024-06-29 LAB
ANION GAP SERPL CALCULATED.3IONS-SCNC: 9 MMOL/L (ref 3–16)
BUN SERPL-MCNC: 10 MG/DL (ref 7–20)
CALCIUM SERPL-MCNC: 10.2 MG/DL (ref 8.3–10.6)
CHLORIDE SERPL-SCNC: 94 MMOL/L (ref 99–110)
CO2 SERPL-SCNC: 26 MMOL/L (ref 21–32)
CREAT SERPL-MCNC: 0.9 MG/DL (ref 0.6–1.2)
GFR SERPLBLD CREATININE-BSD FMLA CKD-EPI: 61 ML/MIN/{1.73_M2}
GLUCOSE SERPL-MCNC: 104 MG/DL (ref 70–99)
POTASSIUM SERPL-SCNC: 4.2 MMOL/L (ref 3.5–5.1)
SODIUM SERPL-SCNC: 129 MMOL/L (ref 136–145)

## 2024-06-30 DIAGNOSIS — E87.8 ELECTROLYTE IMBALANCE: Primary | ICD-10-CM

## 2024-07-11 ENCOUNTER — OFFICE VISIT (OUTPATIENT)
Dept: CARDIOLOGY CLINIC | Age: 89
End: 2024-07-11
Payer: COMMERCIAL

## 2024-07-11 VITALS
WEIGHT: 141 LBS | HEART RATE: 94 BPM | BODY MASS INDEX: 24.98 KG/M2 | DIASTOLIC BLOOD PRESSURE: 58 MMHG | OXYGEN SATURATION: 98 % | SYSTOLIC BLOOD PRESSURE: 150 MMHG

## 2024-07-11 DIAGNOSIS — E78.2 MIXED HYPERLIPIDEMIA: ICD-10-CM

## 2024-07-11 DIAGNOSIS — Z87.898 HISTORY OF SYNCOPE: ICD-10-CM

## 2024-07-11 DIAGNOSIS — R07.89 OTHER CHEST PAIN: ICD-10-CM

## 2024-07-11 DIAGNOSIS — I10 PRIMARY HYPERTENSION: Primary | ICD-10-CM

## 2024-07-11 PROCEDURE — 99212 OFFICE O/P EST SF 10 MIN: CPT | Performed by: INTERNAL MEDICINE

## 2024-07-11 PROCEDURE — 1123F ACP DISCUSS/DSCN MKR DOCD: CPT | Performed by: INTERNAL MEDICINE

## 2024-07-11 NOTE — PATIENT INSTRUCTIONS
Continue current medications.  Continue compression stockings.    No need for further follow-up with cardiology unless new symptoms or other concerns.

## 2024-07-12 ENCOUNTER — TELEPHONE (OUTPATIENT)
Dept: INTERNAL MEDICINE CLINIC | Age: 89
End: 2024-07-12

## 2024-07-12 NOTE — TELEPHONE ENCOUNTER
Grand ajith Regan calling in. Pt has been experiencing dizziness and left eye blurriness since last evening. Pt reported she couldn't see anything at all to left eye at first - was totally black - but vision is back now but very blurry. No other symptoms such as SOB, chest pain. Pt had this issue before to the eye several years ago and marisol to an eye doctor and was told there was nothing wrong with the eye.    No openings in office today. Grand dtr was advised for pt to go to Urgent Care or ER. Grand dtr will advise that to pt but wanted an appt scheduled also - she is not sure pt will be agreeable to Urgent Care or ER. OV 7/15 with covering provider. With any cancellations in office today, this writer will try to get pt scheduled.        No

## 2024-07-13 ENCOUNTER — APPOINTMENT (OUTPATIENT)
Dept: GENERAL RADIOLOGY | Age: 89
End: 2024-07-13
Payer: COMMERCIAL

## 2024-07-13 ENCOUNTER — HOSPITAL ENCOUNTER (EMERGENCY)
Age: 89
Discharge: HOME | End: 2024-07-13
Attending: EMERGENCY MEDICINE
Payer: COMMERCIAL

## 2024-07-13 ENCOUNTER — APPOINTMENT (OUTPATIENT)
Dept: CT IMAGING | Age: 89
End: 2024-07-13
Payer: COMMERCIAL

## 2024-07-13 VITALS
HEIGHT: 63 IN | HEART RATE: 83 BPM | SYSTOLIC BLOOD PRESSURE: 167 MMHG | BODY MASS INDEX: 24.27 KG/M2 | TEMPERATURE: 98.4 F | DIASTOLIC BLOOD PRESSURE: 98 MMHG | RESPIRATION RATE: 20 BRPM | WEIGHT: 137 LBS | OXYGEN SATURATION: 98 %

## 2024-07-13 DIAGNOSIS — R42 DIZZINESS: ICD-10-CM

## 2024-07-13 DIAGNOSIS — R10.9 ABDOMINAL DISCOMFORT: ICD-10-CM

## 2024-07-13 DIAGNOSIS — J40 BRONCHITIS: Primary | ICD-10-CM

## 2024-07-13 DIAGNOSIS — R19.7 DIARRHEA, UNSPECIFIED TYPE: ICD-10-CM

## 2024-07-13 LAB
ALBUMIN SERPL-MCNC: 4.3 G/DL (ref 3.4–5)
ALBUMIN/GLOB SERPL: 1.7 {RATIO} (ref 1.1–2.2)
ALP SERPL-CCNC: 131 U/L (ref 40–129)
ALT SERPL-CCNC: 9 U/L (ref 10–40)
ANION GAP SERPL CALCULATED.3IONS-SCNC: 12 MMOL/L (ref 3–16)
AST SERPL-CCNC: 16 U/L (ref 15–37)
BASOPHILS # BLD: 0.1 K/UL (ref 0–0.2)
BASOPHILS NFR BLD: 1 %
BILIRUB SERPL-MCNC: 0.9 MG/DL (ref 0–1)
BILIRUB UR QL STRIP.AUTO: NEGATIVE
BUN SERPL-MCNC: 11 MG/DL (ref 7–20)
CALCIUM SERPL-MCNC: 10 MG/DL (ref 8.3–10.6)
CHLORIDE SERPL-SCNC: 96 MMOL/L (ref 99–110)
CLARITY UR: CLEAR
CO2 SERPL-SCNC: 24 MMOL/L (ref 21–32)
COLOR UR: YELLOW
CREAT SERPL-MCNC: 0.8 MG/DL (ref 0.6–1.2)
DEPRECATED RDW RBC AUTO: 16.9 % (ref 12.4–15.4)
EOSINOPHIL # BLD: 0.4 K/UL (ref 0–0.6)
EOSINOPHIL NFR BLD: 5.4 %
FLUAV RNA RESP QL NAA+PROBE: NOT DETECTED
FLUBV RNA RESP QL NAA+PROBE: NOT DETECTED
GFR SERPLBLD CREATININE-BSD FMLA CKD-EPI: 70 ML/MIN/{1.73_M2}
GLUCOSE SERPL-MCNC: 109 MG/DL (ref 70–99)
GLUCOSE UR STRIP.AUTO-MCNC: NEGATIVE MG/DL
HCT VFR BLD AUTO: 38.5 % (ref 36–48)
HGB BLD-MCNC: 13.1 G/DL (ref 12–16)
HGB UR QL STRIP.AUTO: NEGATIVE
KETONES UR STRIP.AUTO-MCNC: NEGATIVE MG/DL
LEUKOCYTE ESTERASE UR QL STRIP.AUTO: NEGATIVE
LIPASE SERPL-CCNC: 50 U/L (ref 13–60)
LYMPHOCYTES # BLD: 0.9 K/UL (ref 1–5.1)
LYMPHOCYTES NFR BLD: 11.9 %
MAGNESIUM SERPL-MCNC: 2.1 MG/DL (ref 1.8–2.4)
MCH RBC QN AUTO: 25.9 PG (ref 26–34)
MCHC RBC AUTO-ENTMCNC: 34 G/DL (ref 31–36)
MCV RBC AUTO: 76.4 FL (ref 80–100)
MONOCYTES # BLD: 0.4 K/UL (ref 0–1.3)
MONOCYTES NFR BLD: 5.7 %
NEUTROPHILS # BLD: 5.6 K/UL (ref 1.7–7.7)
NEUTROPHILS NFR BLD: 76 %
NITRITE UR QL STRIP.AUTO: NEGATIVE
NT-PROBNP SERPL-MCNC: 179 PG/ML (ref 0–449)
PH UR STRIP.AUTO: 6.5 [PH] (ref 5–8)
PLATELET # BLD AUTO: 276 K/UL (ref 135–450)
PMV BLD AUTO: 6.9 FL (ref 5–10.5)
POTASSIUM SERPL-SCNC: 4 MMOL/L (ref 3.5–5.1)
PROT SERPL-MCNC: 6.8 G/DL (ref 6.4–8.2)
PROT UR STRIP.AUTO-MCNC: NEGATIVE MG/DL
RBC # BLD AUTO: 5.05 M/UL (ref 4–5.2)
SARS-COV-2 RNA RESP QL NAA+PROBE: NOT DETECTED
SODIUM SERPL-SCNC: 132 MMOL/L (ref 136–145)
SP GR UR STRIP.AUTO: 1.01 (ref 1–1.03)
TROPONIN, HIGH SENSITIVITY: 13 NG/L (ref 0–14)
UA COMPLETE W REFLEX CULTURE PNL UR: NORMAL
UA DIPSTICK W REFLEX MICRO PNL UR: NORMAL
URN SPEC COLLECT METH UR: NORMAL
UROBILINOGEN UR STRIP-ACNC: 0.2 E.U./DL
WBC # BLD AUTO: 7.4 K/UL (ref 4–11)

## 2024-07-13 PROCEDURE — 2580000003 HC RX 258: Performed by: PHYSICIAN ASSISTANT

## 2024-07-13 PROCEDURE — 80053 COMPREHEN METABOLIC PANEL: CPT

## 2024-07-13 PROCEDURE — 6360000004 HC RX CONTRAST MEDICATION: Performed by: PHYSICIAN ASSISTANT

## 2024-07-13 PROCEDURE — 84484 ASSAY OF TROPONIN QUANT: CPT

## 2024-07-13 PROCEDURE — 6370000000 HC RX 637 (ALT 250 FOR IP): Performed by: PHYSICIAN ASSISTANT

## 2024-07-13 PROCEDURE — 71045 X-RAY EXAM CHEST 1 VIEW: CPT

## 2024-07-13 PROCEDURE — 96361 HYDRATE IV INFUSION ADD-ON: CPT

## 2024-07-13 PROCEDURE — 74177 CT ABD & PELVIS W/CONTRAST: CPT

## 2024-07-13 PROCEDURE — 6360000002 HC RX W HCPCS: Performed by: PHYSICIAN ASSISTANT

## 2024-07-13 PROCEDURE — 87636 SARSCOV2 & INF A&B AMP PRB: CPT

## 2024-07-13 PROCEDURE — 81003 URINALYSIS AUTO W/O SCOPE: CPT

## 2024-07-13 PROCEDURE — 85025 COMPLETE CBC W/AUTO DIFF WBC: CPT

## 2024-07-13 PROCEDURE — 99285 EMERGENCY DEPT VISIT HI MDM: CPT

## 2024-07-13 PROCEDURE — 83735 ASSAY OF MAGNESIUM: CPT

## 2024-07-13 PROCEDURE — 96374 THER/PROPH/DIAG INJ IV PUSH: CPT

## 2024-07-13 PROCEDURE — 83880 ASSAY OF NATRIURETIC PEPTIDE: CPT

## 2024-07-13 PROCEDURE — 83690 ASSAY OF LIPASE: CPT

## 2024-07-13 PROCEDURE — 93005 ELECTROCARDIOGRAM TRACING: CPT | Performed by: EMERGENCY MEDICINE

## 2024-07-13 RX ORDER — 0.9 % SODIUM CHLORIDE 0.9 %
500 INTRAVENOUS SOLUTION INTRAVENOUS ONCE
Status: COMPLETED | OUTPATIENT
Start: 2024-07-13 | End: 2024-07-13

## 2024-07-13 RX ORDER — BENZONATATE 100 MG/1
100 CAPSULE ORAL ONCE
Status: COMPLETED | OUTPATIENT
Start: 2024-07-13 | End: 2024-07-13

## 2024-07-13 RX ORDER — BENZONATATE 100 MG/1
100-200 CAPSULE ORAL 3 TIMES DAILY PRN
Qty: 40 CAPSULE | Refills: 0 | Status: SHIPPED | OUTPATIENT
Start: 2024-07-13 | End: 2024-07-23

## 2024-07-13 RX ORDER — ONDANSETRON 2 MG/ML
4 INJECTION INTRAMUSCULAR; INTRAVENOUS ONCE
Status: COMPLETED | OUTPATIENT
Start: 2024-07-13 | End: 2024-07-13

## 2024-07-13 RX ADMIN — IOPAMIDOL 75 ML: 755 INJECTION, SOLUTION INTRAVENOUS at 14:24

## 2024-07-13 RX ADMIN — BENZONATATE 100 MG: 100 CAPSULE ORAL at 13:46

## 2024-07-13 RX ADMIN — ONDANSETRON 4 MG: 2 INJECTION INTRAMUSCULAR; INTRAVENOUS at 13:46

## 2024-07-13 RX ADMIN — SODIUM CHLORIDE 500 ML: 9 INJECTION, SOLUTION INTRAVENOUS at 15:52

## 2024-07-13 ASSESSMENT — ENCOUNTER SYMPTOMS
NAUSEA: 1
SHORTNESS OF BREATH: 1
RHINORRHEA: 1
SORE THROAT: 0
VOICE CHANGE: 0
BACK PAIN: 0
CONSTIPATION: 0
COLOR CHANGE: 0
COUGH: 1
DIARRHEA: 1
ABDOMINAL PAIN: 1
WHEEZING: 0
VOMITING: 0
STRIDOR: 0
TROUBLE SWALLOWING: 0

## 2024-07-13 ASSESSMENT — LIFESTYLE VARIABLES
HOW MANY STANDARD DRINKS CONTAINING ALCOHOL DO YOU HAVE ON A TYPICAL DAY: PATIENT DOES NOT DRINK
HOW OFTEN DO YOU HAVE A DRINK CONTAINING ALCOHOL: NEVER

## 2024-07-13 ASSESSMENT — PAIN - FUNCTIONAL ASSESSMENT: PAIN_FUNCTIONAL_ASSESSMENT: NONE - DENIES PAIN

## 2024-07-13 NOTE — ED PROVIDER NOTES
[facility] EMERGENCY DEPARTMENT  EMERGENCY DEPARTMENT ENCOUNTER        Pt Name: Catherine Rebolledo  MRN: [id number]  Birthdate 2/23/1934  Date of evaluation: 7/13/2024  Provider: Tacho Vinson PA-C  PCP: OLGA Rosa MD  Note Started: 1:43 PM EDT 7/13/24       I have seen and evaluated this patient with my supervising physician Radha Serna MD.      CHIEF COMPLAINT       Chief Complaint   Patient presents with    Cough     Pt complains of cough for months, has seen pcp in past for the cough- bronchitis gets a steroid and antibiotics has seen in recently- unable to see dr liu flowers late Monday, so came to ed    Diarrhea     Diarrhea started 3 days ago.  No fever, no vomiting, nausea at times    Shortness of Breath     Sob every time she has a cough   has been using her inhalers not sure if they are helping    Dizziness     Gets dizzy and staggers when she walks for months and dizziness, is becoming more frequent over the last few weeks.      Fatigue     Pt complains of weakness for the last few months.         HISTORY OF PRESENT ILLNESS: 1 or more Elements     History from : Patient    Limitations to history : None    Catherine Rebolledo is a 89 y.o. female who presents to the emergency department with numerous complaints that have been going on for several months.  Symptoms have progressively worsened for the past week.  grandDaughter-in-law at bedside states that the patient's  passed away this past month and patient has been very depressed.  She does live by herself.  Patient reports that she has had a cough for several months.  Her PCP did prescribe steroids and antibiotics without any significant relief.  She also has had intermittent nausea and diarrhea for several months, worse over the past 3 days.  When she stands she feels dizzy.  She feels like she might be dehydrated.  She just saw her cardiologist this past week.  She did inform the cardiologist about her 
   FLAXSEED, LINSEED, (FLAXSEED OIL) 1000 MG CAPS    Take 1,000 mg by mouth    HYDROCORTISONE (ANUSOL-HC) 2.5 % CREA RECTAL CREAM    Apply locally 3 times daily for for 7 days    IODINE, KELP, 0.15 MG TABS    Take by mouth daily    SERTRALINE (ZOLOFT) 25 MG TABLET    Take 1 tablet by mouth daily    SPIRONOLACTONE (ALDACTONE) 25 MG TABLET    Take 1 tablet by mouth daily    TURMERIC PO    Take by mouth daily     UMECLIDINIUM-VILANTEROL (ANORO ELLIPTA) 62.5-25 MCG/ACT INHALER    Inhale 1 puff into the lungs daily    VALSARTAN (DIOVAN) 160 MG TABLET    Take 1 tablet by mouth daily      DIAGNOSTIC RESULTS   RADIOLOGY:   Interpretation per Radiologist below, if available at the time of this note:  CT ABDOMEN PELVIS W IV CONTRAST Additional Contrast? None   Final Result   No CT evidence of acute intra-abdominal process.         XR CHEST PORTABLE   Final Result   1. Diffusely increased interstitial markings throughout the lungs.   2. Mild cardiac enlargement.   3. No significant interval change.             Disposition Considerations (tests considered but not done, Shared Decision Making, Pt Expectation of Test or Tx.): Appropriate for outpatient management      I estimate there is low risk for sepsis, MI, stroke, tamponade, PTX, toxicity, or other life threatening etiology. Given the best available information and clinical assessment I estimate the risk of hospitalization to be greater than risk of treatment at home. We discussed and I explained the risk could rapidly change and return precautions and instructions given. Discharge disposition is reasonable.   FINAL IMPRESSION      1. Bronchitis    2. Diarrhea, unspecified type    3. Abdominal discomfort    4. Dizziness        DISPOSITION/PLAN   DISPOSITION Decision To Discharge 07/13/2024 04:03:39 PM      PATIENT REFERRED TO:  OLGA Rosa MD  [address] [facility] [address]  [phone]    In 3 days      [facility] Emergency Department  [address]

## 2024-07-14 LAB
EKG ATRIAL RATE: 97 BPM
EKG DIAGNOSIS: NORMAL
EKG P AXIS: 18 DEGREES
EKG P-R INTERVAL: 154 MS
EKG Q-T INTERVAL: 376 MS
EKG QRS DURATION: 136 MS
EKG QTC CALCULATION (BAZETT): 477 MS
EKG R AXIS: -61 DEGREES
EKG T AXIS: 6 DEGREES
EKG VENTRICULAR RATE: 97 BPM

## 2024-07-14 PROCEDURE — 93010 ELECTROCARDIOGRAM REPORT: CPT | Performed by: INTERNAL MEDICINE

## 2024-07-15 ENCOUNTER — OFFICE VISIT (OUTPATIENT)
Dept: INTERNAL MEDICINE CLINIC | Age: 89
End: 2024-07-15
Payer: COMMERCIAL

## 2024-07-15 VITALS
OXYGEN SATURATION: 97 % | BODY MASS INDEX: 25.05 KG/M2 | SYSTOLIC BLOOD PRESSURE: 140 MMHG | HEART RATE: 93 BPM | TEMPERATURE: 97.5 F | WEIGHT: 141.4 LBS | DIASTOLIC BLOOD PRESSURE: 82 MMHG

## 2024-07-15 DIAGNOSIS — C69.32: ICD-10-CM

## 2024-07-15 DIAGNOSIS — J40 BRONCHITIS: Primary | ICD-10-CM

## 2024-07-15 DIAGNOSIS — R51.9 NONINTRACTABLE HEADACHE, UNSPECIFIED CHRONICITY PATTERN, UNSPECIFIED HEADACHE TYPE: ICD-10-CM

## 2024-07-15 DIAGNOSIS — R19.7 DIARRHEA, UNSPECIFIED TYPE: ICD-10-CM

## 2024-07-15 DIAGNOSIS — H53.8 BLURRED VISION, LEFT EYE: ICD-10-CM

## 2024-07-15 PROCEDURE — 1123F ACP DISCUSS/DSCN MKR DOCD: CPT | Performed by: INTERNAL MEDICINE

## 2024-07-15 PROCEDURE — 99214 OFFICE O/P EST MOD 30 MIN: CPT | Performed by: INTERNAL MEDICINE

## 2024-07-15 RX ORDER — DOXYCYCLINE HYCLATE 100 MG
100 TABLET ORAL 2 TIMES DAILY
Qty: 20 TABLET | Refills: 0 | Status: SHIPPED | OUTPATIENT
Start: 2024-07-15 | End: 2024-07-25

## 2024-07-15 ASSESSMENT — ENCOUNTER SYMPTOMS
TROUBLE SWALLOWING: 0
VOICE CHANGE: 0
WHEEZING: 0
SHORTNESS OF BREATH: 0
COUGH: 1

## 2024-07-15 NOTE — PROGRESS NOTES
ASSESSMENT/PLAN:  Assessment/Plan:  Catherine was seen today for other.    Diagnoses and all orders for this visit:  Recent lab work and CT scan report has been reviewed.  Patient's sodium went up to 132  No longer on Lasix  On physical exam no evidence of acute abdomen  In the recent past she had antibiotic for cellulitis in the leg.  Will get C. Difficile  Considering left eye melanoma surgery and more frequent symptoms of headache, blurring of vision, will start workup with contrast including CT head and CT orbit  Bronchitis  -     doxycycline hyclate (VIBRA-TABS) 100 MG tablet; Take 1 tablet by mouth 2 times daily for 10 days    Nonintractable headache, unspecified chronicity pattern, unspecified headache type  -     CT HEAD W CONTRAST; Future  -     CT ORBITS W CONTRAST; Future    Melanoma, choroid, left eye (HCC)  -     CT HEAD W CONTRAST; Future  -     CT ORBITS W CONTRAST; Future  -     AFL - Jose Frankel MD, (Vitreoretinal Diseases & Surgery) Ophthalmology, [facility]    Diarrhea, unspecified type  -     C DIFF TOXIN/ANTIGEN; Future  -     Fecal Leukocytes; Future  -     GI Bacterial Pathogens By PCR; Future    Blurred vision, left eye    Patient reports she just started taking Zoloft over the last 2 days and feeling better.  Keep appointment as scheduled in a month and a half        Orders Placed This Encounter   Procedures    C DIFF TOXIN/ANTIGEN     Standing Status:   Future     Standing Expiration Date:   7/15/2025    Fecal Leukocytes     Standing Status:   Future     Standing Expiration Date:   7/15/2025    GI Bacterial Pathogens By PCR     Standing Status:   Future     Standing Expiration Date:   7/15/2025    CT HEAD W CONTRAST     Standing Status:   Future     Standing Expiration Date:   7/15/2025     Order Specific Question:   Additional Contrast?     Answer:   Radiologist Recommendation     Order Specific Question:   STAT Creatinine as needed:     Answer:   Yes    CT ORBITS W CONTRAST

## 2024-07-26 NOTE — PROGRESS NOTES
Medications:  Current Outpatient Medications on File Prior to Visit   Medication Sig Dispense Refill    spironolactone (ALDACTONE) 25 MG tablet Take 1 tablet by mouth daily 90 tablet 0    furosemide (LASIX) 40 MG tablet Take 1 tablet by mouth every 48 hours 45 tablet 0    hydrocortisone (ANUSOL-HC) 2.5 % CREA rectal cream Apply locally 3 times daily for for 7 days 28 g 0    valsartan (DIOVAN) 160 MG tablet Take 1 tablet by mouth daily 90 tablet 1    umeclidinium-vilanterol (ANORO ELLIPTA) 62.5-25 MCG/ACT inhaler Inhale 1 puff into the lungs daily 3 each 1    Compression Stockings MISC by Does not apply route 1 each 0    Flaxseed, Linseed, (FLAXSEED OIL) 1000 MG CAPS Take 1,000 mg by mouth      Iodine, Kelp, 0.15 MG TABS Take by mouth daily      albuterol sulfate HFA (PROVENTIL;VENTOLIN;PROAIR) 108 (90 Base) MCG/ACT inhaler INHALE 2 PUFFS INTO THE LUNGS FOUR TIMES DAILY AS NEEDED FOR WHEEZING 54 g 2    TURMERIC PO Take by mouth daily       esomeprazole (NEXIUM) 40 MG delayed release capsule Take 1 capsule by mouth every morning (before breakfast)      Coenzyme Q10 (COQ-10) 100 MG CPCR Take 1 capsule by mouth daily      cetirizine (ZYRTEC) 10 MG tablet Take 1 tablet by mouth as needed       No current facility-administered medications on file prior to visit.       Allergies:  Allergies   Allergen Reactions    Simvastatin Myalgia     Body aches      Advair Hfa [Fluticasone-Salmeterol] Hallucinations     Hallucinations with inhaled fluticasone    Symbicort [Budesonide-Formoterol Fumarate] Hallucinations        Social History:   reports that she has never smoked. She has never used smokeless tobacco. She reports that she does not drink alcohol and does not use drugs.     Family History:  family history includes Cancer in her brother and sister; Heart Attack in her brother and brother; Heart Disease in her father and mother; High Blood Pressure in her brother, father, mother, and sister; High Cholesterol in her  FAMILY HISTORY:  Mother  Still living? Unknown  FH: brain aneurysm, Age at diagnosis: Age Unknown

## 2024-08-18 ENCOUNTER — APPOINTMENT (OUTPATIENT)
Dept: CT IMAGING | Age: 89
DRG: 884 | End: 2024-08-18
Payer: COMMERCIAL

## 2024-08-18 ENCOUNTER — HOSPITAL ENCOUNTER (INPATIENT)
Age: 89
LOS: 2 days | Discharge: HOME HEALTH | DRG: 884 | End: 2024-08-20
Attending: STUDENT IN AN ORGANIZED HEALTH CARE EDUCATION/TRAINING PROGRAM | Admitting: STUDENT IN AN ORGANIZED HEALTH CARE EDUCATION/TRAINING PROGRAM
Payer: COMMERCIAL

## 2024-08-18 ENCOUNTER — APPOINTMENT (OUTPATIENT)
Dept: GENERAL RADIOLOGY | Age: 89
DRG: 884 | End: 2024-08-18
Payer: COMMERCIAL

## 2024-08-18 DIAGNOSIS — S00.03XA HEMATOMA OF SCALP, INITIAL ENCOUNTER: ICD-10-CM

## 2024-08-18 DIAGNOSIS — Z91.81 AT HIGH RISK FOR INJURY RELATED TO FALL: ICD-10-CM
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
## 2024-08-18 DIAGNOSIS — S50.01XA CONTUSION OF RIGHT ELBOW, INITIAL ENCOUNTER: ICD-10-CM

## 2024-08-18 DIAGNOSIS — M25.551 RIGHT HIP PAIN: ICD-10-CM
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
## 2024-08-18 LAB
ALBUMIN SERPL-MCNC: 4.2 G/DL (ref 3.4–5)
ALBUMIN/GLOB SERPL: 1.6 {RATIO} (ref 1.1–2.2)
ALP SERPL-CCNC: 148 U/L (ref 40–129)
ALT SERPL-CCNC: 11 U/L (ref 10–40)
ANION GAP SERPL CALCULATED.3IONS-SCNC: 8 MMOL/L (ref 3–16)
AST SERPL-CCNC: 17 U/L (ref 15–37)
BASOPHILS # BLD: 0.1 K/UL (ref 0–0.2)
BASOPHILS NFR BLD: 1 %
BILIRUB SERPL-MCNC: 0.5 MG/DL (ref 0–1)
BILIRUB UR QL STRIP.AUTO: NEGATIVE
BUN SERPL-MCNC: 13 MG/DL (ref 7–20)
CALCIUM SERPL-MCNC: 10.5 MG/DL (ref 8.3–10.6)
CHLORIDE SERPL-SCNC: 98 MMOL/L (ref 99–110)
CLARITY UR: CLEAR
CO2 SERPL-SCNC: 24 MMOL/L (ref 21–32)
COLOR UR: YELLOW
CREAT SERPL-MCNC: 1.2 MG/DL (ref 0.6–1.2)
DEPRECATED RDW RBC AUTO: 15.8 % (ref 12.4–15.4)
EOSINOPHIL # BLD: 0.3 K/UL (ref 0–0.6)
EOSINOPHIL NFR BLD: 3.8 %
FLUAV RNA RESP QL NAA+PROBE: NOT DETECTED
FLUBV RNA RESP QL NAA+PROBE: NOT DETECTED
GFR SERPLBLD CREATININE-BSD FMLA CKD-EPI: 43 ML/MIN/{1.73_M2}
GLUCOSE SERPL-MCNC: 92 MG/DL (ref 70–99)
GLUCOSE UR STRIP.AUTO-MCNC: NEGATIVE MG/DL
HCT VFR BLD AUTO: 37.9 % (ref 36–48)
HGB BLD-MCNC: 12.8 G/DL (ref 12–16)
HGB UR QL STRIP.AUTO: NEGATIVE
KETONES UR STRIP.AUTO-MCNC: NEGATIVE MG/DL
LEUKOCYTE ESTERASE UR QL STRIP.AUTO: NEGATIVE
LIPASE SERPL-CCNC: 45 U/L (ref 13–60)
LYMPHOCYTES # BLD: 0.6 K/UL (ref 1–5.1)
LYMPHOCYTES NFR BLD: 7.7 %
MCH RBC QN AUTO: 26.2 PG (ref 26–34)
MCHC RBC AUTO-ENTMCNC: 33.7 G/DL (ref 31–36)
MCV RBC AUTO: 77.7 FL (ref 80–100)
MONOCYTES # BLD: 0.5 K/UL (ref 0–1.3)
MONOCYTES NFR BLD: 6.8 %
NEUTROPHILS # BLD: 6.5 K/UL (ref 1.7–7.7)
NEUTROPHILS NFR BLD: 80.7 %
NITRITE UR QL STRIP.AUTO: NEGATIVE
PH UR STRIP.AUTO: 7 [PH] (ref 5–8)
PLATELET # BLD AUTO: 303 K/UL (ref 135–450)
PMV BLD AUTO: 6.7 FL (ref 5–10.5)
POTASSIUM SERPL-SCNC: 4.3 MMOL/L (ref 3.5–5.1)
PROCALCITONIN SERPL IA-MCNC: 0.06 NG/ML (ref 0–0.15)
PROT SERPL-MCNC: 6.8 G/DL (ref 6.4–8.2)
PROT UR STRIP.AUTO-MCNC: NEGATIVE MG/DL
RBC # BLD AUTO: 4.88 M/UL (ref 4–5.2)
SARS-COV-2 RNA RESP QL NAA+PROBE: NOT DETECTED
SODIUM SERPL-SCNC: 130 MMOL/L (ref 136–145)
SP GR UR STRIP.AUTO: <=1.005 (ref 1–1.03)
TROPONIN, HIGH SENSITIVITY: 14 NG/L (ref 0–14)
TROPONIN, HIGH SENSITIVITY: 15 NG/L (ref 0–14)
UA COMPLETE W REFLEX CULTURE PNL UR: NORMAL
UA DIPSTICK W REFLEX MICRO PNL UR: NORMAL
URN SPEC COLLECT METH UR: NORMAL
UROBILINOGEN UR STRIP-ACNC: 0.2 E.U./DL
WBC # BLD AUTO: 8 K/UL (ref 4–11)

## 2024-08-18 PROCEDURE — 6370000000 HC RX 637 (ALT 250 FOR IP): Performed by: NURSE PRACTITIONER

## 2024-08-18 PROCEDURE — 72128 CT CHEST SPINE W/O DYE: CPT

## 2024-08-18 PROCEDURE — 96375 TX/PRO/DX INJ NEW DRUG ADDON: CPT

## 2024-08-18 PROCEDURE — 2580000003 HC RX 258: Performed by: NURSE PRACTITIONER

## 2024-08-18 PROCEDURE — 87636 SARSCOV2 & INF A&B AMP PRB: CPT

## 2024-08-18 PROCEDURE — 94760 N-INVAS EAR/PLS OXIMETRY 1: CPT

## 2024-08-18 PROCEDURE — 96374 THER/PROPH/DIAG INJ IV PUSH: CPT

## 2024-08-18 PROCEDURE — 81003 URINALYSIS AUTO W/O SCOPE: CPT

## 2024-08-18 PROCEDURE — 70450 CT HEAD/BRAIN W/O DYE: CPT

## 2024-08-18 PROCEDURE — 73502 X-RAY EXAM HIP UNI 2-3 VIEWS: CPT

## 2024-08-18 PROCEDURE — 72125 CT NECK SPINE W/O DYE: CPT

## 2024-08-18 PROCEDURE — 84145 PROCALCITONIN (PCT): CPT

## 2024-08-18 PROCEDURE — 99285 EMERGENCY DEPT VISIT HI MDM: CPT

## 2024-08-18 PROCEDURE — 71045 X-RAY EXAM CHEST 1 VIEW: CPT

## 2024-08-18 PROCEDURE — 73070 X-RAY EXAM OF ELBOW: CPT

## 2024-08-18 PROCEDURE — 84484 ASSAY OF TROPONIN QUANT: CPT

## 2024-08-18 PROCEDURE — 1200000000 HC SEMI PRIVATE

## 2024-08-18 PROCEDURE — 80053 COMPREHEN METABOLIC PANEL: CPT

## 2024-08-18 PROCEDURE — 83690 ASSAY OF LIPASE: CPT

## 2024-08-18 PROCEDURE — 72131 CT LUMBAR SPINE W/O DYE: CPT

## 2024-08-18 PROCEDURE — 93005 ELECTROCARDIOGRAM TRACING: CPT | Performed by: PHYSICIAN ASSISTANT

## 2024-08-18 PROCEDURE — 6360000002 HC RX W HCPCS: Performed by: PHYSICIAN ASSISTANT

## 2024-08-18 PROCEDURE — 85025 COMPLETE CBC W/AUTO DIFF WBC: CPT

## 2024-08-18 PROCEDURE — 94640 AIRWAY INHALATION TREATMENT: CPT

## 2024-08-18 RX ORDER — POLYETHYLENE GLYCOL 3350 17 G/17G
17 POWDER, FOR SOLUTION ORAL DAILY PRN
Status: DISCONTINUED | OUTPATIENT
Start: 2024-08-18 | End: 2024-08-20 | Stop reason: HOSPADM

## 2024-08-18 RX ORDER — MELATONIN/PYRIDOXINE HCL (B6) 5 MG-10 MG
1 TABLET,IMMED, EXTENDED RELEASE, BIPHASIC ORAL DAILY
Status: DISCONTINUED | OUTPATIENT
Start: 2024-08-19 | End: 2024-08-18 | Stop reason: RX

## 2024-08-18 RX ORDER — ACETAMINOPHEN 325 MG/1
650 TABLET ORAL EVERY 6 HOURS PRN
Status: DISCONTINUED | OUTPATIENT
Start: 2024-08-18 | End: 2024-08-20 | Stop reason: HOSPADM

## 2024-08-18 RX ORDER — ACETAMINOPHEN 650 MG/1
650 SUPPOSITORY RECTAL EVERY 6 HOURS PRN
Status: DISCONTINUED | OUTPATIENT
Start: 2024-08-18 | End: 2024-08-20 | Stop reason: HOSPADM

## 2024-08-18 RX ORDER — MAGNESIUM SULFATE IN WATER 40 MG/ML
2000 INJECTION, SOLUTION INTRAVENOUS PRN
Status: DISCONTINUED | OUTPATIENT
Start: 2024-08-18 | End: 2024-08-18

## 2024-08-18 RX ORDER — ONDANSETRON 2 MG/ML
4 INJECTION INTRAMUSCULAR; INTRAVENOUS ONCE
Status: COMPLETED | OUTPATIENT
Start: 2024-08-18 | End: 2024-08-18

## 2024-08-18 RX ORDER — ALBUTEROL SULFATE 90 UG/1
2 AEROSOL, METERED RESPIRATORY (INHALATION) EVERY 4 HOURS PRN
Status: DISCONTINUED | OUTPATIENT
Start: 2024-08-18 | End: 2024-08-20 | Stop reason: HOSPADM

## 2024-08-18 RX ORDER — ONDANSETRON 2 MG/ML
4 INJECTION INTRAMUSCULAR; INTRAVENOUS EVERY 6 HOURS PRN
Status: DISCONTINUED | OUTPATIENT
Start: 2024-08-18 | End: 2024-08-20 | Stop reason: HOSPADM

## 2024-08-18 RX ORDER — GUAIFENESIN/DEXTROMETHORPHAN 100-10MG/5
5 SYRUP ORAL 4 TIMES DAILY PRN
COMMUNITY

## 2024-08-18 RX ORDER — SODIUM CHLORIDE 0.9 % (FLUSH) 0.9 %
5-40 SYRINGE (ML) INJECTION PRN
Status: DISCONTINUED | OUTPATIENT
Start: 2024-08-18 | End: 2024-08-20 | Stop reason: HOSPADM

## 2024-08-18 RX ORDER — ONDANSETRON 4 MG/1
4 TABLET, ORALLY DISINTEGRATING ORAL EVERY 8 HOURS PRN
Status: DISCONTINUED | OUTPATIENT
Start: 2024-08-18 | End: 2024-08-20 | Stop reason: HOSPADM

## 2024-08-18 RX ORDER — PANTOPRAZOLE SODIUM 40 MG/1
40 TABLET, DELAYED RELEASE ORAL
Status: DISCONTINUED | OUTPATIENT
Start: 2024-08-19 | End: 2024-08-20 | Stop reason: HOSPADM

## 2024-08-18 RX ORDER — VALSARTAN 160 MG/1
160 TABLET ORAL DAILY
Status: DISCONTINUED | OUTPATIENT
Start: 2024-08-19 | End: 2024-08-20 | Stop reason: HOSPADM

## 2024-08-18 RX ORDER — SODIUM CHLORIDE 0.9 % (FLUSH) 0.9 %
5-40 SYRINGE (ML) INJECTION EVERY 12 HOURS SCHEDULED
Status: DISCONTINUED | OUTPATIENT
Start: 2024-08-18 | End: 2024-08-20 | Stop reason: HOSPADM

## 2024-08-18 RX ORDER — SODIUM CHLORIDE 9 MG/ML
INJECTION, SOLUTION INTRAVENOUS CONTINUOUS
Status: ACTIVE | OUTPATIENT
Start: 2024-08-18 | End: 2024-08-19

## 2024-08-18 RX ORDER — MORPHINE SULFATE 2 MG/ML
2 INJECTION, SOLUTION INTRAMUSCULAR; INTRAVENOUS ONCE
Status: COMPLETED | OUTPATIENT
Start: 2024-08-18 | End: 2024-08-18

## 2024-08-18 RX ORDER — SODIUM CHLORIDE 9 MG/ML
INJECTION, SOLUTION INTRAVENOUS PRN
Status: DISCONTINUED | OUTPATIENT
Start: 2024-08-18 | End: 2024-08-20 | Stop reason: HOSPADM

## 2024-08-18 RX ORDER — CETIRIZINE HYDROCHLORIDE 10 MG/1
5 TABLET ORAL PRN
Status: DISCONTINUED | OUTPATIENT
Start: 2024-08-18 | End: 2024-08-20 | Stop reason: HOSPADM

## 2024-08-18 RX ORDER — ENOXAPARIN SODIUM 100 MG/ML
30 INJECTION SUBCUTANEOUS DAILY
Status: DISCONTINUED | OUTPATIENT
Start: 2024-08-19 | End: 2024-08-20 | Stop reason: HOSPADM

## 2024-08-18 RX ADMIN — ACETAMINOPHEN 650 MG: 325 TABLET ORAL at 23:12

## 2024-08-18 RX ADMIN — ONDANSETRON 4 MG: 2 INJECTION INTRAMUSCULAR; INTRAVENOUS at 15:06

## 2024-08-18 RX ADMIN — SODIUM CHLORIDE: 9 INJECTION, SOLUTION INTRAVENOUS at 22:20

## 2024-08-18 RX ADMIN — Medication 2 PUFF: at 23:03

## 2024-08-18 RX ADMIN — MORPHINE SULFATE 2 MG: 2 INJECTION, SOLUTION INTRAMUSCULAR; INTRAVENOUS at 15:06

## 2024-08-18 ASSESSMENT — PAIN SCALES - GENERAL
PAINLEVEL_OUTOF10: 5
PAINLEVEL_OUTOF10: 9
PAINLEVEL_OUTOF10: 9

## 2024-08-18 ASSESSMENT — PAIN DESCRIPTION - DESCRIPTORS: DESCRIPTORS: ACHING

## 2024-08-18 ASSESSMENT — PAIN SCALES - WONG BAKER: WONGBAKER_NUMERICALRESPONSE: NO HURT

## 2024-08-18 ASSESSMENT — PAIN DESCRIPTION - LOCATION: LOCATION: GENERALIZED

## 2024-08-18 NOTE — H&P
suspected or confirmed emergency medical condition->Emergency Medical Condition (MA) FINDINGS: BONES/ALIGNMENT: There is no acute fracture or traumatic malalignment.  Mild compression deformities T1 and T2 appears stable compared to previous exam DEGENERATIVE CHANGES: Multilevel degenerative disc disease and facet joint arthropathy. SOFT TISSUES: There is no prevertebral soft tissue swelling.  8 mm thyroid nodule left lobe of the thyroid requiring no further imaging evaluation.     No acute abnormality of the cervical spine.     CT Head W/O Contrast    Result Date: 8/18/2024  EXAMINATION: CT OF THE HEAD WITHOUT CONTRAST  8/18/2024 3:32 pm TECHNIQUE: CT of the head was performed without the administration of intravenous contrast. Automated exposure control, iterative reconstruction, and/or weight based adjustment of the mA/kV was utilized to reduce the radiation dose to as low as reasonably achievable. COMPARISON: 07/15/2022 HISTORY: ORDERING SYSTEM PROVIDED HISTORY: fall, hit right side of head TECHNOLOGIST PROVIDED HISTORY: Reason for exam:->fall, hit right side of head Has a \"code stroke\" or \"stroke alert\" been called?->No Decision Support Exception - unselect if not a suspected or confirmed emergency medical condition->Emergency Medical Condition (MA) FINDINGS: BRAIN/VENTRICLES: The ventricles and sulci are diffusely enlarged.  Low attenuation is seen in the periventricular and subcortical white matter.  No acute intracranial hemorrhage or acute infarct is identified ORBITS: The visualized portion of the orbits demonstrate no acute abnormality. SINUSES: The visualized paranasal sinuses and mastoid air cells demonstrate no acute abnormality. SOFT TISSUES/SKULL:  Scalp hematoma overlying the right parietal     No acute intracranial abnormality.     XR HIP 2-3 VW W PELVIS RIGHT    Result Date: 8/18/2024  EXAMINATION: ONE XRAY VIEW OF THE PELVIS AND TWO XRAY VIEWS RIGHT HIP 8/18/2024 3:21 pm COMPARISON: Correlation

## 2024-08-18 NOTE — ED NOTES
ED Attending EKG Interpretation Note     Date of evaluation: 2024    This patient was seen by the advance practice provider.  I have not seen or examined the patient.    TIME:   1501  RATE:   Approximately 95  bmp  MI INTERVAL:   *  QRS DURATION:   8  QT/QTc:   132/233  RHYTHM:   Normal sinus  AXIS:   Regular  ABNORMALITIES  No STEMI  T wave inversions in lead(s) III  Incomplete RBBB    PRIOR EK/13/24- current EKG unchanged from prior    INTERPRETATION:  Nonspecific    REVIEWED BY:   Ollie Nowak Jr., DO    EKG was independently reviewed by emergency department physician in absence of cardiologist.         Ollie Nowak,   24

## 2024-08-18 NOTE — ED NOTES
Report received from out going RN. Pt  will be getting admitted  waiting  admitting orders and bed. Pt aware of POC and agreeable to plan . VSS please see flow sheets for full set

## 2024-08-18 NOTE — ED PROVIDER NOTES
[facility] EMERGENCY DEPARTMENT  EMERGENCY DEPARTMENT ENCOUNTER        Pt Name: Catherine Rebolledo  MRN: [id number]  Birthdate 2/23/1934  Date of evaluation: 8/18/2024  Provider: THA Bay  PCP: OLGA Rosa MD  Note Started: 7:16 PM EDT 8/18/24      GEOVANNA. I have evaluated this patient.        CHIEF COMPLAINT       Chief Complaint   Patient presents with    Fall     Pt to ED via [facility] from home s/p fall. PT sts she stumbled on her own feet, making her fall onto her right side. PT with hematoma to right elbow and right crani. PT is not on blood thinners. PT denies LOC.        HISTORY OF PRESENT ILLNESS: 1 or more Elements     History From: Patient, grandson  Limitations to history : None    Catherine Rebolledo is a 89 y.o. female who presents to the emergency department after a fall onto her right side.  Patient states she is unsure what happens.  She thinks she may have stumbled, but does not exactly remember.  She does not believe she passed out, but is not positive.  Patient does live alone, and fall was unwitnessed.  She pressed her life alert button, and EMS responded and brought her here.  She is complaining primarily of right elbow and right hip pain, but also has a hematoma to the right side of her head.  She reports chronic neck pain and acute upper back pain, but denies lower back pain.  She denies any injuries to the left side of the body.  Patient admits this is her third fall this week, and apparently family was unaware of this fact until now.  Patient is unsure exactly what occurred with her other falls either, but believes they may have been mechanical.  She has otherwise been feeling well.  She denies chest pain or shortness of breath.  She has no other acute complaints.    Nursing Notes were all reviewed and agreed with or any disagreements were addressed in the HPI.    REVIEW OF SYSTEMS :      Review of Systems   Constitutional:  Negative for chills, diaphoresis and fever.

## 2024-08-19 LAB
ALBUMIN SERPL-MCNC: 3.7 G/DL (ref 3.4–5)
ALP SERPL-CCNC: 130 U/L (ref 40–129)
ALT SERPL-CCNC: 8 U/L (ref 10–40)
ANION GAP SERPL CALCULATED.3IONS-SCNC: 10 MMOL/L (ref 3–16)
AST SERPL-CCNC: 13 U/L (ref 15–37)
BASOPHILS # BLD: 0.1 K/UL (ref 0–0.2)
BASOPHILS NFR BLD: 1.3 %
BILIRUB DIRECT SERPL-MCNC: 0.2 MG/DL (ref 0–0.3)
BILIRUB INDIRECT SERPL-MCNC: 0.5 MG/DL (ref 0–1)
BILIRUB SERPL-MCNC: 0.7 MG/DL (ref 0–1)
BUN SERPL-MCNC: 13 MG/DL (ref 7–20)
CALCIUM SERPL-MCNC: 9.2 MG/DL (ref 8.3–10.6)
CHLORIDE SERPL-SCNC: 101 MMOL/L (ref 99–110)
CO2 SERPL-SCNC: 20 MMOL/L (ref 21–32)
CREAT SERPL-MCNC: 0.9 MG/DL (ref 0.6–1.2)
DEPRECATED RDW RBC AUTO: 15.7 % (ref 12.4–15.4)
EKG DIAGNOSIS: NORMAL
EKG Q-T INTERVAL: 132 MS
EKG QRS DURATION: 8 MS
EKG QTC CALCULATION (BAZETT): 233 MS
EKG R AXIS: 0 DEGREES
EKG T AXIS: 186 DEGREES
EKG VENTRICULAR RATE: 188 BPM
EOSINOPHIL # BLD: 0.5 K/UL (ref 0–0.6)
EOSINOPHIL NFR BLD: 9.8 %
GFR SERPLBLD CREATININE-BSD FMLA CKD-EPI: 61 ML/MIN/{1.73_M2}
GLUCOSE SERPL-MCNC: 91 MG/DL (ref 70–99)
HCT VFR BLD AUTO: 36.2 % (ref 36–48)
HGB BLD-MCNC: 11.9 G/DL (ref 12–16)
LYMPHOCYTES # BLD: 1.1 K/UL (ref 1–5.1)
LYMPHOCYTES NFR BLD: 22.2 %
MCH RBC QN AUTO: 26.1 PG (ref 26–34)
MCHC RBC AUTO-ENTMCNC: 33 G/DL (ref 31–36)
MCV RBC AUTO: 79 FL (ref 80–100)
MONOCYTES # BLD: 0.5 K/UL (ref 0–1.3)
MONOCYTES NFR BLD: 9.9 %
NEUTROPHILS # BLD: 2.9 K/UL (ref 1.7–7.7)
NEUTROPHILS NFR BLD: 56.8 %
PLATELET # BLD AUTO: 259 K/UL (ref 135–450)
PMV BLD AUTO: 6.9 FL (ref 5–10.5)
POTASSIUM SERPL-SCNC: 4.6 MMOL/L (ref 3.5–5.1)
PROT SERPL-MCNC: 5.7 G/DL (ref 6.4–8.2)
RBC # BLD AUTO: 4.58 M/UL (ref 4–5.2)
SODIUM SERPL-SCNC: 131 MMOL/L (ref 136–145)
WBC # BLD AUTO: 5.1 K/UL (ref 4–11)

## 2024-08-19 PROCEDURE — 85025 COMPLETE CBC W/AUTO DIFF WBC: CPT

## 2024-08-19 PROCEDURE — 80048 BASIC METABOLIC PNL TOTAL CA: CPT

## 2024-08-19 PROCEDURE — 6370000000 HC RX 637 (ALT 250 FOR IP): Performed by: NURSE PRACTITIONER

## 2024-08-19 PROCEDURE — 97116 GAIT TRAINING THERAPY: CPT

## 2024-08-19 PROCEDURE — 97535 SELF CARE MNGMENT TRAINING: CPT

## 2024-08-19 PROCEDURE — 1200000000 HC SEMI PRIVATE

## 2024-08-19 PROCEDURE — 94640 AIRWAY INHALATION TREATMENT: CPT

## 2024-08-19 PROCEDURE — 97530 THERAPEUTIC ACTIVITIES: CPT

## 2024-08-19 PROCEDURE — 93010 ELECTROCARDIOGRAM REPORT: CPT | Performed by: INTERNAL MEDICINE

## 2024-08-19 PROCEDURE — 97161 PT EVAL LOW COMPLEX 20 MIN: CPT

## 2024-08-19 PROCEDURE — 97165 OT EVAL LOW COMPLEX 30 MIN: CPT

## 2024-08-19 PROCEDURE — 36415 COLL VENOUS BLD VENIPUNCTURE: CPT

## 2024-08-19 PROCEDURE — 6360000002 HC RX W HCPCS: Performed by: NURSE PRACTITIONER

## 2024-08-19 PROCEDURE — 94761 N-INVAS EAR/PLS OXIMETRY MLT: CPT

## 2024-08-19 PROCEDURE — 80076 HEPATIC FUNCTION PANEL: CPT

## 2024-08-19 PROCEDURE — 2580000003 HC RX 258: Performed by: NURSE PRACTITIONER

## 2024-08-19 RX ORDER — IPRATROPIUM BROMIDE AND ALBUTEROL SULFATE 2.5; .5 MG/3ML; MG/3ML
SOLUTION RESPIRATORY (INHALATION)
Status: DISCONTINUED
Start: 2024-08-19 | End: 2024-08-19 | Stop reason: WASHOUT

## 2024-08-19 RX ADMIN — TIOTROPIUM BROMIDE AND OLODATEROL 2 PUFF: 3.124; 2.736 SPRAY, METERED RESPIRATORY (INHALATION) at 07:32

## 2024-08-19 RX ADMIN — SODIUM CHLORIDE, PRESERVATIVE FREE 10 ML: 5 INJECTION INTRAVENOUS at 20:54

## 2024-08-19 RX ADMIN — SERTRALINE HYDROCHLORIDE 25 MG: 50 TABLET ORAL at 08:27

## 2024-08-19 RX ADMIN — PANTOPRAZOLE SODIUM 40 MG: 40 TABLET, DELAYED RELEASE ORAL at 05:53

## 2024-08-19 RX ADMIN — Medication 2 PUFF: at 21:55

## 2024-08-19 RX ADMIN — SODIUM CHLORIDE, PRESERVATIVE FREE 10 ML: 5 INJECTION INTRAVENOUS at 08:26

## 2024-08-19 RX ADMIN — VALSARTAN 160 MG: 160 TABLET ORAL at 08:27

## 2024-08-19 RX ADMIN — ENOXAPARIN SODIUM 30 MG: 100 INJECTION SUBCUTANEOUS at 08:27

## 2024-08-19 ASSESSMENT — PAIN SCALES - GENERAL: PAINLEVEL_OUTOF10: 0

## 2024-08-19 NOTE — CARE COORDINATION
Case Management Assessment  Initial Evaluation    Date/Time of Evaluation: 8/19/2024 2:24 PM  Assessment Completed by: Urban Schmidt Jr, RN    If patient is discharged prior to next notation, then this note serves as note for discharge by case management.    Patient Name: Catherine Rebolledo                   YOB: 1934  Diagnosis: Right hip pain [M25.551]  At high risk for injury related to fall [Z91.81]  Contusion of right elbow, initial encounter [S50.01XA]  Hematoma of scalp, initial encounter [S00.03XA]  Fall, initial encounter [W19.XXXA]  Fall in home, initial encounter [W19.XXXA, Y92.009]                   Date / Time: 8/18/2024  1:35 PM    Patient Admission Status: Inpatient   Readmission Risk (Low < 19, Mod (19-27), High > 27): Readmission Risk Score: 12.4    Current PCP: OLGA Rosa MD  PCP verified by CM? (P) Yes    Chart Reviewed: Yes      History Provided by: (P) Patient  Patient Orientation: (P) Alert and Oriented    Patient Cognition: (P) Alert    Hospitalization in the last 30 days (Readmission):  No    If yes, Readmission Assessment in CM Navigator will be completed.    Advance Directives:      Code Status: Full Code   Patient's Primary Decision Maker is: (P) Legal Next of Kin    Primary Decision Maker: Shereen Tomas - Grandchild - [phone]    Discharge Planning:    Patient lives with: (P) Alone Type of Home: (P) House  Primary Care Giver: (P) Self  Patient Support Systems include: (P) Family Members   Current Financial resources: (P) Medicare  Current community resources: (P) None  Current services prior to admission: (P) None            Current DME:              Type of Home Care services:  (P) PT, OT    ADLS  Prior functional level: (P) Assistance with the following:, Mobility  Current functional level: (P) Assistance with the following:, Mobility    PT AM-PAC:   /24  OT AM-PAC:   /24    Family can provide assistance at DC: (P) Yes  Would you like Case Management to discuss

## 2024-08-19 NOTE — RT PROTOCOL NOTE
RT Inhaler-Nebulizer Bronchodilator Protocol Note    There is a bronchodilator order in the chart from a provider indicating to follow the RT Bronchodilator Protocol and there is an “Initiate RT Inhaler-Nebulizer Bronchodilator Protocol” order as well (see protocol at bottom of note).    CXR Findings:  XR CHEST PORTABLE    Result Date: 8/18/2024  No acute process.       The findings from the last RT Protocol Assessment were as follows:   History Pulmonary Disease: Chronic pulmonary disease  Respiratory Pattern: Regular pattern and RR 12-20 bpm  Breath Sounds: Clear breath sounds  Cough: Strong, spontaneous, non-productive  Indication for Bronchodilator Therapy: On home bronchodilators  Bronchodilator Assessment Score: 2    Aerosolized bronchodilator medication orders have been revised according to the RT Inhaler-Nebulizer Bronchodilator Protocol below.    Respiratory Therapist to perform RT Therapy Protocol Assessment initially then follow the protocol.  Repeat RT Therapy Protocol Assessment PRN for score 0-3 or on second treatment, BID, and PRN for scores above 3.    No Indications - adjust the frequency to every 6 hours PRN wheezing or bronchospasm, if no treatments needed after 48 hours then discontinue using Per Protocol order mode.     If indication present, adjust the RT bronchodilator orders based on the Bronchodilator Assessment Score as indicated below.  Use Inhaler orders unless patient has one or more of the following: on home nebulizer, not able to hold breath for 10 seconds, is not alert and oriented, cannot activate and use MDI correctly, or respiratory rate 25 breaths per minute or more, then use the equivalent nebulizer order(s) with same Frequency and PRN reasons based on the score.  If a patient is on this medication at home then do not decrease Frequency below that used at home.    0-3 - enter or revise RT bronchodilator order(s) to equivalent RT Bronchodilator order with Frequency of every 4

## 2024-08-19 NOTE — DISCHARGE INSTR - COC
Continuity of Care Form    Patient Name: Catherine Rebolledo   :  1934  MRN:  [id number]    Admit date:  2024  Discharge date:  2024    Code Status Order: Full Code   Advance Directives:   Advance Care Flowsheet Documentation             Admitting Physician:  Meg Garcia DO  PCP: OLGA Rosa MD    Discharging Nurse: [facility] Unit/Room#: 4TN-4463/4463-01  Discharging Unit Phone Number: [phone]    Emergency Contact:   Extended Emergency Contact Information  Primary Emergency Contact: Shereen Tomas   [facility]  Home Phone: [phone]  Mobile Phone: [phone]  Relation: Grandchild  Secondary Emergency Contact: Vinayak Rebolledo  Home Phone: [phone]  Mobile Phone: [phone]  Relation: Child    Past Surgical History:  Past Surgical History:   Procedure Laterality Date    BLADDER REPAIR N/A     BREAST SURGERY      L breast tumor-benign    COLONOSCOPY N/A 2024    COLONOSCOPY POLYPECTOMY SNARE BIOPSY performed by Josue Sorenson MD at [facility] ENDOSCOPY    CYST REMOVAL Right 2013    GANGLION CYST EXCISION RIGHT WRIST, TRIGGER FINGER RELEASE RIGHT FOURTH    EYE SURGERY      , cataract right eye    FRACTURE SURGERY      R shoulderx2-pinned    HYSTERECTOMY (CERVIX STATUS UNKNOWN)      JOINT REPLACEMENT Right 2012    Total Elbow - Dr. Ramirez    OTHER SURGICAL HISTORY  2024    CapsoCam plus endoscopy    RECTOCELE REPAIR      SHOULDER SURGERY Right     TONSILLECTOMY      TUMOR REMOVAL      carcinoid    UPPER GASTROINTESTINAL ENDOSCOPY  2012    with bx    UPPER GASTROINTESTINAL ENDOSCOPY  2013    EUS    UPPER GASTROINTESTINAL ENDOSCOPY  2014    UPPER GASTROINTESTINAL ENDOSCOPY  2016    push enteroscopy with ablation    UPPER GASTROINTESTINAL ENDOSCOPY  2017    UPPER GASTROINTESTINAL ENDOSCOPY N/A 2021    EGD ULTRASOUND OF STOMACH performed by Kian Brar MD at [facility] ENDOSCOPY    UPPER

## 2024-08-19 NOTE — PLAN OF CARE
Problem: Discharge Planning  Goal: Discharge to home or other facility with appropriate resources  8/19/2024 0841 by Jannet Negron RN  Flowsheets (Taken 8/19/2024 0841)  Discharge to home or other facility with appropriate resources:   Identify barriers to discharge with patient and caregiver   Identify discharge learning needs (meds, wound care, etc)     Problem: Pain  Goal: Verbalizes/displays adequate comfort level or baseline comfort level  8/19/2024 0841 by Jannet Negron RN  Flowsheets (Taken 8/19/2024 0841)  Verbalizes/displays adequate comfort level or baseline comfort level:   Encourage patient to monitor pain and request assistance   Administer analgesics based on type and severity of pain and evaluate response     Problem: ABCDS Injury Assessment  Goal: Absence of physical injury  8/19/2024 0841 by Jannet Negron RN  Flowsheets (Taken 8/19/2024 0841)  Absence of Physical Injury: Implement safety measures based on patient assessment

## 2024-08-20 ENCOUNTER — TELEPHONE (OUTPATIENT)
Dept: INTERNAL MEDICINE CLINIC | Age: 89
End: 2024-08-20

## 2024-08-20 VITALS
SYSTOLIC BLOOD PRESSURE: 136 MMHG | OXYGEN SATURATION: 96 % | TEMPERATURE: 98 F | DIASTOLIC BLOOD PRESSURE: 71 MMHG | RESPIRATION RATE: 17 BRPM | WEIGHT: 133.38 LBS | HEART RATE: 94 BPM | HEIGHT: 63 IN | BODY MASS INDEX: 23.63 KG/M2

## 2024-08-20 PROCEDURE — 2580000003 HC RX 258: Performed by: HOSPITALIST

## 2024-08-20 PROCEDURE — 94640 AIRWAY INHALATION TREATMENT: CPT

## 2024-08-20 PROCEDURE — 6360000002 HC RX W HCPCS: Performed by: NURSE PRACTITIONER

## 2024-08-20 PROCEDURE — 6370000000 HC RX 637 (ALT 250 FOR IP): Performed by: NURSE PRACTITIONER

## 2024-08-20 PROCEDURE — 94761 N-INVAS EAR/PLS OXIMETRY MLT: CPT

## 2024-08-20 PROCEDURE — 2580000003 HC RX 258: Performed by: NURSE PRACTITIONER

## 2024-08-20 PROCEDURE — 6360000002 HC RX W HCPCS: Performed by: HOSPITALIST

## 2024-08-20 RX ORDER — GUAIFENESIN/DEXTROMETHORPHAN 100-10MG/5
5 SYRUP ORAL EVERY 4 HOURS PRN
Status: DISCONTINUED | OUTPATIENT
Start: 2024-08-20 | End: 2024-08-20 | Stop reason: HOSPADM

## 2024-08-20 RX ORDER — LIDOCAINE 4 G/G
1 PATCH TOPICAL DAILY
Qty: 30 PATCH | Refills: 0 | Status: SHIPPED | OUTPATIENT
Start: 2024-08-20 | End: 2024-09-19

## 2024-08-20 RX ADMIN — Medication 2 PUFF: at 16:15

## 2024-08-20 RX ADMIN — SODIUM CHLORIDE, PRESERVATIVE FREE 10 ML: 5 INJECTION INTRAVENOUS at 08:16

## 2024-08-20 RX ADMIN — WATER 40 MG: 1 INJECTION INTRAMUSCULAR; INTRAVENOUS; SUBCUTANEOUS at 08:15

## 2024-08-20 RX ADMIN — VALSARTAN 160 MG: 160 TABLET ORAL at 08:14

## 2024-08-20 RX ADMIN — PANTOPRAZOLE SODIUM 40 MG: 40 TABLET, DELAYED RELEASE ORAL at 05:54

## 2024-08-20 RX ADMIN — TIOTROPIUM BROMIDE AND OLODATEROL 2 PUFF: 3.124; 2.736 SPRAY, METERED RESPIRATORY (INHALATION) at 08:52

## 2024-08-20 RX ADMIN — SERTRALINE HYDROCHLORIDE 25 MG: 50 TABLET ORAL at 08:10

## 2024-08-20 RX ADMIN — ENOXAPARIN SODIUM 30 MG: 100 INJECTION SUBCUTANEOUS at 08:10

## 2024-08-20 ASSESSMENT — PAIN SCALES - WONG BAKER
WONGBAKER_NUMERICALRESPONSE: NO HURT

## 2024-08-20 ASSESSMENT — PAIN SCALES - GENERAL: PAINLEVEL_OUTOF10: 0

## 2024-08-20 NOTE — PLAN OF CARE
Problem: Discharge Planning  Goal: Discharge to home or other facility with appropriate resources  8/20/2024 1028 by Purvi Cotto RN  Outcome: Progressing  8/19/2024 2318 by Josie Newberry RN  Outcome: Progressing     Problem: Pain  Goal: Verbalizes/displays adequate comfort level or baseline comfort level  8/20/2024 1028 by Purvi Cotto RN  Outcome: Progressing  8/19/2024 2318 by Josie Newberry RN  Outcome: Progressing     Problem: ABCDS Injury Assessment  Goal: Absence of physical injury  8/20/2024 1028 by Purvi Cotto RN  Outcome: Progressing  8/19/2024 2318 by Josie Newberry RN  Outcome: Progressing     Problem: Safety - Adult  Goal: Free from fall injury  8/20/2024 1028 by Purvi Cotto RN  Outcome: Progressing  8/19/2024 2318 by Josie Newberry RN  Outcome: Progressing

## 2024-08-20 NOTE — CARE COORDINATION
IMM Letter       08/20/24 0928   IMM Letter   IMM Letter given to Patient/Family/Significant other/Guardian/POA/by: Patient   IMM Letter date given: 08/20/24   IMM Letter time given: 0924     DARNELL Reveles RN    [facility]  Phone: [phone]

## 2024-08-20 NOTE — PROGRESS NOTES
ADVANCED CARE PLANNING    Name:Catherine Rebolledo       :  1934              MRN:  [id number]      Purpose of Encounter: Advanced care planning in light of problem listed above   Parties in attendance: :Catherine VazquezsOscar MD, Family members:  Decisional Capacity:Yes    Diagnosis: Principal Problem:    Fall in home, initial encounter  Resolved Problems:    * No resolved hospital problems. *    Patients Medical Story:Presented with worsening symptom of dx above. With at risk for life threatening event. Procedure and testing as noted in progress noted. We discussed patient long term goal and also wishes and aggressive care. Discussed in detail about code status and what it means with detailed explanation.   Goals of Care Determinations: Patient wishes to focus on full code with aggressive care, CPR, intubation long term vent and facility as well.   Plan: Will notify OLGA Rosa MD of change in care plan. Will look at further interventions as needed.   Code Status: At this time patient wishes to be Full Code  Time Spent with Patient: 17 minutes      Electronically signed by Oscar Flores MD on 2024 at 3:01 PM  Thank you OLGA Rosa MD for the opportunity to be involved in this patient's care.     
   08/19/24 0019   RT Protocol   History Pulmonary Disease 2   Respiratory pattern 0   Breath sounds 0   Cough 0   Indications for Bronchodilator Therapy On home bronchodilators   Bronchodilator Assessment Score 2       
Assessment complete at 2046.  Elevated BP of 157/76, all other VSS.  BS active.  Productive cough and rhonchi noted in the right lung.  Purewick placed per patient request for overnight.  Care plan discussed, patient mutually agrees.  Call light within reach, no further needs at this time.    0150: purewick removed per patient request    Josie Newberry RN    
CLINICAL PHARMACY NOTE: MEDS TO BEDS    Total # of Prescriptions Filled: 1   The following medications were delivered to the patient:  LIDOCAINE PATCHES    Additional Documentation:  Delivered to patients room = signed  Ok to be delivered per RN Purvi Anders   
Shift assessment completed and charted. VSS. A/o x4, forgetful at times but easily reoriented. Standard safety measures in place. Bed locked and in lowest position, call light within reach. SpO2 > 90% on RA. Patient ambulating to and from bathroom with staff SBA with a walker. Patient denies pain. Pt stable and denied needs when writer left room.    
age related physical debility.    Query created by: Heaven Santiago on 8/20/2024 8:17 AM      Electronically signed by:  Oscar Flores MD 8/20/2024 10:03 AM          
Gross Assessment: Impaired, Vision Corrective Device: wears glasses at all times, and Visual History: other - melanoma L eye ; pt c/o floaters in L eye, beginning macular degeneration--unable to see granddtr's face with L eye.  Coment\" Pt had difficulty tracking and sustaining gaze on pen  Hearing:   hard of hearing, no hearing aid (Deaf in R ear since age 12); R ear [place]  Perception:   WFL  Observation:   General Observation:  On RA, bruises on RA, contusion and edema R elbow, telesitter.  Sensation:   denies numbness and tingling  Proprioception:    WFL  Tone:   Normotonic  Coordination Testing:   WFL  Finger/Thumb Opposition: WFL    ROM:   (B) UE AROM WFL  Strength:   (B) UE strength grossly WFL    Therapist Clinical Decision Making (Complexity): low complexity  Clinical Presentation: stable      Subjective  General: Pt in semi-alvarez's position in bed on therapy arrival. Pt pleasant and agreeable to OT/PT eval. Pt's granddaughter, Zuly, present.  Pain: 0/10 (at rest) While ambulating, pt c/o 7/10 R groin pain.  Pain Interventions: not applicable        Activities of Daily Living  Basic Activities of Daily Living  Grooming: stand by assistance  Grooming Comments: in stance at sink to wash hands  Lower Extremity Dressing: stand by assistance Increased time to complete task seated on toilet  Dressing Comments: doff pt's underpants, don pullup and underpants over pullup, I don/doff socks.  Toileting: stand by assistance.    Instrumental Activities of Daily Living  No IADL completed on this date.    Functional Mobility  Bed Mobility:  Supine to Sit: stand by assistance, HOB flat, used rail  Scooting: stand by assistance  Comments:  Transfers:  Sit to stand transfer:stand by assistance, from EOB, from toilet  Stand to sit transfer: stand by assistance, to toilet, to chair, to toilet  Toilet transfer: stand by assistance  Toilet transfer equipment: standard toilet, grab bars, walker  Comments:  Functional 
Value Date/Time    LABA1C 5.5 05/10/2021 03:54 PM     TSH:   Lab Results   Component Value Date/Time    TSH 0.44 11/01/2023 11:48 AM     Troponin: No results found for: \"TROPONINT\"  Lactic Acid: No results for input(s): \"LACTA\" in the last 72 hours.  BNP: No results for input(s): \"PROBNP\" in the last 72 hours.  UA:  Lab Results   Component Value Date/Time    NITRU Negative 08/18/2024 02:57 PM    COLORU Yellow 08/18/2024 02:57 PM    PHUR 7.0 08/18/2024 02:57 PM    PHUR 7.5 07/25/2023 10:43 PM    WBCUA 0-2 07/25/2023 10:43 PM    RBCUA 0-2 07/25/2023 10:43 PM    MUCUS Trace 02/04/2012 08:41 AM    BACTERIA 1+ 07/25/2023 10:43 PM    CLARITYU Clear 08/18/2024 02:57 PM    SPECGRAV 1.025 07/19/2022 10:28 AM    LEUKOCYTESUR Negative 08/18/2024 02:57 PM    UROBILINOGEN 0.2 08/18/2024 02:57 PM    BILIRUBINUR Negative 08/18/2024 02:57 PM    BLOODU Negative 08/18/2024 02:57 PM    GLUCOSEU Negative 08/18/2024 02:57 PM    GLUCOSEU NEGATIVE 02/04/2012 08:41 AM    KETUA Negative 08/18/2024 02:57 PM    AMORPHOUS 1+ 07/25/2023 10:43 PM     Urine Cultures: No results found for: \"LABURIN\"  Blood Cultures: No results found for: \"BC\"  No results found for: \"BLOODCULT2\"  Organism: No results found for: \"ORG\"      Electronically signed by Oscar Flores MD on 8/19/2024 at 9:21 AM    
    Electronically Signed By: Brooke Downs PT, DPT [id number]

## 2024-08-20 NOTE — TELEPHONE ENCOUNTER
Edna from [facility] calling pt fell at home on the 18th got admitted to [facility] --no broken  bones but a lot of contusions ect--hospitalist is wanting her to have nursing-PT & OT --please call Edna at [phone].  Thanks.

## 2024-08-20 NOTE — CARE COORDINATION
Case Management -  Discharge Note      Patient Name: Catherine Rebolledo                   YOB: 1934            Readmission Risk (Low < 19, Mod (19-27), High > 27): Readmission Risk Score: 13.1    Current PCP: OLGA Rosa MD        Date: 08/20/2024    PT AM-PAC: 18 /24  OT AM-PAC: 20 /24    Patient/patient representative has been educated on the benefits of HHC as well as the possible risks of declining recommended services. Patient/patient representative has acknowledged the information provided and decided on the following discharge plan. Patient/ patient representative has been provided freedom of choice regarding service provider, supported by basic dialogue that supports the patient's individualized plan of care/goals.    Home Care Information:       Home Care Agency:     FACILITY:     [facility]  ADDRESS:   [address] [place] [address]   PHONE:        [phone]  FAX:              [phone]              Services: RN / PT / OT    Home Health Order Obtained: yes    Home health agency notified of discharge: yes      Financial    Payor: MEDIGOLD / Plan: MEDIGOLD / Product Type: *No Product type* /     Pharmacy:  Potential assistance Purchasing Medications: No  Meds-to-Beds request: Yes      [id number] [address] [facility] [phone] - F [phone] [address] [facility] [address]  Phone: [phone] Fax: [phone]      Notes:    Additional Case Management Notes:     DARNELL Reveles RN    [facility]  Phone: [phone]

## 2024-08-21 ENCOUNTER — TELEPHONE (OUTPATIENT)
Dept: INTERNAL MEDICINE CLINIC | Age: 89
End: 2024-08-21

## 2024-08-21 ENCOUNTER — HOSPITAL ENCOUNTER (OUTPATIENT)
Age: 89
Discharge: HOME | End: 2024-08-23
Attending: INTERNAL MEDICINE
Payer: COMMERCIAL

## 2024-08-21 VITALS — HEIGHT: 63 IN | WEIGHT: 133 LBS | BODY MASS INDEX: 23.57 KG/M2

## 2024-08-21 DIAGNOSIS — R01.1 HEART MURMUR: ICD-10-CM

## 2024-08-21 DIAGNOSIS — R06.02 SHORTNESS OF BREATH: ICD-10-CM

## 2024-08-21 LAB
ECHO AO ASC DIAM: 4 CM
ECHO AO ASCENDING AORTA INDEX: 2.45 CM/M2
ECHO AO ROOT DIAM: 3.1 CM
ECHO AO ROOT INDEX: 1.9 CM/M2
ECHO AR MAX VEL PISA: 2 M/S
ECHO AV AREA PEAK VELOCITY: 3 CM2
ECHO AV AREA VTI: 2.8 CM2
ECHO AV AREA/BSA PEAK VELOCITY: 1.8 CM2/M2
ECHO AV AREA/BSA VTI: 1.7 CM2/M2
ECHO AV MEAN GRADIENT: 6 MMHG
ECHO AV MEAN VELOCITY: 1.1 M/S
ECHO AV PEAK GRADIENT: 8 MMHG
ECHO AV PEAK VELOCITY: 1.4 M/S
ECHO AV REGURGITANT PHT: 627 MS
ECHO AV VELOCITY RATIO: 0.93
ECHO AV VTI: 33.2 CM
ECHO BSA: 1.64 M2
ECHO EST RA PRESSURE: 8 MMHG
ECHO IVC PROX: 2.3 CM
ECHO LA AREA 2C: 13.1 CM2
ECHO LA AREA 4C: 13.6 CM2
ECHO LA DIAMETER INDEX: 2.21 CM/M2
ECHO LA DIAMETER: 3.6 CM
ECHO LA MAJOR AXIS: 4.8 CM
ECHO LA MINOR AXIS: 4.7 CM
ECHO LA TO AORTIC ROOT RATIO: 1.16
ECHO LA VOL BP: 32 ML (ref 22–52)
ECHO LA VOL MOD A2C: 30 ML (ref 22–52)
ECHO LA VOL MOD A4C: 33 ML (ref 22–52)
ECHO LA VOL/BSA BIPLANE: 20 ML/M2 (ref 16–34)
ECHO LA VOLUME INDEX MOD A2C: 18 ML/M2 (ref 16–34)
ECHO LA VOLUME INDEX MOD A4C: 20 ML/M2 (ref 16–34)
ECHO LV E' LATERAL VELOCITY: 9 CM/S
ECHO LV E' SEPTAL VELOCITY: 8 CM/S
ECHO LV EDV A2C: 76 ML
ECHO LV EDV A4C: 75 ML
ECHO LV EDV INDEX A4C: 46 ML/M2
ECHO LV EDV NDEX A2C: 47 ML/M2
ECHO LV EF PHYSICIAN: 52 %
ECHO LV EJECTION FRACTION A2C: 52 %
ECHO LV EJECTION FRACTION A4C: 53 %
ECHO LV EJECTION FRACTION BIPLANE: 52 % (ref 55–100)
ECHO LV ESV A2C: 37 ML
ECHO LV ESV A4C: 36 ML
ECHO LV ESV INDEX A2C: 23 ML/M2
ECHO LV ESV INDEX A4C: 22 ML/M2
ECHO LV FRACTIONAL SHORTENING: 26 % (ref 28–44)
ECHO LV INTERNAL DIMENSION DIASTOLE INDEX: 2.33 CM/M2
ECHO LV INTERNAL DIMENSION DIASTOLIC: 3.8 CM (ref 3.9–5.3)
ECHO LV INTERNAL DIMENSION SYSTOLIC INDEX: 1.72 CM/M2
ECHO LV INTERNAL DIMENSION SYSTOLIC: 2.8 CM
ECHO LV IVSD: 1.2 CM (ref 0.6–0.9)
ECHO LV MASS 2D: 153.2 G (ref 67–162)
ECHO LV MASS INDEX 2D: 94 G/M2 (ref 43–95)
ECHO LV POSTERIOR WALL DIASTOLIC: 1.2 CM (ref 0.6–0.9)
ECHO LV RELATIVE WALL THICKNESS RATIO: 0.63
ECHO LVOT AREA: 3.1 CM2
ECHO LVOT AV VTI INDEX: 0.9
ECHO LVOT DIAM: 2 CM
ECHO LVOT MEAN GRADIENT: 4 MMHG
ECHO LVOT PEAK GRADIENT: 7 MMHG
ECHO LVOT PEAK VELOCITY: 1.3 M/S
ECHO LVOT STROKE VOLUME INDEX: 57.8 ML/M2
ECHO LVOT SV: 94.2 ML
ECHO LVOT VTI: 30 CM
ECHO MV A VELOCITY: 1.04 M/S
ECHO MV E VELOCITY: 0.86 M/S
ECHO MV E/A RATIO: 0.83
ECHO MV E/E' LATERAL: 9.56
ECHO MV E/E' RATIO (AVERAGED): 10.15
ECHO MV E/E' SEPTAL: 10.75
ECHO MV REGURGITANT PEAK GRADIENT: 100 MMHG
ECHO MV REGURGITANT PEAK VELOCITY: 5 M/S
ECHO PV MAX VELOCITY: 1 M/S
ECHO PV PEAK GRADIENT: 4 MMHG
ECHO RA AREA 4C: 5.4 CM2
ECHO RA END SYSTOLIC VOLUME APICAL 4 CHAMBER INDEX BSA: 6 ML/M2
ECHO RA VOLUME: 9 ML
ECHO RIGHT VENTRICULAR SYSTOLIC PRESSURE (RVSP): 37 MMHG
ECHO RV BASAL DIMENSION: 2.2 CM
ECHO RV FREE WALL PEAK S': 15 CM/S
ECHO RV LONGITUDINAL DIMENSION: 6.6 CM
ECHO RV MID DIMENSION: 2.1 CM
ECHO RV TAPSE: 1.8 CM (ref 1.7–?)
ECHO TV REGURGITANT MAX VELOCITY: 2.7 M/S
ECHO TV REGURGITANT PEAK GRADIENT: 29 MMHG

## 2024-08-21 PROCEDURE — 93306 TTE W/DOPPLER COMPLETE: CPT

## 2024-08-21 NOTE — TELEPHONE ENCOUNTER
Care Transitions Initial Follow Up Call    Outreach made within 2 business days of discharge: Yes    Patient: Catherine Rebolledo Patient : 1934   MRN: [id number]  Reason for Admission: Fall  Discharge Date: 24       Spoke with:     LVM for pt to return call. Pt has a 3 mo f/u appt  - need to know if pt wants/needs to come in sooner or keep that sarah on  and change to HFU    Additional needs identified to be addressed with provider  N/A             Scheduled appointment with PCP within 7-14 days    Follow Up  Future Appointments   Date Time Provider Department Center   2024  2:20 PM [facility]       Jolene Urena LPN

## 2024-08-22 ENCOUNTER — FOLLOWUP TELEPHONE ENCOUNTER (OUTPATIENT)
Dept: INTERNAL MEDICINE CLINIC | Age: 89
End: 2024-08-22

## 2024-08-22 NOTE — RESULT ENCOUNTER NOTE
There are several finding in echocardiogram but nothing appear serious at this time.  We can discuss further during upcoming visit.  Keep regular appointment as scheduled in a week.

## 2024-08-22 NOTE — DISCHARGE SUMMARY
daily            CONTINUE taking these medications      albuterol sulfate  (90 Base) MCG/ACT inhaler  Commonly known as: PROVENTIL;VENTOLIN;PROAIR  INHALE 2 PUFFS INTO THE LUNGS FOUR TIMES DAILY AS NEEDED FOR WHEEZING     Anoro Ellipta 62.5-25 MCG/ACT inhaler  Generic drug: umeclidinium-vilanterol  Inhale 1 puff into the lungs daily     cetirizine 10 MG tablet  Commonly known as: ZYRTEC     Compression Stockings Misc  by Does not apply route     CoQ-10 100 MG Cpcr     esomeprazole 40 MG delayed release capsule  Commonly known as: NEXIUM     Flaxseed Oil 1000 MG Caps     guaiFENesin-dextromethorphan 100-10 MG/5ML syrup  Commonly known as: ROBITUSSIN DM     Iodine (Kelp) 0.15 MG Tabs     sertraline 25 MG tablet  Commonly known as: Zoloft  Take 1 tablet by mouth daily     spironolactone 25 MG tablet  Commonly known as: Aldactone  Take 1 tablet by mouth daily     TURMERIC PO     valsartan 160 MG tablet  Commonly known as: DIOVAN  Take 1 tablet by mouth daily            STOP taking these medications      hydrocortisone 2.5 % Crea rectal cream  Commonly known as: ANUSOL-HC               Where to Get Your Medications        These medications were sent to [facility] [place], [place] - [address] - P [phone] - F [phone]  [address] [facility] [address]      Phone: [phone]   lidocaine 4 % external patch         Discharge recommendations given to patient.  Follow Up. pcp in 1 week   Disposition.  home  Activity. activity as tolerated  Diet: No diet orders on file      Spent 45  minutes in discharge process.    Signed:  Oscar Flores MD     8/22/2024 12:43 PM

## 2024-08-22 NOTE — TELEPHONE ENCOUNTER
Care Transitions Initial Follow Up Call    Outreach made within 2 business days of discharge: Yes    Patient: Catherine Rebolledo Patient : 1934   MRN: [id number]  Reason for Admission: 24  Discharge Date: 24       Spoke with: Patient     Discharge department/facility: [facility] Interactive Patient Contact:  Was patient able to fill all prescriptions: Yes  Was patient instructed to bring all medications to the follow-up visit: Yes  Is patient taking all medications as directed in the discharge summary? Yes  Does patient understand their discharge instructions: Patient states that she can not recall getting any discharge papers, her grand daughter may have then.  Does patient have questions or concerns that need addressed prior to 7-14 day follow up office visit: no    Additional needs identified to be addressed with provider  No needs identified             Scheduled appointment with PCP within 7-14 days    Follow Up  Future Appointments   Date Time Provider Department Center   2024  2:20 PM OLGA Rosa MD [facility] DEP       Dorene Gaytan LPN

## 2024-08-23 ENCOUNTER — TELEPHONE (OUTPATIENT)
Dept: INTERNAL MEDICINE CLINIC | Age: 89
End: 2024-08-23

## 2024-08-23 NOTE — TELEPHONE ENCOUNTER
We need to get blood pressure and pulse on sitting in a standing position.  Did the nurse practitioner made any decision on her finding?  Did she changes any of the antihypertensive?  Please call and clarify above all those numbers?

## 2024-08-23 NOTE — TELEPHONE ENCOUNTER
Timi from [facility] calling. States their NP saw pt after discharging from hospital. When standing pt's HR jumped 40 BPM & systolic BP went up 30. States pt has orthostatic hypertension. They wanted Dr. Rosa to be aware. States once visit note is complete they will send over to the office.

## 2024-08-26 ENCOUNTER — TELEPHONE (OUTPATIENT)
Dept: INTERNAL MEDICINE CLINIC | Age: 89
End: 2024-08-26

## 2024-08-26 ENCOUNTER — OFFICE VISIT (OUTPATIENT)
Dept: INTERNAL MEDICINE CLINIC | Age: 89
End: 2024-08-26

## 2024-08-26 VITALS
HEART RATE: 98 BPM | WEIGHT: 137.6 LBS | SYSTOLIC BLOOD PRESSURE: 120 MMHG | HEIGHT: 63 IN | BODY MASS INDEX: 24.38 KG/M2 | OXYGEN SATURATION: 94 % | DIASTOLIC BLOOD PRESSURE: 68 MMHG

## 2024-08-26 DIAGNOSIS — F43.9 STRESS: ICD-10-CM

## 2024-08-26 DIAGNOSIS — E87.1 HYPONATREMIA: ICD-10-CM

## 2024-08-26 DIAGNOSIS — T07.XXXA MULTIPLE CONTUSIONS: ICD-10-CM

## 2024-08-26 DIAGNOSIS — S70.01XA CONTUSION OF RIGHT HIP, INITIAL ENCOUNTER: ICD-10-CM

## 2024-08-26 DIAGNOSIS — Z09 HOSPITAL DISCHARGE FOLLOW-UP: ICD-10-CM

## 2024-08-26 DIAGNOSIS — M47.812 SPONDYLOSIS OF CERVICAL REGION WITHOUT MYELOPATHY OR RADICULOPATHY: ICD-10-CM

## 2024-08-26 DIAGNOSIS — Z91.81 AT HIGH RISK FOR FALLS: ICD-10-CM

## 2024-08-26 DIAGNOSIS — S16.1XXD STRAIN OF NECK MUSCLE, SUBSEQUENT ENCOUNTER: ICD-10-CM

## 2024-08-26 DIAGNOSIS — S50.01XS CONTUSION OF RIGHT ELBOW, SEQUELA: ICD-10-CM

## 2024-08-26 DIAGNOSIS — F32.0 CURRENT MILD EPISODE OF MAJOR DEPRESSIVE DISORDER WITHOUT PRIOR EPISODE (HCC): ICD-10-CM

## 2024-08-26 DIAGNOSIS — Z09 HOSPITAL DISCHARGE FOLLOW-UP: Primary | ICD-10-CM

## 2024-08-26 LAB
ANION GAP SERPL CALCULATED.3IONS-SCNC: 10 MMOL/L (ref 3–16)
BUN SERPL-MCNC: 15 MG/DL (ref 7–20)
CALCIUM SERPL-MCNC: 10.1 MG/DL (ref 8.3–10.6)
CHLORIDE SERPL-SCNC: 97 MMOL/L (ref 99–110)
CO2 SERPL-SCNC: 24 MMOL/L (ref 21–32)
CREAT SERPL-MCNC: 0.9 MG/DL (ref 0.6–1.2)
GFR SERPLBLD CREATININE-BSD FMLA CKD-EPI: 61 ML/MIN/{1.73_M2}
GLUCOSE SERPL-MCNC: 106 MG/DL (ref 70–99)
MAGNESIUM SERPL-MCNC: 2.04 MG/DL (ref 1.8–2.4)
POTASSIUM SERPL-SCNC: 4 MMOL/L (ref 3.5–5.1)
SODIUM SERPL-SCNC: 131 MMOL/L (ref 136–145)

## 2024-08-26 RX ORDER — CITALOPRAM HYDROBROMIDE 10 MG/1
10 TABLET ORAL DAILY
Qty: 30 TABLET | Refills: 2 | Status: SHIPPED | OUTPATIENT
Start: 2024-08-26

## 2024-08-26 NOTE — PROGRESS NOTES
Post-Discharge Transitional Care  Follow Up      Catherine Rebolledo   YOB: 1934    Date of Office Visit:  8/26/2024  Date of Hospital Admission: 8/18/24  Date of Hospital Discharge: 8/20/24  Risk of hospital readmission (high >=14%. Medium >=10%) :Readmission Risk Score: 12.6      Care management risk score Rising risk (score 2-5) and Complex Care (Scores >=6): No Risk Score On File     Non face to face  following discharge, date last encounter closed (first attempt may have been earlier): 08/22/2024    Call initiated 2 business days of discharge: Yes    ASSESSMENT/PLAN:   Hospital discharge follow-up  -     CO DISCHARGE MEDS RECONCILED W/ CURRENT OUTPATIENT MED LIST  -     [facility] HOME HEALTH CARE SUPERVISION  -     Basic Metabolic Panel; Future  Current mild episode of major depressive disorder without prior episode (HCC)  Patient felt somewhat drowsiness and numbness while on Zoloft.  Will discontinue Zoloft and start low-dose citalopram  -     citalopram (CELEXA) 10 MG tablet; Take 1 tablet by mouth daily, Disp-30 tablet, R-2Normal  Stress  -     citalopram (CELEXA) 10 MG tablet; Take 1 tablet by mouth daily, Disp-30 tablet, R-2Normal  At high risk for falls  -     CO HOME HEALTH CARE SUPERVISION  Multiple contusions  Contusion at the scalp, right elbow forearm and right hip  -     CO HOME HEALTH CARE SUPERVISION  Contusion of right hip, initial encounter  Full weightbearing no significant pain on weightbearing.    -     CO HOME HEALTH CARE SUPERVISION  Contusion of right elbow, sequela  -     CO HOME HEALTH CARE SUPERVISION  Hyponatremia  Will repeat lab work.  No longer on any Lasix  -     Basic Metabolic Panel; Future  -     Magnesium; Future  Strain of neck muscle, subsequent encounter  Occasional pain at the left side of the neck.  CT scan of the neck shows degenerative arthritis.  -     CO HOME HEALTH CARE SUPERVISION  Spondylosis of cervical region without myelopathy or radiculopathy  -     CO HOME

## 2024-08-26 NOTE — TELEPHONE ENCOUNTER
Brooke from [facility] is calling ---started care on Sat.  Need verbal orders for skilled nursing  OT& PT ---please call her at [phone].  Thanks.

## 2024-08-27 DIAGNOSIS — I10 PRIMARY HYPERTENSION: ICD-10-CM

## 2024-08-27 DIAGNOSIS — R60.0 BILATERAL LEG EDEMA: ICD-10-CM

## 2024-08-27 RX ORDER — SPIRONOLACTONE 25 MG/1
25 TABLET ORAL DAILY
Qty: 90 TABLET | Refills: 1 | Status: SHIPPED | OUTPATIENT
Start: 2024-08-27 | End: 2025-02-23

## 2024-08-27 RX ORDER — FUROSEMIDE 40 MG
40 TABLET ORAL
Qty: 45 TABLET | Refills: 0 | OUTPATIENT
Start: 2024-08-27

## 2024-08-27 RX ORDER — UMECLIDINIUM BROMIDE AND VILANTEROL TRIFENATATE 62.5; 25 UG/1; UG/1
POWDER RESPIRATORY (INHALATION)
Qty: 180 EACH | Refills: 1 | Status: SHIPPED | OUTPATIENT
Start: 2024-08-27

## 2024-08-31 DIAGNOSIS — I11.0 HYPERTENSIVE HEART DISEASE WITH HEART FAILURE (HCC): ICD-10-CM

## 2024-09-03 RX ORDER — VALSARTAN 160 MG/1
160 TABLET ORAL DAILY
Qty: 90 TABLET | Refills: 1 | Status: SHIPPED | OUTPATIENT
Start: 2024-09-03

## 2024-11-16 ENCOUNTER — HOSPITAL ENCOUNTER (EMERGENCY)
Age: 89
Discharge: HOME | End: 2024-11-16
Payer: COMMERCIAL

## 2024-11-16 ENCOUNTER — APPOINTMENT (OUTPATIENT)
Dept: CT IMAGING | Age: 89
End: 2024-11-16
Payer: COMMERCIAL

## 2024-11-16 VITALS
TEMPERATURE: 97.6 F | HEIGHT: 63 IN | WEIGHT: 133 LBS | SYSTOLIC BLOOD PRESSURE: 127 MMHG | OXYGEN SATURATION: 100 % | RESPIRATION RATE: 16 BRPM | DIASTOLIC BLOOD PRESSURE: 67 MMHG | HEART RATE: 85 BPM | BODY MASS INDEX: 23.57 KG/M2

## 2024-11-16 DIAGNOSIS — N30.01 ACUTE CYSTITIS WITH HEMATURIA: Primary | ICD-10-CM

## 2024-11-16 DIAGNOSIS — G89.29 OTHER CHRONIC PAIN: ICD-10-CM

## 2024-11-16 LAB
ALBUMIN SERPL-MCNC: 4.2 G/DL (ref 3.4–5)
ALBUMIN/GLOB SERPL: 1.8 {RATIO} (ref 1.1–2.2)
ALP SERPL-CCNC: 158 U/L (ref 40–129)
ALT SERPL-CCNC: 8 U/L (ref 10–40)
ANION GAP SERPL CALCULATED.3IONS-SCNC: 12 MMOL/L (ref 3–16)
AST SERPL-CCNC: 17 U/L (ref 15–37)
BACTERIA URNS QL MICRO: ABNORMAL /HPF
BASOPHILS # BLD: 0.2 K/UL (ref 0–0.2)
BASOPHILS NFR BLD: 2.4 %
BILIRUB SERPL-MCNC: 0.7 MG/DL (ref 0–1)
BILIRUB UR QL STRIP.AUTO: NEGATIVE
BUN SERPL-MCNC: 17 MG/DL (ref 7–20)
CALCIUM SERPL-MCNC: 10.4 MG/DL (ref 8.3–10.6)
CHLORIDE SERPL-SCNC: 93 MMOL/L (ref 99–110)
CLARITY UR: CLEAR
CO2 SERPL-SCNC: 24 MMOL/L (ref 21–32)
COLOR UR: YELLOW
CREAT SERPL-MCNC: 1 MG/DL (ref 0.6–1.2)
DEPRECATED RDW RBC AUTO: 16.1 % (ref 12.4–15.4)
EOSINOPHIL # BLD: 0.5 K/UL (ref 0–0.6)
EOSINOPHIL NFR BLD: 8.3 %
EPI CELLS #/AREA URNS HPF: ABNORMAL /HPF (ref 0–5)
GFR SERPLBLD CREATININE-BSD FMLA CKD-EPI: 53 ML/MIN/{1.73_M2}
GLUCOSE SERPL-MCNC: 88 MG/DL (ref 70–99)
GLUCOSE UR STRIP.AUTO-MCNC: NEGATIVE MG/DL
HCT VFR BLD AUTO: 40.1 % (ref 36–48)
HGB BLD-MCNC: 13.3 G/DL (ref 12–16)
HGB UR QL STRIP.AUTO: ABNORMAL
KETONES UR STRIP.AUTO-MCNC: NEGATIVE MG/DL
LEUKOCYTE ESTERASE UR QL STRIP.AUTO: ABNORMAL
LYMPHOCYTES # BLD: 1.2 K/UL (ref 1–5.1)
LYMPHOCYTES NFR BLD: 18.5 %
MCH RBC QN AUTO: 26.6 PG (ref 26–34)
MCHC RBC AUTO-ENTMCNC: 33 G/DL (ref 31–36)
MCV RBC AUTO: 80.6 FL (ref 80–100)
MONOCYTES # BLD: 0.5 K/UL (ref 0–1.3)
MONOCYTES NFR BLD: 7.9 %
NEUTROPHILS # BLD: 4 K/UL (ref 1.7–7.7)
NEUTROPHILS NFR BLD: 62.9 %
NITRITE UR QL STRIP.AUTO: POSITIVE
PH UR STRIP.AUTO: 6.5 [PH] (ref 5–8)
PLATELET # BLD AUTO: 300 K/UL (ref 135–450)
PMV BLD AUTO: 6.8 FL (ref 5–10.5)
POTASSIUM SERPL-SCNC: 4.4 MMOL/L (ref 3.5–5.1)
PROT SERPL-MCNC: 6.5 G/DL (ref 6.4–8.2)
PROT UR STRIP.AUTO-MCNC: NEGATIVE MG/DL
RBC # BLD AUTO: 4.98 M/UL (ref 4–5.2)
RBC #/AREA URNS HPF: ABNORMAL /HPF (ref 0–4)
SODIUM SERPL-SCNC: 129 MMOL/L (ref 136–145)
SP GR UR STRIP.AUTO: 1.02 (ref 1–1.03)
UA COMPLETE W REFLEX CULTURE PNL UR: YES
UA DIPSTICK W REFLEX MICRO PNL UR: YES
URN SPEC COLLECT METH UR: ABNORMAL
UROBILINOGEN UR STRIP-ACNC: 0.2 E.U./DL
WBC # BLD AUTO: 6.3 K/UL (ref 4–11)
WBC #/AREA URNS HPF: ABNORMAL /HPF (ref 0–5)

## 2024-11-16 PROCEDURE — 36415 COLL VENOUS BLD VENIPUNCTURE: CPT

## 2024-11-16 PROCEDURE — 6370000000 HC RX 637 (ALT 250 FOR IP): Performed by: NURSE PRACTITIONER

## 2024-11-16 PROCEDURE — 85025 COMPLETE CBC W/AUTO DIFF WBC: CPT

## 2024-11-16 PROCEDURE — 87186 SC STD MICRODIL/AGAR DIL: CPT

## 2024-11-16 PROCEDURE — 6360000002 HC RX W HCPCS: Performed by: NURSE PRACTITIONER

## 2024-11-16 PROCEDURE — 87088 URINE BACTERIA CULTURE: CPT

## 2024-11-16 PROCEDURE — 81001 URINALYSIS AUTO W/SCOPE: CPT

## 2024-11-16 PROCEDURE — 80053 COMPREHEN METABOLIC PANEL: CPT

## 2024-11-16 PROCEDURE — 99285 EMERGENCY DEPT VISIT HI MDM: CPT

## 2024-11-16 PROCEDURE — 74177 CT ABD & PELVIS W/CONTRAST: CPT

## 2024-11-16 PROCEDURE — 87086 URINE CULTURE/COLONY COUNT: CPT

## 2024-11-16 PROCEDURE — 96365 THER/PROPH/DIAG IV INF INIT: CPT

## 2024-11-16 PROCEDURE — 2580000003 HC RX 258: Performed by: NURSE PRACTITIONER

## 2024-11-16 PROCEDURE — 6360000004 HC RX CONTRAST MEDICATION: Performed by: NURSE PRACTITIONER

## 2024-11-16 RX ORDER — IOPAMIDOL 755 MG/ML
75 INJECTION, SOLUTION INTRAVASCULAR
Status: COMPLETED | OUTPATIENT
Start: 2024-11-16 | End: 2024-11-16

## 2024-11-16 RX ORDER — HYDROCODONE BITARTRATE AND ACETAMINOPHEN 5; 325 MG/1; MG/1
1 TABLET ORAL EVERY 6 HOURS PRN
Qty: 12 TABLET | Refills: 0 | Status: SHIPPED | OUTPATIENT
Start: 2024-11-16 | End: 2024-11-19

## 2024-11-16 RX ORDER — HYDROCODONE BITARTRATE AND ACETAMINOPHEN 5; 325 MG/1; MG/1
1 TABLET ORAL ONCE
Status: COMPLETED | OUTPATIENT
Start: 2024-11-16 | End: 2024-11-16

## 2024-11-16 RX ORDER — CEFUROXIME AXETIL 250 MG/1
250 TABLET ORAL 2 TIMES DAILY
Qty: 14 TABLET | Refills: 0 | Status: SHIPPED | OUTPATIENT
Start: 2024-11-16 | End: 2024-11-23

## 2024-11-16 RX ADMIN — IOPAMIDOL 75 ML: 755 INJECTION, SOLUTION INTRAVENOUS at 13:45

## 2024-11-16 RX ADMIN — CEFTRIAXONE SODIUM 1000 MG: 1 INJECTION, POWDER, FOR SOLUTION INTRAMUSCULAR; INTRAVENOUS at 14:10

## 2024-11-16 RX ADMIN — HYDROCODONE BITARTRATE AND ACETAMINOPHEN 1 TABLET: 5; 325 TABLET ORAL at 14:57

## 2024-11-16 ASSESSMENT — PAIN - FUNCTIONAL ASSESSMENT: PAIN_FUNCTIONAL_ASSESSMENT: 0-10

## 2024-11-16 ASSESSMENT — PAIN SCALES - GENERAL
PAINLEVEL_OUTOF10: 5
PAINLEVEL_OUTOF10: 10

## 2024-11-17 LAB
BACTERIA UR CULT: ABNORMAL
ORGANISM: ABNORMAL

## 2024-11-18 LAB
BACTERIA UR CULT: ABNORMAL
ORGANISM: ABNORMAL

## 2024-11-18 NOTE — ED PROVIDER NOTES
[facility]  ED  EMERGENCY DEPARTMENT ENCOUNTER        Pt Name: Catherine Rebolledo  MRN: [id number]  Birthdate 2/23/1934  Date of evaluation: 11/16/2024  Provider: ROSE Shay - CNP  PCP: OLGA Rosa MD        GEOVANNA. I have evaluated this patient.        CHIEF COMPLAINT       Chief Complaint   Patient presents with    Back Pain     Patient states her entire back hurts, but her lower back is the worst.     Abdominal Pain     Pt reports mid Abdominal pain, States it feels like a cramp.      Dysuria     Patient states it burns when she pees, states it has been going on for two weeks       HISTORY OF PRESENT ILLNESS: 1 or more Elements     History From: the patient  Limitations to history : None    Catherine Rebolledo is a 89 y.o. female who presents to the ER today with complaints of low back pain, some of lower abdominal pain, painful urination.  Patient states that is been ongoing for several weeks.  No fevers.  Here for further evaluation.    Nursing Notes were all reviewed and agreed with or any disagreements were addressed in the HPI.    REVIEW OF SYSTEMS :      Review of Systems    Positives and Pertinent negatives as per HPI.     SURGICAL HISTORY     Past Surgical History:   Procedure Laterality Date    BLADDER REPAIR N/A 2002    BREAST SURGERY      L breast tumor-benign    COLONOSCOPY N/A 04/23/2024    COLONOSCOPY POLYPECTOMY SNARE BIOPSY performed by Josue Sorenson MD at [facility] ENDOSCOPY    CYST REMOVAL Right 08/01/2013    GANGLION CYST EXCISION RIGHT WRIST, TRIGGER FINGER RELEASE RIGHT FOURTH    EYE SURGERY      , cataract right eye    FRACTURE SURGERY      R shoulderx2-pinned    HYSTERECTOMY (CERVIX STATUS UNKNOWN)      JOINT REPLACEMENT Right 03/28/2012    Total Elbow - Dr. Ramirez    OTHER SURGICAL HISTORY  07/05/2024    CapsoCam plus endoscopy    RECTOCELE REPAIR      SHOULDER SURGERY Right     TONSILLECTOMY      TUMOR REMOVAL  2013    carcinoid    UPPER GASTROINTESTINAL ENDOSCOPY

## 2024-11-26 ENCOUNTER — OFFICE VISIT (OUTPATIENT)
Dept: INTERNAL MEDICINE CLINIC | Age: 88
End: 2024-11-26

## 2024-11-26 VITALS
HEART RATE: 100 BPM | BODY MASS INDEX: 23.03 KG/M2 | SYSTOLIC BLOOD PRESSURE: 126 MMHG | WEIGHT: 130 LBS | OXYGEN SATURATION: 100 % | DIASTOLIC BLOOD PRESSURE: 68 MMHG

## 2024-11-26 DIAGNOSIS — R44.3 HALLUCINATION: ICD-10-CM

## 2024-11-26 DIAGNOSIS — E87.1 HYPONATREMIA: ICD-10-CM

## 2024-11-26 DIAGNOSIS — R39.9 UTI SYMPTOMS: ICD-10-CM

## 2024-11-26 DIAGNOSIS — R74.8 ELEVATED ALKALINE PHOSPHATASE LEVEL: ICD-10-CM

## 2024-11-26 DIAGNOSIS — E87.1 HYPONATREMIA: Primary | ICD-10-CM

## 2024-11-26 DIAGNOSIS — E55.9 VITAMIN D DEFICIENCY: ICD-10-CM

## 2024-11-26 DIAGNOSIS — R21 SKIN RASH: ICD-10-CM

## 2024-11-26 DIAGNOSIS — L30.9 DERMATITIS: ICD-10-CM

## 2024-11-26 LAB
BILIRUBIN, POC: NORMAL
BLOOD URINE, POC: NORMAL
CLARITY, POC: CLEAR
COLOR, POC: YELLOW
GLUCOSE URINE, POC: NORMAL MG/DL
KETONES, POC: NORMAL MG/DL
LEUKOCYTE EST, POC: NORMAL
NITRITE, POC: NORMAL
PH, POC: 7
PROTEIN, POC: NORMAL MG/DL
SPECIFIC GRAVITY, POC: 1.02
UROBILINOGEN, POC: 0.2 MG/DL

## 2024-11-26 RX ORDER — DOXYCYCLINE HYCLATE 100 MG
100 TABLET ORAL 2 TIMES DAILY
Qty: 20 TABLET | Refills: 0 | Status: SHIPPED | OUTPATIENT
Start: 2024-11-26 | End: 2024-12-06

## 2024-11-26 RX ORDER — PERMETHRIN 50 MG/G
CREAM TOPICAL
Qty: 60 G | Refills: 1 | Status: SHIPPED | OUTPATIENT
Start: 2024-11-26

## 2024-11-26 RX ORDER — CLOBETASOL PROPIONATE 0.5 MG/G
OINTMENT TOPICAL
Qty: 30 G | Refills: 0 | Status: SHIPPED | OUTPATIENT
Start: 2024-11-26

## 2024-11-26 ASSESSMENT — ENCOUNTER SYMPTOMS
TROUBLE SWALLOWING: 0
VOICE CHANGE: 0
SHORTNESS OF BREATH: 0
VOMITING: 0
NAUSEA: 0
ABDOMINAL PAIN: 0
COUGH: 0
WHEEZING: 0
PHOTOPHOBIA: 0

## 2024-11-26 NOTE — RESULT ENCOUNTER NOTE
Result was discussed with the patient.  Antibiotic doxycycline sent.  Recurrent UTI.  Urology referral

## 2024-11-26 NOTE — PROGRESS NOTES
URINE RANDOM PROFILE; Future    Hallucination  Temporarily not worsening symptoms.  Recent low sodium as well as urinary tract infection no other neurologic symptoms  -     MRI BRAIN WO CONTRAST; Future    Skin rash  Mostly follicular lesion and some dry scaly skin.  Trial of Nix  Also local steroid cream for dry scaly dermatitis at few spot at the upper back  Hyponatremia  Repeat labs  -     Comprehensive Metabolic Panel; Future  -     Electrophoresis Protein, Serum; Future    Elevated alkaline phosphatase level  -     Electrophoresis Protein, Serum; Future    -     IMMUNOFIXATION, URINE; Future  -     Vitamin D 25 Hydroxy; Future  -     ALKALINE PHOSPHATASE, ISOENZYMES; Future    Dermatitis  -     clobetasol (TEMOVATE) 0.05 % ointment; Apply topically 2 times daily.    Vitamin D deficiency  -     Vitamin D 25 Hydroxy; Future  -     ALKALINE PHOSPHATASE, ISOENZYMES; Future    Other orders  -     doxycycline hyclate (VIBRA-TABS) 100 MG tablet; Take 1 tablet by mouth 2 times daily for 10 days  -     permethrin (ELIMITE) 5 % cream; Apply topically as directed. Repeat after 1 week              Orders Placed This Encounter   Procedures    Culture, Urine    MRI BRAIN WO CONTRAST     Standing Status:   Future     Standing Expiration Date:   11/26/2025    Comprehensive Metabolic Panel     Standing Status:   Future     Number of Occurrences:   1     Standing Expiration Date:   11/26/2025    Electrophoresis Protein, Serum     Standing Status:   Future     Number of Occurrences:   1     Standing Expiration Date:   11/26/2025    ELECTROPHORESIS PROTEIN URINE 24HR     Standing Status:   Future     Standing Expiration Date:   11/26/2025    IMMUNOFIXATION, URINE     Standing Status:   Future     Number of Occurrences:   1     Standing Expiration Date:   11/26/2025    IMMUNOFIXATION URINE RANDOM PROFILE     Standing Status:   Future     Number of Occurrences:   1     Standing Expiration Date:   11/26/2025    Vitamin D 25 Hydroxy

## 2024-11-27 LAB
25(OH)D3 SERPL-MCNC: 27.9 NG/ML
ALBUMIN SERPL-MCNC: 4.3 G/DL (ref 3.4–5)
ALBUMIN/GLOB SERPL: 2.3 {RATIO} (ref 1.1–2.2)
ALP SERPL-CCNC: 154 U/L (ref 40–129)
ALT SERPL-CCNC: 10 U/L (ref 10–40)
ANION GAP SERPL CALCULATED.3IONS-SCNC: 12 MMOL/L (ref 3–16)
AST SERPL-CCNC: 21 U/L (ref 15–37)
BILIRUB SERPL-MCNC: 0.7 MG/DL (ref 0–1)
BUN SERPL-MCNC: 14 MG/DL (ref 7–20)
CALCIUM SERPL-MCNC: 10.7 MG/DL (ref 8.3–10.6)
CHLORIDE SERPL-SCNC: 98 MMOL/L (ref 99–110)
CO2 SERPL-SCNC: 26 MMOL/L (ref 21–32)
CREAT SERPL-MCNC: 0.9 MG/DL (ref 0.6–1.2)
GFR SERPLBLD CREATININE-BSD FMLA CKD-EPI: 60 ML/MIN/{1.73_M2}
GLUCOSE SERPL-MCNC: 61 MG/DL (ref 70–99)
POTASSIUM SERPL-SCNC: 4.2 MMOL/L (ref 3.5–5.1)
PROT UR-MCNC: 0.02 G/DL
PROT UR-MCNC: 17.3 MG/DL
SODIUM SERPL-SCNC: 136 MMOL/L (ref 136–145)

## 2024-11-28 LAB
BACTERIA UR CULT: ABNORMAL
ORGANISM: ABNORMAL

## 2024-11-30 LAB
ALP BONE SERPL-CCNC: 67 U/L (ref 0–55)
ALP ISOS SERPL HS-CCNC: 0 U/L
ALP LIVER SERPL-CCNC: 97 U/L (ref 0–94)
ALP SERPL-CCNC: 164 U/L (ref 40–120)

## 2024-11-30 RX ORDER — CIPROFLOXACIN 500 MG/1
500 TABLET, FILM COATED ORAL 2 TIMES DAILY
Qty: 20 TABLET | Refills: 0 | Status: SHIPPED | OUTPATIENT
Start: 2024-11-30 | End: 2024-12-10

## 2024-11-30 NOTE — RESULT ENCOUNTER NOTE
Enterococcus faecalis in urine.  Patient need to stop doxycycline and start Cipro.  Order sent to the pharmacy

## 2024-12-02 ENCOUNTER — TELEPHONE (OUTPATIENT)
Dept: INTERNAL MEDICINE CLINIC | Age: 88
End: 2024-12-02

## 2024-12-02 DIAGNOSIS — L29.9 ITCHING: Primary | ICD-10-CM

## 2024-12-02 DIAGNOSIS — L30.9 DERMATITIS: ICD-10-CM

## 2024-12-02 DIAGNOSIS — R21 RASH: ICD-10-CM

## 2024-12-02 DIAGNOSIS — L29.9 ITCHING: ICD-10-CM

## 2024-12-02 RX ORDER — HYDROXYZINE HYDROCHLORIDE 25 MG/1
25 TABLET, FILM COATED ORAL EVERY 8 HOURS PRN
Qty: 90 TABLET | Refills: 0 | Status: SHIPPED | OUTPATIENT
Start: 2024-12-02 | End: 2024-12-02 | Stop reason: SDUPTHER

## 2024-12-02 RX ORDER — CIPROFLOXACIN 500 MG/1
500 TABLET, FILM COATED ORAL 2 TIMES DAILY
Qty: 20 TABLET | Refills: 0 | Status: ON HOLD | OUTPATIENT
Start: 2024-12-02 | End: 2024-12-10 | Stop reason: HOSPADM

## 2024-12-02 RX ORDER — CIPROFLOXACIN 500 MG/1
500 TABLET, FILM COATED ORAL 2 TIMES DAILY
Qty: 20 TABLET | Refills: 0 | Status: CANCELLED | OUTPATIENT
Start: 2024-12-02 | End: 2024-12-12

## 2024-12-02 RX ORDER — HYDROXYZINE HYDROCHLORIDE 25 MG/1
25 TABLET, FILM COATED ORAL EVERY 8 HOURS PRN
Qty: 90 TABLET | Refills: 0 | Status: ON HOLD | OUTPATIENT
Start: 2024-12-02 | End: 2024-12-10 | Stop reason: HOSPADM

## 2024-12-03 DIAGNOSIS — E55.9 VITAMIN D DEFICIENCY: Primary | ICD-10-CM

## 2024-12-03 LAB
ALBUMIN SERPL ELPH-MCNC: 3.6 G/DL (ref 3.1–4.9)
ALPHA1 GLOB SERPL ELPH-MCNC: 0.2 G/DL (ref 0.2–0.4)
ALPHA2 GLOB SERPL ELPH-MCNC: 0.8 G/DL (ref 0.4–1.1)
B-GLOBULIN SERPL ELPH-MCNC: 1 G/DL (ref 0.9–1.6)
GAMMA GLOB SERPL ELPH-MCNC: 0.6 G/DL (ref 0.6–1.8)
PROT SERPL-MCNC: 6.2 G/DL (ref 6.4–8.2)
SPE/IFE INTERPRETATION: NORMAL

## 2024-12-03 NOTE — RESULT ENCOUNTER NOTE
Will discuss additional lab result during next office visit.  Please advise patient to take over-the-counter vitamin D 2000 unit daily

## 2024-12-05 LAB — PROT PATTERN UR ELPH-IMP: NORMAL

## 2024-12-06 ENCOUNTER — HOSPITAL ENCOUNTER (INPATIENT)
Age: 88
LOS: 4 days | Discharge: HOME | DRG: 643 | End: 2024-12-10
Attending: EMERGENCY MEDICINE | Admitting: STUDENT IN AN ORGANIZED HEALTH CARE EDUCATION/TRAINING PROGRAM
Payer: COMMERCIAL

## 2024-12-06 ENCOUNTER — APPOINTMENT (OUTPATIENT)
Dept: CT IMAGING | Age: 88
DRG: 643 | End: 2024-12-06
Payer: COMMERCIAL

## 2024-12-06 DIAGNOSIS — R26.2 DIFFICULTY IN WALKING: ICD-10-CM

## 2024-12-06 DIAGNOSIS — E87.1 HYPONATREMIA: Primary | ICD-10-CM

## 2024-12-06 DIAGNOSIS — R44.1 HALLUCINATIONS, VISUAL: ICD-10-CM

## 2024-12-06 DIAGNOSIS — R29.6 MULTIPLE FALLS: ICD-10-CM

## 2024-12-06 LAB
ALBUMIN SERPL-MCNC: 4.1 G/DL (ref 3.4–5)
ALBUMIN/GLOB SERPL: 1.6 {RATIO} (ref 1.1–2.2)
ALP SERPL-CCNC: 154 U/L (ref 40–129)
ALT SERPL-CCNC: 10 U/L (ref 10–40)
ANION GAP SERPL CALCULATED.3IONS-SCNC: 11 MMOL/L (ref 3–16)
AST SERPL-CCNC: 22 U/L (ref 15–37)
BACTERIA URNS QL MICRO: ABNORMAL /HPF
BASOPHILS # BLD: 0.1 K/UL (ref 0–0.2)
BASOPHILS NFR BLD: 1 %
BILIRUB SERPL-MCNC: 1.1 MG/DL (ref 0–1)
BILIRUB UR QL STRIP.AUTO: NEGATIVE
BUN SERPL-MCNC: 15 MG/DL (ref 7–20)
CALCIUM SERPL-MCNC: 10.2 MG/DL (ref 8.3–10.6)
CHLORIDE SERPL-SCNC: 90 MMOL/L (ref 99–110)
CLARITY UR: CLEAR
CO2 SERPL-SCNC: 24 MMOL/L (ref 21–32)
COLOR UR: YELLOW
CREAT SERPL-MCNC: 1 MG/DL (ref 0.6–1.2)
DEPRECATED RDW RBC AUTO: 15.3 % (ref 12.4–15.4)
EOSINOPHIL # BLD: 0.5 K/UL (ref 0–0.6)
EOSINOPHIL NFR BLD: 8.2 %
EPI CELLS #/AREA URNS HPF: ABNORMAL /HPF (ref 0–5)
GFR SERPLBLD CREATININE-BSD FMLA CKD-EPI: 53 ML/MIN/{1.73_M2}
GLUCOSE SERPL-MCNC: 101 MG/DL (ref 70–99)
GLUCOSE UR STRIP.AUTO-MCNC: NEGATIVE MG/DL
HCT VFR BLD AUTO: 38.7 % (ref 36–48)
HGB BLD-MCNC: 12.9 G/DL (ref 12–16)
HGB UR QL STRIP.AUTO: ABNORMAL
KETONES UR STRIP.AUTO-MCNC: NEGATIVE MG/DL
LEUKOCYTE ESTERASE UR QL STRIP.AUTO: NEGATIVE
LYMPHOCYTES # BLD: 1.1 K/UL (ref 1–5.1)
LYMPHOCYTES NFR BLD: 19.4 %
MAGNESIUM SERPL-MCNC: 1.9 MG/DL (ref 1.8–2.4)
MCH RBC QN AUTO: 26.7 PG (ref 26–34)
MCHC RBC AUTO-ENTMCNC: 33.4 G/DL (ref 31–36)
MCV RBC AUTO: 80 FL (ref 80–100)
MONOCYTES # BLD: 0.6 K/UL (ref 0–1.3)
MONOCYTES NFR BLD: 11.6 %
NEUTROPHILS # BLD: 3.4 K/UL (ref 1.7–7.7)
NEUTROPHILS NFR BLD: 59.8 %
NITRITE UR QL STRIP.AUTO: NEGATIVE
PH UR STRIP.AUTO: 6 [PH] (ref 5–8)
PLATELET # BLD AUTO: 296 K/UL (ref 135–450)
PMV BLD AUTO: 6.8 FL (ref 5–10.5)
POTASSIUM SERPL-SCNC: 4.1 MMOL/L (ref 3.5–5.1)
PROT SERPL-MCNC: 6.6 G/DL (ref 6.4–8.2)
PROT UR STRIP.AUTO-MCNC: NEGATIVE MG/DL
RBC # BLD AUTO: 4.84 M/UL (ref 4–5.2)
RBC #/AREA URNS HPF: ABNORMAL /HPF (ref 0–4)
SODIUM SERPL-SCNC: 125 MMOL/L (ref 136–145)
SP GR UR STRIP.AUTO: 1.02 (ref 1–1.03)
UA COMPLETE W REFLEX CULTURE PNL UR: ABNORMAL
UA DIPSTICK W REFLEX MICRO PNL UR: YES
URN SPEC COLLECT METH UR: ABNORMAL
UROBILINOGEN UR STRIP-ACNC: 0.2 E.U./DL
WBC # BLD AUTO: 5.6 K/UL (ref 4–11)
WBC #/AREA URNS HPF: ABNORMAL /HPF (ref 0–5)

## 2024-12-06 PROCEDURE — 1200000000 HC SEMI PRIVATE

## 2024-12-06 PROCEDURE — 85025 COMPLETE CBC W/AUTO DIFF WBC: CPT

## 2024-12-06 PROCEDURE — 80053 COMPREHEN METABOLIC PANEL: CPT

## 2024-12-06 PROCEDURE — 99285 EMERGENCY DEPT VISIT HI MDM: CPT

## 2024-12-06 PROCEDURE — 83735 ASSAY OF MAGNESIUM: CPT

## 2024-12-06 PROCEDURE — 36415 COLL VENOUS BLD VENIPUNCTURE: CPT

## 2024-12-06 PROCEDURE — 81001 URINALYSIS AUTO W/SCOPE: CPT

## 2024-12-06 PROCEDURE — 70450 CT HEAD/BRAIN W/O DYE: CPT

## 2024-12-06 PROCEDURE — 74176 CT ABD & PELVIS W/O CONTRAST: CPT

## 2024-12-06 RX ORDER — SODIUM CHLORIDE 9 MG/ML
INJECTION, SOLUTION INTRAVENOUS PRN
Status: DISCONTINUED | OUTPATIENT
Start: 2024-12-06 | End: 2024-12-10 | Stop reason: HOSPADM

## 2024-12-06 RX ORDER — SODIUM CHLORIDE 0.9 % (FLUSH) 0.9 %
5-40 SYRINGE (ML) INJECTION PRN
Status: DISCONTINUED | OUTPATIENT
Start: 2024-12-06 | End: 2024-12-10 | Stop reason: HOSPADM

## 2024-12-06 RX ORDER — SODIUM CHLORIDE 0.9 % (FLUSH) 0.9 %
5-40 SYRINGE (ML) INJECTION EVERY 12 HOURS SCHEDULED
Status: DISCONTINUED | OUTPATIENT
Start: 2024-12-06 | End: 2024-12-10 | Stop reason: HOSPADM

## 2024-12-06 RX ORDER — ENOXAPARIN SODIUM 100 MG/ML
40 INJECTION SUBCUTANEOUS DAILY
Status: DISCONTINUED | OUTPATIENT
Start: 2024-12-07 | End: 2024-12-10 | Stop reason: HOSPADM

## 2024-12-06 RX ORDER — ONDANSETRON 4 MG/1
4 TABLET, ORALLY DISINTEGRATING ORAL EVERY 8 HOURS PRN
Status: DISCONTINUED | OUTPATIENT
Start: 2024-12-06 | End: 2024-12-10 | Stop reason: HOSPADM

## 2024-12-06 RX ORDER — POLYETHYLENE GLYCOL 3350 17 G/17G
17 POWDER, FOR SOLUTION ORAL DAILY PRN
Status: DISCONTINUED | OUTPATIENT
Start: 2024-12-06 | End: 2024-12-10 | Stop reason: HOSPADM

## 2024-12-06 RX ORDER — POTASSIUM CHLORIDE 1500 MG/1
40 TABLET, EXTENDED RELEASE ORAL PRN
Status: DISCONTINUED | OUTPATIENT
Start: 2024-12-06 | End: 2024-12-10 | Stop reason: HOSPADM

## 2024-12-06 RX ORDER — ACETAMINOPHEN 325 MG/1
650 TABLET ORAL EVERY 6 HOURS PRN
Status: DISCONTINUED | OUTPATIENT
Start: 2024-12-06 | End: 2024-12-10 | Stop reason: HOSPADM

## 2024-12-06 RX ORDER — ONDANSETRON 2 MG/ML
4 INJECTION INTRAMUSCULAR; INTRAVENOUS EVERY 6 HOURS PRN
Status: DISCONTINUED | OUTPATIENT
Start: 2024-12-06 | End: 2024-12-10 | Stop reason: HOSPADM

## 2024-12-06 RX ORDER — MAGNESIUM SULFATE IN WATER 40 MG/ML
2000 INJECTION, SOLUTION INTRAVENOUS PRN
Status: DISCONTINUED | OUTPATIENT
Start: 2024-12-06 | End: 2024-12-10 | Stop reason: HOSPADM

## 2024-12-06 RX ORDER — ACETAMINOPHEN 650 MG/1
650 SUPPOSITORY RECTAL EVERY 6 HOURS PRN
Status: DISCONTINUED | OUTPATIENT
Start: 2024-12-06 | End: 2024-12-10 | Stop reason: HOSPADM

## 2024-12-06 RX ORDER — POTASSIUM CHLORIDE 7.45 MG/ML
10 INJECTION INTRAVENOUS PRN
Status: DISCONTINUED | OUTPATIENT
Start: 2024-12-06 | End: 2024-12-10 | Stop reason: HOSPADM

## 2024-12-06 RX ORDER — DONEPEZIL HYDROCHLORIDE 5 MG/1
5 TABLET, FILM COATED ORAL NIGHTLY
COMMUNITY
Start: 2024-12-05

## 2024-12-06 NOTE — ED NOTES
Catherine Rebolledo is a 89 y.o. female admitted for  Principal Problem:    Hyponatremia  Resolved Problems:    * No resolved hospital problems. *  .   Patient Home via family with   Chief Complaint   Patient presents with    Abnormal Lab     PCP called pt this morning and told her to go to the ED due to low Na.    .  Patient is alert and Person, Place, Time, and Situation  Patient's baseline mobility: Baseline Mobility: Cane   Code Status: Full Code   Cardiac Rhythm:       Is patient on baseline Oxygen: no how many Liters:   Abnormal Assessment Findings:     Isolation:       NIH Score:    C-SSRS: Risk of Suicide: No Risk  Bedside swallow:        Active LDA's:   Peripheral IV 12/06/24 Left;Posterior Forearm (Active)   Site Assessment Clean, dry & intact 12/06/24 1001   Line Status Blood return noted 12/06/24 1001   Line Care Connections checked and tightened 12/06/24 1001   Phlebitis Assessment No symptoms 12/06/24 1001   Infiltration Assessment 0 12/06/24 1001   Dressing Status New dressing applied 12/06/24 1001   Dressing Type Transparent 12/06/24 1001   Dressing Intervention New 12/06/24 1001     Patient admitted with a carson:  If the carson is chronic was it exchanged:  Reason for carson:   Patient admitted with Central Line:  . PICC line placement confirmed: YES OR NO:[id number]}   Reason for Central line:   Was central line Inserted from an outside facility:        Family/Caregiver Present yes Any Concerns: no   Restraints no  Sitter no         Vitals:      Vitals:    12/06/24 1251 12/06/24 1349 12/06/24 1451 12/06/24 1523   BP: 126/65 136/62 117/79 (!) 142/70   Pulse: 72 70 74 82   Resp: 13 13 20 15   Temp:       TempSrc:       SpO2: 99% 100% 100% 96%   Weight:       Height:           Last documented pain score (0-10 scale)    Pain medication administered No.    Pertinent or High Risk Medications/Drips: No.    Pending Blood Product Administration: no    Abnormal labs:   Abnormal Labs Reviewed   COMPREHENSIVE METABOLIC

## 2024-12-06 NOTE — H&P
ORDERING SYSTEM PROVIDED HISTORY: lower abd pain TECHNOLOGIST PROVIDED HISTORY: Reason for exam:->lower abd pain Additional Contrast?->None Decision Support Exception - unselect if not a suspected or confirmed emergency medical condition->Emergency Medical Condition (MA) Reason for Exam: lower back pain and abd cramps Relevant Medical/Surgical History: hysterectomy FINDINGS: Lower Chest: Mild emphysematous changes.  No lobar infiltrate, effusion or pneumothorax. Organs: Liver: Tiny hepatic cyst inferiorly.  The liver is otherwise unremarkable. Gallbladder: Normal Pancreas: Normal Adrenal glands: Normal Kidneys: Small renal cysts.  Bilateral extrarenal pelvis ease. Spleen: Calcified splenic granuloma. GI/Bowel: Stomach: Unremarkable Small bowel: Mild fluid-filled distention of the small bowel without focal mucosal thickening or mass. Colon: Moderate diverticulosis. Pelvis: The bladder is partially distended with a slightly thickened wall similar to the prior study.  Hysterectomy. Peritoneum/Retroperitoneum: No free fluid or free air.  Atherosclerotic changes of the aorta. Bones/Soft Tissues: Moderate to severe degenerative changes throughout the spine with scoliosis.  No lytic or blastic lesion.  No acute fracture.     1. No acute abdominal or pelvic process. 2. Diverticulosis. 3. Moderate to severe degenerative changes throughout the spine with scoliosis. 4. Mild fluid-filled distention of the small bowel without focal mucosal thickening or mass. This may represent a mild enteritis. 5. Small hepatic and renal cysts. 6. Hysterectomy. 7. Mild emphysematous changes.       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  No results for input(s): \"LABA1C\" in the last 72 hours.  Recent Labs     12/06/24 0915   AST 22   ALT 10   BILITOT 1.1*   ALKPHOS 154*     No results for input(s): \"INR\", \"LACTA\", \"TSH\" in the last 72 hours.     Bony Ansari, DO

## 2024-12-06 NOTE — ED PROVIDER NOTES
I independently examined and evaluated Catherine Rebolledo.    In brief, patient is a 89-year-old female presents to the emergency department for evaluation of abnormal lab yesterday along with confusion and hallucinations.  Per the granddaughter at bedside, patient lives alone at assisted living facility.  She had several falls over the past few days.  Patient has also been hallucinating.  Reports having a bruise over the left flank.  Having some left lateral chest wall pain.  CT scans showed no acute intracranial bleed, no acute traumatic injury to the chest, abdomen, or pelvis.  Mild compression deformity of the superior endplate of the T1 unchanged compared to August 2024.  Creatinine is normal.  Sodium was 125.  Hospitalist consulted for admission for further evaluation and treatment.  Admit.    All diagnostic, treatment, and disposition decisions were made by myself in conjunction with the advanced practice provider/resident physician.     I personally saw the patient and performed a substantive portion of the visit including aspects of the medical decision making. I approved management plan and take responsibility for the patient management.     I personally saw the patient and independently provided 0 minutes of non-concurrent critical care out of the total shared critical care time provided.    Comment: Please note this report has been produced using speech recognition software and may contain errors related to that system including errors in grammar, punctuation, and spelling, as well as words and phrases that may be inappropriate. If there are any questions or concerns please feel free to contact the dictating provider for clarification.    For all further details of the patient's emergency department visit, please see the advanced practice provider's documentation.        Jyoti Lucero MD  12/06/24 3271

## 2024-12-06 NOTE — ED PROVIDER NOTES
[facility]  ED  EMERGENCY DEPARTMENT ENCOUNTER        Pt Name: Catherine Rebolledo  MRN: [id number]  Birthdate 2/23/1934  Date of evaluation: 12/6/2024  Provider: BECKY OSUNA PA-C  PCP: OLGA Rosa MD  ED Attending: MD Nic       I have seen and evaluated this patient with my supervising physician Jyoti Lucero MD.    CHIEF COMPLAINT:     Chief Complaint   Patient presents with    Abnormal Lab     PCP called pt this morning and told her to go to the ED due to low Na.        HISTORY OF PRESENT ILLNESS:      History provided by the patient and her granddaughter. No limitations.    Catherine Rebolledo is a 89 y.o. female who arrives to the ED by private vehicle with her granddaughter.  Patient sent in by PCP due to outpatient labs drawn yesterday showing hyponatremia at 123.  The patient lives independently at a place called [facility].  She has early Alzheimer's and some depression as her  passed away earlier this year.  She used to live in [facility] but moved to the [place] area to be closer to other family.  She was seen in the ED in mid November for a UTI.  She has followed with primary care (a new local PCP) and had labs drawn yesterday that again ultimately showed this hyponatremia.  The patient and her granddaughter state that in the last 48 hours they have noticed a change.  She has had a little more trouble walking (she typically walks independently or with a cane).  She has been experiencing some mild confusion and has had numerous visual hallucinations where she sees people that are not there.  Some of these are friendly people or family and others are unknown and threatening.  She actually called her granddaughter at 3:30 AM on Tuesday due to hallucinations and her granddaughter came and stayed with her the rest of the night.  There have been no new changes with her diet or water intake.  The patient's granddaughter reports she was briefly on Celexa (for 1 to 2 weeks)  height 1.6 m (5' 3\"), weight 59.4 kg (131 lb), SpO2 99%, not currently breastfeeding.    Patient ambulated from the waiting room to room 11 with a cane.  Workup initiated to evaluate her symptoms.  CBC normal  CMP notable for hyponatremia 125 and chloride low 90.  UA small blood with 3-5 WBCs, 21-50 RBCs, 1+ bacteria  CT head: No evidence of acute intracranial normality  CT chest, abdomen and pelvis: No evidence of acute traumatic injury.    Patient was reassessed and she remained stable.  Based on her new hyponatremia and her mental status changes with difficulty ambulating and frequent falls, I am reaching out to the hospitalist for admission.    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    Coronary artery disease involving native heart 1/11/2024    Gastroesophagitis     GERD (gastroesophageal reflux disease)     Hyperlipidemia     Hypertension     Moderate persistent asthma 2/12/2021    Pneumonia     Rheumatic fever with heart involvement     Trigger ring finger of right hand 7/1/2013    Unspecified diseases of blood and blood-forming organs     chronic anemia     Records reviewed: None    Disposition considerations/Plan (include 1 test not done- why not, d/c vs admit): This patient is here in the ED due to reports of low sodium on outpatient blood work as well as changes over the last 48 hours that include confusion, hallucinations, falls.  Workup does reveal hyponatremia at 125.  There no obvious infection whether it be UTI or pneumonia.  Her screening imaging studies due to falls are negative.  Her other lab values outside of the mild

## 2024-12-06 NOTE — CARE COORDINATION
Case Management Assessment  Initial Evaluation    Date/Time of Evaluation: 12/6/2024 11:06 AM  Assessment Completed by: HIEU Lam    If patient is discharged prior to next notation, then this note serves as note for discharge by case management.    Patient Name: Catherine Rebolledo                   YOB: 1934  Diagnosis: No admission diagnoses are documented for this encounter.                   Date / Time: 12/6/2024  9:05 AM    Patient Admission Status: Emergency   Readmission Risk (Low < 19, Mod (19-27), High > 27): Readmission Risk Score: 12.6    Current PCP: OLGA Rosa MD  PCP verified by CM? (P) Yes    Chart Reviewed: Yes      History Provided by: (P) Patient, Child/Family  Patient Orientation: (P) Alert and Oriented    Patient Cognition: (P) Alert    Hospitalization in the last 30 days (Readmission):  No    If yes, Readmission Assessment in CM Navigator will be completed.    Advance Directives:      Code Status: Prior   Patient's Primary Decision Maker is: (P) Legal Next of Kin    Primary Decision Maker: Shereen Tomas - [place] - [phone]    Discharge Planning:    Patient lives with: (P) Alone Type of Home: (P) Apartment, Independent Living ([facility])  Primary Care Giver: (P) Self  Patient Support Systems include: (P) Children, Family Members   Current Financial resources: (P) None  Current community resources: (P) ECF/Home Care  Current services prior to admission: (P) Durable Medical Equipment            Current DME: (P) Walker            Type of Home Care services:  (P) None    ADLS  Prior functional level: (P) Assistance with the following:, Cooking, Housework, Shopping, Bathing, Dressing, Toileting, Mobility  Current functional level: (P) Assistance with the following:, Bathing, Dressing, Toileting, Cooking, Housework, Shopping, Mobility    PT AM-PAC:   /24  OT AM-PAC:   /24    Family can provide assistance at DC: (P) Yes  Would you like Case Management to discuss  the discharge plan with any other family members/significant others, and if so, who? (P) No  Plans to Return to Present Housing: (P) Unknown at present  Other Identified Issues/Barriers to RETURNING to current housing: weakness  Potential Assistance needed at discharge: (P) Home Care, Skilled Nursing Facility            Potential DME:    Patient expects to discharge to: (P) Apartment  Plan for transportation at discharge: (P) Family    Financial    Payor: MEDIGOLD / Plan: MEDIGOLD / Product Type: *No Product type* /     Does insurance require precert for SNF: Yes    Potential assistance Purchasing Medications: (P) No  Meds-to-Beds request:        [id number] - [place], [place] - [address] - P [phone] - F [phone] [address] [facility] [address]  Phone: [phone] Fax: [phone]      Notes:    Factors facilitating achievement of predicted outcomes: Family support, Motivated, Cooperative, Pleasant, and Has needed Durable Medical Equipment at home    Barriers to discharge: Medical complications    Additional Case Management Notes: Referred to patient for d/c planning.  Spoke to patient and family.  Patient is a 89 year old female admitted for hyponatremia.  Patient lives at  [facility].  Patient usually independent in ADLs.  Per family patient is fall risk, so they have been providing 24/7 assist.  Unsure of d/c needs at this time.  CM to follow.    The Plan for Transition of Care is related to the following treatment goals of No admission diagnoses are documented for this encounter.    IF APPLICABLE: The Patient and/or patient representative Catherine and her family were provided with a choice of provider and agrees with the discharge plan. Freedom of choice list with basic dialogue that supports the patient's individualized plan of care/goals and shares the quality data associated with the providers was provided to:     Patient Representative Name:

## 2024-12-07 ENCOUNTER — APPOINTMENT (OUTPATIENT)
Dept: MRI IMAGING | Age: 88
DRG: 643 | End: 2024-12-07
Payer: COMMERCIAL

## 2024-12-07 LAB
25(OH)D3 SERPL-MCNC: 23.9 NG/ML
AMMONIA PLAS-SCNC: 29 UMOL/L (ref 11–51)
ANION GAP SERPL CALCULATED.3IONS-SCNC: 12 MMOL/L (ref 3–16)
BASOPHILS # BLD: 0.1 K/UL (ref 0–0.2)
BASOPHILS NFR BLD: 1 %
BUN SERPL-MCNC: 14 MG/DL (ref 7–20)
CALCIUM SERPL-MCNC: 9.9 MG/DL (ref 8.3–10.6)
CHLORIDE SERPL-SCNC: 94 MMOL/L (ref 99–110)
CO2 SERPL-SCNC: 21 MMOL/L (ref 21–32)
CORTIS AM PEAK SERPL-MCNC: 12.2 UG/DL (ref 4.3–22.4)
CREAT SERPL-MCNC: 0.8 MG/DL (ref 0.6–1.2)
DEPRECATED RDW RBC AUTO: 15.4 % (ref 12.4–15.4)
EOSINOPHIL # BLD: 0.4 K/UL (ref 0–0.6)
EOSINOPHIL NFR BLD: 7.8 %
FOLATE SERPL-MCNC: 10.8 NG/ML (ref 4.78–24.2)
GFR SERPLBLD CREATININE-BSD FMLA CKD-EPI: 70 ML/MIN/{1.73_M2}
GLUCOSE SERPL-MCNC: 92 MG/DL (ref 70–99)
HCT VFR BLD AUTO: 35.2 % (ref 36–48)
HGB BLD-MCNC: 11.9 G/DL (ref 12–16)
LYMPHOCYTES # BLD: 1.2 K/UL (ref 1–5.1)
LYMPHOCYTES NFR BLD: 21.2 %
MCH RBC QN AUTO: 27 PG (ref 26–34)
MCHC RBC AUTO-ENTMCNC: 33.8 G/DL (ref 31–36)
MCV RBC AUTO: 79.9 FL (ref 80–100)
MONOCYTES # BLD: 0.6 K/UL (ref 0–1.3)
MONOCYTES NFR BLD: 11.7 %
NEUTROPHILS # BLD: 3.2 K/UL (ref 1.7–7.7)
NEUTROPHILS NFR BLD: 58.3 %
OSMOLALITY SERPL: 267 MOSM/KG (ref 280–301)
OSMOLALITY UR: 291 MOSM/KG (ref 390–1070)
PLATELET # BLD AUTO: 252 K/UL (ref 135–450)
PMV BLD AUTO: 7.1 FL (ref 5–10.5)
POTASSIUM SERPL-SCNC: 3.9 MMOL/L (ref 3.5–5.1)
PREALB SERPL-MCNC: 15.8 MG/DL (ref 20–40)
RBC # BLD AUTO: 4.41 M/UL (ref 4–5.2)
SODIUM SERPL-SCNC: 127 MMOL/L (ref 136–145)
SODIUM SERPL-SCNC: 127 MMOL/L (ref 136–145)
SODIUM UR-SCNC: 50 MMOL/L
TSH SERPL DL<=0.005 MIU/L-ACNC: 0.43 UIU/ML (ref 0.27–4.2)
URATE SERPL-MCNC: 3 MG/DL (ref 2.6–6)
VIT B12 SERPL-MCNC: 1176 PG/ML (ref 211–911)
WBC # BLD AUTO: 5.5 K/UL (ref 4–11)

## 2024-12-07 PROCEDURE — 84443 ASSAY THYROID STIM HORMONE: CPT

## 2024-12-07 PROCEDURE — 97162 PT EVAL MOD COMPLEX 30 MIN: CPT

## 2024-12-07 PROCEDURE — 82140 ASSAY OF AMMONIA: CPT

## 2024-12-07 PROCEDURE — 92610 EVALUATE SWALLOWING FUNCTION: CPT

## 2024-12-07 PROCEDURE — 84300 ASSAY OF URINE SODIUM: CPT

## 2024-12-07 PROCEDURE — 83930 ASSAY OF BLOOD OSMOLALITY: CPT

## 2024-12-07 PROCEDURE — 84550 ASSAY OF BLOOD/URIC ACID: CPT

## 2024-12-07 PROCEDURE — 82306 VITAMIN D 25 HYDROXY: CPT

## 2024-12-07 PROCEDURE — 85025 COMPLETE CBC W/AUTO DIFF WBC: CPT

## 2024-12-07 PROCEDURE — 84295 ASSAY OF SERUM SODIUM: CPT

## 2024-12-07 PROCEDURE — 6360000002 HC RX W HCPCS: Performed by: STUDENT IN AN ORGANIZED HEALTH CARE EDUCATION/TRAINING PROGRAM

## 2024-12-07 PROCEDURE — 6370000000 HC RX 637 (ALT 250 FOR IP): Performed by: STUDENT IN AN ORGANIZED HEALTH CARE EDUCATION/TRAINING PROGRAM

## 2024-12-07 PROCEDURE — 99223 1ST HOSP IP/OBS HIGH 75: CPT | Performed by: PSYCHIATRY & NEUROLOGY

## 2024-12-07 PROCEDURE — 97530 THERAPEUTIC ACTIVITIES: CPT

## 2024-12-07 PROCEDURE — 36415 COLL VENOUS BLD VENIPUNCTURE: CPT

## 2024-12-07 PROCEDURE — 1200000000 HC SEMI PRIVATE

## 2024-12-07 PROCEDURE — 70551 MRI BRAIN STEM W/O DYE: CPT

## 2024-12-07 PROCEDURE — 83935 ASSAY OF URINE OSMOLALITY: CPT

## 2024-12-07 PROCEDURE — 2580000003 HC RX 258: Performed by: STUDENT IN AN ORGANIZED HEALTH CARE EDUCATION/TRAINING PROGRAM

## 2024-12-07 PROCEDURE — 80048 BASIC METABOLIC PNL TOTAL CA: CPT

## 2024-12-07 PROCEDURE — 97535 SELF CARE MNGMENT TRAINING: CPT

## 2024-12-07 PROCEDURE — 82607 VITAMIN B-12: CPT

## 2024-12-07 PROCEDURE — 84134 ASSAY OF PREALBUMIN: CPT

## 2024-12-07 PROCEDURE — 97166 OT EVAL MOD COMPLEX 45 MIN: CPT

## 2024-12-07 PROCEDURE — 82533 TOTAL CORTISOL: CPT

## 2024-12-07 PROCEDURE — 82746 ASSAY OF FOLIC ACID SERUM: CPT

## 2024-12-07 RX ORDER — FLUTICASONE PROPIONATE 50 MCG
1 SPRAY, SUSPENSION (ML) NASAL DAILY PRN
Status: DISCONTINUED | OUTPATIENT
Start: 2024-12-07 | End: 2024-12-10 | Stop reason: HOSPADM

## 2024-12-07 RX ORDER — HYDROXYZINE HYDROCHLORIDE 25 MG/1
25 TABLET, FILM COATED ORAL EVERY 8 HOURS PRN
Status: DISCONTINUED | OUTPATIENT
Start: 2024-12-07 | End: 2024-12-10 | Stop reason: HOSPADM

## 2024-12-07 RX ORDER — PERMETHRIN 50 MG/G
CREAM TOPICAL DAILY
Status: DISCONTINUED | OUTPATIENT
Start: 2024-12-07 | End: 2024-12-07

## 2024-12-07 RX ORDER — VALSARTAN 80 MG/1
160 TABLET ORAL DAILY
Status: DISCONTINUED | OUTPATIENT
Start: 2024-12-07 | End: 2024-12-10 | Stop reason: HOSPADM

## 2024-12-07 RX ORDER — PANTOPRAZOLE SODIUM 40 MG/1
40 TABLET, DELAYED RELEASE ORAL
Status: DISCONTINUED | OUTPATIENT
Start: 2024-12-08 | End: 2024-12-10 | Stop reason: HOSPADM

## 2024-12-07 RX ORDER — DONEPEZIL HYDROCHLORIDE 5 MG/1
5 TABLET, FILM COATED ORAL NIGHTLY
Status: DISCONTINUED | OUTPATIENT
Start: 2024-12-07 | End: 2024-12-10 | Stop reason: HOSPADM

## 2024-12-07 RX ORDER — SPIRONOLACTONE 25 MG/1
25 TABLET ORAL DAILY
Status: DISCONTINUED | OUTPATIENT
Start: 2024-12-07 | End: 2024-12-10

## 2024-12-07 RX ADMIN — ENOXAPARIN SODIUM 40 MG: 100 INJECTION SUBCUTANEOUS at 09:24

## 2024-12-07 RX ADMIN — SODIUM CHLORIDE, PRESERVATIVE FREE 10 ML: 5 INJECTION INTRAVENOUS at 05:58

## 2024-12-07 RX ADMIN — DONEPEZIL HYDROCHLORIDE 5 MG: 5 TABLET, FILM COATED ORAL at 20:41

## 2024-12-07 RX ADMIN — HYDROXYZINE HYDROCHLORIDE 25 MG: 25 TABLET, FILM COATED ORAL at 15:15

## 2024-12-07 RX ADMIN — Medication: at 17:45

## 2024-12-07 RX ADMIN — SODIUM CHLORIDE, PRESERVATIVE FREE 10 ML: 5 INJECTION INTRAVENOUS at 09:24

## 2024-12-07 RX ADMIN — SODIUM CHLORIDE, PRESERVATIVE FREE 10 ML: 5 INJECTION INTRAVENOUS at 20:47

## 2024-12-07 RX ADMIN — VALSARTAN 160 MG: 80 TABLET, FILM COATED ORAL at 15:15

## 2024-12-07 RX ADMIN — SPIRONOLACTONE 25 MG: 25 TABLET ORAL at 15:15

## 2024-12-07 RX ADMIN — CEFTRIAXONE SODIUM 1000 MG: 1 INJECTION, POWDER, FOR SOLUTION INTRAMUSCULAR; INTRAVENOUS at 06:04

## 2024-12-07 NOTE — PROGRESS NOTES
Physical Therapy  Facility/Department: [place] [address] TELE/MED SURG/ONC  Physical Therapy Initial Assessment    Name: Catherine Rebolledo  : 1934  MRN: [id number]  Date of Service: 2024    Discharge Recommendations:  24 hour supervision or assist, Home with Home health PT   PT Equipment Recommendations  Equipment Needed: No  Other: pt owns cane and rollator if needed      Patient Diagnosis(es): The primary encounter diagnosis was Hyponatremia. Diagnoses of Hallucinations, visual, Difficulty in walking, and Multiple falls were also pertinent to this visit.  Past Medical History:  has a past medical history of Anxiety, Bleeding ulcer, Cancer (HCC), Confusion, COPD with asthma (HCC), Coronary artery disease involving native heart, Gastroesophagitis, GERD (gastroesophageal reflux disease), Hyperlipidemia, Hypertension, Moderate persistent asthma, Pneumonia, Rheumatic fever with heart involvement, Trigger ring finger of right hand, and Unspecified diseases of blood and blood-forming organs.  Past Surgical History:  has a past surgical history that includes Hysterectomy; Tonsillectomy; tumor removal (); Rectocele repair; Breast surgery; fracture surgery; Upper gastrointestinal endoscopy (2012); bladder repair (N/A, ); cyst removal (Right, 2013); joint replacement (Right, 2012); shoulder surgery (Right); Upper gastrointestinal endoscopy (2013); Upper gastrointestinal endoscopy (2014); eye surgery; Upper gastrointestinal endoscopy (2016); Upper gastrointestinal endoscopy (2017); Upper gastrointestinal endoscopy (N/A, 2021); Colonoscopy (N/A, 2024); Upper gastrointestinal endoscopy (N/A, 2024); and other surgical history (2024).    Assessment  Body Structures, Functions, Activity Limitations Requiring Skilled Therapeutic Intervention: Decreased functional mobility ;Decreased body mechanics;Decreased balance;Decreased endurance;Decreased  patient self corrects)                         OutComes Score                                                  AM-PAC - Mobility    AM-PAC Basic Mobility - Inpatient   How much help is needed turning from your back to your side while in a flat bed without using bedrails?: A Little  How much help is needed moving from lying on your back to sitting on the side of a flat bed without using bedrails?: A Little  How much help is needed moving to and from a bed to a chair?: A Little  How much help is needed standing up from a chair using your arms?: A Little  How much help is needed walking in hospital room?: A Little  How much help is needed climbing 3-5 steps with a railing?: A Little  AM-[facility] Inpatient Mobility Raw Score : 18  AM-PAC Inpatient T-Scale Score : 43.63  Mobility Inpatient CMS 0-100% Score: 46.58  Mobility Inpatient CMS G-Code Modifier : CK         Tinneti Score       Goals  Short Term Goals  Time Frame for Short Term Goals: 12/14 (7 days) unless otherwise stated  Short Term Goal 1: Pt will perform supine <> sit with independence  Short Term Goal 2: Pt will perform all transfers with LRAD and mod I  Short Term Goal 3: Pt will ambulate 250 ft with LRAD and mod I  Short Term Goal 4: Pt will perform 10 reps of BLE exercises by 12/16  Short Term Goal 5: Pt will complete formal balance assessment (5xSTS or GRADY) by 12/16  Patient Goals   Patient Goals : \"to go home\"       Education  Patient Education  Education Given To: Patient  Education Provided: Role of Therapy;Plan of Care;Transfer Training  Education Provided Comments: Disease Specific Education: Pt educated on importance of OOB mobility, prevention of complications of bedrest, and general safety during hospitalization.  Education Method: Verbal  Barriers to Learning: Cognition  Education Outcome: Verbalized understanding      Therapy Time   Individual Concurrent Group Co-treatment   Time In 0837         Time Out 0903         Minutes 26         Timed Code

## 2024-12-07 NOTE — PROGRESS NOTES
Speech Language Pathology  Clinical Bedside Swallow Assessment  Facility/Department: [facility] C3 TELE/MED SURG/ONC        Recommendations:  Diet recommendation: IDDSI 7 Regular Solids - Downgrade to easy to chew if pt shows s/s of aspiration with PO intake; IDDSI 0 Thin Liquids; Meds whole with thin liquids  Instrumentation: Not clinically indicated at this time  Risk management: upright for all intake, small bites/sips, alternate bites/sips, slow rate of intake, general GERD precautions, STRICT aspiration precautions, and hold PO and contact SLP if s/s of aspiration or worsening respiratory status develop.    **Only give PO intake when pt is alert    NAME:Catherine Rebolledo  : 1934 (89 y.o.)   MRN: [id number]  ROOM: [address] [place] [address]  ADMISSION DATE: 2024  PATIENT DIAGNOSIS(ES): Hyponatremia [E87.1]  Hallucinations, visual [R44.1]  Multiple falls [R29.6]  Difficulty in walking [R26.2]  Chief Complaint   Patient presents with    Abnormal Lab     PCP called pt this morning and told her to go to the ED due to low Na.      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 None   Final Result   1.  No evidence of acute traumatic injury to the chest/abdomen/pelvis.      2.  Mild compression deformity of the superior endplate of T1 unchanged   dating back to August 2024.         CT HEAD WO CONTRAST   Final Result   No evidence of acute intracranial abnormality.               Reason for Referral  Catherine Rebolledo was referred for a bedside swallow evaluation to assess the efficiency of their swallow function, identify signs and symptoms of aspiration and make recommendations regarding safe dietary consistencies, effective compensatory strategies, and safe eating environment.    Assessment    Medical record review/interview: Per MD H&P on 12/6/24:     \" Presenting Admission History:       89 y.o. female who presented to [facility] with confusion.  PMHx significant for COPD, CAD, HLD, HTN, GERD.       Patient states that yesterday she went to her PCPs office when she got labs drawn.  In the a.m. patient woke up and was informed that her sodium was low by her PCP.  It was at this time PCP informed her to go to the emergency department for further evaluation and treatment.  Patient endorses hallucinations, sundowning, agitation, and confusion.  Patient had 2 recent UTIs with antibiotic usage.  Patient states she is continues to have symptoms of UTI.  Patient not eating well.  Patient toileting appropriately.\"        Predisposing dysphagia risk factors: Cognitive Deficit, Age, and GERD  Clinical signs of possible chronic dysphagia: N/A  Precipitating dysphagia risk factors: AMS    Patient Complaints:   None reported during pt interview      Baseline Method of Oral

## 2024-12-07 NOTE — CONSULTS
RECTOCELE REPAIR      SHOULDER SURGERY Right     TONSILLECTOMY      TUMOR REMOVAL  2013    carcinoid    UPPER GASTROINTESTINAL ENDOSCOPY  03/07/2012    with bx    UPPER GASTROINTESTINAL ENDOSCOPY  11/08/2013    EUS    UPPER GASTROINTESTINAL ENDOSCOPY  04/24/2014    UPPER GASTROINTESTINAL ENDOSCOPY  03/17/2016    push enteroscopy with ablation    UPPER GASTROINTESTINAL ENDOSCOPY  06/29/2017    UPPER GASTROINTESTINAL ENDOSCOPY N/A 08/11/2021    EGD ULTRASOUND OF STOMACH performed by Kian Brar MD at [facility] ENDOSCOPY    UPPER GASTROINTESTINAL ENDOSCOPY N/A 04/23/2024    ESOPHAGOGASTRODUODENOSCOPY BIOPSY performed by Josue Sorenson MD at [facility] ENDOSCOPY        Medication:    Current Facility-Administered Medications   Medication Dose Route Frequency Provider Last Rate Last Admin    cefTRIAXone (ROCEPHIN) 1,000 mg in sterile water 10 mL IV syringe  1,000 mg IntraVENous Q24H Bony Ansari, DO   1,000 mg at 12/07/24 0604    sodium chloride flush 0.9 % injection 5-40 mL  5-40 mL IntraVENous 2 times per day Bony Ansari, DO   10 mL at 12/07/24 0924    sodium chloride flush 0.9 % injection 5-40 mL  5-40 mL IntraVENous PRN Bony Ansari N, DO        0.9 % sodium chloride infusion   IntraVENous PRN Bony Ansari N, DO        potassium chloride (KLOR-CON M) extended release tablet 40 mEq  40 mEq Oral PRN Bony Ansari, DO        Or    potassium bicarb-citric acid (EFFER-K) effervescent tablet 40 mEq  40 mEq Oral PRN Bony Ansari, DO        Or    potassium chloride 10 mEq/100 mL IVPB (Peripheral Line)  10 mEq IntraVENous PRN Bony Ansari, DO        magnesium sulfate 2000 mg in 50 mL IVPB premix  2,000 mg IntraVENous PRN Bony Ansari N, DO        enoxaparin (LOVENOX) injection 40 mg  40 mg SubCUTAneous Daily Bony Ansari, DO   40 mg at 12/07/24 0924    ondansetron (ZOFRAN-ODT) disintegrating tablet 4 mg  4 mg Oral Q8H PRN Bony Ansari,         Or    ondansetron (ZOFRAN) injection 4 mg  4 mg  IntraVENous Q6H PRN Bony Ansari,         polyethylene glycol (GLYCOLAX) packet 17 g  17 g Oral Daily PRN Bony Ansari,         acetaminophen (TYLENOL) tablet 650 mg  650 mg Oral Q6H PRN Bony Ansari DO        Or    acetaminophen (TYLENOL) suppository 650 mg  650 mg Rectal Q6H PRN Bony Ansari, DO           Allergies:    Allergies as of 12/06/2024 - Fully Reviewed 12/06/2024   Allergen Reaction Noted    Simvastatin Myalgia 03/06/2012    Advair hfa [fluticasone-salmeterol] Hallucinations 06/14/2023    Hydrochlorothiazide  07/15/2024    Lasix [furosemide]  07/15/2024    Symbicort [budesonide-formoterol fumarate] Hallucinations 10/30/2023    Thiazide-type diuretics  07/15/2024        Social history:     reports that she has never smoked. She has never used smokeless tobacco. She reports that she does not drink alcohol and does not use drugs.     Family history:    Family History   Problem Relation Age of Onset    Heart Disease Mother     High Blood Pressure Mother     High Cholesterol Mother     Heart Disease Father     High Blood Pressure Father     High Cholesterol Father     Cancer Sister     Stroke Sister     High Blood Pressure Sister     High Cholesterol Sister     High Blood Pressure Brother     High Cholesterol Brother     Heart Attack Brother     Cancer Brother     Heart Attack Brother     Parkinsonism Brother         Review of system:  No chest pain, shortness of breath, palpitation, cough, fever, abdominal pain, vomiting, diarrhea, dysuria, vertigo, joint pain, change in speech/vision or new onset of weakness/numbness. Remaining as per HPI.      /62   Pulse 83   Temp 97.5 °F (36.4 °C) (Oral)   Resp 18   Ht 1.6 m (5' 3\")   Wt 59.4 kg (131 lb)   SpO2 97%   BMI 23.21 kg/m²     Neurological examination:  MENTAL STATUS:  Alert and oriented to person.  LANG/SPEECH: No dysarthria.  CRANIAL NERVES: No facial asymmetry.  MOTOR: No pronator drift or leg drift.  REFLEXES: Generalized

## 2024-12-07 NOTE — CONSULTS
Patient ID: Catherine Rebolledo  Referring/ Physician: Bony Ansari DO      Summary:   Catherine Rebolledo is being seen by nephrology for hyponatremia,With presenting serum sodium level of 125.  According to the information in the epic system, this patient has intermittent hyponatremia since 2019.  But sodium level 125 is the lowest sodium level ever documented in the epic system.    Reason for admission:   Referred to the ER by PCP for abnormal lab test    Interval Hx:   The patient was seen and examined in her room with her son at her bedside  She was resting her bed  She was awake, alert and conversant but she was hard hearing  She looked up pale and frail but she was not under any acute distress  She to me that she had not been feeling well for quite some time she has some neck and posterior head pain and she was told that she had arthritis.  She also told me that she did not be able to walk for quite some time but it is getting worse lately.  Her p.o. intake is poor  She had cough with some whitish sputum which going on for probably a year she also has poor appetite and lost 5 pounds of weight but she cannot tell me within what timeframe    She had no pitting edema but her leg was very tender to touch    Her most recent set of vital signs showed temperature 97.8 heart rate 81 blood pressure 134/65    Most recent lab work showed sodium 127 potassium 3.9 bicarb 21 BUN 14 creatinine 0.8 glucose 92 calcium 9.9    WBC 5.5 hemoglobin 11.9 and the platelet was 252    Assessment/Plan:   Hyponatremia:  Longstanding, since at least 2019  Though the patient's serum sodium level this time is much lower than her previous sodium level  Based on medication history review, she had at least 2 potential offending agents  I will request the following lab tests and try to clarify the nature of her hyponatremia, including:    Uric acid level  TSH level  Cortisol level  Serum osmolality with simultaneous sodium  Hallucinations with inhaled fluticasone    Hydrochlorothiazide      Low sodium and potassium    Lasix [Furosemide]      Low sodium, potassium with diuretics Lasix and hydrochlorothiazide    Symbicort [Budesonide-Formoterol Fumarate] Hallucinations    Thiazide-Type Diuretics          Physical Exam/Objective:   Vitals:    12/07/24 0350   BP: 134/65   Pulse: 81   Resp: 16   Temp: 97.8 °F (36.6 °C)   SpO2: 95%     No intake or output data in the 24 hours ending 12/07/24 0613      General appearance: in no acute distress.   HEENT:   Respiratory: Respiratory effort normal, bilateral equal chest rise. No wheeze, no crackles no  Cardiovascular: Ausculation shows RRR and  no edema   Abdomen: abdomen is soft, non distended  Musculoskeletal:  no joint swelling, no deformity        Data:   CBC:   Recent Labs     12/06/24  0915 12/07/24  0444   WBC 5.6 5.5   HGB 12.9 11.9*   HCT 38.7 35.2*    252     BMP:    Recent Labs     12/06/24  0915   *   K 4.1   CL 90*   CO2 24   BUN 15   CREATININE 1.0   GLUCOSE 101*   MG 1.90     Lab Results   Component Value Date/Time    COLORU Yellow 12/06/2024 09:39 AM    NITRU Negative 12/06/2024 09:39 AM    GLUCOSEU Negative 12/06/2024 09:39 AM    GLUCOSEU NEGATIVE 02/04/2012 08:41 AM    KETUA Negative 12/06/2024 09:39 AM    UROBILINOGEN 0.2 12/06/2024 09:39 AM    BILIRUBINUR Negative 12/06/2024 09:39 AM

## 2024-12-07 NOTE — PROGRESS NOTES
Occupational Therapy  Facility/Department: [place] [id number] TELE/MED SURG/ONC  Occupational Therapy Initial Assessment    Name: Catherine Rebolledo  : 1934  MRN: [id number]  Date of Service: 2024    Discharge Recommendations:  24 hour supervision or assist, Home with Home health OT  OT Equipment Recommendations  Equipment Needed: Yes  Mobility Devices: ADL Assistive Devices  ADL Assistive Devices: Shower Chair with back       Patient Diagnosis(es): The primary encounter diagnosis was Hyponatremia. Diagnoses of Hallucinations, visual, Difficulty in walking, and Multiple falls were also pertinent to this visit.  Past Medical History:  has a past medical history of Anxiety, Bleeding ulcer, Cancer (HCC), Confusion, COPD with asthma (HCC), Coronary artery disease involving native heart, Gastroesophagitis, GERD (gastroesophageal reflux disease), Hyperlipidemia, Hypertension, Moderate persistent asthma, Pneumonia, Rheumatic fever with heart involvement, Trigger ring finger of right hand, and Unspecified diseases of blood and blood-forming organs.  Past Surgical History:  has a past surgical history that includes Hysterectomy; Tonsillectomy; tumor removal (); Rectocele repair; Breast surgery; fracture surgery; Upper gastrointestinal endoscopy (2012); bladder repair (N/A, ); cyst removal (Right, 2013); joint replacement (Right, 2012); shoulder surgery (Right); Upper gastrointestinal endoscopy (2013); Upper gastrointestinal endoscopy (2014); eye surgery; Upper gastrointestinal endoscopy (2016); Upper gastrointestinal endoscopy (2017); Upper gastrointestinal endoscopy (N/A, 2021); Colonoscopy (N/A, 2024); Upper gastrointestinal endoscopy (N/A, 2024); and other surgical history (2024).           Assessment  Performance deficits / Impairments: Decreased functional mobility ;Decreased endurance;Decreased coordination;Decreased ADL status;Decreased

## 2024-12-07 NOTE — PROGRESS NOTES
Consult Placed     Who: Brielle Barrios MD   Date: 12/07/24  Time:09:23 am     Electronically signed by Francie Cho on 12/7/2024 at 9:25 AM

## 2024-12-07 NOTE — PROGRESS NOTES
A&Ox4. Denied chest pain or heaviness. /SOB.   SR x 2.   Bed alarm on. Bed on lowest position.   Call light within reach.

## 2024-12-07 NOTE — PLAN OF CARE
SLP completed evaluation. Please refer to notes in EMR.    Emily Pinto MA CF-SLP   Speech Language Pathologist

## 2024-12-08 LAB
ANION GAP SERPL CALCULATED.3IONS-SCNC: 11 MMOL/L (ref 3–16)
BASOPHILS # BLD: 0.1 K/UL (ref 0–0.2)
BASOPHILS NFR BLD: 1.4 %
BUN SERPL-MCNC: 14 MG/DL (ref 7–20)
CALCIUM SERPL-MCNC: 9.6 MG/DL (ref 8.3–10.6)
CHLORIDE SERPL-SCNC: 95 MMOL/L (ref 99–110)
CO2 SERPL-SCNC: 21 MMOL/L (ref 21–32)
CREAT SERPL-MCNC: 0.8 MG/DL (ref 0.6–1.2)
DEPRECATED RDW RBC AUTO: 15.3 % (ref 12.4–15.4)
EOSINOPHIL # BLD: 0.6 K/UL (ref 0–0.6)
EOSINOPHIL NFR BLD: 11.5 %
GFR SERPLBLD CREATININE-BSD FMLA CKD-EPI: 70 ML/MIN/{1.73_M2}
GLUCOSE SERPL-MCNC: 95 MG/DL (ref 70–99)
HCT VFR BLD AUTO: 36.1 % (ref 36–48)
HGB BLD-MCNC: 12.1 G/DL (ref 12–16)
LYMPHOCYTES # BLD: 1.2 K/UL (ref 1–5.1)
LYMPHOCYTES NFR BLD: 24.8 %
MCH RBC QN AUTO: 26.8 PG (ref 26–34)
MCHC RBC AUTO-ENTMCNC: 33.4 G/DL (ref 31–36)
MCV RBC AUTO: 80.2 FL (ref 80–100)
MONOCYTES # BLD: 0.5 K/UL (ref 0–1.3)
MONOCYTES NFR BLD: 9.6 %
NEUTROPHILS # BLD: 2.6 K/UL (ref 1.7–7.7)
NEUTROPHILS NFR BLD: 52.7 %
PLATELET # BLD AUTO: 239 K/UL (ref 135–450)
PMV BLD AUTO: 6.4 FL (ref 5–10.5)
POTASSIUM SERPL-SCNC: 3.6 MMOL/L (ref 3.5–5.1)
RBC # BLD AUTO: 4.5 M/UL (ref 4–5.2)
SODIUM SERPL-SCNC: 127 MMOL/L (ref 136–145)
SODIUM SERPL-SCNC: 129 MMOL/L (ref 136–145)
WBC # BLD AUTO: 5 K/UL (ref 4–11)

## 2024-12-08 PROCEDURE — 6370000000 HC RX 637 (ALT 250 FOR IP): Performed by: PSYCHIATRY & NEUROLOGY

## 2024-12-08 PROCEDURE — 36415 COLL VENOUS BLD VENIPUNCTURE: CPT

## 2024-12-08 PROCEDURE — 6360000002 HC RX W HCPCS: Performed by: STUDENT IN AN ORGANIZED HEALTH CARE EDUCATION/TRAINING PROGRAM

## 2024-12-08 PROCEDURE — 84295 ASSAY OF SERUM SODIUM: CPT

## 2024-12-08 PROCEDURE — 85025 COMPLETE CBC W/AUTO DIFF WBC: CPT

## 2024-12-08 PROCEDURE — 94640 AIRWAY INHALATION TREATMENT: CPT

## 2024-12-08 PROCEDURE — 1200000000 HC SEMI PRIVATE

## 2024-12-08 PROCEDURE — 2580000003 HC RX 258: Performed by: STUDENT IN AN ORGANIZED HEALTH CARE EDUCATION/TRAINING PROGRAM

## 2024-12-08 PROCEDURE — 6370000000 HC RX 637 (ALT 250 FOR IP): Performed by: STUDENT IN AN ORGANIZED HEALTH CARE EDUCATION/TRAINING PROGRAM

## 2024-12-08 PROCEDURE — 80048 BASIC METABOLIC PNL TOTAL CA: CPT

## 2024-12-08 PROCEDURE — 99233 SBSQ HOSP IP/OBS HIGH 50: CPT | Performed by: PSYCHIATRY & NEUROLOGY

## 2024-12-08 PROCEDURE — 6370000000 HC RX 637 (ALT 250 FOR IP): Performed by: INTERNAL MEDICINE

## 2024-12-08 RX ORDER — VITAMIN B COMPLEX
1000 TABLET ORAL DAILY
Status: DISCONTINUED | OUTPATIENT
Start: 2024-12-08 | End: 2024-12-10 | Stop reason: HOSPADM

## 2024-12-08 RX ORDER — FLUTICASONE PROPIONATE 50 MCG
1 SPRAY, SUSPENSION (ML) NASAL DAILY PRN
Status: DISCONTINUED | OUTPATIENT
Start: 2024-12-08 | End: 2024-12-10 | Stop reason: HOSPADM

## 2024-12-08 RX ORDER — CETIRIZINE HYDROCHLORIDE 10 MG/1
10 TABLET ORAL DAILY
Status: DISCONTINUED | OUTPATIENT
Start: 2024-12-08 | End: 2024-12-10 | Stop reason: HOSPADM

## 2024-12-08 RX ADMIN — PANTOPRAZOLE SODIUM 40 MG: 40 TABLET, DELAYED RELEASE ORAL at 05:31

## 2024-12-08 RX ADMIN — CEFTRIAXONE SODIUM 1000 MG: 1 INJECTION, POWDER, FOR SOLUTION INTRAMUSCULAR; INTRAVENOUS at 05:30

## 2024-12-08 RX ADMIN — SODIUM CHLORIDE, PRESERVATIVE FREE 10 ML: 5 INJECTION INTRAVENOUS at 09:56

## 2024-12-08 RX ADMIN — ENOXAPARIN SODIUM 40 MG: 100 INJECTION SUBCUTANEOUS at 09:53

## 2024-12-08 RX ADMIN — Medication 1000 UNITS: at 12:54

## 2024-12-08 RX ADMIN — Medication 15 G: at 20:46

## 2024-12-08 RX ADMIN — Medication 15 G: at 09:53

## 2024-12-08 RX ADMIN — VALSARTAN 160 MG: 80 TABLET, FILM COATED ORAL at 09:53

## 2024-12-08 RX ADMIN — SODIUM CHLORIDE, PRESERVATIVE FREE 10 ML: 5 INJECTION INTRAVENOUS at 20:46

## 2024-12-08 RX ADMIN — HYDROXYZINE HYDROCHLORIDE 25 MG: 25 TABLET, FILM COATED ORAL at 20:45

## 2024-12-08 RX ADMIN — DONEPEZIL HYDROCHLORIDE 5 MG: 5 TABLET, FILM COATED ORAL at 20:45

## 2024-12-08 RX ADMIN — SPIRONOLACTONE 25 MG: 25 TABLET ORAL at 09:53

## 2024-12-08 RX ADMIN — TIOTROPIUM BROMIDE AND OLODATEROL 2 PUFF: 3.124; 2.736 SPRAY, METERED RESPIRATORY (INHALATION) at 09:09

## 2024-12-08 RX ADMIN — CETIRIZINE HYDROCHLORIDE 10 MG: 10 TABLET, FILM COATED ORAL at 17:27

## 2024-12-08 RX ADMIN — FLUTICASONE PROPIONATE 1 SPRAY: 50 SPRAY, METERED NASAL at 17:28

## 2024-12-08 NOTE — PROGRESS NOTES
Hospital Medicine Progress Note  V 10.25      Date of Admission: 12/6/2024    Hospital Day: 2      Chief Admission Complaint:  confusion    Subjective: Patient is in hospital bed awake and alert.  No new issues or complaints today.  Patient states she feels \"not so good and tired \".  States she has neck pain at this time.  Breathing is okay.  No nausea or vomiting.    Presenting Admission History:       89 y.o. female who presented to [facility] with confusion.  PMHx significant for COPD, CAD, HLD, HTN, GERD.       Patient states that yesterday she went to her PCPs office when she got labs drawn.  In the a.m. patient woke up and was informed that her sodium was low by her PCP.  It was at this time PCP informed her to go to the emergency department for further evaluation and treatment.  Patient endorses hallucinations, sundowning, agitation, and confusion.  Patient had 2 recent UTIs with antibiotic usage.  Patient states she is continues to have symptoms of UTI.  Patient not eating well.  Patient toileting appropriately.    Assessment/Plan:      Current Principal Problem:  Hyponatremia    HypoNatremia - etiology clinically unable to determine but likely hypovolemic.  Followed serial Sodium on/off IVF, personally reviewed and documented in this note.    -Nephrology consulted, note reviewed on 12/7/2024 with recommendations to obtain uric acid, TSH, cortisol, serum osmolality, urine osmolality and urine sodium     UTI - admission U/A demonstrating probable acute cystitis.  Infection evidenced by U/A, Culture results and signs and/or symptoms of clinical infection despite Culture results.  Started on empiric ceftriaxone on 12/7/24 pending Culture results.    -Patient was getting over UTI and informs us that she still feels like she has a UTI     Malnutrition - severe protein-calorie malnutrition.  Likely 2nd to poor intake.  Supplemental feeding/nutritional supplements as able.    -Prealbumin ordered     Dementia  - w/out behavioral disturbance.  Controlled on home medication regimen - continued.  Continue supportive care and redirection as needed.    -Continue Aricept  -Neurology consulted, note reviewed on 12/7/2024 with recommendations to MRI brain, vitamin D level, ammonia level, B12 and folate level, minimize anticholinergic and sedating medications     HTN w/ CAD - w/ known CAD but no evidence of active signs and/or symptoms of ischemia and/or failure. Currently controlled on home meds w/ vitals documented and reviewed.       HyperLipidemia - normally controlled on home Statin. Continued.  Follow up w/ PCP outpatient for medication initiation and/or adjustment as needed.       COPD - w/out chronic hypoxic respiratory failure on no baseline home O2.  Controlled on home medication regimen - continued.       GERD - w/out active signs and or symptoms of dysphagia and/or odynophagia. No evidence of active Peptic Ulcer Disease or history of GI Bleed. Controlled on home PPI - continued.       Discharge Planning  -PT/OT consulted   -CM following    Ongoing threat to life and/or bodily function without ongoing treatment due to: Hyponatremia    Consults:      IP CONSULT TO HOSPITALIST  IP CONSULT TO NEPHROLOGY  IP CONSULT TO CASE MANAGEMENT  IP CONSULT TO NEUROLOGY        --------------------------------------------------      Medications:        Infusion Medications    sodium chloride       Scheduled Medications    cefTRIAXone (ROCEPHIN) IV  1,000 mg IntraVENous Q24H    donepezil  5 mg Oral Nightly    [START ON 12/8/2024] pantoprazole  40 mg Oral QAM AC    spironolactone  25 mg Oral Daily    tiotropium-olodaterol  2 puff Inhalation Daily    valsartan  160 mg Oral Daily    sodium chloride flush  5-40 mL IntraVENous 2 times per day    enoxaparin  40 mg SubCUTAneous Daily     PRN Meds: hydrOXYzine HCl, fluticasone, diphenhydrAMINE-zinc acetate, sodium chloride flush, sodium chloride, potassium chloride **OR** potassium alternative

## 2024-12-08 NOTE — CONSULTS
Patient ID: Catherine Rebolledo  Referring/ Physician: Bony Ansari DO      Summary:   Catherine Rebolledo is being seen by nephrology for hyponatremia,With presenting serum sodium level of 125.  According to the information in the epic system, this patient has intermittent hyponatremia since 2019.  But sodium level 125 is the lowest sodium level ever documented in the epic system.    Reason for admission:   Referred to the ER by PCP for abnormal lab test    Plan for today:  The patient's serum sodium level 127 which is identical to the value from 1 day earlier    Serum osmolality was 267 with simultaneous serum sodium level 127  Uric acid level 3  TSH level 0.43  A.m. 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    According to the information in the epic system, she had intermittent hyponatremia since 2019 but her serum sodium level has never been this low    1 of concern with SIADH type of hyponatremia is neoplastic syndrome.    She had CT scan of her chest abdomen and pelvis 2 days earlier which showed no concerning pathology except for mild compression fracture of her T1.    She is 89 years old, therefore I do not believe any more extensive evaluation of \"malignancy is indicated.    I will start patient on fluid restriction 1500 mL and start patient on urea powder at 15 g p.o. twice daily.            Interval Hx:   The patient was seen and examined in her room with her daughter at her bedside  She was resting her bed  She states that she feels better  She is able to eat and drink this was confirmed by her daughter.  She definitely is under no acute distress  Her most recent set of vital signs showed temperature 97.7 heart rate 83 and blood pressure 118/62  She has no leg edema but her legs are tender to touch    Lab work from this

## 2024-12-08 NOTE — PROGRESS NOTES
[facility] Medicine Progress Note  V 10.25      Date of Admission: 12/6/2024    Hospital Day: 3      Chief Admission Complaint:  confusion    Subjective: Patient is in hospital bed awake and alert.  No new issues or complaints today.  Patient states he feels \"better \".  Normal neck pain.  Minimal shortness of breath.  Currently on room air.    Presenting Admission History:       89 y.o. female who presented to [facility] with confusion.  PMHx significant for COPD, CAD, HLD, HTN, GERD.       Patient states that yesterday she went to her PCPs office when she got labs drawn.  In the a.m. patient woke up and was informed that her sodium was low by her PCP.  It was at this time PCP informed her to go to the emergency department for further evaluation and treatment.  Patient endorses hallucinations, sundowning, agitation, and confusion.  Patient had 2 recent UTIs with antibiotic usage.  Patient states she is continues to have symptoms of UTI.  Patient not eating well.  Patient toileting appropriately.    Assessment/Plan:      Current Principal Problem:  Hyponatremia    HypoNatremia - etiology clinically unable to determine but likely hypovolemic.  Followed serial Sodium on/off IVF, personally reviewed and documented in this note.    -Nephrology consulted, note reviewed on 12/7/2024 with recommendations to obtain uric acid, TSH, cortisol, serum osmolality, urine osmolality and urine sodium     UTI - admission U/A demonstrating probable acute cystitis.  Infection evidenced by U/A, Culture results and signs and/or symptoms of clinical infection despite Culture results.  Started on empiric ceftriaxone on 12/7/24 pending Culture results.    -Patient was getting over UTI and informs us that she still feels like she has a UTI     Malnutrition - severe protein-calorie malnutrition.  Likely 2nd to poor intake.  Supplemental feeding/nutritional supplements as able.    -Prealbumin ordered     Dementia - w/out behavioral  disturbance.  Controlled on home medication regimen - continued.  Continue supportive care and redirection as needed.    -Continue Aricept  -Neurology consulted, note reviewed on 12/7/2024 with recommendations to give vitamin D supplement, minimize anticholinergic and sedating medication, treatment of infection, continue donepezil, EEG tomorrow     HTN w/ CAD - w/ known CAD but no evidence of active signs and/or symptoms of ischemia and/or failure. Currently controlled on home meds w/ vitals documented and reviewed.       HyperLipidemia - normally controlled on home Statin. Continued.  Follow up w/ PCP outpatient for medication initiation and/or adjustment as needed.       COPD - w/out chronic hypoxic respiratory failure on no baseline home O2.  Controlled on home medication regimen - continued.       GERD - w/out active signs and or symptoms of dysphagia and/or odynophagia. No evidence of active Peptic Ulcer Disease or history of GI Bleed. Controlled on home PPI - continued.      Allergies - zyrtec and flonase ordered      Discharge Planning  -PT/OT consulted rec [facility]  - following    Ongoing threat to life and/or bodily function without ongoing treatment due to: Hyponatremia    Consults:      IP CONSULT TO HOSPITALIST  IP CONSULT TO NEPHROLOGY  IP CONSULT TO CASE MANAGEMENT  IP CONSULT TO NEUROLOGY        --------------------------------------------------      Medications:        Infusion Medications    sodium chloride       Scheduled Medications    urea  15 g Oral BID    Vitamin D  1,000 Units Oral Daily    cetirizine  10 mg Oral Daily    cefTRIAXone (ROCEPHIN) IV  1,000 mg IntraVENous Q24H    donepezil  5 mg Oral Nightly    pantoprazole  40 mg Oral QAM AC    spironolactone  25 mg Oral Daily    tiotropium-olodaterol  2 puff Inhalation Daily    valsartan  160 mg Oral Daily    sodium chloride flush  5-40 mL IntraVENous 2 times per day    enoxaparin  40 mg SubCUTAneous Daily     PRN Meds: fluticasone, hydrOXYzine

## 2024-12-08 NOTE — PROGRESS NOTES
Neurology Progress Note    ID: Catherine Rebolledo is a 89 y.o. female    : 1934     LOS: 2 days     ASSESSMENT    1.  Altered mental status.  2.  UTI.  3.  Hyponatremia.  4.  Vitamin D deficiency.     The patient is not back to her baseline during my assessment.  But there is no focal weakness or numbness.  CT brain showed generalized brain atrophy with mild to moderate small vessel disease.  MRI brain did not  any acute CVA or extensive white matter change.  Vitamin D deficiency.  Normal ammonia level.  Normal B12 and folate level.     From neurology perspective, this is likely metabolic encephalopathy in the context of UTI and hyponatremia.  The patient needs further evaluation for dementia as an outpatient.     Plan:     1.  Vitamin D supplement.  2.  Minimize anticholinergic and sedative medication.  3.  Treatment of infection and metabolic derangements per nephrology and primary team.  4.  Continue donepezil.  5.  EEG tomorrow if the patient is not discharged.      Medications:  Scheduled Meds:    urea  15 g Oral BID    cefTRIAXone (ROCEPHIN) IV  1,000 mg IntraVENous Q24H    donepezil  5 mg Oral Nightly    pantoprazole  40 mg Oral QAM AC    spironolactone  25 mg Oral Daily    tiotropium-olodaterol  2 puff Inhalation Daily    valsartan  160 mg Oral Daily    sodium chloride flush  5-40 mL IntraVENous 2 times per day    enoxaparin  40 mg SubCUTAneous Daily     Continuous Infusions:    sodium chloride       PRN Meds: hydrOXYzine HCl, fluticasone, diphenhydrAMINE-zinc acetate, sodium chloride flush, sodium chloride, potassium chloride **OR** potassium alternative oral replacement **OR** potassium chloride, magnesium sulfate, ondansetron **OR** ondansetron, polyethylene glycol, acetaminophen **OR** acetaminophen    OBJECTIVE  Vital signs in the last 24 hours:  Vitals:    24 0950   BP: 114/69   Pulse: 83   Resp: 16   Temp: 97.5 °F (36.4 °C)   SpO2: 98%       Intake/Output last 3 shifts:  I/O last 3

## 2024-12-08 NOTE — PROGRESS NOTES
A&Ox4. Denied chest pain or heaviness. /SOB. Denied paiin or discomfort.  SR x 2.   Call light within reach.  On chair, relatives on bedside.

## 2024-12-09 ENCOUNTER — TELEPHONE (OUTPATIENT)
Dept: INTERNAL MEDICINE CLINIC | Age: 88
End: 2024-12-09

## 2024-12-09 LAB
ANION GAP SERPL CALCULATED.3IONS-SCNC: 12 MMOL/L (ref 3–16)
BASOPHILS # BLD: 0.1 K/UL (ref 0–0.2)
BASOPHILS NFR BLD: 1.1 %
BUN SERPL-MCNC: 36 MG/DL (ref 7–20)
CALCIUM SERPL-MCNC: 10.4 MG/DL (ref 8.3–10.6)
CHLORIDE SERPL-SCNC: 97 MMOL/L (ref 99–110)
CO2 SERPL-SCNC: 25 MMOL/L (ref 21–32)
CREAT SERPL-MCNC: 0.8 MG/DL (ref 0.6–1.2)
DEPRECATED RDW RBC AUTO: 15.4 % (ref 12.4–15.4)
EOSINOPHIL # BLD: 0.8 K/UL (ref 0–0.6)
EOSINOPHIL NFR BLD: 9.6 %
GFR SERPLBLD CREATININE-BSD FMLA CKD-EPI: 70 ML/MIN/{1.73_M2}
GLUCOSE SERPL-MCNC: 108 MG/DL (ref 70–99)
HCT VFR BLD AUTO: 37.5 % (ref 36–48)
HGB BLD-MCNC: 12.4 G/DL (ref 12–16)
LYMPHOCYTES # BLD: 1.3 K/UL (ref 1–5.1)
LYMPHOCYTES NFR BLD: 16.4 %
MCH RBC QN AUTO: 27 PG (ref 26–34)
MCHC RBC AUTO-ENTMCNC: 33.1 G/DL (ref 31–36)
MCV RBC AUTO: 81.5 FL (ref 80–100)
MONOCYTES # BLD: 0.6 K/UL (ref 0–1.3)
MONOCYTES NFR BLD: 6.8 %
NEUTROPHILS # BLD: 5.3 K/UL (ref 1.7–7.7)
NEUTROPHILS NFR BLD: 66.1 %
PLATELET # BLD AUTO: 267 K/UL (ref 135–450)
PMV BLD AUTO: 6.8 FL (ref 5–10.5)
POTASSIUM SERPL-SCNC: 3.7 MMOL/L (ref 3.5–5.1)
RBC # BLD AUTO: 4.6 M/UL (ref 4–5.2)
SODIUM SERPL-SCNC: 134 MMOL/L (ref 136–145)
WBC # BLD AUTO: 8 K/UL (ref 4–11)

## 2024-12-09 PROCEDURE — 6370000000 HC RX 637 (ALT 250 FOR IP): Performed by: INTERNAL MEDICINE

## 2024-12-09 PROCEDURE — 99233 SBSQ HOSP IP/OBS HIGH 50: CPT | Performed by: PSYCHIATRY & NEUROLOGY

## 2024-12-09 PROCEDURE — 6370000000 HC RX 637 (ALT 250 FOR IP): Performed by: STUDENT IN AN ORGANIZED HEALTH CARE EDUCATION/TRAINING PROGRAM

## 2024-12-09 PROCEDURE — 6360000002 HC RX W HCPCS: Performed by: STUDENT IN AN ORGANIZED HEALTH CARE EDUCATION/TRAINING PROGRAM

## 2024-12-09 PROCEDURE — 95816 EEG AWAKE AND DROWSY: CPT | Performed by: PSYCHIATRY & NEUROLOGY

## 2024-12-09 PROCEDURE — APPSS30 APP SPLIT SHARED TIME 16-30 MINUTES

## 2024-12-09 PROCEDURE — 1200000000 HC SEMI PRIVATE

## 2024-12-09 PROCEDURE — 95819 EEG AWAKE AND ASLEEP: CPT

## 2024-12-09 PROCEDURE — 97530 THERAPEUTIC ACTIVITIES: CPT

## 2024-12-09 PROCEDURE — 80048 BASIC METABOLIC PNL TOTAL CA: CPT

## 2024-12-09 PROCEDURE — 6370000000 HC RX 637 (ALT 250 FOR IP): Performed by: PSYCHIATRY & NEUROLOGY

## 2024-12-09 PROCEDURE — 97110 THERAPEUTIC EXERCISES: CPT

## 2024-12-09 PROCEDURE — 36415 COLL VENOUS BLD VENIPUNCTURE: CPT

## 2024-12-09 PROCEDURE — 85025 COMPLETE CBC W/AUTO DIFF WBC: CPT

## 2024-12-09 PROCEDURE — 94640 AIRWAY INHALATION TREATMENT: CPT

## 2024-12-09 PROCEDURE — 97116 GAIT TRAINING THERAPY: CPT

## 2024-12-09 PROCEDURE — 2580000003 HC RX 258: Performed by: STUDENT IN AN ORGANIZED HEALTH CARE EDUCATION/TRAINING PROGRAM

## 2024-12-09 RX ADMIN — ACETAMINOPHEN 650 MG: 325 TABLET ORAL at 09:34

## 2024-12-09 RX ADMIN — CEFTRIAXONE SODIUM 1000 MG: 1 INJECTION, POWDER, FOR SOLUTION INTRAMUSCULAR; INTRAVENOUS at 05:19

## 2024-12-09 RX ADMIN — ENOXAPARIN SODIUM 40 MG: 100 INJECTION SUBCUTANEOUS at 09:36

## 2024-12-09 RX ADMIN — SODIUM CHLORIDE, PRESERVATIVE FREE 10 ML: 5 INJECTION INTRAVENOUS at 19:41

## 2024-12-09 RX ADMIN — Medication 1000 UNITS: at 09:35

## 2024-12-09 RX ADMIN — CETIRIZINE HYDROCHLORIDE 10 MG: 10 TABLET, FILM COATED ORAL at 09:35

## 2024-12-09 RX ADMIN — SODIUM CHLORIDE, PRESERVATIVE FREE 10 ML: 5 INJECTION INTRAVENOUS at 09:36

## 2024-12-09 RX ADMIN — SPIRONOLACTONE 25 MG: 25 TABLET ORAL at 09:35

## 2024-12-09 RX ADMIN — Medication: at 09:36

## 2024-12-09 RX ADMIN — PANTOPRAZOLE SODIUM 40 MG: 40 TABLET, DELAYED RELEASE ORAL at 09:35

## 2024-12-09 RX ADMIN — DONEPEZIL HYDROCHLORIDE 5 MG: 5 TABLET, FILM COATED ORAL at 19:42

## 2024-12-09 RX ADMIN — Medication 15 G: at 09:35

## 2024-12-09 RX ADMIN — TIOTROPIUM BROMIDE AND OLODATEROL 2 PUFF: 3.124; 2.736 SPRAY, METERED RESPIRATORY (INHALATION) at 08:32

## 2024-12-09 ASSESSMENT — PAIN DESCRIPTION - LOCATION
LOCATION: NECK
LOCATION: BACK;NECK

## 2024-12-09 ASSESSMENT — PAIN SCALES - GENERAL
PAINLEVEL_OUTOF10: 5
PAINLEVEL_OUTOF10: 5

## 2024-12-09 NOTE — CARE COORDINATION
Hospital day 3: Patient on C3 re hyponatremia care managed by IM and Neurology. Patient from home N[facility] with family supports. Agreeable to HHC thru [facility] as used in the past. Pending Na, EEG.KULWANT Antony

## 2024-12-09 NOTE — PROCEDURES
INTERPRETATION:  This 25-minute, computer-assisted video EEG recording is mildly abnormal.  It showed mild degree of generalized slowing background activity.  No potentially epileptiform activity was present during the recording.      The EEG findings were consistent with mild degree of generalized non-specific cerebral dysfunction.    CLASSIFICATION:  Dysrhythmia grade 1, generalized.  Sleep - unsuccessful.  EKG channel.    DESCRIPTION:    BACKGROUND:  The awake recording revealed 9 Hz alpha activity over the posterior head region.  There were increased 4 to 7 Hz theta activity into the EEG background.  Given the extensive study, the patient still did not sleep.  The EEG showed normal V waves during drowsiness.  There was no significant change on the EEG background with photic stimulation.  Hyperventilation was omitted due to old age.      INTERICTAL DISCHARGES: None    CLINICAL EVENTS:  None    The EKG channel was unremarkable.

## 2024-12-09 NOTE — TELEPHONE ENCOUNTER
[facility] with Pt nabeel Regan. Dr. Rosa wants to know if they will be coming to the appointment later today. Dr. Rosa stated at the last appointment that they were going to be moving and switching providers. I just wanted to call and confirm that. I also seen that Patient is in the Hospital currently.

## 2024-12-09 NOTE — PROGRESS NOTES
Patient ID: Catherine Rebolledo  Referring/ Physician: Lisandro Larsen MD      Summary:   Catherine Rebolledo is being seen by nephrology for hyponatremia,With presenting serum sodium level of 125.  According to the information in the epic system, this patient has intermittent hyponatremia since 2019.  But sodium level 125 is the lowest sodium level ever documented in the epic system.    Reason for admission:   Referred to the ER by PCP for abnormal lab test    Interval Hx:   Seen at bedside  /65  Na improved to 134 today  On ure-na 15 g BID    Plan:  Will stop ure-na today after the AM dose  Encourage a high protein diet  Reduce lab frequency to daily.    Assessment:   Hyponatremia:  Longstanding, since at least 2019  Though the patient's serum sodium level this time is much lower than her previous sodium level  Based on medication history review, she had at least 2 potential offending agents  I will request the following lab tests and try to clarify the nature of her hyponatremia, including:    Uric acid 3.0  TSH 0.43  AM cortisol 12.2  Urine sodium 50, urine osm 291, serum osm 267  - likely SIADH    The patient's presenting serum sodium level 125 with potassium level 4.1, therefore she is not at high risk for CNS demyelination injury even if her serum sodium goes up    There is no need for to frequent serum sodium level monitoring, twice daily should be enough for the next 2 days    Reviewed her medication list now and I did not see any concerning medications    Weight loss:  The patient reports about 5 pound weight loss, but patient cannot tell me within how long time.  She also had a cough with some whitish sputum which has been going for a year    She had a CT scan of her chest, abdomen and pelvis during this hospitalization which showed: No evidence of acute traumatic injury to the chest, abdomen/pelvis.  Mild compression deformity of the superior endplate of T11,

## 2024-12-09 NOTE — PROGRESS NOTES
Catherine Rebolledo  Neurology Follow-up  [facility] Neurology    Date of Service: 12/9/2024    Subjective:   CC: Follow up today regarding: Altered mental status       Events noted. Chart and lab reviewed. Pt's mental status has improved and family feels she is back to baseline at this time. EEG with mild gen slowing.      ROS : A 10-12 system review obtained and updated today and is unremarkable except as mentioned  in my interval history.     Medication, past medical history, social history, and family history reviewed.    Objective:  Exam:   Constitutional:   Vitals:    12/09/24 0800 12/09/24 0833 12/09/24 0936 12/09/24 1215   BP: 111/69  (!) 88/57 117/65   Pulse: 86  88 89   Resp: 18   16   Temp: 98.2 °F (36.8 °C)   98.5 °F (36.9 °C)   TempSrc: Oral   Oral   SpO2: 97% 98%  98%   Weight:       Height:           General appearance:  Normal development and appear in no acute distress.   Mental Status:   Oriented to person, place, problem, and time.    Memory: Intact recent memory  Normal attention span and concentration.  Language: intact naming, repeating and fluency   Poor fund of Knowledge.   Cranial Nerves:   II:   Pupils: R pupil 3mm and reactive. L pupil nonreactive at baseline.   III,IV,VI: Extra Ocular Movements are intact. No nystagmus  V: Facial sensation is intact  VII: Facial strength and movements: intact and symmetric  XII: Tongue movements are normal  Musculoskeletal: 5/5 in all 4 extremities.   Reflexes: Not tested  Coordination: no pronator drift, no dysmetria with FNF.   Sensation: normal to light touch in both arms and legs.        Data:  LABS:   Lab Results   Component Value Date/Time     12/09/2024 09:41 AM    K 3.7 12/09/2024 09:41 AM    CL 97 12/09/2024 09:41 AM    CO2 25 12/09/2024 09:41 AM    BUN 36 12/09/2024 09:41 AM    CREATININE 0.8 12/09/2024 09:41 AM    GFRAA >60 08/08/2022 03:18 PM    GFRAA >60 01/11/2013 10:05 AM    LABGLOM 70 12/09/2024 09:41 AM    LABGLOM >60 03/25/2024 07:10 AM     GLUCOSE 108 12/09/2024 09:41 AM    PHOS 2.8 09/20/2013 11:07 AM    MG 1.90 12/06/2024 09:15 AM    CALCIUM 10.4 12/09/2024 09:41 AM     Lab Results   Component Value Date/Time    WBC 8.0 12/09/2024 09:41 AM    RBC 4.60 12/09/2024 09:41 AM    RBC 4.81 08/18/2017 01:07 PM    HGB 12.4 12/09/2024 09:41 AM    HCT 37.5 12/09/2024 09:41 AM    MCV 81.5 12/09/2024 09:41 AM    RDW 15.4 12/09/2024 09:41 AM     12/09/2024 09:41 AM     Lab Results   Component Value Date    INR 0.98 06/18/2019    PROTIME 11.2 06/18/2019             Medical decision making:      A. Problems (any 1)    High:    [x] Acute/Chronic Illness/injury posing threat to life or bodily function: acute encephalopathy  [] Severe exacerbation of chronic illness:      Moderate:    []     1 or more chronic illness with exacerbation, progression or side effect of treatment or  []     2 or more stable chronic illnesses or  []     1 acute illness with systemic symptoms     ---------------------------------------------------------------------  B. Risk of Treatment (any 1)   [] Drugs/treatments that require intensive monitoring for toxicity include:    [] Change in code status:    [] Decision to escalate care:    [] Major surgery/procedure with associated risk factors:    [x] Prescription drug management  ----------------------------------------------------------------------  [] High (any 2)  [x] Moderate (any 1)    C. Data (any 2 for high and any 1 for moderate)  [] Discussed management of the case with:   [x] Imaging personally reviewed and interpreted:    [x] Data Review (any 3)  [x] Collateral history obtained from: son, daughter in law at bedside  [x] All available Consultant notes from yesterday/today were reviewed  [x] All current labs were reviewed and interpreted for clinical significance   [x] Appropriate follow-up labs were ordered      Neuroimaging was independently reviewed by me and discussed results with the patient  I reviewed blood testing and

## 2024-12-09 NOTE — PROGRESS NOTES
Pt accidentally dropped the aricept pill on the floor. Dropped pill was wasted with RONALD Coffey. Issued another one from the [facility].

## 2024-12-09 NOTE — PROGRESS NOTES
[facility] Medicine Progress Note  V 10.25      Date of Admission: 12/6/2024    Hospital Day: 4      Chief Admission Complaint:  confusion    Subjective:  family is pleased.  She is back to baseline.  No acute complaints from patient.    Presenting Admission History:       89 y.o. female who presented to [facility] with confusion.  PMHx significant for COPD, CAD, HLD, HTN, GERD.       Patient states that yesterday she went to her PCPs office when she got labs drawn.  In the a.m. patient woke up and was informed that her sodium was low by her PCP.  It was at this time PCP informed her to go to the emergency department for further evaluation and treatment.  Patient endorses hallucinations, sundowning, agitation, and confusion.  Patient had 2 recent UTIs with antibiotic usage.  Patient states she is continues to have symptoms of UTI.  Patient not eating well.  Patient toileting appropriately.    Assessment/Plan:      Current Principal Problem:  Hyponatremia    HypoNatremia, likely due to hypovolemia and poor PO intake.  Improved here.      UTI clinically ruled out.  Reviewed UA, bloodwork labs, and vital signs with family.  I am aware of the enterococcal urine culture from two weeks ago.       Malnutrition - severe protein-calorie malnutrition.  Likely 2nd to poor intake.  Supplemental feeding/nutritional supplements as able.      Dementia - w/out behavioral disturbance.  Controlled on home medication regimen - continued.  Continue supportive care and redirection as needed.    -Continue Aricept     HTN w/ CAD - w/ known CAD but no evidence of active signs and/or symptoms of ischemia and/or failure. Currently controlled on home meds w/ vitals documented and reviewed.       HyperLipidemia - normally controlled on home Statin. Continued.  Follow up w/ PCP outpatient for medication initiation and/or adjustment as needed.       COPD - w/out chronic hypoxic respiratory failure on no baseline home O2.  Controlled on home  medication regimen - continued.       GERD - w/out active signs and or symptoms of dysphagia and/or odynophagia. No evidence of active Peptic Ulcer Disease or history of GI Bleed. Controlled on home PPI - continued.      Allergies - zyrtec and flonase ordered      Discharge Planning - home 12/10 if Na OK        Consults:      IP CONSULT TO HOSPITALIST  IP CONSULT TO NEPHROLOGY  IP CONSULT TO CASE MANAGEMENT  IP CONSULT TO NEUROLOGY        --------------------------------------------------      Medications:        Infusion Medications    sodium chloride       Scheduled Medications    Vitamin D  1,000 Units Oral Daily    cetirizine  10 mg Oral Daily    donepezil  5 mg Oral Nightly    pantoprazole  40 mg Oral QAM AC    spironolactone  25 mg Oral Daily    tiotropium-olodaterol  2 puff Inhalation Daily    valsartan  160 mg Oral Daily    sodium chloride flush  5-40 mL IntraVENous 2 times per day    enoxaparin  40 mg SubCUTAneous Daily     PRN Meds: fluticasone, hydrOXYzine HCl, fluticasone, diphenhydrAMINE-zinc acetate, sodium chloride flush, sodium chloride, potassium chloride **OR** potassium alternative oral replacement **OR** potassium chloride, magnesium sulfate, ondansetron **OR** ondansetron, polyethylene glycol, acetaminophen **OR** acetaminophen      Physical Exam Performed:      General appearance:  No apparent distress  Respiratory:  Normal respiratory effort.   Cardiovascular:  Regular rate and rhythm.  Abdomen:  Soft, non-tender, non-distended.  Musculoskeletal:  No edema  Neurologic:  Non-focal  Psychiatric:  Alert and awake    /65   Pulse 89   Temp 98.5 °F (36.9 °C) (Oral)   Resp 16   Ht 1.6 m (5' 3\")   Wt 59.4 kg (131 lb)   SpO2 98%   BMI 23.21 kg/m²     Telemetry:      Personally reviewed and interpreted telemetry (Rhythm Strip) on 12/9/2024 with the following findings: NSR with a rate of 91    Diet: ADULT DIET; Easy to Chew  ADULT ORAL NUTRITION SUPPLEMENT; Breakfast, Dinner, Lunch; Standard

## 2024-12-09 NOTE — PROGRESS NOTES
Physical Therapy  Facility/Department: [facility] C3 TELE/MED SURG/ONC  Daily Treatment Note  NAME: Catherine Rebolledo  : 1934  MRN: [id number]    Date of Service: 2024    Discharge Recommendations:  24 hour supervision or assist, Home with Home health PT   PT Equipment Recommendations  Equipment Needed: No  Other: pt owns cane and rollator if needed    Patient Diagnosis(es): The primary encounter diagnosis was Hyponatremia. Diagnoses of Hallucinations, visual, Difficulty in walking, and Multiple falls were also pertinent to this visit.    Assessment  Assessment: Pt tolerated therpay well needing grossly CGA for gait and transfers with RW.  Perfomred BLE exs well X 15 reps with cues.  Pt is recommended for con't skilled PT and home with 24 hr supervison at D/C and PT.  Activity Tolerance: Patient tolerated evaluation without incident;Patient limited by fatigue  Equipment Needed: No  Other: pt owns cane and rollator if needed    Plan  Physical Therapy Plan  General Plan: 3-5 times per week  Current Treatment Recommendations: Strengthening;Functional mobility training;Balance training;Transfer training;Stair training;Gait training;Neuromuscular re-education;Home exercise program;Safety education & training;Therapeutic activities    Restrictions  Restrictions/Precautions  Restrictions/Precautions: Fall Risk, General Precautions  Activity Level: Up with Assist  Position Activity Restriction  Other Position/Activity Restrictions: IV, tele     Subjective   Cognition  Overall Cognitive Status: Exceptions  Arousal/Alertness: Appears intact  Following Commands: Appears intact  Attention Span: Appears intact ([place])  Initiation: Does not require cues  Sequencing: Requires cues for some    Objective  Vitals  Vitals:    24 0936   BP: (!) 88/57 then 101/65   Pulse: 88   Resp:    Temp:    SpO2: 98%          Bed Mobility Training  Bed Mobility Training: No  Transfer Training  Transfer Training: Yes  Sit to Stand:  bedrails?: None  How much help is needed moving to and from a bed to a chair?: None  How much help is needed standing up from a chair using your arms?: None  How much help is needed walking in hospital room?: None  How much help is needed climbing 3-5 steps with a railing?: None  AM-PAC Inpatient Mobility Raw Score : 24  AM-PAC Inpatient T-Scale Score : 61.14  Mobility Inpatient CMS 0-100% Score: 0  Mobility Inpatient CMS G-Code Modifier : CH         Therapy Time   Individual Concurrent Group Co-treatment   Time In 0929         Time Out 1010         Minutes 41                 Edelmira Segura, PTA#[id number]

## 2024-12-09 NOTE — PLAN OF CARE
Problem: Chronic Conditions and Co-morbidities  Goal: Patient's chronic conditions and co-morbidity symptoms are monitored and maintained or improved  12/9/2024 0948 by Hellen Hunt RN  Outcome: Progressing  12/9/2024 0252 by Erlinda Braun RN  Outcome: Progressing     Problem: Skin/Tissue Integrity  Goal: Absence of new skin breakdown  Description: 1.  Monitor for areas of redness and/or skin breakdown  2.  Assess vascular access sites hourly  3.  Every 4-6 hours minimum:  Change oxygen saturation probe site  4.  Every 4-6 hours:  If on nasal continuous positive airway pressure, respiratory therapy assess nares and determine need for appliance change or resting period.  12/9/2024 0948 by Hellen Hunt RN  Outcome: Progressing  12/9/2024 0252 by Erlinda Braun RN  Outcome: Progressing     Problem: Safety - Adult  Goal: Free from fall injury  12/9/2024 0948 by Hellen Hunt RN  Outcome: Progressing  12/9/2024 0252 by Erlinda Braun RN  Outcome: Progressing

## 2024-12-09 NOTE — DISCHARGE INSTR - COC
Continuity of Care Form    Patient Name: Catherine Rebolledo   :  1934  MRN:  [id number]    Admit date:  2024  Discharge date:  12/10/24    Code Status Order: Full Code   Advance Directives:   Advance Care Flowsheet Documentation             Admitting Physician:  Bony Ansari DO  PCP: OLGA Rosa MD    Discharging Nurse: Jessica Parker RN  Discharging Hospital Unit/Room#: 0329/0329-01  Discharging Unit Phone Number:   [phone]    Emergency Contact:   Extended Emergency Contact Information  Primary Emergency Contact: Shereen Tomas   [facility]  Home Phone: [phone]  Mobile Phone: [phone]  Relation: Grandchild  Secondary Emergency Contact: Vinayak Rebolledo  Home Phone: [phone]  Mobile Phone: [phone]  Relation: Child    Past Surgical History:  Past Surgical History:   Procedure Laterality Date    BLADDER REPAIR N/A     BREAST SURGERY      L breast tumor-benign    COLONOSCOPY N/A 2024    COLONOSCOPY POLYPECTOMY SNARE BIOPSY performed by Josue Sorenson MD at [facility] ENDOSCOPY    CYST REMOVAL Right 2013    GANGLION CYST EXCISION RIGHT WRIST, TRIGGER FINGER RELEASE RIGHT FOURTH    EYE SURGERY      , cataract right eye    FRACTURE SURGERY      R shoulderx2-pinned    HYSTERECTOMY (CERVIX STATUS UNKNOWN)      JOINT REPLACEMENT Right 2012    Total Elbow - Dr. Ramirez    OTHER SURGICAL HISTORY  2024    CapsoCam plus endoscopy    RECTOCELE REPAIR      SHOULDER SURGERY Right     TONSILLECTOMY      TUMOR REMOVAL      carcinoid    UPPER GASTROINTESTINAL ENDOSCOPY  2012    with bx    UPPER GASTROINTESTINAL ENDOSCOPY  2013    EUS    UPPER GASTROINTESTINAL ENDOSCOPY  2014    UPPER GASTROINTESTINAL ENDOSCOPY  2016    push enteroscopy with ablation    UPPER GASTROINTESTINAL ENDOSCOPY  2017    UPPER GASTROINTESTINAL ENDOSCOPY N/A 2021    EGD ULTRASOUND OF STOMACH performed by Kian Brar MD at [facility] ENDOSCOPY     23.21 kg/m²     Last documented pain score (0-10 scale): Pain Level: 5  Last Weight:   Wt Readings from Last 1 Encounters:   12/06/24 59.4 kg (131 lb)     Mental Status:  A/O to person, time, place. Disoriented to situation    IV Access:  - None    Nursing Mobility/ADLs:  Walking   Assisted  Transfer  Assisted  Bathing  Assisted  Dressing  Assisted  Toileting  Assisted  Feeding  Independent  Med Admin  Assisted  Med Delivery   whole    Wound Care Documentation and Therapy:        Elimination:  Continence:   Bowel: Yes  Bladder: Yes  Urinary Catheter: None   Colostomy/Ileostomy/Ileal Conduit: No       Date of Last BM: 12/09/24    Intake/Output Summary (Last 24 hours) at 12/9/2024 1458  Last data filed at 12/9/2024 0936  Gross per 24 hour   Intake 490 ml   Output --   Net 490 ml     I/O last 3 completed shifts:  In: 610 [P.O.:600; I.V.:10]  Out: -     Safety Concerns:     At Risk for Falls    Impairments/Disabilities:      Vision and Hearing    Nutrition Therapy:  Current Nutrition Therapy:   - Oral Diet:  Easy to Chew. High Calorie/High protein oral supplement with Breakfast, lunch, and dinner.     Routes of Feeding: Oral  Liquids: Thin Liquids  Daily Fluid Restriction: yes - amount 1500ml  Last Modified Barium Swallow with Video (Video Swallowing Test): not done    Treatments at the Time of Hospital Discharge:   Respiratory Treatments:     Oxygen Therapy:  is not on home oxygen therapy.  Ventilator:    - No ventilator support    Rehab Therapies: Physical Therapy and Occupational Therapy  Weight Bearing Status/Restrictions: No weight bearing restrictions  Other Medical Equipment (for information only, NOT a DME order):  walker  Other Treatments:       Patient's personal belongings (please select all that are sent with patient):  Glasses, Dentures upper and lower, cell phone, , clothing. Family present and states all belongings are accounted for.     RN SIGNATURE:  Electronically signed by Jessica Parker RN on

## 2024-12-09 NOTE — PROGRESS NOTES
Pt assessment completed and charted. VSS. Pt a/o. Bed in lowest position and wheels locked. Call light within reach. Bedside table within reach. Non-skid socks in place. Pt denies any other needs at this time.  Pt calls out appropriately.

## 2024-12-10 VITALS
HEART RATE: 80 BPM | OXYGEN SATURATION: 99 % | SYSTOLIC BLOOD PRESSURE: 110 MMHG | DIASTOLIC BLOOD PRESSURE: 68 MMHG | TEMPERATURE: 97.3 F | RESPIRATION RATE: 16 BRPM | BODY MASS INDEX: 23.21 KG/M2 | HEIGHT: 63 IN | WEIGHT: 131 LBS

## 2024-12-10 LAB
ALBUMIN SERPL-MCNC: 3.5 G/DL (ref 3.4–5)
ANION GAP SERPL CALCULATED.3IONS-SCNC: 11 MMOL/L (ref 3–16)
BUN SERPL-MCNC: 32 MG/DL (ref 7–20)
CALCIUM SERPL-MCNC: 9.9 MG/DL (ref 8.3–10.6)
CHLORIDE SERPL-SCNC: 98 MMOL/L (ref 99–110)
CO2 SERPL-SCNC: 22 MMOL/L (ref 21–32)
CREAT SERPL-MCNC: 0.9 MG/DL (ref 0.6–1.2)
GFR SERPLBLD CREATININE-BSD FMLA CKD-EPI: 60 ML/MIN/{1.73_M2}
GLUCOSE SERPL-MCNC: 101 MG/DL (ref 70–99)
PHOSPHATE SERPL-MCNC: 2.5 MG/DL (ref 2.5–4.9)
POTASSIUM SERPL-SCNC: 3.8 MMOL/L (ref 3.5–5.1)
SODIUM SERPL-SCNC: 131 MMOL/L (ref 136–145)

## 2024-12-10 PROCEDURE — 6360000002 HC RX W HCPCS: Performed by: STUDENT IN AN ORGANIZED HEALTH CARE EDUCATION/TRAINING PROGRAM

## 2024-12-10 PROCEDURE — 6370000000 HC RX 637 (ALT 250 FOR IP): Performed by: PSYCHIATRY & NEUROLOGY

## 2024-12-10 PROCEDURE — 97110 THERAPEUTIC EXERCISES: CPT

## 2024-12-10 PROCEDURE — 2580000003 HC RX 258: Performed by: STUDENT IN AN ORGANIZED HEALTH CARE EDUCATION/TRAINING PROGRAM

## 2024-12-10 PROCEDURE — 97116 GAIT TRAINING THERAPY: CPT

## 2024-12-10 PROCEDURE — 94640 AIRWAY INHALATION TREATMENT: CPT

## 2024-12-10 PROCEDURE — 6370000000 HC RX 637 (ALT 250 FOR IP): Performed by: STUDENT IN AN ORGANIZED HEALTH CARE EDUCATION/TRAINING PROGRAM

## 2024-12-10 PROCEDURE — 36415 COLL VENOUS BLD VENIPUNCTURE: CPT

## 2024-12-10 PROCEDURE — 80069 RENAL FUNCTION PANEL: CPT

## 2024-12-10 PROCEDURE — 97530 THERAPEUTIC ACTIVITIES: CPT

## 2024-12-10 RX ADMIN — VALSARTAN 160 MG: 80 TABLET, FILM COATED ORAL at 10:09

## 2024-12-10 RX ADMIN — CETIRIZINE HYDROCHLORIDE 10 MG: 10 TABLET, FILM COATED ORAL at 10:09

## 2024-12-10 RX ADMIN — PANTOPRAZOLE SODIUM 40 MG: 40 TABLET, DELAYED RELEASE ORAL at 06:44

## 2024-12-10 RX ADMIN — SPIRONOLACTONE 25 MG: 25 TABLET ORAL at 10:09

## 2024-12-10 RX ADMIN — ENOXAPARIN SODIUM 40 MG: 100 INJECTION SUBCUTANEOUS at 10:09

## 2024-12-10 RX ADMIN — TIOTROPIUM BROMIDE AND OLODATEROL 2 PUFF: 3.124; 2.736 SPRAY, METERED RESPIRATORY (INHALATION) at 07:47

## 2024-12-10 RX ADMIN — Medication 15 G: at 12:08

## 2024-12-10 RX ADMIN — Medication 1000 UNITS: at 10:09

## 2024-12-10 RX ADMIN — SODIUM CHLORIDE, PRESERVATIVE FREE 10 ML: 5 INJECTION INTRAVENOUS at 10:11

## 2024-12-10 NOTE — PROGRESS NOTES
New order noted for discharge, pending nephrology input. Perfect Serve sent to Dr. Briones asking for input.

## 2024-12-10 NOTE — DISCHARGE SUMMARY
Discharge Summary    Name:  Catherine Rebolledo /Age/Sex: 1934 (89 y.o. female)   Admit Date: 2024  Discharge Date: 12/10/24   MRN & CSN:  [id number] & [id number] Encounter Date and Time 12/10/24 11:41 AM EST    Attending:  Oswaldo Otoole MD Discharging Provider: OSWALDO OTOOLE MD       Hospital Course:       \"89 y.o. female who presented to [facility] with confusion.  PMHx significant for COPD, CAD, HLD, HTN, GERD.  Patient states that yesterday she went to her PCPs office when she got labs drawn.  In the a.m. patient woke up and was informed that her sodium was low by her PCP.  It was at this time PCP informed her to go to the emergency department for further evaluation and treatment.  Patient endorses hallucinations, sundowning, agitation, and confusion.  Patient had 2 recent UTIs with antibiotic usage.  Patient states she is continues to have symptoms of UTI.  Patient not eating well.  Patient toileting appropriately.\"      HypoNatremia, likely due to hypovolemia and poor PO intake.  Improved here.  Encourage better PO intake.  Urea daily for one week per nephrology, then repeat labs as outpatient and reassess.  Stopped spironolactone.      UTI clinically ruled out, stopped abx.  Reviewed UA, bloodwork labs, and vital signs with family.  I am aware of the enterococcal urine culture from two weeks ago.       Malnutrition - severe protein-calorie malnutrition.  Likely 2nd to poor intake.  Supplemental feeding/nutritional supplements as able.      Dementia - w/out behavioral disturbance.  Controlled on home medication regimen - continued.  Continue supportive care and redirection as needed.    -Continue Aricept.  She should stop taking hydroxyzine.      HTN w/ CAD - w/ known CAD but no evidence of active signs and/or symptoms of ischemia and/or failure. Currently controlled on home meds w/ vitals documented and reviewed.       HyperLipidemia - normally controlled on home Statin. Continued.

## 2024-12-10 NOTE — PROGRESS NOTES
Perfect Serve sent to Dr. Larsen:    \"fyi, therapy was walking her in the mckeon and she started having heart palpitations. Denies any dizziness, lightheadedness, CP, or nausea. Skin W/D. I did an apical pulse and it was regular rhythm at 80 bpm. She is no longer on tele. Did you want to order? Thanks\"

## 2024-12-10 NOTE — PROGRESS NOTES
Physical Therapy  Facility/Department: [facility] C3 TELE/MED SURG/ONC  Daily Treatment Note  NAME: Catherine Rebolledo  : 1934  MRN: [id number]    Date of Service: 12/10/2024    Discharge Recommendations:  24 hour supervision or assist, Home with Home health PT   PT Equipment Recommendations  Equipment Needed: No  Other: pt owns cane and rollator if needed    Patient Diagnosis(es): The primary encounter diagnosis was Hyponatremia. Diagnoses of Hallucinations, visual, Difficulty in walking, and Multiple falls were also pertinent to this visit.    Assessment  Assessment: Pt tolerated therpay well needing grossly CGA for gait and transfers with RW.  Pt limted by heart palpitations causing her to need to sit during our walk.  Pt's RN and nursing instructor attended to pt asking appropiate questions, taking pulse and listening to her heart.  Pt walked back to room and left to rest in chair, LE exs deferred.  Pt is recommended for con't skilled PT and home with 24 hr supervison at D/C and HHPT.  Activity Tolerance: Patient tolerated evaluation without incident;Patient limited by fatigue  Equipment Needed: No  Other: pt owns cane and rollator if needed    Plan  Physical Therapy Plan  General Plan: 3-5 times per week  Current Treatment Recommendations: Strengthening;Functional mobility training;Balance training;Transfer training;Stair training;Gait training;Neuromuscular re-education;Home exercise program;Safety education & training;Therapeutic activities    Restrictions  Restrictions/Precautions  Restrictions/Precautions: Fall Risk, General Precautions  Activity Level: Up with Assist  Position Activity Restriction  Other Position/Activity Restrictions: IV, tele     Subjective   Orientation  Overall Orientation Status: Within Functional Limits  Cognition  Overall Cognitive Status: Exceptions  Arousal/Alertness: Appears intact  Following Commands: Appears intact  Attention Span: Appears intact  Memory: Impaired  Safety  Judgement: Appears intact  Problem Solving: Assistance required to identify errors made;Assistance required to implement solutions  Insights: Decreased awareness of deficits  Initiation: Requires cues for some  Sequencing: Requires cues for some    Objective  Vitals  Pulse: 77  SpO2: (!) 2 %  Bed Mobility Training  Bed Mobility Training: Yes  Supine to Sit: Stand-by assistance;Additional time;Adaptive equipment (HOB elevated)  Scooting: Stand-by assistance  Balance  Sitting: Intact  Standing: Impaired  Standing - Static: Constant support;Good (RW)  Standing - Dynamic: Constant support;Fair (RW)  Transfer Training  Transfer Training: Yes  Sit to Stand: Contact-guard assistance;Stand-by assistance;Additional time;Adaptive equipment (RW)  Stand to Sit: Contact-guard assistance;Additional time;Adaptive equipment (RW)  Toilet Transfer: Contact-guard assistance;Stand-by assistance;Additional time;Adaptive equipment (grab bars and RW)  Gait  Gait Training: Yes  Overall Level of Assistance: Contact-guard assistance  Distance (ft): 50 Feet (X 2 with seated rest)  Assistive Device: Gait belt  Interventions: Safety awareness training  Speed/Deborah: Slow  Step Length: Right shortened;Left shortened  Gait Abnormalities: Decreased step clearance     PT Exercises  Exercise Treatment: defer after heart palpitations, RN to contact MD     Safety Devices  Type of Devices: Call light within reach;Chair alarm in place;Left in chair;Nurse notified;All fall risk precautions in place      Goals  Short Term Goals  Time Frame for Short Term Goals: 12/14 (7 days) unless otherwise stated; 12/10 con't goals  Short Term Goal 1: Pt will perform supine <> sit with independence  Short Term Goal 2: Pt will perform all transfers with LRAD and mod I  Short Term Goal 3: Pt will ambulate 250 ft with LRAD and mod I  Short Term Goal 4: Pt will perform 10 reps of BLE exercises by 12/16  Short Term Goal 5: Pt will complete formal balance assessment (5xSTS

## 2024-12-10 NOTE — CARE COORDINATION
CASE MANAGEMENT DISCHARGE SUMMARY      Discharge to: Back to [facility] with family supports and [facility] Nursing, PT, OT    IMM given: (date) 12/09/2024    New Durable Medical Equipment ordered/agency: has needed DME    Transportation: via private car    Confirmed discharge plan with: Patient and son Vinayak Bedside     Facility/Agency, name:  [facility] KIERRA/AVS Kaleigh holm RN, name: RONALD Lopez    Note: Discharging nurse to complete KIERRA, reconcile AVS, and place final copy with patient's discharge packet. .KULWANT Antony

## 2024-12-10 NOTE — PROGRESS NOTES
Patient ID: Catherine Rebolledo  Referring/ Physician: Lisandro Larsen MD      Summary:   Catherine Rebolledo is being seen by nephrology for hyponatremia,With presenting serum sodium level of 125.  According to the information in the epic system, this patient has intermittent hyponatremia since 2019.  But sodium level 125 is the lowest sodium level ever documented in the epic system.    Reason for admission:   Referred to the ER by PCP for abnormal lab test    Interval Hx:   Seen at bedside with his son  Comfortable and denied any symptoms.   Pt very hard of hearing   /64 but BP was below 100 overnight.   On room air   Urine output 6 x   Na 134 > 131  Cr 0.9  Plan for D/C noted.         Plan:  Patient has low BP for her age and will stop Spironolactone and allow BP to be little elevated in 130 range and that will also help Na to improve.   Follow BP  Encouraged high protein diet  Fluid restriction  Oral Urea 15 gram daily x 1 week and further adjust according to Na level  Advised patient/his son to repeat renal panel later this week with PCP to make sure it remain stable at least above 130 and will also arrange follow up in clinic and office contact information provided to patient        Will communicate plan with primary team. Secure message sent for Dr Larsen        Assessment:   Hyponatremia:  Longstanding, since at least 2019  Though the patient's serum sodium level this time is much lower than her previous sodium level  Based on medication history review, she had at least 2 potential offending agents  I will request the following lab tests and try to clarify the nature of her hyponatremia, including:    Uric acid 3.0  TSH 0.43  AM cortisol 12.2  Urine sodium 50, urine osm 291, serum osm 267  - likely SIADH    The patient's presenting serum sodium level 125 with potassium level 4.1, therefore she is not at high risk for CNS demyelination injury even if her serum sodium goes up    There  is no need for to frequent serum sodium level monitoring, twice daily should be enough for the next 2 days    Reviewed her medication list now and I did not see any concerning medications    Weight loss:  The patient reports about 5 pound weight loss, but patient cannot tell me within how long time.  She also had a cough with some whitish sputum which has been going for a year    She had a CT scan of her chest, abdomen and pelvis during this hospitalization which showed: No evidence of acute traumatic injury to the chest, abdomen/pelvis.  Mild compression deformity of the superior endplate of T11, unchanged    Hypertension:  The patient was pretty hefty medication for blood pressure control prior to this presentation but her most recent blood pressure was 134/65 without blood pressure medications therefore, we can hold off on her current blood pressure medications    Microscopic hematuria:  Her UA showed WBC 3-5 however, her RBC was between 21-50    She is 89 years old, she should be evaluated by urology service for the source of her microscopic hematuria      [facility] would like to thank you for the opportunity to serve this patient. Please call with any questions or concerns.    Angelic De La Garza MD  [facility]  [address] [place] [address]  Fax: [phone]  Office: [phone]    [facility]  Catherine Rebolledo is a 89 y.o. female  with  has a past medical history of Anxiety, Bleeding ulcer, Cancer (HCC), Confusion, COPD with asthma (HCC), Coronary artery disease involving native heart, Gastroesophagitis, GERD (gastroesophageal reflux disease), Hyperlipidemia, Hypertension, Moderate persistent asthma, Pneumonia, Rheumatic fever with heart involvement, Trigger ring finger of right hand, and Unspecified diseases of blood and blood-forming organs. who presented with abnormal lab test.  The patient lab test done 1 day earlier which showed a sodium level of 123.  Per the patient's family

## 2024-12-10 NOTE — PROGRESS NOTES
A&Ox4. Denied chest pain or heaviness. /SOB. Denied pain or discomfort.  SR x 2.   Call light within reach.  Bed alarm on. Bed on lowest position.

## 2024-12-12 NOTE — PROGRESS NOTES
Physician Progress Note      PATIENT:               MAMIE NICE  CSN #:                  [id number]  :                       1934  ADMIT DATE:       2024 9:05 AM  DISCH DATE:        12/10/2024 3:03 PM  RESPONDING  PROVIDER #:        Brielle Barrios MD          QUERY TEXT:    Patient admitted with SIADH. Noted documentation of metabolic encephalopathy   in Neurology PN 12/10. In order to support the diagnosis of metabolic   encephalopathy, please include additional clinical indicators in your   documentation.  Or please document if the diagnosis of metabolic   encephalopathy has been ruled out after further study.    The medical record reflects the following:  Risk Factors: Hyponatremia, Dementia    Clinical Indicators: Neurology PN 12/10 From neurology perspective, this is   likely metabolic encephalopathy in the context of UTI and hyponatremia.  Discharge Summary 12/10  Patient endorses hallucinations, sundowning,   agitation, and confusion. Dementia - w/out behavioral disturbance.  ED Provider Notes  Alert and oriented x 3.  Ammonia (umol/L)  29    Treatment: IV Fluid, IV Ceftriaxone    Thank You  Brent Vazquez [facility] CDS  Options provided:  -- Metabolic encephalopathy present as evidenced by, Please document evidence.  -- Metabolic encephalopathy was ruled out  -- Other - I will add my own diagnosis  -- Disagree - Not applicable / Not valid  -- Disagree - Clinically unable to determine / Unknown  -- Refer to Clinical Documentation Reviewer    PROVIDER RESPONSE TEXT:    Metabolic encephalopathy was ruled out after study.    Query created by: Brent Vazquez on 2024 12:53 AM      Electronically signed by:  Brielle Barrios MD 2024 9:15 AM

## 2024-12-19 ENCOUNTER — APPOINTMENT (OUTPATIENT)
Dept: GENERAL RADIOLOGY | Age: 88
End: 2024-12-19
Payer: COMMERCIAL

## 2024-12-19 ENCOUNTER — HOSPITAL ENCOUNTER (EMERGENCY)
Age: 88
Discharge: HOME | End: 2024-12-19
Attending: EMERGENCY MEDICINE
Payer: COMMERCIAL

## 2024-12-19 VITALS
WEIGHT: 132 LBS | HEART RATE: 87 BPM | OXYGEN SATURATION: 99 % | TEMPERATURE: 98.3 F | SYSTOLIC BLOOD PRESSURE: 150 MMHG | BODY MASS INDEX: 23.39 KG/M2 | HEIGHT: 63 IN | RESPIRATION RATE: 18 BRPM | DIASTOLIC BLOOD PRESSURE: 100 MMHG

## 2024-12-19 DIAGNOSIS — U07.1 COVID-19: Primary | ICD-10-CM

## 2024-12-19 LAB
AMORPH SED URNS QL MICRO: NORMAL /HPF
BILIRUB UR QL STRIP.AUTO: NEGATIVE
CLARITY UR: ABNORMAL
COLOR UR: YELLOW
EPI CELLS #/AREA URNS HPF: NORMAL /HPF (ref 0–5)
FLUAV RNA RESP QL NAA+PROBE: NOT DETECTED
FLUBV RNA RESP QL NAA+PROBE: NOT DETECTED
GLUCOSE UR STRIP.AUTO-MCNC: NEGATIVE MG/DL
HGB UR QL STRIP.AUTO: ABNORMAL
KETONES UR STRIP.AUTO-MCNC: NEGATIVE MG/DL
LEUKOCYTE ESTERASE UR QL STRIP.AUTO: NEGATIVE
NITRITE UR QL STRIP.AUTO: NEGATIVE
PH UR STRIP.AUTO: 7 [PH] (ref 5–8)
PROT UR STRIP.AUTO-MCNC: NEGATIVE MG/DL
RBC #/AREA URNS HPF: NORMAL /HPF (ref 0–4)
SARS-COV-2 RNA RESP QL NAA+PROBE: DETECTED
SP GR UR STRIP.AUTO: 1.01 (ref 1–1.03)
UA COMPLETE W REFLEX CULTURE PNL UR: ABNORMAL
UA DIPSTICK W REFLEX MICRO PNL UR: YES
URN SPEC COLLECT METH UR: ABNORMAL
UROBILINOGEN UR STRIP-ACNC: 0.2 E.U./DL
WBC #/AREA URNS HPF: NORMAL /HPF (ref 0–5)

## 2024-12-19 PROCEDURE — 71045 X-RAY EXAM CHEST 1 VIEW: CPT

## 2024-12-19 PROCEDURE — 87636 SARSCOV2 & INF A&B AMP PRB: CPT

## 2024-12-19 PROCEDURE — 99284 EMERGENCY DEPT VISIT MOD MDM: CPT

## 2024-12-19 PROCEDURE — 81001 URINALYSIS AUTO W/SCOPE: CPT

## 2024-12-19 PROCEDURE — 6360000002 HC RX W HCPCS: Performed by: EMERGENCY MEDICINE

## 2024-12-19 RX ORDER — DEXAMETHASONE 4 MG/1
10 TABLET ORAL ONCE
Status: COMPLETED | OUTPATIENT
Start: 2024-12-19 | End: 2024-12-19

## 2024-12-19 RX ORDER — NIRMATRELVIR AND RITONAVIR 150-100 MG
KIT ORAL
Qty: 20 TABLET | Refills: 0 | Status: SHIPPED | OUTPATIENT
Start: 2024-12-19 | End: 2024-12-24

## 2024-12-19 RX ADMIN — DEXAMETHASONE 10 MG: 4 TABLET ORAL at 21:01

## 2024-12-19 ASSESSMENT — PAIN DESCRIPTION - ORIENTATION: ORIENTATION: MID

## 2024-12-19 ASSESSMENT — PAIN - FUNCTIONAL ASSESSMENT: PAIN_FUNCTIONAL_ASSESSMENT: 0-10

## 2024-12-19 ASSESSMENT — PAIN DESCRIPTION - DESCRIPTORS: DESCRIPTORS: ACHING

## 2024-12-19 ASSESSMENT — PAIN SCALES - GENERAL: PAINLEVEL_OUTOF10: 5

## 2024-12-19 ASSESSMENT — PAIN DESCRIPTION - LOCATION: LOCATION: HEAD;NECK

## 2024-12-20 NOTE — ED PROVIDER NOTES
along the right lung base laterally.  No effusion is seen.  There mild linear densities along the left lung base which is more apparent.     Minimal central pulmonary congestion or pulmonary artery hypertension which is more apparent Mild linear atelectasis or scarring along the left lung base and questionable overlying artifact or possible small infiltrate or atelectasis along the right lung base laterally.         Bedside Ultrasound, as interpreted by me, if performed:    No results found.    PROCEDURES     Unless otherwise noted below, none     Procedures    CRITICAL CARE TIME     I personally spent a total of 0 minutes of critical care time in obtaining history, performing a physical exam, bedside monitoring of interventions, collecting and interpreting tests and discussion with consultants but excluding time spent performing procedures, treating other patients and teaching time.                                                                                                           PAST MEDICAL HISTORY      has a past medical history of Anxiety (2/24/2022), Bleeding ulcer, Cancer ([facility]), Confusion (10/30/2023), COPD with asthma ([facility]) (12/12/2023), Coronary artery disease involving native heart (1/11/2024), Gastroesophagitis, GERD (gastroesophageal reflux disease), Hyperlipidemia, Hypertension, Moderate persistent asthma (2/12/2021), Pneumonia, Rheumatic fever with heart involvement, Trigger ring finger of right hand (7/1/2013), and Unspecified diseases of blood and blood-forming organs.     EMERGENCY DEPARTMENT COURSE and DIFFERENTIAL DIAGNOSIS/MDM:     Vitals:    Vitals:    12/19/24 1830 12/19/24 2100   BP: (!) 152/82 (!) 150/100   Pulse: (!) 102 87   Resp: 18 18   Temp: 98.3 °F (36.8 °C)    TempSrc: Oral    SpO2: 95% 99%   Weight: 59.9 kg (132 lb)    Height: 1.6 m (5' 3\")        Patient was treated with and given the following medications:  Medications   dexAMETHasone (DECADRON) tablet 10 mg (10 mg Oral Given

## 2024-12-20 NOTE — DISCHARGE INSTRUCTIONS
You are found to have COVID-19, you were prescribed Paxlovid you may also take over-the-counter cough and cold medicines for your symptoms.  Return for worsening symptoms

## 2024-12-31 ENCOUNTER — APPOINTMENT (OUTPATIENT)
Dept: CT IMAGING | Age: 88
End: 2024-12-31
Payer: COMMERCIAL

## 2024-12-31 ENCOUNTER — HOSPITAL ENCOUNTER (EMERGENCY)
Age: 88
Discharge: HOME | End: 2024-12-31
Payer: COMMERCIAL

## 2024-12-31 ENCOUNTER — APPOINTMENT (OUTPATIENT)
Dept: GENERAL RADIOLOGY | Age: 88
End: 2024-12-31
Payer: COMMERCIAL

## 2024-12-31 VITALS
RESPIRATION RATE: 16 BRPM | HEART RATE: 84 BPM | OXYGEN SATURATION: 100 % | SYSTOLIC BLOOD PRESSURE: 163 MMHG | WEIGHT: 135.2 LBS | TEMPERATURE: 98 F | BODY MASS INDEX: 23.95 KG/M2 | DIASTOLIC BLOOD PRESSURE: 86 MMHG

## 2024-12-31 DIAGNOSIS — S20.212A CONTUSION OF RIB ON LEFT SIDE, INITIAL ENCOUNTER: ICD-10-CM
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
## 2024-12-31 LAB
ALBUMIN SERPL-MCNC: 3.7 G/DL (ref 3.4–5)
ALBUMIN/GLOB SERPL: 1.6 {RATIO} (ref 1.1–2.2)
ALP SERPL-CCNC: 141 U/L (ref 40–129)
ALT SERPL-CCNC: 12 U/L (ref 10–40)
ANION GAP SERPL CALCULATED.3IONS-SCNC: 10 MMOL/L (ref 3–16)
AST SERPL-CCNC: 20 U/L (ref 15–37)
BILIRUB SERPL-MCNC: 0.4 MG/DL (ref 0–1)
BUN SERPL-MCNC: 15 MG/DL (ref 7–20)
CALCIUM SERPL-MCNC: 9.8 MG/DL (ref 8.3–10.6)
CHLORIDE SERPL-SCNC: 103 MMOL/L (ref 99–110)
CO2 SERPL-SCNC: 25 MMOL/L (ref 21–32)
CREAT SERPL-MCNC: 0.8 MG/DL (ref 0.6–1.2)
EKG ATRIAL RATE: 91 BPM
EKG DIAGNOSIS: NORMAL
EKG P AXIS: 10 DEGREES
EKG P-R INTERVAL: 172 MS
EKG Q-T INTERVAL: 392 MS
EKG QRS DURATION: 124 MS
EKG QTC CALCULATION (BAZETT): 482 MS
EKG R AXIS: -45 DEGREES
EKG T AXIS: -13 DEGREES
EKG VENTRICULAR RATE: 91 BPM
GFR SERPLBLD CREATININE-BSD FMLA CKD-EPI: 70 ML/MIN/{1.73_M2}
GLUCOSE SERPL-MCNC: 94 MG/DL (ref 70–99)
POTASSIUM SERPL-SCNC: 3.8 MMOL/L (ref 3.5–5.1)
PROT SERPL-MCNC: 6 G/DL (ref 6.4–8.2)
SODIUM SERPL-SCNC: 138 MMOL/L (ref 136–145)

## 2024-12-31 PROCEDURE — 93010 ELECTROCARDIOGRAM REPORT: CPT | Performed by: INTERNAL MEDICINE

## 2024-12-31 PROCEDURE — 6370000000 HC RX 637 (ALT 250 FOR IP): Performed by: NURSE PRACTITIONER

## 2024-12-31 PROCEDURE — 71101 X-RAY EXAM UNILAT RIBS/CHEST: CPT

## 2024-12-31 PROCEDURE — 72125 CT NECK SPINE W/O DYE: CPT

## 2024-12-31 PROCEDURE — 36415 COLL VENOUS BLD VENIPUNCTURE: CPT

## 2024-12-31 PROCEDURE — 70450 CT HEAD/BRAIN W/O DYE: CPT

## 2024-12-31 PROCEDURE — 80053 COMPREHEN METABOLIC PANEL: CPT

## 2024-12-31 PROCEDURE — 99285 EMERGENCY DEPT VISIT HI MDM: CPT

## 2024-12-31 PROCEDURE — 93005 ELECTROCARDIOGRAM TRACING: CPT | Performed by: NURSE PRACTITIONER

## 2024-12-31 RX ORDER — HYDROCODONE BITARTRATE AND ACETAMINOPHEN 5; 325 MG/1; MG/1
1 TABLET ORAL ONCE
Status: COMPLETED | OUTPATIENT
Start: 2024-12-31 | End: 2024-12-31

## 2024-12-31 RX ORDER — HYDROCODONE BITARTRATE AND ACETAMINOPHEN 5; 325 MG/1; MG/1
1 TABLET ORAL EVERY 6 HOURS PRN
Qty: 12 TABLET | Refills: 0 | Status: SHIPPED | OUTPATIENT
Start: 2024-12-31 | End: 2025-01-03

## 2024-12-31 RX ADMIN — HYDROCODONE BITARTRATE AND ACETAMINOPHEN 1 TABLET: 5; 325 TABLET ORAL at 12:29

## 2024-12-31 ASSESSMENT — PAIN DESCRIPTION - PAIN TYPE: TYPE: CHRONIC PAIN

## 2024-12-31 ASSESSMENT — PAIN - FUNCTIONAL ASSESSMENT: PAIN_FUNCTIONAL_ASSESSMENT: 0-10

## 2024-12-31 ASSESSMENT — PAIN DESCRIPTION - LOCATION: LOCATION: HEAD;NECK

## 2024-12-31 ASSESSMENT — PAIN SCALES - GENERAL
PAINLEVEL_OUTOF10: 5
PAINLEVEL_OUTOF10: 5

## 2025-01-02 NOTE — ED PROVIDER NOTES
Patient seen and evaluated by GEOVANNA:    EKG: Normal sinus rhythm with a rate of 91.  Left axis deviation.  QTc 42.  Otherwise normal intervals and durations.  Right bundle branch block noted.  Inferior Q waves noted.  EKG similar to previous noted on 8/18/2024.     Ethan Gabriel II, DO  12/31/24 1726    
intravenous contrast. Automated exposure control, iterative reconstruction, and/or weight based adjustment of the mA/kV was utilized to reduce the radiation dose to as low as reasonably achievable.; CT of the cervical spine was performed without the administration of intravenous contrast. Multiplanar reformatted images are provided for review. Automated exposure control, iterative reconstruction, and/or weight based adjustment of the mA/kV was utilized to reduce the radiation dose to as low as reasonably achievable. COMPARISON: 12/06/2024 HISTORY: ORDERING SYSTEM PROVIDED HISTORY: fall TECHNOLOGIST PROVIDED HISTORY: Reason for exam:->fall Has a \"code stroke\" or \"stroke alert\" been called?->No Decision Support Exception - unselect if not a suspected or confirmed emergency medical condition->Emergency Medical Condition ([place]) Reason for Exam: fell today-posterior head pain-dizziness FINDINGS: CT HEAD: BRAIN/VENTRICLES: There is no acute intracranial hemorrhage, mass effect, or midline shift. No abnormal extra-axial fluid collection. The gray-white differentiation is maintained without evidence of an acute infarct. 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 Mild prominence of the ventricles and sulci consistent with parenchymal volume loss. Mild low-density white matter changes. ORBITS: The visualized portion of the orbits demonstrate no acute abnormality. SINUSES:  The visualized paranasal sinuses and mastoid air cells demonstrate no acute abnormality. SOFT TISSUES/SKULL: No acute abnormality of the visualized skull or soft tissues. CT CERVICAL SPINE: BONES/ALIGNMENT: There is no acute fracture or traumatic malalignment. Unchanged mild compression deformities at T1 and T2. DEGENERATIVE CHANGES: Moderate to advanced multilevel degenerative disc disease.  Multilevel facet and uncovertebral arthropathy. SOFT TISSUES AND LUNG APICES: There is no prevertebral soft tissue swelling.

## 2025-01-27 ENCOUNTER — APPOINTMENT (OUTPATIENT)
Dept: GENERAL RADIOLOGY | Age: 89
DRG: 872 | End: 2025-01-27
Payer: COMMERCIAL

## 2025-01-27 ENCOUNTER — APPOINTMENT (OUTPATIENT)
Dept: CT IMAGING | Age: 89
DRG: 872 | End: 2025-01-27
Payer: COMMERCIAL

## 2025-01-27 ENCOUNTER — HOSPITAL ENCOUNTER (INPATIENT)
Age: 89
LOS: 3 days | Discharge: HOME HEALTH | DRG: 872 | End: 2025-01-30
Attending: STUDENT IN AN ORGANIZED HEALTH CARE EDUCATION/TRAINING PROGRAM | Admitting: STUDENT IN AN ORGANIZED HEALTH CARE EDUCATION/TRAINING PROGRAM
Payer: COMMERCIAL

## 2025-01-27 DIAGNOSIS — M54.2 CHRONIC NECK PAIN: ICD-10-CM

## 2025-01-27 DIAGNOSIS — R07.89 LEFT-SIDED CHEST WALL PAIN: ICD-10-CM

## 2025-01-27 DIAGNOSIS — M25.552 BILATERAL HIP PAIN: ICD-10-CM

## 2025-01-27 DIAGNOSIS — G89.29 CHRONIC NECK PAIN: ICD-10-CM

## 2025-01-27 DIAGNOSIS — N30.01 ACUTE CYSTITIS WITH HEMATURIA: Primary | ICD-10-CM

## 2025-01-27 DIAGNOSIS — M25.551 BILATERAL HIP PAIN: ICD-10-CM

## 2025-01-27 PROBLEM — N30.00 ACUTE CYSTITIS WITHOUT HEMATURIA: Status: ACTIVE | Noted: 2025-01-27

## 2025-01-27 LAB
ALBUMIN SERPL-MCNC: 4 G/DL (ref 3.4–5)
ALBUMIN/GLOB SERPL: 1.5 {RATIO} (ref 1.1–2.2)
ALP SERPL-CCNC: 156 U/L (ref 40–129)
ALT SERPL-CCNC: 10 U/L (ref 10–40)
ANION GAP SERPL CALCULATED.3IONS-SCNC: 11 MMOL/L (ref 3–16)
AST SERPL-CCNC: 22 U/L (ref 15–37)
BACTERIA URNS QL MICRO: ABNORMAL /HPF
BASOPHILS # BLD: 0.1 K/UL (ref 0–0.2)
BASOPHILS NFR BLD: 0.7 %
BILIRUB SERPL-MCNC: 0.8 MG/DL (ref 0–1)
BILIRUB UR QL STRIP.AUTO: NEGATIVE
BUN SERPL-MCNC: 16 MG/DL (ref 7–20)
CALCIUM SERPL-MCNC: 10.1 MG/DL (ref 8.3–10.6)
CHLORIDE SERPL-SCNC: 99 MMOL/L (ref 99–110)
CLARITY UR: CLEAR
CO2 SERPL-SCNC: 26 MMOL/L (ref 21–32)
COLOR UR: YELLOW
CREAT SERPL-MCNC: 0.9 MG/DL (ref 0.6–1.2)
DEPRECATED RDW RBC AUTO: 13.7 % (ref 12.4–15.4)
EOSINOPHIL # BLD: 0.3 K/UL (ref 0–0.6)
EOSINOPHIL NFR BLD: 2.1 %
EPI CELLS #/AREA URNS HPF: ABNORMAL /HPF (ref 0–5)
GFR SERPLBLD CREATININE-BSD FMLA CKD-EPI: 60 ML/MIN/{1.73_M2}
GLUCOSE SERPL-MCNC: 95 MG/DL (ref 70–99)
GLUCOSE UR STRIP.AUTO-MCNC: NEGATIVE MG/DL
HCT VFR BLD AUTO: 37.4 % (ref 36–48)
HGB BLD-MCNC: 12.5 G/DL (ref 12–16)
HGB UR QL STRIP.AUTO: ABNORMAL
KETONES UR STRIP.AUTO-MCNC: NEGATIVE MG/DL
LACTATE BLDV-SCNC: 1 MMOL/L (ref 0.4–1.9)
LEUKOCYTE ESTERASE UR QL STRIP.AUTO: ABNORMAL
LYMPHOCYTES # BLD: 1.4 K/UL (ref 1–5.1)
LYMPHOCYTES NFR BLD: 10.8 %
MCH RBC QN AUTO: 26.5 PG (ref 26–34)
MCHC RBC AUTO-ENTMCNC: 33.4 G/DL (ref 31–36)
MCV RBC AUTO: 79.5 FL (ref 80–100)
MONOCYTES # BLD: 1.2 K/UL (ref 0–1.3)
MONOCYTES NFR BLD: 9.5 %
NEUTROPHILS # BLD: 9.7 K/UL (ref 1.7–7.7)
NEUTROPHILS NFR BLD: 76.9 %
NITRITE UR QL STRIP.AUTO: NEGATIVE
PH UR STRIP.AUTO: 6 [PH] (ref 5–8)
PLATELET # BLD AUTO: 345 K/UL (ref 135–450)
PMV BLD AUTO: 6.6 FL (ref 5–10.5)
POTASSIUM SERPL-SCNC: 3.7 MMOL/L (ref 3.5–5.1)
PROT SERPL-MCNC: 6.7 G/DL (ref 6.4–8.2)
PROT UR STRIP.AUTO-MCNC: 30 MG/DL
RBC # BLD AUTO: 4.7 M/UL (ref 4–5.2)
RBC #/AREA URNS HPF: ABNORMAL /HPF (ref 0–4)
SODIUM SERPL-SCNC: 136 MMOL/L (ref 136–145)
SP GR UR STRIP.AUTO: 1.02 (ref 1–1.03)
TROPONIN, HIGH SENSITIVITY: 14 NG/L (ref 0–14)
TROPONIN, HIGH SENSITIVITY: 15 NG/L (ref 0–14)
UA COMPLETE W REFLEX CULTURE PNL UR: YES
UA DIPSTICK W REFLEX MICRO PNL UR: YES
URN SPEC COLLECT METH UR: ABNORMAL
UROBILINOGEN UR STRIP-ACNC: 0.2 E.U./DL
WBC # BLD AUTO: 12.6 K/UL (ref 4–11)
WBC #/AREA URNS HPF: >100 /HPF (ref 0–5)

## 2025-01-27 PROCEDURE — 96375 TX/PRO/DX INJ NEW DRUG ADDON: CPT

## 2025-01-27 PROCEDURE — 2500000003 HC RX 250 WO HCPCS: Performed by: PHYSICIAN ASSISTANT

## 2025-01-27 PROCEDURE — 87077 CULTURE AEROBIC IDENTIFY: CPT

## 2025-01-27 PROCEDURE — 80053 COMPREHEN METABOLIC PANEL: CPT

## 2025-01-27 PROCEDURE — 84484 ASSAY OF TROPONIN QUANT: CPT

## 2025-01-27 PROCEDURE — 6360000004 HC RX CONTRAST MEDICATION: Performed by: STUDENT IN AN ORGANIZED HEALTH CARE EDUCATION/TRAINING PROGRAM

## 2025-01-27 PROCEDURE — 74177 CT ABD & PELVIS W/CONTRAST: CPT

## 2025-01-27 PROCEDURE — 6360000002 HC RX W HCPCS: Performed by: PHYSICIAN ASSISTANT

## 2025-01-27 PROCEDURE — 81001 URINALYSIS AUTO W/SCOPE: CPT

## 2025-01-27 PROCEDURE — 73521 X-RAY EXAM HIPS BI 2 VIEWS: CPT

## 2025-01-27 PROCEDURE — 85025 COMPLETE CBC W/AUTO DIFF WBC: CPT

## 2025-01-27 PROCEDURE — 87040 BLOOD CULTURE FOR BACTERIA: CPT

## 2025-01-27 PROCEDURE — 1200000000 HC SEMI PRIVATE

## 2025-01-27 PROCEDURE — 83605 ASSAY OF LACTIC ACID: CPT

## 2025-01-27 PROCEDURE — 93005 ELECTROCARDIOGRAM TRACING: CPT | Performed by: PHYSICIAN ASSISTANT

## 2025-01-27 PROCEDURE — 71045 X-RAY EXAM CHEST 1 VIEW: CPT

## 2025-01-27 PROCEDURE — 96374 THER/PROPH/DIAG INJ IV PUSH: CPT

## 2025-01-27 PROCEDURE — 99285 EMERGENCY DEPT VISIT HI MDM: CPT

## 2025-01-27 PROCEDURE — 87086 URINE CULTURE/COLONY COUNT: CPT

## 2025-01-27 PROCEDURE — 87186 SC STD MICRODIL/AGAR DIL: CPT

## 2025-01-27 PROCEDURE — 36415 COLL VENOUS BLD VENIPUNCTURE: CPT

## 2025-01-27 RX ORDER — IOPAMIDOL 755 MG/ML
75 INJECTION, SOLUTION INTRAVASCULAR
Status: COMPLETED | OUTPATIENT
Start: 2025-01-27 | End: 2025-01-27

## 2025-01-27 RX ORDER — MORPHINE SULFATE 2 MG/ML
2 INJECTION, SOLUTION INTRAMUSCULAR; INTRAVENOUS ONCE
Status: COMPLETED | OUTPATIENT
Start: 2025-01-27 | End: 2025-01-27

## 2025-01-27 RX ORDER — ONDANSETRON 2 MG/ML
4 INJECTION INTRAMUSCULAR; INTRAVENOUS ONCE
Status: COMPLETED | OUTPATIENT
Start: 2025-01-27 | End: 2025-01-27

## 2025-01-27 RX ADMIN — IOPAMIDOL 75 ML: 755 INJECTION, SOLUTION INTRAVENOUS at 21:19

## 2025-01-27 RX ADMIN — WATER 1000 MG: 1 INJECTION INTRAMUSCULAR; INTRAVENOUS; SUBCUTANEOUS at 21:37

## 2025-01-27 RX ADMIN — MORPHINE SULFATE 2 MG: 2 INJECTION, SOLUTION INTRAMUSCULAR; INTRAVENOUS at 20:38

## 2025-01-27 RX ADMIN — ONDANSETRON 4 MG: 2 INJECTION, SOLUTION INTRAMUSCULAR; INTRAVENOUS at 20:38

## 2025-01-27 ASSESSMENT — PAIN DESCRIPTION - ORIENTATION: ORIENTATION: LEFT;RIGHT

## 2025-01-27 ASSESSMENT — PAIN SCALES - GENERAL
PAINLEVEL_OUTOF10: 10
PAINLEVEL_OUTOF10: 3

## 2025-01-27 ASSESSMENT — PAIN DESCRIPTION - LOCATION: LOCATION: BACK

## 2025-01-27 ASSESSMENT — PAIN DESCRIPTION - DESCRIPTORS: DESCRIPTORS: ACHING

## 2025-01-27 ASSESSMENT — PAIN - FUNCTIONAL ASSESSMENT: PAIN_FUNCTIONAL_ASSESSMENT: 0-10

## 2025-01-28 LAB
EKG ATRIAL RATE: 88 BPM
EKG DIAGNOSIS: NORMAL
EKG P AXIS: 13 DEGREES
EKG P-R INTERVAL: 148 MS
EKG Q-T INTERVAL: 374 MS
EKG QRS DURATION: 126 MS
EKG QTC CALCULATION (BAZETT): 452 MS
EKG R AXIS: 21 DEGREES
EKG T AXIS: 51 DEGREES
EKG VENTRICULAR RATE: 88 BPM

## 2025-01-28 PROCEDURE — 2500000003 HC RX 250 WO HCPCS: Performed by: NURSE PRACTITIONER

## 2025-01-28 PROCEDURE — 6370000000 HC RX 637 (ALT 250 FOR IP): Performed by: NURSE PRACTITIONER

## 2025-01-28 PROCEDURE — 6360000002 HC RX W HCPCS: Performed by: NURSE PRACTITIONER

## 2025-01-28 PROCEDURE — 93010 ELECTROCARDIOGRAM REPORT: CPT | Performed by: INTERNAL MEDICINE

## 2025-01-28 PROCEDURE — 1200000000 HC SEMI PRIVATE

## 2025-01-28 RX ORDER — MELATONIN/PYRIDOXINE HCL (B6) 5 MG-10 MG
1 TABLET,IMMED, EXTENDED RELEASE, BIPHASIC ORAL DAILY
Status: DISCONTINUED | OUTPATIENT
Start: 2025-01-28 | End: 2025-01-30 | Stop reason: HOSPADM

## 2025-01-28 RX ORDER — MORPHINE SULFATE 2 MG/ML
2 INJECTION, SOLUTION INTRAMUSCULAR; INTRAVENOUS EVERY 4 HOURS PRN
Status: DISCONTINUED | OUTPATIENT
Start: 2025-01-28 | End: 2025-01-30 | Stop reason: HOSPADM

## 2025-01-28 RX ORDER — ACETAMINOPHEN 650 MG/1
650 SUPPOSITORY RECTAL EVERY 6 HOURS PRN
Status: DISCONTINUED | OUTPATIENT
Start: 2025-01-28 | End: 2025-01-30 | Stop reason: HOSPADM

## 2025-01-28 RX ORDER — HYDROCHLOROTHIAZIDE 25 MG/1
25 TABLET ORAL
COMMUNITY

## 2025-01-28 RX ORDER — PANTOPRAZOLE SODIUM 40 MG/1
40 TABLET, DELAYED RELEASE ORAL
Status: DISCONTINUED | OUTPATIENT
Start: 2025-01-28 | End: 2025-01-30 | Stop reason: HOSPADM

## 2025-01-28 RX ORDER — CALCIUM CARBONATE 500(1250)
500 TABLET ORAL DAILY
COMMUNITY

## 2025-01-28 RX ORDER — ENOXAPARIN SODIUM 100 MG/ML
40 INJECTION SUBCUTANEOUS DAILY
Status: DISCONTINUED | OUTPATIENT
Start: 2025-01-28 | End: 2025-01-30 | Stop reason: HOSPADM

## 2025-01-28 RX ORDER — VITAMIN B COMPLEX
2 TABLET ORAL DAILY
Status: DISCONTINUED | OUTPATIENT
Start: 2025-01-28 | End: 2025-01-30 | Stop reason: HOSPADM

## 2025-01-28 RX ORDER — HYDROCODONE BITARTRATE AND ACETAMINOPHEN 5; 325 MG/1; MG/1
1 TABLET ORAL EVERY 6 HOURS PRN
COMMUNITY

## 2025-01-28 RX ORDER — HYDROXYZINE PAMOATE 25 MG/1
25 CAPSULE ORAL 3 TIMES DAILY PRN
COMMUNITY

## 2025-01-28 RX ORDER — ALBUTEROL SULFATE 90 UG/1
1 INHALANT RESPIRATORY (INHALATION) EVERY 4 HOURS PRN
Status: DISCONTINUED | OUTPATIENT
Start: 2025-01-28 | End: 2025-01-30 | Stop reason: HOSPADM

## 2025-01-28 RX ORDER — SODIUM CHLORIDE 0.9 % (FLUSH) 0.9 %
5-40 SYRINGE (ML) INJECTION EVERY 12 HOURS SCHEDULED
Status: DISCONTINUED | OUTPATIENT
Start: 2025-01-28 | End: 2025-01-30 | Stop reason: HOSPADM

## 2025-01-28 RX ORDER — VALSARTAN 80 MG/1
160 TABLET ORAL DAILY
Status: DISCONTINUED | OUTPATIENT
Start: 2025-01-28 | End: 2025-01-30 | Stop reason: HOSPADM

## 2025-01-28 RX ORDER — DONEPEZIL HYDROCHLORIDE 5 MG/1
5 TABLET, FILM COATED ORAL NIGHTLY
Status: DISCONTINUED | OUTPATIENT
Start: 2025-01-28 | End: 2025-01-30 | Stop reason: HOSPADM

## 2025-01-28 RX ORDER — ACETAMINOPHEN 325 MG/1
650 TABLET ORAL EVERY 6 HOURS PRN
Status: DISCONTINUED | OUTPATIENT
Start: 2025-01-28 | End: 2025-01-30 | Stop reason: HOSPADM

## 2025-01-28 RX ORDER — SODIUM CHLORIDE 0.9 % (FLUSH) 0.9 %
5-40 SYRINGE (ML) INJECTION PRN
Status: DISCONTINUED | OUTPATIENT
Start: 2025-01-28 | End: 2025-01-30 | Stop reason: HOSPADM

## 2025-01-28 RX ORDER — POTASSIUM CHLORIDE 750 MG/1
10 CAPSULE, EXTENDED RELEASE ORAL DAILY
COMMUNITY

## 2025-01-28 RX ORDER — GUAIFENESIN/DEXTROMETHORPHAN 100-10MG/5
5 SYRUP ORAL 4 TIMES DAILY PRN
Status: DISCONTINUED | OUTPATIENT
Start: 2025-01-28 | End: 2025-01-30 | Stop reason: HOSPADM

## 2025-01-28 RX ORDER — FLUTICASONE PROPIONATE 50 MCG
1 SPRAY, SUSPENSION (ML) NASAL DAILY PRN
COMMUNITY

## 2025-01-28 RX ORDER — SODIUM CHLORIDE 9 MG/ML
INJECTION, SOLUTION INTRAVENOUS PRN
Status: DISCONTINUED | OUTPATIENT
Start: 2025-01-28 | End: 2025-01-30 | Stop reason: HOSPADM

## 2025-01-28 RX ORDER — SODIUM CHLORIDE 9 MG/ML
INJECTION, SOLUTION INTRAVENOUS CONTINUOUS
Status: DISCONTINUED | OUTPATIENT
Start: 2025-01-28 | End: 2025-01-28

## 2025-01-28 RX ORDER — OXYCODONE AND ACETAMINOPHEN 5; 325 MG/1; MG/1
1 TABLET ORAL EVERY 4 HOURS PRN
Status: DISCONTINUED | OUTPATIENT
Start: 2025-01-28 | End: 2025-01-30 | Stop reason: HOSPADM

## 2025-01-28 RX ADMIN — DONEPEZIL HYDROCHLORIDE 5 MG: 5 TABLET, FILM COATED ORAL at 22:06

## 2025-01-28 RX ADMIN — PANTOPRAZOLE SODIUM 40 MG: 40 TABLET, DELAYED RELEASE ORAL at 09:32

## 2025-01-28 RX ADMIN — Medication 10 ML: at 22:07

## 2025-01-28 RX ADMIN — VALSARTAN 160 MG: 80 TABLET, FILM COATED ORAL at 09:32

## 2025-01-28 RX ADMIN — ENOXAPARIN SODIUM 40 MG: 100 INJECTION SUBCUTANEOUS at 09:32

## 2025-01-28 RX ADMIN — OXYCODONE HYDROCHLORIDE AND ACETAMINOPHEN 1 TABLET: 5; 325 TABLET ORAL at 23:53

## 2025-01-28 RX ADMIN — WATER 1000 MG: 1 INJECTION INTRAMUSCULAR; INTRAVENOUS; SUBCUTANEOUS at 22:06

## 2025-01-28 RX ADMIN — Medication 2000 UNITS: at 09:32

## 2025-01-28 ASSESSMENT — PAIN DESCRIPTION - ORIENTATION: ORIENTATION: LOWER

## 2025-01-28 ASSESSMENT — PAIN SCALES - GENERAL: PAINLEVEL_OUTOF10: 10

## 2025-01-28 ASSESSMENT — PAIN DESCRIPTION - LOCATION: LOCATION: BACK

## 2025-01-28 NOTE — PROGRESS NOTES
Hospital Medicine Progress Note  V 1.6      Date of Admission: 1/27/2025    Hospital Day: 2      Chief Admission Complaint:  Lower back pain    Subjective:  pain improved. No new complaints today.    Presenting Admission History:       Catherine Rebolledo is a/an 89 y.o. female with a significant past medical history of hypertension, hyperlipidemia, COPD, chronic lower extremity swelling, dementia, and reported chronic back and neck pain who presents to [facility]'s emergency department with complaints of significant bilateral hip and lower back pain that began last 48 hours.  Patient notes the pain was significant enough to cause her significant emotional distress and inability to perform her ADLs which she is typically independent at home.  Pain was described as a deep aching sensation, 10 out of 10, has had recent dysuria and burning with urination.  Patient denies any recent fever.  Her evaluation here included laboratory studies, EKG, chest x-ray, bilateral hip x-rays, and CT abdomen pelvis with IV contrast.  Chest x-ray was negative for any acute findings.  Bilateral hip x-rays were negative for any acute fracture or deformity.  CT abdomen pelvis did show diffuse bladder wall thickening without discrete mass, underlying with amatory infectious etiology should have been considered; bilateral renal cortical cysts, nonobstructive bowel gas pattern.  Laboratory studies reviewed and pertinent for normal electrolytes, normal renal function, normal lactic acid 1.0, troponin 15 with a repeat of 14, mildly elevated ALP at 156, WBC of 12.6.  Urinalysis showed small blood, 30 protein, small leukocyte esterase; microscopy showed greater than 100 urinary WBCs, 6-10 epithelial cells, and rare bacteria.  Patient was started on ceftriaxone emergency department and was given morphine 2 mg IV push in the emergency department.  Hospital team was consulted to admit.     Assessment/Plan:      Current Principal Problem:

## 2025-01-28 NOTE — ED PROVIDER NOTES
[facility] EMERGENCY DEPARTMENT  EMERGENCY DEPARTMENT ENCOUNTER        Pt Name: Catherine Rebolledo  MRN: [id number]  Birthdate 2/23/1934  Date of evaluation: 1/27/2025  Provider: THA Brock  PCP: Claritza Agustin DO  Note Started: 8:29 PM EST 1/27/25       I have seen and evaluated this patient with my supervising physician Josie Sawant MD.      CHIEF COMPLAINT       Chief Complaint   Patient presents with    Pain     Pt states pain all over. Pt states mostly in her back and that goes across her back to her neck and side/        HISTORY OF PRESENT ILLNESS: 1 or more Elements     History from : Patient    Limitations to history : None    Catherine Rebolledo is a 89 y.o. female who presents to the emergency department via private vehicle accompanied by family.  The patient has chronic neck and shoulder pain.  However beginning yesterday and today she began complaining of bilateral hip pain although right is worse than left.  The pain does radiate across her low back.  She states the pain is worse with constipation.  It does improve after having a bowel movement.  She was able to have a bowel movement yesterday after using a Fleet enema.  Today she did have a fairly large bowel movement as well.  She does take hydrocodone at home as needed for pain.  She last took this around 9 AM today.  She has left-sided chest pain as well, she describes this as rib pain stating that this again is fairly chronic for her although worse today than normal.  She denies any shortness of breath or cough.      Nursing Notes were all reviewed and agreed with or any disagreements were addressed in the HPI.    REVIEW OF SYSTEMS :      Review of Systems    Positives and Pertinent negatives as per HPI.     SURGICAL HISTORY     Past Surgical History:   Procedure Laterality Date    BLADDER REPAIR N/A 2002    BREAST SURGERY      L breast tumor-benign    COLONOSCOPY N/A 04/23/2024    COLONOSCOPY POLYPECTOMY SNARE BIOPSY performed by

## 2025-01-28 NOTE — PROGRESS NOTES
4 Eyes Skin Assessment and Patient belongings     The patient is being assess for  Admission    I agree that 2 Nurses have performed a thorough Head to Toe Skin Assessment on the patient. ALL assessment sites listed below have been assessed.       Areas assessed by both nurses:   [x]   Head, Face, and Ears   [x]   Shoulders, Back, and Chest  [x]   Arms, Elbows, and Hands   [x]   Coccyx, Sacrum, and IschIum  [x]   Legs, Feet, and Heels        Does the Patient have Skin Breakdown?  No         Billy Prevention initiated:  No   Wound Care Orders initiated:  No      [facility] nurse consulted for Pressure Injury (Stage 3,4, Unstageable, DTI, NWPT, and Complex wounds), New and Established Ostomies:  NA      I agree that 2 Nurses have reviewed patient belongings with the patient/family and documented in the flowsheet upon admission or transfer to the unit.             Nurse 1 eSignature: Electronically signed by Nacho Lakhani RN on 1/28/25 at 12:46 PM EST    **SHARE this note so that the co-signing nurse is able to place an eSignature**    Nurse 2 eSignature: Electronically signed by Josie Pham RN on 1/28/25 at 1:50 PM EST

## 2025-01-28 NOTE — CARE COORDINATION
Case Management Assessment  Initial Evaluation    Date/Time of Evaluation: 1/28/2025 11:27 AM  Assessment Completed by: HIEU Lam    If patient is discharged prior to next notation, then this note serves as note for discharge by case management.    Patient Name: Catherine Rebolledo                   YOB: 1934  Diagnosis: Acute cystitis without hematuria [N30.00]                   Date / Time: 1/27/2025  7:10 PM    Patient Admission Status: Inpatient   Readmission Risk (Low < 19, Mod (19-27), High > 27): Readmission Risk Score: 18.4    Current PCP: Claritza Agustin, DO  PCP verified by CM? (P) Yes    Chart Reviewed: Yes      History Provided by: (P) Patient  Patient Orientation: (P) Alert and Oriented    Patient Cognition: (P) Alert    Hospitalization in the last 30 days (Readmission):  Yes    If yes, Readmission Assessment in CM Navigator will be completed.    Advance Directives:      Code Status: Full Code   Patient's Primary Decision Maker is: (P) Legal Next of Kin    Primary Decision Maker: Shereen Tomas - [place] - [phone]    Discharge Planning:    Patient lives with: (P) Alone Type of Home: (P) Apartment, Independent Living ([facility])  Primary Care Giver: (P) Self  Patient Support Systems include: (P) Children   Current Financial resources: (P) None  Current community resources: (P) ECF/Home Care  Current services prior to admission: (P) Durable Medical Equipment, Home Care            Current DME: (P) Walker            Type of Home Care services:  (P) Nursing Services, OT, PT    ADLS  Prior functional level: (P) Assistance with the following:, Cooking, Housework, Shopping  Current functional level: (P) Assistance with the following:, Cooking, Housework, Shopping    PT AM-PAC:   /24  OT AM-PAC:   /24    Family can provide assistance at DC: (P) Yes  Would you like Case Management to discuss the discharge plan with any other family members/significant others, and if so, who? (P)

## 2025-01-28 NOTE — ED PROVIDER NOTES
Emergency Department Attending Provider Note  Location: [facility] EMERGENCY DEPARTMENT  1/27/2025     Patient Identification  Catherine Rebolledo is a 89 y.o. female      Catherine Rebolledo was evaluated in the Emergency Department for generalized pain. Although initial history and physical exam information was obtained by Racquel ALMAZAN (who also dictated a record of this visit), I personally saw the patient and performed a substantive portion of the visit including all aspects of the medical decision making.      Patient seen and evaluated.  Relevant records reviewed.  Patient is a 89-year-old female who presents with generalized pain.  She is endorsing bilateral hip pain that worse on the right.  Pain radiates across her lower back.  She is endorsing generalized abdominal pain as well as constipation.  Denies any fever.  She took a hydrocodone at home for pain which did not relieve her symptoms.  She denies any shortness of breath or cough    Patient presented hypertensive, tachycardic but vitals otherwise unremarkable.  On exam she has left-sided chest wall pain no palpable step-offs.  She has mild generalized abdominal tenderness with no rebound or guarding.    Chronic medical conditions reviewed  Social determinants reviewed    Diagnostic studies reviewed and interpreted  CBC with a leukocytosis of 12.6, no evidence of anemia, normal platelets  CMP with a GFR of 60 creatinine BUN normal.  Elevated alk phos to 156  UA significant for infection  Elevated troponin to 15  Chest x-ray with no acute abnormalities  XR hip bilateral with no acute abnormality  CT abdomen pelvis w IV contrast: no acute abnormality    Patient meeting sepsis criteria. Patient to be admitted given Urosepsis. Patient amenable with plan      I, Dr. Sawant am the primary clinician of record.   I personally saw the patient and independently provided 0 minutes of non-concurrent critical care out of the total shared critical care time

## 2025-01-29 LAB
ANION GAP SERPL CALCULATED.3IONS-SCNC: 8 MMOL/L (ref 3–16)
BASOPHILS # BLD: 0.1 K/UL (ref 0–0.2)
BASOPHILS NFR BLD: 1 %
BUN SERPL-MCNC: 11 MG/DL (ref 7–20)
CALCIUM SERPL-MCNC: 9.4 MG/DL (ref 8.3–10.6)
CHLORIDE SERPL-SCNC: 103 MMOL/L (ref 99–110)
CO2 SERPL-SCNC: 27 MMOL/L (ref 21–32)
CREAT SERPL-MCNC: 0.8 MG/DL (ref 0.6–1.2)
DEPRECATED RDW RBC AUTO: 13.5 % (ref 12.4–15.4)
EOSINOPHIL # BLD: 0.4 K/UL (ref 0–0.6)
EOSINOPHIL NFR BLD: 6.6 %
GFR SERPLBLD CREATININE-BSD FMLA CKD-EPI: 70 ML/MIN/{1.73_M2}
GLUCOSE SERPL-MCNC: 94 MG/DL (ref 70–99)
HCT VFR BLD AUTO: 32.9 % (ref 36–48)
HGB BLD-MCNC: 11 G/DL (ref 12–16)
LYMPHOCYTES # BLD: 1.5 K/UL (ref 1–5.1)
LYMPHOCYTES NFR BLD: 24.9 %
MAGNESIUM SERPL-MCNC: 2.09 MG/DL (ref 1.8–2.4)
MCH RBC QN AUTO: 26.6 PG (ref 26–34)
MCHC RBC AUTO-ENTMCNC: 33.5 G/DL (ref 31–36)
MCV RBC AUTO: 79.6 FL (ref 80–100)
MONOCYTES # BLD: 0.6 K/UL (ref 0–1.3)
MONOCYTES NFR BLD: 9.2 %
NEUTROPHILS # BLD: 3.5 K/UL (ref 1.7–7.7)
NEUTROPHILS NFR BLD: 58.3 %
PLATELET # BLD AUTO: 258 K/UL (ref 135–450)
PMV BLD AUTO: 6.6 FL (ref 5–10.5)
POTASSIUM SERPL-SCNC: 3.4 MMOL/L (ref 3.5–5.1)
RBC # BLD AUTO: 4.14 M/UL (ref 4–5.2)
SODIUM SERPL-SCNC: 138 MMOL/L (ref 136–145)
WBC # BLD AUTO: 6.1 K/UL (ref 4–11)

## 2025-01-29 PROCEDURE — 6370000000 HC RX 637 (ALT 250 FOR IP): Performed by: INTERNAL MEDICINE

## 2025-01-29 PROCEDURE — 2500000003 HC RX 250 WO HCPCS: Performed by: NURSE PRACTITIONER

## 2025-01-29 PROCEDURE — 94640 AIRWAY INHALATION TREATMENT: CPT

## 2025-01-29 PROCEDURE — 36415 COLL VENOUS BLD VENIPUNCTURE: CPT

## 2025-01-29 PROCEDURE — 1200000000 HC SEMI PRIVATE

## 2025-01-29 PROCEDURE — 6360000002 HC RX W HCPCS: Performed by: NURSE PRACTITIONER

## 2025-01-29 PROCEDURE — 2580000003 HC RX 258: Performed by: INTERNAL MEDICINE

## 2025-01-29 PROCEDURE — 83735 ASSAY OF MAGNESIUM: CPT

## 2025-01-29 PROCEDURE — 85025 COMPLETE CBC W/AUTO DIFF WBC: CPT

## 2025-01-29 PROCEDURE — 6360000002 HC RX W HCPCS: Performed by: INTERNAL MEDICINE

## 2025-01-29 PROCEDURE — 6370000000 HC RX 637 (ALT 250 FOR IP): Performed by: NURSE PRACTITIONER

## 2025-01-29 PROCEDURE — 80048 BASIC METABOLIC PNL TOTAL CA: CPT

## 2025-01-29 RX ORDER — POTASSIUM CHLORIDE 1500 MG/1
40 TABLET, EXTENDED RELEASE ORAL ONCE
Status: COMPLETED | OUTPATIENT
Start: 2025-01-29 | End: 2025-01-29

## 2025-01-29 RX ORDER — BISACODYL 10 MG
10 SUPPOSITORY, RECTAL RECTAL DAILY PRN
Status: DISCONTINUED | OUTPATIENT
Start: 2025-01-29 | End: 2025-01-30 | Stop reason: HOSPADM

## 2025-01-29 RX ADMIN — SODIUM CHLORIDE 3000 MG: 900 INJECTION INTRAVENOUS at 12:25

## 2025-01-29 RX ADMIN — Medication 1 PUFF: at 08:32

## 2025-01-29 RX ADMIN — POTASSIUM CHLORIDE 40 MEQ: 1500 TABLET, EXTENDED RELEASE ORAL at 10:58

## 2025-01-29 RX ADMIN — VALSARTAN 160 MG: 80 TABLET, FILM COATED ORAL at 08:48

## 2025-01-29 RX ADMIN — SODIUM CHLORIDE 3000 MG: 900 INJECTION INTRAVENOUS at 17:52

## 2025-01-29 RX ADMIN — BISACODYL 10 MG: 10 SUPPOSITORY RECTAL at 10:58

## 2025-01-29 RX ADMIN — Medication 10 ML: at 20:54

## 2025-01-29 RX ADMIN — DONEPEZIL HYDROCHLORIDE 5 MG: 5 TABLET, FILM COATED ORAL at 20:54

## 2025-01-29 RX ADMIN — Medication 10 ML: at 08:49

## 2025-01-29 RX ADMIN — OXYCODONE HYDROCHLORIDE AND ACETAMINOPHEN 1 TABLET: 5; 325 TABLET ORAL at 20:57

## 2025-01-29 RX ADMIN — PANTOPRAZOLE SODIUM 40 MG: 40 TABLET, DELAYED RELEASE ORAL at 05:16

## 2025-01-29 RX ADMIN — SODIUM CHLORIDE 3000 MG: 900 INJECTION INTRAVENOUS at 23:23

## 2025-01-29 RX ADMIN — ENOXAPARIN SODIUM 40 MG: 100 INJECTION SUBCUTANEOUS at 08:48

## 2025-01-29 RX ADMIN — Medication 2000 UNITS: at 08:48

## 2025-01-29 NOTE — CARE COORDINATION
Writer reviewed chart, and spoke with MD, and RN she is from [facility] and plans to return and [facility] with [facility]. Patient got a suppository today, possible DC 1/30.     Claritza Morrison RN

## 2025-01-29 NOTE — PLAN OF CARE
Problem: ABCDS Injury Assessment  Goal: Absence of physical injury  1/29/2025 1035 by Josie Pham RN  Outcome: Progressing  1/29/2025 0027 by Gianna Field RN  Outcome: Progressing  Flowsheets (Taken 1/28/2025 2007)  Absence of Physical Injury: Implement safety measures based on patient assessment     Problem: Safety - Adult  Goal: Free from fall injury  1/29/2025 1035 by Josie Pham RN  Outcome: Progressing  1/29/2025 0027 by Gianna Field RN  Outcome: Progressing     Problem: Chronic Conditions and Co-morbidities  Goal: Patient's chronic conditions and co-morbidity symptoms are monitored and maintained or improved  1/29/2025 1035 by Josie Pham RN  Outcome: Progressing  1/29/2025 0027 by Gianna Field RN  Outcome: Progressing  Flowsheets (Taken 1/28/2025 2007)  Care Plan - Patient's Chronic Conditions and Co-Morbidity Symptoms are Monitored and Maintained or Improved:   Monitor and assess patient's chronic conditions and comorbid symptoms for stability, deterioration, or improvement   Collaborate with multidisciplinary team to address chronic and comorbid conditions and prevent exacerbation or deterioration   Update acute care plan with appropriate goals if chronic or comorbid symptoms are exacerbated and prevent overall improvement and discharge     Problem: Discharge Planning  Goal: Discharge to home or other facility with appropriate resources  1/29/2025 1035 by Josie Pham RN  Outcome: Progressing  1/29/2025 0027 by Gianna Field RN  Outcome: Progressing  Flowsheets (Taken 1/28/2025 2007)  Discharge to home or other facility with appropriate resources:   Identify barriers to discharge with patient and caregiver   Arrange for needed discharge resources and transportation as appropriate

## 2025-01-29 NOTE — PLAN OF CARE
Problem: ABCDS Injury Assessment  Goal: Absence of physical injury  1/29/2025 0027 by Gianna Field RN  Outcome: Progressing  Flowsheets (Taken 1/28/2025 2007)  Absence of Physical Injury: Implement safety measures based on patient assessment  1/28/2025 1511 by Josie Pham RN  Outcome: Progressing     Problem: Safety - Adult  Goal: Free from fall injury  1/29/2025 0027 by Gianna Field RN  Outcome: Progressing  1/28/2025 1511 by Josie Pham RN  Outcome: Progressing     Problem: Chronic Conditions and Co-morbidities  Goal: Patient's chronic conditions and co-morbidity symptoms are monitored and maintained or improved  1/29/2025 0027 by Gianna Field RN  Outcome: Progressing  Flowsheets (Taken 1/28/2025 2007)  Care Plan - Patient's Chronic Conditions and Co-Morbidity Symptoms are Monitored and Maintained or Improved:   Monitor and assess patient's chronic conditions and comorbid symptoms for stability, deterioration, or improvement   Collaborate with multidisciplinary team to address chronic and comorbid conditions and prevent exacerbation or deterioration   Update acute care plan with appropriate goals if chronic or comorbid symptoms are exacerbated and prevent overall improvement and discharge  1/28/2025 1511 by Josie Pham RN  Outcome: Progressing     Problem: Discharge Planning  Goal: Discharge to home or other facility with appropriate resources  1/29/2025 0027 by Gianna Field RN  Outcome: Progressing  Flowsheets (Taken 1/28/2025 2007)  Discharge to home or other facility with appropriate resources:   Identify barriers to discharge with patient and caregiver   Arrange for needed discharge resources and transportation as appropriate  1/28/2025 1511 by Josie Pham RN  Outcome: Progressing

## 2025-01-29 NOTE — PROGRESS NOTES
Hospital Medicine Progress Note  V 1.6      Date of Admission: 1/27/2025    Hospital Day: 3      Chief Admission Complaint:  Lower back pain    Subjective:  pain improved. No new complaints today.    Presenting Admission History:       Catherine Rebolledo is a/an 89 y.o. female with a significant past medical history of hypertension, hyperlipidemia, COPD, chronic lower extremity swelling, dementia, and reported chronic back and neck pain who presents to [facility]'s emergency department with complaints of significant bilateral hip and lower back pain that began last 48 hours.  Patient notes the pain was significant enough to cause her significant emotional distress and inability to perform her ADLs which she is typically independent at home.  Pain was described as a deep aching sensation, 10 out of 10, has had recent dysuria and burning with urination.  Patient denies any recent fever.  Her evaluation here included laboratory studies, EKG, chest x-ray, bilateral hip x-rays, and CT abdomen pelvis with IV contrast.  Chest x-ray was negative for any acute findings.  Bilateral hip x-rays were negative for any acute fracture or deformity.  CT abdomen pelvis did show diffuse bladder wall thickening without discrete mass, underlying with amatory infectious etiology should have been considered; bilateral renal cortical cysts, nonobstructive bowel gas pattern.  Laboratory studies reviewed and pertinent for normal electrolytes, normal renal function, normal lactic acid 1.0, troponin 15 with a repeat of 14, mildly elevated ALP at 156, WBC of 12.6.  Urinalysis showed small blood, 30 protein, small leukocyte esterase; microscopy showed greater than 100 urinary WBCs, 6-10 epithelial cells, and rare bacteria.  Patient was started on ceftriaxone emergency department and was given morphine 2 mg IV push in the emergency department.  Hospital team was consulted to admit.     Assessment/Plan:      Current Principal Problem:

## 2025-01-30 VITALS
HEIGHT: 64 IN | SYSTOLIC BLOOD PRESSURE: 158 MMHG | BODY MASS INDEX: 22.4 KG/M2 | TEMPERATURE: 97.3 F | WEIGHT: 131.2 LBS | HEART RATE: 77 BPM | DIASTOLIC BLOOD PRESSURE: 80 MMHG | RESPIRATION RATE: 16 BRPM | OXYGEN SATURATION: 96 %

## 2025-01-30 LAB
ANION GAP SERPL CALCULATED.3IONS-SCNC: 7 MMOL/L (ref 3–16)
BACTERIA UR CULT: ABNORMAL
BASOPHILS # BLD: 0.1 K/UL (ref 0–0.2)
BASOPHILS NFR BLD: 1.2 %
BUN SERPL-MCNC: 9 MG/DL (ref 7–20)
CALCIUM SERPL-MCNC: 9.3 MG/DL (ref 8.3–10.6)
CHLORIDE SERPL-SCNC: 104 MMOL/L (ref 99–110)
CO2 SERPL-SCNC: 27 MMOL/L (ref 21–32)
CREAT SERPL-MCNC: 0.7 MG/DL (ref 0.6–1.2)
DEPRECATED RDW RBC AUTO: 13.7 % (ref 12.4–15.4)
EOSINOPHIL # BLD: 0.4 K/UL (ref 0–0.6)
EOSINOPHIL NFR BLD: 8.5 %
GFR SERPLBLD CREATININE-BSD FMLA CKD-EPI: 82 ML/MIN/{1.73_M2}
GLUCOSE SERPL-MCNC: 92 MG/DL (ref 70–99)
HCT VFR BLD AUTO: 31.4 % (ref 36–48)
HGB BLD-MCNC: 10.7 G/DL (ref 12–16)
LYMPHOCYTES # BLD: 1.2 K/UL (ref 1–5.1)
LYMPHOCYTES NFR BLD: 23.5 %
MAGNESIUM SERPL-MCNC: 2.05 MG/DL (ref 1.8–2.4)
MCH RBC QN AUTO: 26.9 PG (ref 26–34)
MCHC RBC AUTO-ENTMCNC: 34.2 G/DL (ref 31–36)
MCV RBC AUTO: 78.5 FL (ref 80–100)
MONOCYTES # BLD: 0.5 K/UL (ref 0–1.3)
MONOCYTES NFR BLD: 9.1 %
NEUTROPHILS # BLD: 3 K/UL (ref 1.7–7.7)
NEUTROPHILS NFR BLD: 57.7 %
ORGANISM: ABNORMAL
PLATELET # BLD AUTO: 261 K/UL (ref 135–450)
PMV BLD AUTO: 6.7 FL (ref 5–10.5)
POTASSIUM SERPL-SCNC: 3.8 MMOL/L (ref 3.5–5.1)
RBC # BLD AUTO: 4 M/UL (ref 4–5.2)
SODIUM SERPL-SCNC: 138 MMOL/L (ref 136–145)
WBC # BLD AUTO: 5.1 K/UL (ref 4–11)

## 2025-01-30 PROCEDURE — 6370000000 HC RX 637 (ALT 250 FOR IP): Performed by: NURSE PRACTITIONER

## 2025-01-30 PROCEDURE — 85025 COMPLETE CBC W/AUTO DIFF WBC: CPT

## 2025-01-30 PROCEDURE — 83735 ASSAY OF MAGNESIUM: CPT

## 2025-01-30 PROCEDURE — 6360000002 HC RX W HCPCS: Performed by: NURSE PRACTITIONER

## 2025-01-30 PROCEDURE — 2580000003 HC RX 258: Performed by: INTERNAL MEDICINE

## 2025-01-30 PROCEDURE — 6360000002 HC RX W HCPCS: Performed by: INTERNAL MEDICINE

## 2025-01-30 PROCEDURE — 80048 BASIC METABOLIC PNL TOTAL CA: CPT

## 2025-01-30 PROCEDURE — 2500000003 HC RX 250 WO HCPCS: Performed by: NURSE PRACTITIONER

## 2025-01-30 PROCEDURE — 94640 AIRWAY INHALATION TREATMENT: CPT

## 2025-01-30 PROCEDURE — 36415 COLL VENOUS BLD VENIPUNCTURE: CPT

## 2025-01-30 RX ADMIN — PANTOPRAZOLE SODIUM 40 MG: 40 TABLET, DELAYED RELEASE ORAL at 06:30

## 2025-01-30 RX ADMIN — VALSARTAN 160 MG: 80 TABLET, FILM COATED ORAL at 08:31

## 2025-01-30 RX ADMIN — TIOTROPIUM BROMIDE AND OLODATEROL 2 PUFF: 3.124; 2.736 SPRAY, METERED RESPIRATORY (INHALATION) at 09:35

## 2025-01-30 RX ADMIN — SODIUM CHLORIDE 3000 MG: 900 INJECTION INTRAVENOUS at 06:32

## 2025-01-30 RX ADMIN — Medication 2000 UNITS: at 08:31

## 2025-01-30 RX ADMIN — SODIUM CHLORIDE 3000 MG: 900 INJECTION INTRAVENOUS at 11:32

## 2025-01-30 RX ADMIN — Medication 10 ML: at 08:31

## 2025-01-30 RX ADMIN — ENOXAPARIN SODIUM 40 MG: 100 INJECTION SUBCUTANEOUS at 08:31

## 2025-01-30 ASSESSMENT — PAIN - FUNCTIONAL ASSESSMENT: PAIN_FUNCTIONAL_ASSESSMENT: 0-10

## 2025-01-30 NOTE — DISCHARGE INSTR - COC
Continuity of Care Form    Patient Name: Catherine Rebolledo   :  1934  MRN:  [id number]    Admit date:  2025  Discharge date:  ***    Code Status Order: Full Code   Advance Directives:   Advance Care Flowsheet Documentation             Admitting Physician:  Ralph Collins MD  PCP: Claritza Agustin DO    Discharging Nurse: ***  Discharging Hospital Unit/Room#: 0541/0541-01  Discharging Unit Phone Number: ***    Emergency Contact:   Extended Emergency Contact Information  Primary Emergency Contact: Sehreen Tomas   [facility]  Home Phone: [phone]  Mobile Phone: [phone]  Relation: Grandchild  Secondary Emergency Contact: Vinayak Rebolledo  Home Phone: [phone]  Mobile Phone: [phone]  Relation: Child    Past Surgical History:  Past Surgical History:   Procedure Laterality Date    BLADDER REPAIR N/A     BREAST SURGERY      L breast tumor-benign    COLONOSCOPY N/A 2024    COLONOSCOPY POLYPECTOMY SNARE BIOPSY performed by Josue Sorenson MD at [facility] ENDOSCOPY    CYST REMOVAL Right 2013    GANGLION CYST EXCISION RIGHT WRIST, TRIGGER FINGER RELEASE RIGHT FOURTH    EYE SURGERY      , cataract right eye    FRACTURE SURGERY      R shoulderx2-pinned    HYSTERECTOMY (CERVIX STATUS UNKNOWN)      JOINT REPLACEMENT Right 2012    Total Elbow - Dr. Ramirez    OTHER SURGICAL HISTORY  2024    CapsoCam plus endoscopy    RECTOCELE REPAIR      SHOULDER SURGERY Right     TONSILLECTOMY      TUMOR REMOVAL      carcinoid    UPPER GASTROINTESTINAL ENDOSCOPY  2012    with bx    UPPER GASTROINTESTINAL ENDOSCOPY  2013    EUS    UPPER GASTROINTESTINAL ENDOSCOPY  2014    UPPER GASTROINTESTINAL ENDOSCOPY  2016    push enteroscopy with ablation    UPPER GASTROINTESTINAL ENDOSCOPY  2017    UPPER GASTROINTESTINAL ENDOSCOPY N/A 2021    EGD ULTRASOUND OF STOMACH performed by Kian Brar MD at [facility] ENDOSCOPY    UPPER GASTROINTESTINAL ENDOSCOPY

## 2025-01-30 NOTE — DISCHARGE SUMMARY
Low radiation dose CT technique utilized. Heart size appears within normal limits. Mild scarring in the lung bases. Within the abdomen, the liver normal in size. Tiny subcentimeters hepatic cyst inferiorly, unchanged. The spleen, pancreas, gallbladder, and adrenal glands are unremarkable. Bilateral kidneys demonstrate uniform enhancement. Multiple bilateral renal cortical cysts and left renal sinus cyst similar to prior exam. No findings for obstructive uropathy. No mesenteric or retroperitoneal lymphadenopathy. No free fluid collection seen. Bowel loops are nonopacified, demonstrating no findings for obstruction. No focal bowel wall thickening or mass identified. Within the pelvis, bladder mildly distended with diffuse wall thickening. Patient status post hysterectomy. No pelvic lymphadenopathy or free fluid. Bony structures are intact. There is bony demineralization with multilevel degenerative spondylosis in the lower thoracic and lumbar spine.     1. Diffuse bladder wall thickening without discrete mass. Underlying inflammatory or infectious etiology such as cystitis should be considered. Correlate clinically. 2. Bilateral renal cortical cyst which no further evaluation recommended at this time. 3. Nonobstructive bowel gas pattern with scattered colonic diverticulosis. 4. No other findings for acute intra-abdominal pelvic abnormality. Electronically signed by Cristino Brasher MD    XR CHEST PORTABLE    Result Date: 1/27/2025  PORTABLE CHEST. Comparison Study: 12/19/2024 HISTORY: Chest pain FINDINGS: Patient is rotated in this exam. Heart size and mediastinal structures are unchanged. Moderate tortuosity of the thoracic aorta unchanged. No localized consolidation or pleural effusion. Bony structures are intact.     1. No findings for acute cardiopulmonary disease. Electronically signed by Cristino Brasher MD      Consults:     IP CONSULT TO HOSPITALIST  IP CONSULT TO HOME CARE NEEDS    Labs:     Recent Labs

## 2025-01-30 NOTE — CARE COORDINATION
CASE MANAGEMENT DISCHARGE SUMMARY      Discharge to: [facility]    Precertification completed: N/A  Hospital Exemption Notification (HENS) completed: N/A    IMM given: (date) 1/30/25  Follow-Up copy of Important Message from Medicare (IMM2) has been explained to patient and/or designated healthcare decision maker (HCDM). Pt and/or HCDM aware that patient is permitted to stay an additional 4 hours prior to discharge to consider an appeal if they feel as though they are being discharged too soon. Patient may discharge as planned if chooses to do so.  Patient/HCDM voice no other concerns or questions regarding this process.      New Durable Medical Equipment ordered/agency: N/A    Transportation:    Family/car: Personal      Confirmed discharge plan with:     Patient: yes     Family:  yes        Facility/Agency, name: [facility]      RN, name: Cynthia    Note: Discharging nurse to complete KIERRA, reconcile AVS, and place final copy with patient's discharge packet. RN to ensure that written prescriptions for  Level II medications are sent with patient to the facility as per protocol.      Claritza Morrison RN

## 2025-01-31 LAB
BACTERIA BLD CULT ORG #2: NORMAL
BACTERIA BLD CULT: NORMAL

## 2025-03-09 DIAGNOSIS — I11.0 HYPERTENSIVE HEART DISEASE WITH HEART FAILURE (HCC): ICD-10-CM

## 2025-03-10 RX ORDER — VALSARTAN 160 MG/1
160 TABLET ORAL DAILY
Qty: 90 TABLET | Refills: 1 | OUTPATIENT
Start: 2025-03-10

## 2025-05-22 ENCOUNTER — OFFICE VISIT (OUTPATIENT)
Dept: FAMILY MEDICINE CLINIC | Age: 89
End: 2025-05-22
Payer: COMMERCIAL

## 2025-05-22 VITALS
WEIGHT: 133.2 LBS | OXYGEN SATURATION: 96 % | RESPIRATION RATE: 19 BRPM | DIASTOLIC BLOOD PRESSURE: 82 MMHG | HEART RATE: 91 BPM | HEIGHT: 64 IN | TEMPERATURE: 97.8 F | SYSTOLIC BLOOD PRESSURE: 128 MMHG | BODY MASS INDEX: 22.74 KG/M2

## 2025-05-22 DIAGNOSIS — M81.0 AGE RELATED OSTEOPOROSIS, UNSPECIFIED PATHOLOGICAL FRACTURE PRESENCE: ICD-10-CM

## 2025-05-22 DIAGNOSIS — E04.2 MULTIPLE THYROID NODULES: ICD-10-CM

## 2025-05-22 DIAGNOSIS — M54.2 CERVICALGIA: ICD-10-CM

## 2025-05-22 DIAGNOSIS — Z76.89 ENCOUNTER TO ESTABLISH CARE: Primary | ICD-10-CM

## 2025-05-22 DIAGNOSIS — M54.89 OTHER CHRONIC BACK PAIN: ICD-10-CM

## 2025-05-22 DIAGNOSIS — D50.9 MICROCYTIC ANEMIA: ICD-10-CM

## 2025-05-22 DIAGNOSIS — G89.29 OTHER CHRONIC BACK PAIN: ICD-10-CM

## 2025-05-22 DIAGNOSIS — E78.2 MIXED HYPERLIPIDEMIA: ICD-10-CM

## 2025-05-22 DIAGNOSIS — M47.819 SPONDYLOARTHROPATHY: ICD-10-CM

## 2025-05-22 DIAGNOSIS — R82.90 ABNORMAL URINALYSIS: ICD-10-CM

## 2025-05-22 DIAGNOSIS — R10.13 EPIGASTRIC PAIN: ICD-10-CM

## 2025-05-22 LAB
BILIRUBIN, POC: NEGATIVE
BLOOD URINE, POC: NORMAL
CLARITY, POC: NORMAL
COLOR, POC: NORMAL
GLUCOSE URINE, POC: NEGATIVE MG/DL
KETONES, POC: NEGATIVE MG/DL
LEUKOCYTE EST, POC: NORMAL
NITRITE, POC: NEGATIVE
PH, POC: 7
PROTEIN, POC: NEGATIVE MG/DL
SPECIFIC GRAVITY, POC: 1.01
UROBILINOGEN, POC: 1 MG/DL

## 2025-05-22 PROCEDURE — 1123F ACP DISCUSS/DSCN MKR DOCD: CPT | Performed by: SURGERY

## 2025-05-22 PROCEDURE — 81002 URINALYSIS NONAUTO W/O SCOPE: CPT | Performed by: SURGERY

## 2025-05-22 PROCEDURE — 99204 OFFICE O/P NEW MOD 45 MIN: CPT | Performed by: SURGERY

## 2025-05-22 PROCEDURE — 1159F MED LIST DOCD IN RCRD: CPT | Performed by: SURGERY

## 2025-05-22 RX ORDER — FERROUS GLUCONATE 324(38)MG
324 TABLET ORAL
COMMUNITY
Start: 2025-02-09

## 2025-05-22 RX ORDER — UBIDECARENONE 75 MG
50 CAPSULE ORAL DAILY
COMMUNITY

## 2025-05-22 RX ORDER — TRAMADOL HYDROCHLORIDE 50 MG/1
50 TABLET ORAL EVERY 6 HOURS PRN
Qty: 28 TABLET | Refills: 0 | Status: SHIPPED | OUTPATIENT
Start: 2025-05-22 | End: 2025-05-29

## 2025-05-22 SDOH — ECONOMIC STABILITY: FOOD INSECURITY: WITHIN THE PAST 12 MONTHS, THE FOOD YOU BOUGHT JUST DIDN'T LAST AND YOU DIDN'T HAVE MONEY TO GET MORE.: NEVER TRUE

## 2025-05-22 SDOH — ECONOMIC STABILITY: FOOD INSECURITY: WITHIN THE PAST 12 MONTHS, YOU WORRIED THAT YOUR FOOD WOULD RUN OUT BEFORE YOU GOT MONEY TO BUY MORE.: NEVER TRUE

## 2025-05-22 ASSESSMENT — PATIENT HEALTH QUESTIONNAIRE - PHQ9
7. TROUBLE CONCENTRATING ON THINGS, SUCH AS READING THE NEWSPAPER OR WATCHING TELEVISION: NOT AT ALL
3. TROUBLE FALLING OR STAYING ASLEEP: NOT AT ALL
5. POOR APPETITE OR OVEREATING: NOT AT ALL
SUM OF ALL RESPONSES TO PHQ QUESTIONS 1-9: 0
10. IF YOU CHECKED OFF ANY PROBLEMS, HOW DIFFICULT HAVE THESE PROBLEMS MADE IT FOR YOU TO DO YOUR WORK, TAKE CARE OF THINGS AT HOME, OR GET ALONG WITH OTHER PEOPLE: NOT DIFFICULT AT ALL
SUM OF ALL RESPONSES TO PHQ QUESTIONS 1-9: 0
2. FEELING DOWN, DEPRESSED OR HOPELESS: NOT AT ALL
4. FEELING TIRED OR HAVING LITTLE ENERGY: NOT AT ALL
1. LITTLE INTEREST OR PLEASURE IN DOING THINGS: NOT AT ALL
8. MOVING OR SPEAKING SO SLOWLY THAT OTHER PEOPLE COULD HAVE NOTICED. OR THE OPPOSITE, BEING SO FIGETY OR RESTLESS THAT YOU HAVE BEEN MOVING AROUND A LOT MORE THAN USUAL: NOT AT ALL
SUM OF ALL RESPONSES TO PHQ QUESTIONS 1-9: 0
9. THOUGHTS THAT YOU WOULD BE BETTER OFF DEAD, OR OF HURTING YOURSELF: NOT AT ALL
6. FEELING BAD ABOUT YOURSELF - OR THAT YOU ARE A FAILURE OR HAVE LET YOURSELF OR YOUR FAMILY DOWN: NOT AT ALL
SUM OF ALL RESPONSES TO PHQ QUESTIONS 1-9: 0

## 2025-05-22 NOTE — PROGRESS NOTES
(around 8/22/2025).    Results  Imaging   - MRI of the neck: Moderate to severe arthritis in the cervical spine    On this date 5/22/2025 I have spent 45 minutes reviewing previous notes, test results and face to face with the patient discussing the diagnosis and importance of compliance with the treatment plan as well as documenting on the day of the visit.    Laura Carrasco, DO

## 2025-05-23 ENCOUNTER — HOSPITAL ENCOUNTER (EMERGENCY)
Age: 89
Discharge: HOME | End: 2025-05-23
Attending: EMERGENCY MEDICINE
Payer: COMMERCIAL

## 2025-05-23 VITALS
TEMPERATURE: 98 F | SYSTOLIC BLOOD PRESSURE: 164 MMHG | RESPIRATION RATE: 20 BRPM | HEART RATE: 70 BPM | OXYGEN SATURATION: 98 % | DIASTOLIC BLOOD PRESSURE: 60 MMHG

## 2025-05-23 DIAGNOSIS — N30.00 ACUTE CYSTITIS WITHOUT HEMATURIA: ICD-10-CM

## 2025-05-23 DIAGNOSIS — M19.90 OSTEOARTHRITIS, UNSPECIFIED OSTEOARTHRITIS TYPE, UNSPECIFIED SITE: Primary | ICD-10-CM

## 2025-05-23 LAB
25(OH)D3 SERPL-MCNC: 37 NG/ML
ALBUMIN SERPL-MCNC: 3.7 G/DL (ref 3.4–5)
ALBUMIN SERPL-MCNC: 4.2 G/DL (ref 3.4–5)
ALBUMIN/GLOB SERPL: 1.7 {RATIO} (ref 1.1–2.2)
ALP SERPL-CCNC: 122 U/L (ref 40–129)
ALP SERPL-CCNC: 141 U/L (ref 40–129)
ALT SERPL-CCNC: 14 U/L (ref 10–40)
ALT SERPL-CCNC: 9 U/L (ref 10–40)
ANION GAP SERPL CALCULATED.3IONS-SCNC: 10 MMOL/L (ref 3–16)
ANION GAP SERPL CALCULATED.3IONS-SCNC: 9 MMOL/L (ref 3–16)
AST SERPL-CCNC: 13 U/L (ref 15–37)
AST SERPL-CCNC: 21 U/L (ref 15–37)
BACTERIA URNS QL MICRO: ABNORMAL /HPF
BASOPHILS # BLD: 0.1 K/UL (ref 0–0.2)
BASOPHILS # BLD: 0.2 K/UL (ref 0–0.2)
BASOPHILS NFR BLD: 1 %
BASOPHILS NFR BLD: 2.1 %
BILIRUB DIRECT SERPL-MCNC: 0.3 MG/DL (ref 0–0.3)
BILIRUB INDIRECT SERPL-MCNC: 0.4 MG/DL (ref 0–1)
BILIRUB SERPL-MCNC: 0.7 MG/DL (ref 0–1)
BILIRUB SERPL-MCNC: 0.9 MG/DL (ref 0–1)
BILIRUB UR QL STRIP.AUTO: NEGATIVE
BUN SERPL-MCNC: 18 MG/DL (ref 7–20)
BUN SERPL-MCNC: 30 MG/DL (ref 7–20)
CALCIUM SERPL-MCNC: 10.5 MG/DL (ref 8.3–10.6)
CALCIUM SERPL-MCNC: 9.5 MG/DL (ref 8.3–10.6)
CHLORIDE SERPL-SCNC: 104 MMOL/L (ref 99–110)
CHLORIDE SERPL-SCNC: 98 MMOL/L (ref 99–110)
CLARITY UR: ABNORMAL
CO2 SERPL-SCNC: 25 MMOL/L (ref 21–32)
CO2 SERPL-SCNC: 26 MMOL/L (ref 21–32)
COLOR UR: ABNORMAL
CREAT SERPL-MCNC: 0.8 MG/DL (ref 0.6–1.2)
CREAT SERPL-MCNC: 1.1 MG/DL (ref 0.6–1.2)
CRP SERPL-MCNC: 34.4 MG/L (ref 0–5.1)
CRYSTALS URNS MICRO: ABNORMAL /HPF
DEPRECATED RDW RBC AUTO: 15.4 % (ref 12.4–15.4)
DEPRECATED RDW RBC AUTO: 15.6 % (ref 12.4–15.4)
EOSINOPHIL # BLD: 0.3 K/UL (ref 0–0.6)
EOSINOPHIL # BLD: 0.4 K/UL (ref 0–0.6)
EOSINOPHIL NFR BLD: 3.4 %
EOSINOPHIL NFR BLD: 5.3 %
EPI CELLS #/AREA URNS HPF: ABNORMAL /HPF (ref 0–5)
ERYTHROCYTE [SEDIMENTATION RATE] IN BLOOD BY WESTERGREN METHOD: 33 MM/HR (ref 0–30)
FERRITIN SERPL IA-MCNC: 118 NG/ML (ref 15–150)
FOLATE SERPL-MCNC: 13.6 NG/ML (ref 4.78–24.2)
GFR SERPLBLD CREATININE-BSD FMLA CKD-EPI: 47 ML/MIN/{1.73_M2}
GFR SERPLBLD CREATININE-BSD FMLA CKD-EPI: 69 ML/MIN/{1.73_M2}
GLUCOSE SERPL-MCNC: 88 MG/DL (ref 70–99)
GLUCOSE SERPL-MCNC: 92 MG/DL (ref 70–99)
GLUCOSE UR STRIP.AUTO-MCNC: NEGATIVE MG/DL
HCT VFR BLD AUTO: 35.2 % (ref 36–48)
HCT VFR BLD AUTO: 38.6 % (ref 36–48)
HGB BLD-MCNC: 11.8 G/DL (ref 12–16)
HGB BLD-MCNC: 13 G/DL (ref 12–16)
HGB UR QL STRIP.AUTO: ABNORMAL
HYALINE CASTS #/AREA URNS LPF: ABNORMAL /LPF (ref 0–2)
IRON SATN MFR SERPL: 6 % (ref 15–50)
IRON SERPL-MCNC: 20 UG/DL (ref 37–145)
KETONES UR STRIP.AUTO-MCNC: ABNORMAL MG/DL
LACTATE BLDV-SCNC: 1.3 MMOL/L (ref 0.4–1.9)
LEUKOCYTE ESTERASE UR QL STRIP.AUTO: ABNORMAL
LIPASE SERPL-CCNC: 63 U/L (ref 13–60)
LYMPHOCYTES # BLD: 1.2 K/UL (ref 1–5.1)
LYMPHOCYTES # BLD: 1.4 K/UL (ref 1–5.1)
LYMPHOCYTES NFR BLD: 12.9 %
LYMPHOCYTES NFR BLD: 17.8 %
MCH RBC QN AUTO: 26.4 PG (ref 26–34)
MCH RBC QN AUTO: 26.8 PG (ref 26–34)
MCHC RBC AUTO-ENTMCNC: 33.6 G/DL (ref 31–36)
MCHC RBC AUTO-ENTMCNC: 33.8 G/DL (ref 31–36)
MCV RBC AUTO: 78.3 FL (ref 80–100)
MCV RBC AUTO: 79.8 FL (ref 80–100)
MONOCYTES # BLD: 0.7 K/UL (ref 0–1.3)
MONOCYTES # BLD: 0.8 K/UL (ref 0–1.3)
MONOCYTES NFR BLD: 8.5 %
MONOCYTES NFR BLD: 8.8 %
MUCOUS THREADS #/AREA URNS LPF: ABNORMAL /LPF
NEUTROPHILS # BLD: 5.1 K/UL (ref 1.7–7.7)
NEUTROPHILS # BLD: 6.5 K/UL (ref 1.7–7.7)
NEUTROPHILS NFR BLD: 67.1 %
NEUTROPHILS NFR BLD: 73.1 %
NITRITE UR QL STRIP.AUTO: NEGATIVE
PH UR STRIP.AUTO: 5.5 [PH] (ref 5–8)
PLATELET # BLD AUTO: 235 K/UL (ref 135–450)
PLATELET # BLD AUTO: 256 K/UL (ref 135–450)
PMV BLD AUTO: 6.8 FL (ref 5–10.5)
PMV BLD AUTO: 8.1 FL (ref 5–10.5)
POTASSIUM SERPL-SCNC: 3.6 MMOL/L (ref 3.5–5.1)
POTASSIUM SERPL-SCNC: 4 MMOL/L (ref 3.5–5.1)
PROT SERPL-MCNC: 5.9 G/DL (ref 6.4–8.2)
PROT SERPL-MCNC: 6.4 G/DL (ref 6.4–8.2)
PROT UR STRIP.AUTO-MCNC: 30 MG/DL
RBC # BLD AUTO: 4.41 M/UL (ref 4–5.2)
RBC # BLD AUTO: 4.93 M/UL (ref 4–5.2)
RBC #/AREA URNS HPF: ABNORMAL /HPF (ref 0–4)
SODIUM SERPL-SCNC: 134 MMOL/L (ref 136–145)
SODIUM SERPL-SCNC: 138 MMOL/L (ref 136–145)
SP GR UR STRIP.AUTO: 1.02 (ref 1–1.03)
T4 FREE SERPL-MCNC: 1.1 NG/DL (ref 0.9–1.8)
TIBC SERPL-MCNC: 324 UG/DL (ref 260–445)
TROPONIN, HIGH SENSITIVITY: 22 NG/L (ref 0–14)
TSH SERPL DL<=0.005 MIU/L-ACNC: 0.24 UIU/ML (ref 0.27–4.2)
UA COMPLETE W REFLEX CULTURE PNL UR: ABNORMAL
UA DIPSTICK W REFLEX MICRO PNL UR: YES
URN SPEC COLLECT METH UR: ABNORMAL
UROBILINOGEN UR STRIP-ACNC: 0.2 E.U./DL
VIT B12 SERPL-MCNC: 1650 PG/ML (ref 211–911)
WBC # BLD AUTO: 7.6 K/UL (ref 4–11)
WBC # BLD AUTO: 8.9 K/UL (ref 4–11)
WBC #/AREA URNS HPF: ABNORMAL /HPF (ref 0–5)

## 2025-05-23 PROCEDURE — 6370000000 HC RX 637 (ALT 250 FOR IP): Performed by: EMERGENCY MEDICINE

## 2025-05-23 PROCEDURE — 93005 ELECTROCARDIOGRAM TRACING: CPT | Performed by: EMERGENCY MEDICINE

## 2025-05-23 PROCEDURE — 80053 COMPREHEN METABOLIC PANEL: CPT

## 2025-05-23 PROCEDURE — 51701 INSERT BLADDER CATHETER: CPT

## 2025-05-23 PROCEDURE — 81001 URINALYSIS AUTO W/SCOPE: CPT

## 2025-05-23 PROCEDURE — 84484 ASSAY OF TROPONIN QUANT: CPT

## 2025-05-23 PROCEDURE — 99284 EMERGENCY DEPT VISIT MOD MDM: CPT

## 2025-05-23 PROCEDURE — 36415 COLL VENOUS BLD VENIPUNCTURE: CPT

## 2025-05-23 PROCEDURE — 85025 COMPLETE CBC W/AUTO DIFF WBC: CPT

## 2025-05-23 PROCEDURE — 2580000003 HC RX 258: Performed by: EMERGENCY MEDICINE

## 2025-05-23 PROCEDURE — 83605 ASSAY OF LACTIC ACID: CPT

## 2025-05-23 RX ORDER — HYDROCODONE BITARTRATE AND ACETAMINOPHEN 5; 325 MG/1; MG/1
1 TABLET ORAL ONCE
Status: COMPLETED | OUTPATIENT
Start: 2025-05-23 | End: 2025-05-23

## 2025-05-23 RX ORDER — LIDOCAINE 4 G/G
1 PATCH TOPICAL ONCE
Status: DISCONTINUED | OUTPATIENT
Start: 2025-05-23 | End: 2025-05-24 | Stop reason: HOSPADM

## 2025-05-23 RX ORDER — CEPHALEXIN 500 MG/1
500 CAPSULE ORAL 3 TIMES DAILY
Qty: 21 CAPSULE | Refills: 0 | Status: SHIPPED | OUTPATIENT
Start: 2025-05-23 | End: 2025-05-30

## 2025-05-23 RX ORDER — IBUPROFEN 600 MG/1
600 TABLET, FILM COATED ORAL ONCE
Status: COMPLETED | OUTPATIENT
Start: 2025-05-23 | End: 2025-05-23

## 2025-05-23 RX ORDER — LIDOCAINE 50 MG/G
1 PATCH TOPICAL DAILY
Qty: 10 PATCH | Refills: 0 | Status: SHIPPED | OUTPATIENT
Start: 2025-05-23 | End: 2025-06-02

## 2025-05-23 RX ORDER — 0.9 % SODIUM CHLORIDE 0.9 %
1000 INTRAVENOUS SOLUTION INTRAVENOUS ONCE
Status: COMPLETED | OUTPATIENT
Start: 2025-05-23 | End: 2025-05-23

## 2025-05-23 RX ADMIN — SODIUM CHLORIDE 1000 ML: 0.9 INJECTION, SOLUTION INTRAVENOUS at 18:34

## 2025-05-23 RX ADMIN — HYDROCODONE BITARTRATE AND ACETAMINOPHEN 1 TABLET: 5; 325 TABLET ORAL at 17:56

## 2025-05-23 RX ADMIN — IBUPROFEN 600 MG: 600 TABLET, FILM COATED ORAL at 21:59

## 2025-05-23 RX ADMIN — HYDROCODONE BITARTRATE AND ACETAMINOPHEN 1 TABLET: 5; 325 TABLET ORAL at 21:59

## 2025-05-23 ASSESSMENT — PAIN SCALES - GENERAL
PAINLEVEL_OUTOF10: 7
PAINLEVEL_OUTOF10: 2

## 2025-05-23 NOTE — ED PROVIDER NOTES
Emergency Department Provider Note  Location: [facility] EMERGENCY DEPARTMENT  5/23/2025     Patient Identification  Catherine Rebolledo is a 89 y.o. female    Chief Complaint  Fatigue (Pt arrives via EMS from [facility] for weakness and pain all over. Pt had septic UTI in March. Pt alert and oriented x 4. Pt reports pain in her back, head, neck, and legs.)          HPI  (History provided by patient)  Patient is a 89-year-old female who presents with EMS report of pain all over and possible fatigue.  Patient reports that she has chronic lower back pain shoulder pain back of her head neck legs hips knees.  When clarified this appears to be a chronic issue.  She takes Norco she is prescribed tramadol.  She reports that sometimes the pain is so bad it is difficult for her to walk.  She denies any new symptoms.  She denies any injury no trauma.  Assisted living wanted to make sure she does not have a urinary tract infection and she had 1 a month or 2 ago and was \"almost septic\".  Tolerating p.o., denies any urinary symptoms other than maybe a little frequency.  No dysuria.      Nursing Notes were all reviewed and agreed with, or any disagreements were addressed in the HPI:  Allergies:   Allergies   Allergen Reactions    Simvastatin Myalgia     Body aches      Advair Hfa [Fluticasone-Salmeterol] Hallucinations     Hallucinations with inhaled fluticasone    Hydrochlorothiazide      Low sodium and potassium    Lasix [Furosemide]      Low sodium, potassium with diuretics Lasix and hydrochlorothiazide    Symbicort [Budesonide-Formoterol Fumarate] Hallucinations    Thiazide-Type Diuretics        Past medical history:  has a past medical history of Anxiety (2/24/2022), Bleeding ulcer, Cancer ([facility]), Confusion (10/30/2023), COPD with asthma ([facility]) (12/12/2023), Coronary artery disease involving native heart (1/11/2024), Gastroesophagitis, GERD (gastroesophageal reflux disease), Hyperlipidemia, Hypertension,  Moderate persistent asthma (2/12/2021), Pneumonia, Rheumatic fever with heart involvement, Trigger ring finger of right hand (7/1/2013), and Unspecified diseases of blood and blood-forming organs.    Past surgical history:  has a past surgical history that includes Hysterectomy; Tonsillectomy; tumor removal (2013); Rectocele repair; Breast surgery; fracture surgery; Upper gastrointestinal endoscopy (03/07/2012); bladder repair (N/A, 2002); cyst removal (Right, 08/01/2013); joint replacement (Right, 03/28/2012); shoulder surgery (Right); Upper gastrointestinal endoscopy (11/08/2013); Upper gastrointestinal endoscopy (04/24/2014); eye surgery; Upper gastrointestinal endoscopy (03/17/2016); Upper gastrointestinal endoscopy (06/29/2017); Upper gastrointestinal endoscopy (N/A, 08/11/2021); Colonoscopy (N/A, 04/23/2024); Upper gastrointestinal endoscopy (N/A, 04/23/2024); and other surgical history (07/05/2024).    Home medications:   Prior to Admission medications    Medication Sig Start Date End Date Taking? Authorizing Provider   tiZANidine (ZANAFLEX) 4 MG tablet Take 1 tablet by mouth nightly as needed (pain) 5/18/25  Yes Thomas Myers MD   lidocaine (LIDODERM) 5 % Place 1 patch onto the skin daily for 10 days 12 hours on, 12 hours off. 5/23/25 6/2/25 Yes Viet Mueller MD   diclofenac sodium (VOLTAREN) 1 % GEL Apply 4 g topically 4 times daily 5/23/25  Yes Viet Mueller MD   cephALEXin (KEFLEX) 500 MG capsule Take 1 capsule by mouth 3 times daily for 7 days 5/23/25 5/30/25 Yes Viet Mueller MD   ferrous gluconate (FERGON) 324 (38 Fe) MG tablet Take 1 tablet by mouth daily (with breakfast) 2/9/25   Thomas Myers MD   vitamin B-12 (CYANOCOBALAMIN) 100 MCG tablet Take 0.5 tablets by mouth daily    Thomas Myers MD   traMADol (ULTRAM) 50 MG tablet Take 1 tablet by mouth every 6 hours as needed for Pain for up to 7 days. Intended supply: 7 days. Take lowest dose possible to manage

## 2025-05-23 NOTE — ED NOTES
Attempted to obtain urine sample. Pt requested BSC. Pt placed paper towels in the commode and contaminated the urine. Will attempt straight cath shortly to obtain sample.

## 2025-05-24 LAB
BACTERIA UR CULT: NORMAL
EKG ATRIAL RATE: 74 BPM
EKG DIAGNOSIS: NORMAL
EKG P AXIS: 61 DEGREES
EKG P-R INTERVAL: 152 MS
EKG Q-T INTERVAL: 416 MS
EKG QRS DURATION: 128 MS
EKG QTC CALCULATION (BAZETT): 461 MS
EKG R AXIS: -20 DEGREES
EKG T AXIS: 25 DEGREES
EKG VENTRICULAR RATE: 74 BPM

## 2025-05-24 PROCEDURE — 93010 ELECTROCARDIOGRAM REPORT: CPT | Performed by: INTERNAL MEDICINE

## 2025-05-29 ENCOUNTER — OFFICE VISIT (OUTPATIENT)
Dept: FAMILY MEDICINE CLINIC | Age: 89
End: 2025-05-29
Payer: COMMERCIAL

## 2025-05-29 VITALS
OXYGEN SATURATION: 95 % | TEMPERATURE: 96.9 F | WEIGHT: 136 LBS | HEART RATE: 94 BPM | BODY MASS INDEX: 23.34 KG/M2 | RESPIRATION RATE: 16 BRPM | SYSTOLIC BLOOD PRESSURE: 145 MMHG | DIASTOLIC BLOOD PRESSURE: 76 MMHG

## 2025-05-29 DIAGNOSIS — N30.00 ACUTE CYSTITIS WITHOUT HEMATURIA: ICD-10-CM

## 2025-05-29 DIAGNOSIS — R94.4 DECREASED GFR: ICD-10-CM

## 2025-05-29 DIAGNOSIS — I10 PRIMARY HYPERTENSION: Chronic | ICD-10-CM

## 2025-05-29 DIAGNOSIS — M54.2 CERVICALGIA: ICD-10-CM

## 2025-05-29 DIAGNOSIS — S99.921A INJURY OF RIGHT FOOT, INITIAL ENCOUNTER: ICD-10-CM

## 2025-05-29 DIAGNOSIS — F43.20 GRIEF REACTION: ICD-10-CM

## 2025-05-29 DIAGNOSIS — M81.0 AGE RELATED OSTEOPOROSIS, UNSPECIFIED PATHOLOGICAL FRACTURE PRESENCE: Primary | ICD-10-CM

## 2025-05-29 PROBLEM — E87.1 HYPONATREMIA: Status: RESOLVED | Noted: 2023-10-30 | Resolved: 2025-05-29

## 2025-05-29 PROBLEM — R68.2 DRY MOUTH: Status: RESOLVED | Noted: 2019-11-22 | Resolved: 2025-05-29

## 2025-05-29 PROBLEM — J32.2 CHRONIC ETHMOIDAL SINUSITIS: Status: RESOLVED | Noted: 2020-08-09 | Resolved: 2025-05-29

## 2025-05-29 PROBLEM — S40.011A CONTUSION OF RIGHT SHOULDER: Status: RESOLVED | Noted: 2021-10-09 | Resolved: 2025-05-29

## 2025-05-29 PROBLEM — S90.32XA CONTUSION OF LEFT FOOT: Status: RESOLVED | Noted: 2021-10-09 | Resolved: 2025-05-29

## 2025-05-29 PROBLEM — J32.0 CHRONIC MAXILLARY SINUSITIS: Status: RESOLVED | Noted: 2020-08-09 | Resolved: 2025-05-29

## 2025-05-29 PROBLEM — J39.2 DRY THROAT: Status: RESOLVED | Noted: 2019-11-22 | Resolved: 2025-05-29

## 2025-05-29 PROCEDURE — 1123F ACP DISCUSS/DSCN MKR DOCD: CPT

## 2025-05-29 PROCEDURE — 1160F RVW MEDS BY RX/DR IN RCRD: CPT

## 2025-05-29 PROCEDURE — 1159F MED LIST DOCD IN RCRD: CPT

## 2025-05-29 PROCEDURE — 99214 OFFICE O/P EST MOD 30 MIN: CPT

## 2025-05-29 SDOH — ECONOMIC STABILITY: FOOD INSECURITY: WITHIN THE PAST 12 MONTHS, YOU WORRIED THAT YOUR FOOD WOULD RUN OUT BEFORE YOU GOT MONEY TO BUY MORE.: NEVER TRUE

## 2025-05-29 SDOH — ECONOMIC STABILITY: FOOD INSECURITY: WITHIN THE PAST 12 MONTHS, THE FOOD YOU BOUGHT JUST DIDN'T LAST AND YOU DIDN'T HAVE MONEY TO GET MORE.: NEVER TRUE

## 2025-05-29 ASSESSMENT — PATIENT HEALTH QUESTIONNAIRE - PHQ9
SUM OF ALL RESPONSES TO PHQ QUESTIONS 1-9: 16
8. MOVING OR SPEAKING SO SLOWLY THAT OTHER PEOPLE COULD HAVE NOTICED. OR THE OPPOSITE, BEING SO FIGETY OR RESTLESS THAT YOU HAVE BEEN MOVING AROUND A LOT MORE THAN USUAL: NOT AT ALL
9. THOUGHTS THAT YOU WOULD BE BETTER OFF DEAD, OR OF HURTING YOURSELF: NOT AT ALL
SUM OF ALL RESPONSES TO PHQ QUESTIONS 1-9: 16
10. IF YOU CHECKED OFF ANY PROBLEMS, HOW DIFFICULT HAVE THESE PROBLEMS MADE IT FOR YOU TO DO YOUR WORK, TAKE CARE OF THINGS AT HOME, OR GET ALONG WITH OTHER PEOPLE: VERY DIFFICULT
1. LITTLE INTEREST OR PLEASURE IN DOING THINGS: NEARLY EVERY DAY
7. TROUBLE CONCENTRATING ON THINGS, SUCH AS READING THE NEWSPAPER OR WATCHING TELEVISION: NEARLY EVERY DAY
3. TROUBLE FALLING OR STAYING ASLEEP: NEARLY EVERY DAY
6. FEELING BAD ABOUT YOURSELF - OR THAT YOU ARE A FAILURE OR HAVE LET YOURSELF OR YOUR FAMILY DOWN: NEARLY EVERY DAY
SUM OF ALL RESPONSES TO PHQ QUESTIONS 1-9: 16
4. FEELING TIRED OR HAVING LITTLE ENERGY: NEARLY EVERY DAY
2. FEELING DOWN, DEPRESSED OR HOPELESS: SEVERAL DAYS
5. POOR APPETITE OR OVEREATING: NOT AT ALL
SUM OF ALL RESPONSES TO PHQ QUESTIONS 1-9: 16

## 2025-05-29 NOTE — PROGRESS NOTES
exacerbation (HCC)    Hypertensive heart disease with heart failure (HCC)    Leg swelling    History of syncope    Fall in home, initial encounter    Acute cystitis without hematuria    Cervicalgia       PE  Vitals:    05/29/25 1521 05/29/25 1535   BP: (!) 154/90 (!) 145/76   BP Site: Left Upper Arm Left Upper Arm   Patient Position: Sitting Sitting   Pulse: 94    Resp: 16    Temp: 96.9 °F (36.1 °C)    TempSrc: Temporal    SpO2: 95%    Weight: 61.7 kg (136 lb)      Estimated body mass index is 23.34 kg/m² as calculated from the following:    Height as of 5/22/25: 1.626 m (5' 4\").    Weight as of this encounter: 61.7 kg (136 lb).    Physical Exam  Vitals reviewed.   Constitutional:       Appearance: Normal appearance.   HENT:      Head: Normocephalic.      Right Ear: External ear normal.      Left Ear: External ear normal.      Nose: Nose normal.      Mouth/Throat:      Mouth: Mucous membranes are moist.   Eyes:      Extraocular Movements: Extraocular movements intact.      Pupils: Pupils are equal, round, and reactive to light.   Cardiovascular:      Rate and Rhythm: Normal rate and regular rhythm.      Pulses: Normal pulses.      Heart sounds: Normal heart sounds.   Pulmonary:      Effort: Pulmonary effort is normal.      Breath sounds: Normal breath sounds.   Abdominal:      General: Abdomen is flat. Bowel sounds are normal.      Palpations: Abdomen is soft.   Musculoskeletal:         General: Normal range of motion.      Cervical back: Normal range of motion and neck supple.      Right foot: Deformity present.        Feet:    Feet:      Comments: Localized swelling/tenderness/bruising to the 2nd-4th digit on her right foot  Distal joint  Skin:     General: Skin is warm and dry.      Capillary Refill: Capillary refill takes less than 2 seconds.      Coloration: Skin is pale.   Neurological:      General: No focal deficit present.      Mental Status: She is alert. Mental status is at baseline.      Motor: Weakness

## 2025-05-30 ENCOUNTER — HOSPITAL ENCOUNTER (OUTPATIENT)
Dept: GENERAL RADIOLOGY | Age: 89
Discharge: HOME | End: 2025-05-30
Payer: COMMERCIAL

## 2025-05-30 DIAGNOSIS — S99.921A INJURY OF RIGHT FOOT, INITIAL ENCOUNTER: ICD-10-CM

## 2025-05-30 PROCEDURE — 73630 X-RAY EXAM OF FOOT: CPT

## 2025-06-02 ENCOUNTER — RESULTS FOLLOW-UP (OUTPATIENT)
Dept: FAMILY MEDICINE CLINIC | Age: 89
End: 2025-06-02

## 2025-06-12 ENCOUNTER — OFFICE VISIT (OUTPATIENT)
Dept: FAMILY MEDICINE CLINIC | Age: 89
End: 2025-06-12
Payer: COMMERCIAL

## 2025-06-12 VITALS
RESPIRATION RATE: 18 BRPM | SYSTOLIC BLOOD PRESSURE: 126 MMHG | BODY MASS INDEX: 23.41 KG/M2 | WEIGHT: 136.4 LBS | OXYGEN SATURATION: 97 % | HEART RATE: 78 BPM | TEMPERATURE: 98 F | DIASTOLIC BLOOD PRESSURE: 78 MMHG

## 2025-06-12 DIAGNOSIS — M79.671 PAIN IN BOTH FEET: ICD-10-CM

## 2025-06-12 DIAGNOSIS — B37.2 INTERTRIGINOUS CANDIDIASIS: ICD-10-CM

## 2025-06-12 DIAGNOSIS — L29.9 ITCHING: ICD-10-CM

## 2025-06-12 DIAGNOSIS — M79.672 PAIN IN BOTH FEET: ICD-10-CM

## 2025-06-12 DIAGNOSIS — B37.2 INTERTRIGINOUS CANDIDIASIS: Primary | ICD-10-CM

## 2025-06-12 PROCEDURE — G2211 COMPLEX E/M VISIT ADD ON: HCPCS | Performed by: SURGERY

## 2025-06-12 PROCEDURE — 99214 OFFICE O/P EST MOD 30 MIN: CPT | Performed by: SURGERY

## 2025-06-12 PROCEDURE — 1123F ACP DISCUSS/DSCN MKR DOCD: CPT | Performed by: SURGERY

## 2025-06-12 PROCEDURE — 1159F MED LIST DOCD IN RCRD: CPT | Performed by: SURGERY

## 2025-06-12 RX ORDER — HYDROXYZINE PAMOATE 25 MG/1
25 CAPSULE ORAL 3 TIMES DAILY PRN
Qty: 180 CAPSULE | Refills: 1 | Status: SHIPPED | OUTPATIENT
Start: 2025-06-12

## 2025-06-12 RX ORDER — NYSTATIN 100000 [USP'U]/G
1 POWDER TOPICAL 2 TIMES DAILY
Qty: 60 G | Refills: 3 | Status: SHIPPED | OUTPATIENT
Start: 2025-06-12 | End: 2025-06-12 | Stop reason: SDUPTHER

## 2025-06-12 NOTE — PROGRESS NOTES
6/12/2025    History of Present Illness      This is a 89 y.o. female   Chief Complaint   Patient presents with    Follow-up     Back of head, foot. Itch all over    .    Reviewed xray images, toe is definitely broken. Conferred with Melina Madsen - no appropriate walking boot or post-op shoe available in office today. Patient will order from [employer].    Discussed air purifier for apartment to help with allergy/itching         Patient Active Problem List   Diagnosis    Hypertension    S/P elbow joint replacement    Ganglion cyst of flexor tendon sheath    Trigger ring finger of right hand    Hand pain, right    Wrist pain, right    Thyroid nodule    Chronic pansinusitis    Personal history of malignant carcinoid tumor of small intestine    Microcytic anemia    Lower abdominal pain    Iron deficiency anemia due to chronic blood loss    Intestinal malabsorption    Hearing loss due to cerumen impaction    Mixed hyperlipidemia    Syncope    Multiple thyroid nodules    Sprain of left rotator cuff capsule    Ataxia    Vertigo    Globus pharyngeus    Chronic cough    Post-nasal drip    Chronic allergic rhinitis    Gastroesophageal reflux disease    Hypertrophy of nasal turbinates    Laryngopharyngeal reflux (LPR)    Nasal septal spur    Moderate persistent asthma    Pulmonary nodule    Sicca syndrome    At high risk for falls    Closed nondisplaced fracture of styloid process of right ulna    Anxiety    Age related osteoporosis    Bursitis of left hip    Sacroiliac sprain, initial encounter    Neuropathic arthritis    Abdominal pain    Closed nondisplaced fracture of shaft of right clavicle    Right shoulder pain    NSTEMI (non-ST elevated myocardial infarction) ([facility])    Abnormal CT of the chest    Confusion    Dementia arising in the senium and presenium ([facility])    HTN (hypertension), benign    COPD with asthma (HCC)    Coronary artery disease involving native heart    History of non-ST elevation myocardial infarction

## 2025-06-12 NOTE — TELEPHONE ENCOUNTER
Pharmacy called about the sig on the Nystatin. They need to know a frequency as it has 2 different sets of directions.     Please send new RX or call back with verbal.

## 2025-06-13 RX ORDER — NYSTATIN 100000 [USP'U]/G
1 POWDER TOPICAL 2 TIMES DAILY
Qty: 60 G | Refills: 3 | Status: SHIPPED | OUTPATIENT
Start: 2025-06-13

## 2025-06-18 ENCOUNTER — TELEPHONE (OUTPATIENT)
Dept: FAMILY MEDICINE CLINIC | Age: 89
End: 2025-06-18

## 2025-06-18 NOTE — TELEPHONE ENCOUNTER
Héctor calling in from [facility] stating that they do not participate in patients insurance

## 2025-07-12 DIAGNOSIS — M79.671 PAIN IN BOTH FEET: Primary | ICD-10-CM

## 2025-07-12 DIAGNOSIS — M79.672 PAIN IN BOTH FEET: Primary | ICD-10-CM

## 2025-07-13 ENCOUNTER — HOSPITAL ENCOUNTER (INPATIENT)
Age: 89
LOS: 1 days | Discharge: HOME HEALTH | DRG: 206 | End: 2025-07-14
Attending: EMERGENCY MEDICINE
Payer: COMMERCIAL

## 2025-07-13 ENCOUNTER — APPOINTMENT (OUTPATIENT)
Dept: CT IMAGING | Age: 89
DRG: 206 | End: 2025-07-13
Payer: COMMERCIAL

## 2025-07-13 DIAGNOSIS — S22.31XA CLOSED FRACTURE OF ONE RIB OF RIGHT SIDE, INITIAL ENCOUNTER: Primary | ICD-10-CM

## 2025-07-13 DIAGNOSIS — E87.1 HYPONATREMIA: ICD-10-CM
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
## 2025-07-13 LAB
ALBUMIN SERPL-MCNC: 4.4 G/DL (ref 3.4–5)
ALBUMIN/GLOB SERPL: 2.1 {RATIO} (ref 1.1–2.2)
ALP SERPL-CCNC: 140 U/L (ref 40–129)
ALT SERPL-CCNC: 14 U/L (ref 10–40)
ANION GAP SERPL CALCULATED.3IONS-SCNC: 13 MMOL/L (ref 3–16)
APTT BLD: 31.3 SEC (ref 22.8–35.8)
AST SERPL-CCNC: 25 U/L (ref 15–37)
BACTERIA URNS QL MICRO: ABNORMAL /HPF
BASOPHILS # BLD: 0.1 K/UL (ref 0–0.2)
BASOPHILS NFR BLD: 0.9 %
BILIRUB SERPL-MCNC: 0.7 MG/DL (ref 0–1)
BILIRUB UR QL STRIP.AUTO: NEGATIVE
BUN SERPL-MCNC: 19 MG/DL (ref 7–20)
CALCIUM SERPL-MCNC: 10.4 MG/DL (ref 8.3–10.6)
CHLORIDE SERPL-SCNC: 89 MMOL/L (ref 99–110)
CLARITY UR: CLEAR
CO2 SERPL-SCNC: 26 MMOL/L (ref 21–32)
COLOR UR: YELLOW
CREAT SERPL-MCNC: 0.9 MG/DL (ref 0.6–1.2)
DEPRECATED RDW RBC AUTO: 14.9 % (ref 12.4–15.4)
EOSINOPHIL # BLD: 0.4 K/UL (ref 0–0.6)
EOSINOPHIL NFR BLD: 5.7 %
EPI CELLS #/AREA URNS HPF: ABNORMAL /HPF (ref 0–5)
GFR SERPLBLD CREATININE-BSD FMLA CKD-EPI: 60 ML/MIN/{1.73_M2}
GLUCOSE SERPL-MCNC: 102 MG/DL (ref 70–99)
GLUCOSE UR STRIP.AUTO-MCNC: NEGATIVE MG/DL
HCT VFR BLD AUTO: 39.9 % (ref 36–48)
HGB BLD-MCNC: 13.8 G/DL (ref 12–16)
HGB UR QL STRIP.AUTO: ABNORMAL
HYALINE CASTS #/AREA URNS LPF: ABNORMAL /LPF (ref 0–2)
INR PPP: 0.97 (ref 0.86–1.14)
KETONES UR STRIP.AUTO-MCNC: NEGATIVE MG/DL
LEUKOCYTE ESTERASE UR QL STRIP.AUTO: ABNORMAL
LYMPHOCYTES # BLD: 1.6 K/UL (ref 1–5.1)
LYMPHOCYTES NFR BLD: 21.6 %
MCH RBC QN AUTO: 27.4 PG (ref 26–34)
MCHC RBC AUTO-ENTMCNC: 34.5 G/DL (ref 31–36)
MCV RBC AUTO: 79.4 FL (ref 80–100)
MONOCYTES # BLD: 0.3 K/UL (ref 0–1.3)
MONOCYTES NFR BLD: 4.5 %
NEUTROPHILS # BLD: 4.9 K/UL (ref 1.7–7.7)
NEUTROPHILS NFR BLD: 67.3 %
NITRITE UR QL STRIP.AUTO: NEGATIVE
NT-PROBNP SERPL-MCNC: 207 PG/ML (ref 0–449)
PH UR STRIP.AUTO: 7 [PH] (ref 5–8)
PLATELET # BLD AUTO: 281 K/UL (ref 135–450)
PMV BLD AUTO: 7.3 FL (ref 5–10.5)
POTASSIUM SERPL-SCNC: 3.9 MMOL/L (ref 3.5–5.1)
PROT SERPL-MCNC: 6.5 G/DL (ref 6.4–8.2)
PROT UR STRIP.AUTO-MCNC: NEGATIVE MG/DL
PROTHROMBIN TIME: 13.2 SEC (ref 12.1–14.9)
RBC # BLD AUTO: 5.03 M/UL (ref 4–5.2)
RBC #/AREA URNS HPF: ABNORMAL /HPF (ref 0–4)
SODIUM SERPL-SCNC: 128 MMOL/L (ref 136–145)
SP GR UR STRIP.AUTO: 1.01 (ref 1–1.03)
TROPONIN, HIGH SENSITIVITY: 18 NG/L (ref 0–14)
TROPONIN, HIGH SENSITIVITY: 20 NG/L (ref 0–14)
UA COMPLETE W REFLEX CULTURE PNL UR: ABNORMAL
UA DIPSTICK W REFLEX MICRO PNL UR: YES
URN SPEC COLLECT METH UR: ABNORMAL
UROBILINOGEN UR STRIP-ACNC: 0.2 E.U./DL
WBC # BLD AUTO: 7.3 K/UL (ref 4–11)
WBC #/AREA URNS HPF: ABNORMAL /HPF (ref 0–5)

## 2025-07-13 PROCEDURE — 36415 COLL VENOUS BLD VENIPUNCTURE: CPT

## 2025-07-13 PROCEDURE — 71250 CT THORAX DX C-: CPT

## 2025-07-13 PROCEDURE — 6360000004 HC RX CONTRAST MEDICATION: Performed by: EMERGENCY MEDICINE

## 2025-07-13 PROCEDURE — 96375 TX/PRO/DX INJ NEW DRUG ADDON: CPT

## 2025-07-13 PROCEDURE — 6370000000 HC RX 637 (ALT 250 FOR IP)

## 2025-07-13 PROCEDURE — 70450 CT HEAD/BRAIN W/O DYE: CPT

## 2025-07-13 PROCEDURE — 2500000003 HC RX 250 WO HCPCS

## 2025-07-13 PROCEDURE — 74177 CT ABD & PELVIS W/CONTRAST: CPT

## 2025-07-13 PROCEDURE — 99285 EMERGENCY DEPT VISIT HI MDM: CPT

## 2025-07-13 PROCEDURE — 6360000002 HC RX W HCPCS: Performed by: EMERGENCY MEDICINE

## 2025-07-13 PROCEDURE — 80053 COMPREHEN METABOLIC PANEL: CPT

## 2025-07-13 PROCEDURE — 85610 PROTHROMBIN TIME: CPT

## 2025-07-13 PROCEDURE — 96374 THER/PROPH/DIAG INJ IV PUSH: CPT

## 2025-07-13 PROCEDURE — 85025 COMPLETE CBC W/AUTO DIFF WBC: CPT

## 2025-07-13 PROCEDURE — 93005 ELECTROCARDIOGRAM TRACING: CPT | Performed by: EMERGENCY MEDICINE

## 2025-07-13 PROCEDURE — 72125 CT NECK SPINE W/O DYE: CPT

## 2025-07-13 PROCEDURE — 83880 ASSAY OF NATRIURETIC PEPTIDE: CPT

## 2025-07-13 PROCEDURE — 84484 ASSAY OF TROPONIN QUANT: CPT

## 2025-07-13 PROCEDURE — 81001 URINALYSIS AUTO W/SCOPE: CPT

## 2025-07-13 PROCEDURE — 6360000002 HC RX W HCPCS

## 2025-07-13 PROCEDURE — 6370000000 HC RX 637 (ALT 250 FOR IP): Performed by: STUDENT IN AN ORGANIZED HEALTH CARE EDUCATION/TRAINING PROGRAM

## 2025-07-13 PROCEDURE — 2580000003 HC RX 258

## 2025-07-13 PROCEDURE — 1200000000 HC SEMI PRIVATE

## 2025-07-13 PROCEDURE — 85730 THROMBOPLASTIN TIME PARTIAL: CPT

## 2025-07-13 RX ORDER — MORPHINE SULFATE 4 MG/ML
4 INJECTION, SOLUTION INTRAMUSCULAR; INTRAVENOUS
Status: COMPLETED | OUTPATIENT
Start: 2025-07-13 | End: 2025-07-13

## 2025-07-13 RX ORDER — LIDOCAINE 50 MG/G
1 PATCH TOPICAL DAILY
Qty: 10 PATCH | Refills: 0 | Status: SHIPPED | OUTPATIENT
Start: 2025-07-13 | End: 2025-07-23

## 2025-07-13 RX ORDER — ENOXAPARIN SODIUM 100 MG/ML
40 INJECTION SUBCUTANEOUS DAILY
Status: DISCONTINUED | OUTPATIENT
Start: 2025-07-13 | End: 2025-07-14 | Stop reason: HOSPADM

## 2025-07-13 RX ORDER — ACETAMINOPHEN 325 MG/1
650 TABLET ORAL EVERY 6 HOURS PRN
Status: DISCONTINUED | OUTPATIENT
Start: 2025-07-13 | End: 2025-07-14 | Stop reason: HOSPADM

## 2025-07-13 RX ORDER — POLYETHYLENE GLYCOL 3350 17 G/17G
17 POWDER, FOR SOLUTION ORAL DAILY PRN
Status: DISCONTINUED | OUTPATIENT
Start: 2025-07-13 | End: 2025-07-14 | Stop reason: HOSPADM

## 2025-07-13 RX ORDER — SODIUM CHLORIDE 9 MG/ML
INJECTION, SOLUTION INTRAVENOUS CONTINUOUS
Status: DISCONTINUED | OUTPATIENT
Start: 2025-07-13 | End: 2025-07-14 | Stop reason: HOSPADM

## 2025-07-13 RX ORDER — SODIUM CHLORIDE 0.9 % (FLUSH) 0.9 %
5-40 SYRINGE (ML) INJECTION EVERY 12 HOURS SCHEDULED
Status: DISCONTINUED | OUTPATIENT
Start: 2025-07-13 | End: 2025-07-14 | Stop reason: HOSPADM

## 2025-07-13 RX ORDER — SODIUM CHLORIDE 9 MG/ML
INJECTION, SOLUTION INTRAVENOUS PRN
Status: DISCONTINUED | OUTPATIENT
Start: 2025-07-13 | End: 2025-07-14 | Stop reason: HOSPADM

## 2025-07-13 RX ORDER — POTASSIUM CHLORIDE 1500 MG/1
40 TABLET, EXTENDED RELEASE ORAL PRN
Status: DISCONTINUED | OUTPATIENT
Start: 2025-07-13 | End: 2025-07-14 | Stop reason: HOSPADM

## 2025-07-13 RX ORDER — ACETAMINOPHEN 650 MG/1
650 SUPPOSITORY RECTAL EVERY 6 HOURS PRN
Status: DISCONTINUED | OUTPATIENT
Start: 2025-07-13 | End: 2025-07-14 | Stop reason: HOSPADM

## 2025-07-13 RX ORDER — VALSARTAN 80 MG/1
160 TABLET ORAL DAILY
Status: DISCONTINUED | OUTPATIENT
Start: 2025-07-13 | End: 2025-07-14 | Stop reason: HOSPADM

## 2025-07-13 RX ORDER — LIDOCAINE 4 G/G
1 PATCH TOPICAL DAILY
Status: DISCONTINUED | OUTPATIENT
Start: 2025-07-13 | End: 2025-07-14 | Stop reason: HOSPADM

## 2025-07-13 RX ORDER — HYDROCODONE BITARTRATE AND ACETAMINOPHEN 5; 325 MG/1; MG/1
1 TABLET ORAL EVERY 6 HOURS PRN
COMMUNITY

## 2025-07-13 RX ORDER — SODIUM CHLORIDE 0.9 % (FLUSH) 0.9 %
5-40 SYRINGE (ML) INJECTION PRN
Status: DISCONTINUED | OUTPATIENT
Start: 2025-07-13 | End: 2025-07-14 | Stop reason: HOSPADM

## 2025-07-13 RX ORDER — FLUTICASONE PROPIONATE 50 MCG
1 SPRAY, SUSPENSION (ML) NASAL DAILY PRN
Status: DISCONTINUED | OUTPATIENT
Start: 2025-07-13 | End: 2025-07-14 | Stop reason: HOSPADM

## 2025-07-13 RX ORDER — MAGNESIUM SULFATE IN WATER 40 MG/ML
2000 INJECTION, SOLUTION INTRAVENOUS PRN
Status: DISCONTINUED | OUTPATIENT
Start: 2025-07-13 | End: 2025-07-14 | Stop reason: HOSPADM

## 2025-07-13 RX ORDER — DONEPEZIL HYDROCHLORIDE 5 MG/1
5 TABLET, FILM COATED ORAL NIGHTLY
Status: DISCONTINUED | OUTPATIENT
Start: 2025-07-13 | End: 2025-07-14 | Stop reason: HOSPADM

## 2025-07-13 RX ORDER — HYDROCHLOROTHIAZIDE 25 MG/1
25 TABLET ORAL
Status: DISCONTINUED | OUTPATIENT
Start: 2025-07-14 | End: 2025-07-14 | Stop reason: HOSPADM

## 2025-07-13 RX ORDER — ACETAMINOPHEN 325 MG/1
650 TABLET ORAL ONCE
Status: COMPLETED | OUTPATIENT
Start: 2025-07-13 | End: 2025-07-13

## 2025-07-13 RX ORDER — POTASSIUM CHLORIDE 7.45 MG/ML
10 INJECTION INTRAVENOUS PRN
Status: DISCONTINUED | OUTPATIENT
Start: 2025-07-13 | End: 2025-07-14 | Stop reason: HOSPADM

## 2025-07-13 RX ORDER — PANTOPRAZOLE SODIUM 40 MG/1
40 TABLET, DELAYED RELEASE ORAL
Status: DISCONTINUED | OUTPATIENT
Start: 2025-07-14 | End: 2025-07-14 | Stop reason: HOSPADM

## 2025-07-13 RX ORDER — ONDANSETRON 2 MG/ML
4 INJECTION INTRAMUSCULAR; INTRAVENOUS
Status: COMPLETED | OUTPATIENT
Start: 2025-07-13 | End: 2025-07-13

## 2025-07-13 RX ORDER — IOPAMIDOL 755 MG/ML
75 INJECTION, SOLUTION INTRAVASCULAR
Status: COMPLETED | OUTPATIENT
Start: 2025-07-13 | End: 2025-07-13

## 2025-07-13 RX ORDER — ONDANSETRON 2 MG/ML
4 INJECTION INTRAMUSCULAR; INTRAVENOUS EVERY 6 HOURS PRN
Status: DISCONTINUED | OUTPATIENT
Start: 2025-07-13 | End: 2025-07-14 | Stop reason: HOSPADM

## 2025-07-13 RX ORDER — MORPHINE SULFATE 2 MG/ML
2 INJECTION, SOLUTION INTRAMUSCULAR; INTRAVENOUS EVERY 4 HOURS PRN
Refills: 0 | Status: DISCONTINUED | OUTPATIENT
Start: 2025-07-13 | End: 2025-07-14 | Stop reason: HOSPADM

## 2025-07-13 RX ORDER — HYDROCODONE BITARTRATE AND ACETAMINOPHEN 5; 325 MG/1; MG/1
1 TABLET ORAL EVERY 6 HOURS PRN
Refills: 0 | Status: DISCONTINUED | OUTPATIENT
Start: 2025-07-13 | End: 2025-07-14 | Stop reason: HOSPADM

## 2025-07-13 RX ORDER — ONDANSETRON 4 MG/1
4 TABLET, ORALLY DISINTEGRATING ORAL EVERY 8 HOURS PRN
Status: DISCONTINUED | OUTPATIENT
Start: 2025-07-13 | End: 2025-07-14 | Stop reason: HOSPADM

## 2025-07-13 RX ADMIN — ONDANSETRON 4 MG: 2 INJECTION, SOLUTION INTRAMUSCULAR; INTRAVENOUS at 16:42

## 2025-07-13 RX ADMIN — MORPHINE SULFATE 4 MG: 4 INJECTION, SOLUTION INTRAMUSCULAR; INTRAVENOUS at 16:42

## 2025-07-13 RX ADMIN — SODIUM CHLORIDE: 0.9 INJECTION, SOLUTION INTRAVENOUS at 21:12

## 2025-07-13 RX ADMIN — IOPAMIDOL 75 ML: 755 INJECTION, SOLUTION INTRAVENOUS at 17:59

## 2025-07-13 RX ADMIN — Medication 10 ML: at 21:12

## 2025-07-13 RX ADMIN — ENOXAPARIN SODIUM 40 MG: 100 INJECTION SUBCUTANEOUS at 21:27

## 2025-07-13 RX ADMIN — DONEPEZIL HYDROCHLORIDE 5 MG: 5 TABLET, FILM COATED ORAL at 21:12

## 2025-07-13 RX ADMIN — ACETAMINOPHEN 650 MG: 325 TABLET ORAL at 12:27

## 2025-07-13 ASSESSMENT — PAIN SCALES - GENERAL
PAINLEVEL_OUTOF10: 10
PAINLEVEL_OUTOF10: 10
PAINLEVEL_OUTOF10: 0
PAINLEVEL_OUTOF10: 10
PAINLEVEL_OUTOF10: 0
PAINLEVEL_OUTOF10: 0

## 2025-07-13 ASSESSMENT — PAIN - FUNCTIONAL ASSESSMENT: PAIN_FUNCTIONAL_ASSESSMENT: 0-10

## 2025-07-13 ASSESSMENT — PAIN DESCRIPTION - ORIENTATION: ORIENTATION: RIGHT

## 2025-07-13 ASSESSMENT — PAIN DESCRIPTION - LOCATION: LOCATION: FLANK

## 2025-07-13 NOTE — DISCHARGE INSTRUCTIONS
You can take your prescribed Norco to help with pain control.  I have also sent lidocaine patches to your pharmacy to place on top of your skin.  Continue to use your incentive spirometer as instructed.  Follow-up with your primary care doctor in 1 week for repeat evaluation.

## 2025-07-13 NOTE — H&P
Name:  Catherine Rebolledo /Age/Sex: 1934  (89 y.o. female)   MRN & CSN:  [id number] & [id number] Encounter Date/Time: 2025 6:31 PM EDT   Location:   PCP: Laura Carrasco DO     Attending:Ashley Galaviz MD       Catherine Rebolledo is a 89 y.o. female who presented with     Chief Complaint   Patient presents with    Fall     From [place]. Tripped and fell into [facility]. C/o R sided pain. Did not hit head. No blood thinners. VSS RA. No meds given. Was down for a short while before EMT arrival.           Assessment and Plan     Assessment:    Hyponatremia  Mechanical fall  Traumatic right posterior 11th rib fracture  Essential hypertension  Hyperlipidemia  COPD  Dementia    Plan:      Patient presented to the hospital after she had a mechanical fall.  CT scanning with right posterior rib fracture without lung injury.  Patient does not require supplemental oxygen.  Pain control as needed.  PT OT evaluation.    Sodium levels of 128, started on gentle IV hydration.  Urine electrolyte and urine and sodium osmolality ordered.  Monitor with BMP in a.m.    Mentation at baseline, continue Aricept for dementia    Continue valsartan for hypertension.  Monitor on telemetry    HPI :      Catherine Rebolledo is a 89 y.o. female with  has a past medical history of Anxiety, Bleeding ulcer, Cancer (HCC), Confusion, COPD with asthma (HCC), Coronary artery disease involving native heart, Gastroesophagitis, GERD (gastroesophageal reflux disease), Hyperlipidemia, Hypertension, Moderate persistent asthma, Pneumonia, Rheumatic fever with heart involvement, Trigger ring finger of right hand, and Unspecified diseases of blood and blood-forming organs. who presented with chief complaints of having a fall.  Patient reports she was doing some household work and then she fell on the right side.  Daughter present at the bedside.  Currently reports having some right-sided abdominal/chest pain but it is relatively well-controlled.

## 2025-07-13 NOTE — ED PROVIDER NOTES
I independently examined and evaluated Catherine Rebolledo.    In brief, patient is a 89-year-old female presents to the emergency department for evaluation after fall.  Patient reports she tripped and fell into the nightstand.  She was complaining of right sided lower and lateral chest/upper abdominal pain.  She was asking to be discharged.  I went into the room.  She again asked if she could get released.  However, after telling me that she wants to go home, she also tells me that she is having abdominal pain that she feels is new from the fall today.  She also asked if she might of fallen because of a UTI.  Urinalysis sent off.  Urinalysis negative for nitrite, shows moderate leukocyte, and 1+ bacteria.  CT shows acute traumatic right posterior 11th rib fracture without pneumothorax.  Evidence of old healing rib fractures.  CT head without shows no acute intracranial bleed.  CT cervical spine shows no acute pathology.  Awaiting for labs and CT abdomen pelvis to result.  She was given IV morphine and Zofran as needed for symptoms. Added on ct abdomen pelvis and urinalysis.     The Ekg interpreted by me shows  Normal sinus rhythm with a rate of 83  Right bundle branch block  Left anterior fascicular block  Bifascicular block  QTc is prolonged at 493  ST Segments: Nonspecific ST changes  No significant change from prior EKG dated 05/23/25    All diagnostic, treatment, and disposition decisions were made by myself in conjunction with the advanced practice provider/resident physician.     I personally saw the patient and performed a substantive portion of the visit including aspects of the medical decision making. I approved management plan and take responsibility for the patient management.     I personally saw the patient and independently provided 0 minutes of non-concurrent critical care out of the total shared critical care time provided.    Comment: Please note this report has been produced using speech recognition 
Clinician of Record.  FINAL IMPRESSION      1. Closed fracture of one rib of right side, initial encounter    2. Fall, initial encounter    3. Hyponatremia          DISPOSITION/PLAN     DISPOSITION Admitted 07/13/2025 06:30:32 PM               PATIENT REFERRED TO:  No follow-up provider specified.    DISCHARGE MEDICATIONS:  New Prescriptions    LIDOCAINE (LIDODERM) 5 %    Place 1 patch onto the skin daily for 10 days 12 hours on, 12 hours off.       DISCONTINUED MEDICATIONS:  Discontinued Medications    No medications on file              (Please note that portions of this note were completed with a voice recognition program.  Efforts were made to edit the dictations but occasionally words are mis-transcribed.)    Sophie Raymundo PA-C (electronically signed)      Sophie Raymundo PA-C  07/13/25 1659

## 2025-07-13 NOTE — ED NOTES
Catherine Rebolledo is a 89 y.o. female admitted for  Principal Problem:    Hyponatremia  Resolved Problems:    * No resolved hospital problems. *  .   Patient [facility] via EMS transportation with   Chief Complaint   Patient presents with    Fall     From [place]. Tripped and fell into nightstand. C/o R sided pain. Did not hit head. No blood thinners. VSS RA. No meds given. Was down for a short while before EMT arrival.    .  Patient is alert and Person, Place, Time, and Situation  Patient's baseline mobility:   Code Status: Full Code   Cardiac Rhythm:       Is patient on baseline Oxygen:  how many Liters:   Abnormal Assessment Findings:     Isolation: None      NIH Score:    C-SSRS: Risk of Suicide: No Risk  Bedside swallow:        Active LDA's:   Peripheral IV 07/13/25 Left;Proximal Forearm (Active)   Site Assessment Clean, dry & intact 07/13/25 1151   Line Status Normal saline locked 07/13/25 1151   Phlebitis Assessment No symptoms 07/13/25 1151   Infiltration Assessment 0 07/13/25 1151   Alcohol Cap Used Yes 07/13/25 1151   Dressing Status Clean, dry & intact 07/13/25 1151   Dressing Type Transparent 07/13/25 1151     Patient admitted with a carson: no If the carson is chronic was it exchanged:  Reason for carson:   Patient admitted with Central Line:  . PICC line placement confirmed: YES OR NO:[id number]}   Reason for Central line:   Was central line Inserted from an outside facility:        Family/Caregiver Present yes Any Concerns:    Restraints   Sitter          Vitals:      Vitals:    07/13/25 1645 07/13/25 1716 07/13/25 1739 07/13/25 1815   BP: (!) 159/75 (!) 165/74  (!) 158/80   Pulse:       Resp:       Temp:       TempSrc:       SpO2: 99% 97% 99%    Weight:       Height:           Last documented pain score (0-10 scale) Pain Level: 10  Pain medication administered Yes- see MAR.    Pertinent or High Risk Medications/Drips: No.    Pending Blood Product Administration: no    Abnormal labs:   Abnormal Labs Reviewed

## 2025-07-14 ENCOUNTER — TELEPHONE (OUTPATIENT)
Dept: FAMILY MEDICINE CLINIC | Age: 89
End: 2025-07-14

## 2025-07-14 VITALS
SYSTOLIC BLOOD PRESSURE: 152 MMHG | HEART RATE: 77 BPM | HEIGHT: 64 IN | OXYGEN SATURATION: 94 % | DIASTOLIC BLOOD PRESSURE: 84 MMHG | BODY MASS INDEX: 23.05 KG/M2 | TEMPERATURE: 98.1 F | RESPIRATION RATE: 18 BRPM | WEIGHT: 135 LBS

## 2025-07-14 LAB
ANION GAP SERPL CALCULATED.3IONS-SCNC: 10 MMOL/L (ref 3–16)
BASOPHILS # BLD: 0.1 K/UL (ref 0–0.2)
BASOPHILS NFR BLD: 1.2 %
BUN SERPL-MCNC: 13 MG/DL (ref 7–20)
CALCIUM SERPL-MCNC: 9.4 MG/DL (ref 8.3–10.6)
CHLORIDE SERPL-SCNC: 100 MMOL/L (ref 99–110)
CHLORIDE UR-SCNC: 64 MMOL/L
CO2 SERPL-SCNC: 23 MMOL/L (ref 21–32)
CREAT SERPL-MCNC: 0.8 MG/DL (ref 0.6–1.2)
DEPRECATED RDW RBC AUTO: 15.1 % (ref 12.4–15.4)
EKG ATRIAL RATE: 83 BPM
EKG DIAGNOSIS: NORMAL
EKG P AXIS: 49 DEGREES
EKG P-R INTERVAL: 184 MS
EKG Q-T INTERVAL: 420 MS
EKG QRS DURATION: 134 MS
EKG QTC CALCULATION (BAZETT): 493 MS
EKG R AXIS: -66 DEGREES
EKG T AXIS: 27 DEGREES
EKG VENTRICULAR RATE: 83 BPM
EOSINOPHIL # BLD: 0.5 K/UL (ref 0–0.6)
EOSINOPHIL NFR BLD: 6.7 %
GFR SERPLBLD CREATININE-BSD FMLA CKD-EPI: 69 ML/MIN/{1.73_M2}
GLUCOSE SERPL-MCNC: 86 MG/DL (ref 70–99)
HCT VFR BLD AUTO: 39.7 % (ref 36–48)
HGB BLD-MCNC: 13.5 G/DL (ref 12–16)
LYMPHOCYTES # BLD: 1.4 K/UL (ref 1–5.1)
LYMPHOCYTES NFR BLD: 19.4 %
MCH RBC QN AUTO: 27.3 PG (ref 26–34)
MCHC RBC AUTO-ENTMCNC: 34 G/DL (ref 31–36)
MCV RBC AUTO: 80 FL (ref 80–100)
MONOCYTES # BLD: 0.5 K/UL (ref 0–1.3)
MONOCYTES NFR BLD: 7.4 %
NEUTROPHILS # BLD: 4.6 K/UL (ref 1.7–7.7)
NEUTROPHILS NFR BLD: 65.3 %
OSMOLALITY SERPL: 293 MOSM/KG (ref 280–301)
OSMOLALITY UR: 315 MOSM/KG (ref 390–1070)
PLATELET # BLD AUTO: 217 K/UL (ref 135–450)
PMV BLD AUTO: 6.9 FL (ref 5–10.5)
POTASSIUM SERPL-SCNC: 3.7 MMOL/L (ref 3.5–5.1)
POTASSIUM UR-SCNC: 25.7 MMOL/L
RBC # BLD AUTO: 4.96 M/UL (ref 4–5.2)
SODIUM SERPL-SCNC: 133 MMOL/L (ref 136–145)
SODIUM UR-SCNC: 76 MMOL/L
WBC # BLD AUTO: 7 K/UL (ref 4–11)

## 2025-07-14 PROCEDURE — 80048 BASIC METABOLIC PNL TOTAL CA: CPT

## 2025-07-14 PROCEDURE — 84133 ASSAY OF URINE POTASSIUM: CPT

## 2025-07-14 PROCEDURE — 82436 ASSAY OF URINE CHLORIDE: CPT

## 2025-07-14 PROCEDURE — 97162 PT EVAL MOD COMPLEX 30 MIN: CPT

## 2025-07-14 PROCEDURE — 85025 COMPLETE CBC W/AUTO DIFF WBC: CPT

## 2025-07-14 PROCEDURE — 97166 OT EVAL MOD COMPLEX 45 MIN: CPT

## 2025-07-14 PROCEDURE — 83930 ASSAY OF BLOOD OSMOLALITY: CPT

## 2025-07-14 PROCEDURE — 2500000003 HC RX 250 WO HCPCS

## 2025-07-14 PROCEDURE — 6370000000 HC RX 637 (ALT 250 FOR IP)

## 2025-07-14 PROCEDURE — 2580000003 HC RX 258

## 2025-07-14 PROCEDURE — 97530 THERAPEUTIC ACTIVITIES: CPT

## 2025-07-14 PROCEDURE — 83935 ASSAY OF URINE OSMOLALITY: CPT

## 2025-07-14 PROCEDURE — 97535 SELF CARE MNGMENT TRAINING: CPT

## 2025-07-14 PROCEDURE — 84300 ASSAY OF URINE SODIUM: CPT

## 2025-07-14 PROCEDURE — 6370000000 HC RX 637 (ALT 250 FOR IP): Performed by: STUDENT IN AN ORGANIZED HEALTH CARE EDUCATION/TRAINING PROGRAM

## 2025-07-14 PROCEDURE — 6360000002 HC RX W HCPCS

## 2025-07-14 PROCEDURE — 36415 COLL VENOUS BLD VENIPUNCTURE: CPT

## 2025-07-14 PROCEDURE — 97116 GAIT TRAINING THERAPY: CPT

## 2025-07-14 RX ADMIN — ENOXAPARIN SODIUM 40 MG: 100 INJECTION SUBCUTANEOUS at 08:41

## 2025-07-14 RX ADMIN — HYDROCHLOROTHIAZIDE 25 MG: 25 TABLET ORAL at 08:41

## 2025-07-14 RX ADMIN — SODIUM CHLORIDE: 0.9 INJECTION, SOLUTION INTRAVENOUS at 08:45

## 2025-07-14 RX ADMIN — VALSARTAN 160 MG: 80 TABLET, FILM COATED ORAL at 08:41

## 2025-07-14 RX ADMIN — PANTOPRAZOLE SODIUM 40 MG: 40 TABLET, DELAYED RELEASE ORAL at 06:07

## 2025-07-14 RX ADMIN — POTASSIUM CHLORIDE 40 MEQ: 1500 TABLET, EXTENDED RELEASE ORAL at 08:41

## 2025-07-14 RX ADMIN — Medication 10 ML: at 08:41

## 2025-07-14 RX ADMIN — HYDROCODONE BITARTRATE AND ACETAMINOPHEN 1 TABLET: 5; 325 TABLET ORAL at 08:41

## 2025-07-14 ASSESSMENT — PAIN SCALES - GENERAL
PAINLEVEL_OUTOF10: 0
PAINLEVEL_OUTOF10: 5
PAINLEVEL_OUTOF10: 0
PAINLEVEL_OUTOF10: 0

## 2025-07-14 ASSESSMENT — PAIN - FUNCTIONAL ASSESSMENT: PAIN_FUNCTIONAL_ASSESSMENT: ACTIVITIES ARE NOT PREVENTED

## 2025-07-14 ASSESSMENT — PAIN DESCRIPTION - DESCRIPTORS: DESCRIPTORS: ACHING

## 2025-07-14 ASSESSMENT — PAIN DESCRIPTION - PAIN TYPE: TYPE: ACUTE PAIN

## 2025-07-14 ASSESSMENT — PAIN DESCRIPTION - FREQUENCY: FREQUENCY: INTERMITTENT

## 2025-07-14 ASSESSMENT — PAIN DESCRIPTION - ORIENTATION: ORIENTATION: RIGHT

## 2025-07-14 ASSESSMENT — PAIN DESCRIPTION - LOCATION: LOCATION: RIB CAGE

## 2025-07-14 ASSESSMENT — PAIN SCALES - WONG BAKER: WONGBAKER_NUMERICALRESPONSE: NO HURT

## 2025-07-14 NOTE — CARE COORDINATION
Case Management Assessment  Initial Evaluation    Date/Time of Evaluation: 7/14/2025 10:28 AM  Assessment Completed by: Claritza Morrison RN    If patient is discharged prior to next notation, then this note serves as note for discharge by case management.    Patient Name: Catherine Rebolledo                   YOB: 1934  Diagnosis: Hyponatremia [E87.1]  Fall, initial encounter [W19.XXXA]  Closed fracture of one rib of right side, initial encounter [S22.31XA]                   Date / Time: 7/13/2025 11:38 AM    Patient Admission Status: Inpatient   Readmission Risk (Low < 19, Mod (19-27), High > 27): Readmission Risk Score: 16.6    Current PCP: Laura Carrasco, DO  PCP verified by CM? Yes (DO Danilo)    Chart Reviewed: Yes      History Provided by: Patient, Medical Record  Patient Orientation: Alert and Oriented    Patient Cognition: Alert    Hospitalization in the last 30 days (Readmission):  No    If yes, Readmission Assessment in CM Navigator will be completed.    Advance Directives:      Code Status: Full Code   Patient's Primary Decision Maker is: Legal Next of Kin    Primary Decision Maker: Shereen Tomas - Grand[place]za - [phone]    Discharge Planning:    Patient lives with: Alone Type of Home: Independent Living  Primary Care Giver: Self  Patient Support Systems include: Children, Family Members   Current Financial resources: Medicare  Current community resources: None  Current services prior to admission: Durable Medical Equipment            Current DME: Other (Comment) ([employer])            Type of Home Care services:  OT, PT    ADLS  Prior functional level: Assistance with the following:, Cooking, Shopping  Current functional level: Assistance with the following:, Cooking, Shopping    PT AM-PAC: 23 /24  OT AM-PAC:   /24    Family can provide assistance at DC: No  Would you like Case Management to discuss the discharge plan with any other family members/significant others, and if so, who? Yes

## 2025-07-14 NOTE — CARE COORDINATION
Case Management Discharge Summary                                 DISCHARGE Disposition: Home with Home Health Care    [facility]  Services: Home Health Services  [address]  [facility] [id number] [phone]     CMS Letters:  Initial IMM Yes  7/13/25     Durable Medical Equipment:  No    Dialysis:  No    Hospice Services:  No    Referrals made at DISCHARGE for outpatient continued care:  Not Applicable      Transportation:  Transportation plan for discharge: Private Car with family  Name of Transport Company: Not Applicable  Date and Time of Transport:   Transport form completed: Not Indicated    Confirmed discharge plan with:  Patient: Yes  Family/Name: Grand daughter  Name and Contact number: Shereen [phone]  RN/name: Chris    Carlitos CM Notes: Patient to return [facility].     The Patient and/or patient representative Catherine and her family were provided with a choice of provider and agrees with the discharge plan Yes  Freedom of choice list was provided with basic dialogue that supports the patient's individualized plan of care/goals and shares the quality data associated with the providers. Yes    Claritza Morrison RN  [facility]   Case Management Department  Ph: [phone]  Fax: [phone]

## 2025-07-14 NOTE — TELEPHONE ENCOUNTER
Care Transitions Initial Follow Up Call    Outreach made within 2 business days of discharge: Yes    Patient: Catherine Rebolledo Patient : 1934   MRN: [id number]  Reason for Admission: Hyponatremia   Discharge Date: 25       Spoke with:     Discharge department/facility: [facility] Interactive Patient Contact:  Was patient able to fill all prescriptions:   Was patient instructed to bring all medications to the follow-up visit:   Is patient taking all medications as directed in the discharge summary?   Does patient understand their discharge instructions:   Does patient have questions or concerns that need addressed prior to 7-14 day follow up office visit:     Additional needs identified to be addressed with provider               Scheduled appointment with PCP within 7-14 days    Follow Up  Future Appointments   Date Time Provider Department Center   2025  4:00 PM Laura Carrasco DO [facility]       Heaven Bingham MA

## 2025-07-14 NOTE — DISCHARGE SUMMARY
Hospital Medicine Discharge Summary    Patient: Catherine Rebolledo   : 1934     Hospital:  [facility]  Admit Date: 2025   Discharge Date: 2025    Disposition:  Home w/ C   Code status:  Full  Condition at Discharge: Stable  Primary Care Provider: Laura Carrasco DO    Admitting Provider: Ashley Galaviz MD  Discharge Provider: Zara Parish MD     Discharge Diagnoses:      Active Hospital Problems    Diagnosis     Hyponatremia [E87.1]        Presenting Admission History:      Catherine Rebolledo is a 89 y.o. female with  has a past medical history of Anxiety, Bleeding ulcer, Cancer (HCC), Confusion, COPD with asthma (HCC), Coronary artery disease involving native heart, Gastroesophagitis, GERD (gastroesophageal reflux disease), Hyperlipidemia, Hypertension, Moderate persistent asthma, Pneumonia, Rheumatic fever with heart involvement, Trigger ring finger of right hand, and Unspecified diseases of blood and blood-forming organs. who presented with chief complaints of having a fall.  Patient reports she was doing some household work and then she fell on the right side.  Daughter present at the bedside.  Currently reports having some right-sided abdominal/chest pain but it is relatively well-controlled.  Denies any active substernal chest pain or shortness of breath but has some discomfort on deep inspiration.  Patient to be admitted for further workup of hyponatremia      Assessment/Plan:      Mechanical fall-right rib fracture 11th-pain is controlled, PT OT eval appreciated  No hypoxia or respiratory compromise  DC home on home health care    Mild hyponatremia-improved DC hydrochlorothiazide and potassium 2 times a week      Hypertension-continue with Diovan    COPD-no exacerbation    History of CAD and hyperlipidemia-stable    UA is mildly abnormal patient does not have any symptoms and culture is not indicated    Physical Exam Performed:      BP (!) 152/84   Pulse 77   Temp  Paperwork filled out by SYDNEY Ho.   Faxed to number provider on form.   Advised pt form was ready to be picked up at  at her convenience. Confirmed.

## 2025-07-14 NOTE — PLAN OF CARE
Problem: Chronic Conditions and Co-morbidities  Goal: Patient's chronic conditions and co-morbidity symptoms are monitored and maintained or improved  7/14/2025 0832 by Chris Garcias RN  Outcome: Progressing  Flowsheets (Taken 7/14/2025 0824)  Care Plan - Patient's Chronic Conditions and Co-Morbidity Symptoms are Monitored and Maintained or Improved:   Monitor and assess patient's chronic conditions and comorbid symptoms for stability, deterioration, or improvement   Collaborate with multidisciplinary team to address chronic and comorbid conditions and prevent exacerbation or deterioration   Update acute care plan with appropriate goals if chronic or comorbid symptoms are exacerbated and prevent overall improvement and discharge  7/13/2025 2119 by Mikaela Aguilar RN  Outcome: Progressing     Problem: Discharge Planning  Goal: Discharge to home or other facility with appropriate resources  7/14/2025 0832 by Chris Garcias RN  Outcome: Progressing  Flowsheets (Taken 7/14/2025 0824)  Discharge to home or other facility with appropriate resources:   Identify barriers to discharge with patient and caregiver   Arrange for needed discharge resources and transportation as appropriate   Identify discharge learning needs (meds, wound care, etc)   Arrange for interpreters to assist at discharge as needed   Refer to discharge planning if patient needs post-hospital services based on physician order or complex needs related to functional status, cognitive ability or social support system  7/13/2025 2119 by Mikaela Aguilar RN  Outcome: Progressing     Problem: Pain  Goal: Verbalizes/displays adequate comfort level or baseline comfort level  7/14/2025 0832 by Chris Garcias RN  Outcome: Progressing  Flowsheets (Taken 7/14/2025 0821)  Verbalizes/displays adequate comfort level or baseline comfort level:   Encourage patient to monitor pain and request assistance   Assess pain using appropriate pain scale   Administer

## 2025-07-14 NOTE — PROGRESS NOTES
4 Eyes Skin Assessment     NAME:  Catherine Rebolledo  YOB: 1934  MEDICAL RECORD NUMBER:  [id number]    The patient is being assessed for  Admission    I agree that at least one RN has performed a thorough Head to Toe Skin Assessment on the patient. ALL assessment sites listed below have been assessed.      Areas assessed by both nurses:    Head, Face, Ears, Shoulders, Back, Chest, Arms, Elbows, Hands, Sacrum. Buttock, Coccyx, Ischium, Legs. Feet and Heels, and Under Medical Devices         Does the Patient have a Wound? No noted wound(s)       Billy Prevention initiated by RN: Yes  Wound Care Orders initiated by RN: No    Pressure Injury (Stage 1,2,3,4, Unstageable, DTI, NWPT, and Complex wounds) if present, place Wound referral order by RN under : No    New Ostomies, if present place, Ostomy referral order under : No     Nurse 1 eSignature: Electronically signed by Mikaela Aguilar RN on 7/13/25 at 9:47 PM EDT    **SHARE this note so that the co-signing nurse can place an eSignature**    Nurse 2 eSignature: Electronically signed by Franklin Garcia RN on 7/13/25 at 9:48 PM EDT   
Patient admitted to room 543 from ED.  Patient oriented to room, call light, bed rails, phone, lights and bathroom.  Patient instructed about the schedule of the day including: vital sign frequency, lab draws, possible tests, frequency of MD and staff rounds, including RN/MD rounding together at bedside, daily weights, and I &O's.  Patient instructed about prescribed diet, how to use 8MENU, and television.   bed alarm in place, patient aware of placement and reason.   Telemetry box  in place, patient aware of placement and reason.  Bed locked, in lowest position, side rails up 2/4, call light within reach.  Will continue to monitor.     
Patients grand daughter in the room informed of the discharge plans medication to pickup follow up clinics and home health  
assistance  Scooting: Stand by assistance (EOB)  Balance  Sitting: Intact  Standing: Impaired;With support  Standing - Static: Good  Standing - Dynamic: Fair;Good  Transfer Training  Transfer Training: Yes  Interventions: Safety awareness training;Verbal cues;Tactile cues  Sit to Stand: Contact guard assistance;Stand by assistance (progress to SBA, with RW)  Stand to Sit: Stand by assistance  Toilet Transfer: Stand by assistance          ADL  Grooming: Stand by assistance;Contact guard assistance  Grooming Skilled Clinical Factors: CGA to SBA to complete oral care and hand washing. Pt requires cueing for placement of RW  LE Dressing: Contact guard assistance  LE Dressing Skilled Clinical Factors: CGA to sitting balance while threading R LE  Toileting: Contact guard assistance  Toileting Skilled Clinical Factors: CGA for clothing management. Pt completed on standard commode  Functional Mobility: Contact guard assistance;Stand by assistance  Functional Mobility Skilled Clinical Factors: CGA progressing to SBA using RW for standing balance  Product Used : Bath wipes     Activity Tolerance  Activity Tolerance: Patient tolerated evaluation without incident;Patient tolerated treatment well;Patient limited by pain    Cognition  Overall Cognitive Status: WFL  Orientation  Overall Orientation Status: Within Functional Limits  Orientation Level: Oriented X4  Perception  Overall Perceptual Status: WFL    Education Given To: Patient  Education Provided: Role of Therapy;Transfer Training;Plan of Care;Equipment;Precautions;Orientation;Mobility Training  Education Provided Comments: Pt educated on importance of OOB mobility, prevention of complications of bedrest, and general safety during hospitalization  Education Method: Demonstration;Verbal  Barriers to Learning: Cognition  Education Outcome: Continued education needed;Verbalized understanding    AM-PAC - ADL  AM-PAC Daily Activity - Inpatient   How much help is needed for 
will ambulate 100 ft with RW supervision  Short Term Goal 4: pt will participate in BLE exercises x10 INDEP by 7/16  Patient Goals   Patient Goals : \"to go home\"       Education  Patient Education  Education Given To: Patient  Education Provided: Role of Therapy;Plan of Care;Transfer Training;Mobility Training;Equipment  Education Provided Comments: Disease Specific Education: Pt educated on importance of OOB mobility, prevention of complications of bedrest, and general safety during hospitalization.  Education Method: Verbal  Barriers to Learning: None  Education Outcome: Verbalized understanding;Demonstrated understanding;Continued education needed      Therapy Time   Individual Concurrent Group Co-treatment   Time In       0905   Time Out       0938   Minutes       33   Timed Code Treatment Minutes: 23 Minutes (10 min eval)       Socorro Perez, PT

## 2025-07-14 NOTE — DISCHARGE INSTR - COC
patient):  {CHP DME Belongings:[id number]}    RN SIGNATURE:  {Esignature:[id number]}    CASE MANAGEMENT/SOCIAL WORK SECTION    Inpatient Status Date: 7/13/25    Readmission Risk Assessment Score:  [facility] RISK OF UNPLANNED READMISSION 2.0             16.6 Total Score        Discharging to Facility/ Agency   Name: [facility]  Services: Home Health Services  [address]  [facility] [id number] [phone]     / signature: Electronically signed by Claritza Morrison RN on 7/14/25 at 12:39 PM EDT    PHYSICIAN SECTION    Prognosis: Good    Condition at Discharge: Stable    Rehab Potential (if transferring to Rehab): Good    Recommended Labs or Other Treatments After Discharge: PCP    Physician Certification: I certify the above information and transfer of Catherine Rebolledo  is necessary for the continuing treatment of the diagnosis listed and that she requires Home Care for less 30 days.     Update Admission H&P: No change in H&P    PHYSICIAN SIGNATURE:  Electronically signed by Zara Parish MD on 7/14/25 at 12:11 PM EDT

## 2025-07-14 NOTE — ED NOTES
Pt transported to 543 w/ tele, all personal belongings, and son. Pt continued to insist that she can get up and got OOB. RN asked pt to remain in bed until another staff member was available to assist pt into bed. Pt stated that she will throw herself on the floor and say that this RN \"did it.\" This RN explained that telling staff that she will throw herself on the floor and blame it on the RN was inappropriate. Pt's son stepped closer to this RN and blocked this RN's route to the door. This RN did not feel comfortable leaving pt's bedside d/t pt's threat to throw herself on the floor. This RN asked pt's son, \"Can I help you?\" Pt's son looked at this RN suggestively and said, \"No,\" and would not move. C5 RN came to room. Pt stated again that she wanted to get OOB herself and if the staff moved her she would \"scream and kick and hit.\" This RN said pt can kick as long as staff members were not kicked. Pt stated she would kick staff. This RN stated that she is not allowed to threaten staff. RN asked son to move to the couch so pt could be safely moved. Pt's son stood in the corner instead. Pt was moved w/ staff from stretcher to bed w/o any c/o pain, etc. This RN notified C5 RN of son's inappropriate behavior.

## 2025-07-21 ENCOUNTER — TELEPHONE (OUTPATIENT)
Dept: FAMILY MEDICINE CLINIC | Age: 89
End: 2025-07-21

## 2025-07-21 NOTE — TELEPHONE ENCOUNTER
Care Transitions Initial Follow Up Call    Outreach made within 2 business days of discharge: Yes    Patient: Catherine Rebolledo Patient : 1934   MRN: [id number]  Reason for Admission: fall  Discharge Date: 25       Spoke with: Shereen    Discharge department/facility: [facility] Interactive Patient Contact:  Was patient able to fill all prescriptions: Yes  Was patient instructed to bring all medications to the follow-up visit: Yes  Is patient taking all medications as directed in the discharge summary? Yes  Does patient understand their discharge instructions: Yes  Does patient have questions or concerns that need addressed prior to 7-14 day follow up office visit: no    Additional needs identified to be addressed with provider  Pt's water pill was stopped and potassium now she is experiencing swelling              Scheduled appointment with PCP within 7-14 days    Follow Up  Future Appointments   Date Time Provider Department Center   2025 10:00 AM Fortener, Melissa, APRN - C[facility]   2025  4:00 PM Laura Carrasco DO [facility]       Josie Brothers MA

## 2025-07-22 ENCOUNTER — OFFICE VISIT (OUTPATIENT)
Dept: FAMILY MEDICINE CLINIC | Age: 89
End: 2025-07-22

## 2025-07-22 ENCOUNTER — TELEPHONE (OUTPATIENT)
Dept: FAMILY MEDICINE CLINIC | Age: 89
End: 2025-07-22

## 2025-07-22 VITALS
DIASTOLIC BLOOD PRESSURE: 78 MMHG | SYSTOLIC BLOOD PRESSURE: 148 MMHG | BODY MASS INDEX: 23.96 KG/M2 | WEIGHT: 139.6 LBS | TEMPERATURE: 98.2 F | RESPIRATION RATE: 16 BRPM

## 2025-07-22 DIAGNOSIS — E87.1 HYPONATREMIA: ICD-10-CM

## 2025-07-22 DIAGNOSIS — S22.31XD CLOSED FRACTURE OF ONE RIB OF RIGHT SIDE WITH ROUTINE HEALING, SUBSEQUENT ENCOUNTER: ICD-10-CM

## 2025-07-22 DIAGNOSIS — L24.9 IRRITANT CONTACT DERMATITIS, UNSPECIFIED TRIGGER: ICD-10-CM

## 2025-07-22 DIAGNOSIS — J45.40 MODERATE PERSISTENT ASTHMA WITHOUT COMPLICATION: ICD-10-CM
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
## 2025-07-22 DIAGNOSIS — I10 PRIMARY HYPERTENSION: ICD-10-CM

## 2025-07-22 DIAGNOSIS — Z09 HOSPITAL DISCHARGE FOLLOW-UP: Primary | ICD-10-CM
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
## 2025-07-22 PROBLEM — N30.00 ACUTE CYSTITIS WITHOUT HEMATURIA: Status: RESOLVED | Noted: 2025-01-27 | Resolved: 2025-07-22

## 2025-07-22 RX ORDER — ALBUTEROL SULFATE 90 UG/1
2 INHALANT RESPIRATORY (INHALATION) EVERY 6 HOURS PRN
Qty: 18 G | Refills: 3 | Status: SHIPPED | OUTPATIENT
Start: 2025-07-22

## 2025-07-22 RX ORDER — TRIAMCINOLONE ACETONIDE 1 MG/G
CREAM TOPICAL
Qty: 15 G | Refills: 0 | Status: SHIPPED | OUTPATIENT
Start: 2025-07-22

## 2025-07-22 NOTE — PROGRESS NOTES
Encounters:   07/22/25 63.3 kg (139 lb 9.6 oz)   07/13/25 61.2 kg (135 lb)   06/12/25 61.9 kg (136 lb 6.4 oz)          Subjective:   HPI:  Follow up of Hospital problems/diagnosis(es): Hyponatremia, Acute Rib fracture s/p fall at home.    Inpatient course: Discharge summary reviewed- see chart.    Interval history/Current status:   Here today with her grand-daughter, Zuly.    Was doing some household work and lost her balance. Right side hit the night stand.   Did not hit her head.    Deep breaths worsen her pain.  Bending over & repositioning also worsen her pain.    The lidocaine patch is making her skin break out.   Stopped using a result.  Took hydrocodone last night and this morning.  Ibuprofen.    Using Miralax and dulcolax to keep her bowels moving.  Reports some increased gas.    Active with [facility]. Met with PT/OT already. The RN is scheduled to come out.    +HTN. 148/78 here today. However, didn't take her losartan yet today.  Her HCTZ and potassium were discontinued due to hyponatremia.  She was taking HCTZ 25mg 2x/week.  Since stopping it, notes increasing BLE edema.     Getting a new hearing aide on Thursday.    Patient Active Problem List   Diagnosis    Hypertension    S/P elbow joint replacement    Ganglion cyst of flexor tendon sheath    Trigger ring finger of right hand    Hand pain, right    Wrist pain, right    Thyroid nodule    Chronic pansinusitis    Personal history of malignant carcinoid tumor of small intestine    Microcytic anemia    Lower abdominal pain    Iron deficiency anemia due to chronic blood loss    Intestinal malabsorption    Hearing loss due to cerumen impaction    Mixed hyperlipidemia    Syncope    Multiple thyroid nodules    Sprain of left rotator cuff capsule    Ataxia    Vertigo    Globus pharyngeus    Chronic cough    Post-nasal drip    Chronic allergic rhinitis    Gastroesophageal reflux disease    Hypertrophy of nasal turbinates    Laryngopharyngeal reflux (LPR)    Nasal

## 2025-07-22 NOTE — TELEPHONE ENCOUNTER
Teresa with [facility] called asking for a verbal order for a SN eval. Please call back and advise.

## 2025-07-23 LAB
ANION GAP SERPL CALCULATED.3IONS-SCNC: 7 MMOL/L (ref 3–16)
BUN SERPL-MCNC: 13 MG/DL (ref 7–20)
CALCIUM SERPL-MCNC: 9.9 MG/DL (ref 8.3–10.6)
CHLORIDE SERPL-SCNC: 94 MMOL/L (ref 99–110)
CO2 SERPL-SCNC: 30 MMOL/L (ref 21–32)
CREAT SERPL-MCNC: 0.8 MG/DL (ref 0.6–1.2)
GFR SERPLBLD CREATININE-BSD FMLA CKD-EPI: 69 ML/MIN/{1.73_M2}
GLUCOSE SERPL-MCNC: 81 MG/DL (ref 70–99)
POTASSIUM SERPL-SCNC: 4.3 MMOL/L (ref 3.5–5.1)
SODIUM SERPL-SCNC: 131 MMOL/L (ref 136–145)

## 2025-07-24 ENCOUNTER — TELEPHONE (OUTPATIENT)
Dept: FAMILY MEDICINE CLINIC | Age: 89
End: 2025-07-24

## 2025-07-24 NOTE — TELEPHONE ENCOUNTER
Mohamud kumar PT with [facility] called to report the patient BP while sitting today was 171/82. She has had a headache all day. Per Mohamud pt did have a fall before he started seeing her and did not know if that had anything to do with her headache.     Please call pt and Mohamud back to advise what to do.

## 2025-07-25 ENCOUNTER — OFFICE VISIT (OUTPATIENT)
Dept: FAMILY MEDICINE CLINIC | Age: 89
End: 2025-07-25
Payer: COMMERCIAL

## 2025-07-25 VITALS
DIASTOLIC BLOOD PRESSURE: 90 MMHG | HEART RATE: 70 BPM | BODY MASS INDEX: 24.03 KG/M2 | TEMPERATURE: 97.3 F | SYSTOLIC BLOOD PRESSURE: 191 MMHG | OXYGEN SATURATION: 97 % | WEIGHT: 140 LBS | RESPIRATION RATE: 16 BRPM

## 2025-07-25 DIAGNOSIS — B02.29 HERPES ZOSTER VIRUS INFECTION OF FACE AND EAR NERVES: Primary | ICD-10-CM

## 2025-07-25 DIAGNOSIS — H57.12 PAIN OF LEFT EYE: ICD-10-CM

## 2025-07-25 PROCEDURE — 1159F MED LIST DOCD IN RCRD: CPT | Performed by: NURSE PRACTITIONER

## 2025-07-25 PROCEDURE — 1123F ACP DISCUSS/DSCN MKR DOCD: CPT | Performed by: NURSE PRACTITIONER

## 2025-07-25 PROCEDURE — 99214 OFFICE O/P EST MOD 30 MIN: CPT | Performed by: NURSE PRACTITIONER

## 2025-07-25 RX ORDER — VALACYCLOVIR HYDROCHLORIDE 1 G/1
1000 TABLET, FILM COATED ORAL 3 TIMES DAILY
Qty: 21 TABLET | Refills: 0 | Status: SHIPPED | OUTPATIENT
Start: 2025-07-25 | End: 2025-08-01

## 2025-07-25 RX ORDER — TRAMADOL HYDROCHLORIDE 50 MG/1
50 TABLET ORAL EVERY 6 HOURS PRN
Qty: 20 TABLET | Refills: 0 | Status: SHIPPED | OUTPATIENT
Start: 2025-07-25 | End: 2025-07-30

## 2025-07-25 ASSESSMENT — ENCOUNTER SYMPTOMS
GASTROINTESTINAL NEGATIVE: 1
FACIAL SWELLING: 0
EYE REDNESS: 1
EYE DISCHARGE: 1
SINUS PAIN: 1
RESPIRATORY NEGATIVE: 1
EYE PAIN: 1

## 2025-07-25 ASSESSMENT — PATIENT HEALTH QUESTIONNAIRE - PHQ9: DEPRESSION UNABLE TO ASSESS: FUNCTIONAL CAPACITY MOTIVATION LIMITS ACCURACY

## 2025-07-25 NOTE — PROGRESS NOTES
7/25/2025    This is a 89 y.o. female   Chief Complaint   Patient presents with    Sore     On head pain into left eye pain radiates into    .    HPI    History of Present Illness  The patient presents for evaluation of a sore on her forehead.    She has been experiencing small, itchy spots on her skin for several months to a year. She then developed a new sore on her forehead. However over the last few days thsese have opened up and she now has pain in her face, eye, cheek and left ear to touch. The rash now extends to thebridge of her nose. She reports pain in the area, which she describes as bone-deep. She also experiences left eye light sensitivity and some eye drainage. 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 She believes the rash is spreading to her eye. She has been using norco for pain management, which she finds midly effective but causes excessive sleepiness. She also takes a combination of ibuprofen and Tylenol, which provides some relief.     She also experiences sinus issues and was without her nasal spray for a few days, during which time her symptoms worsened. No congestion, drainage, fevers chills or cough. Only the side where the rash is located is effective.     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  - Broken rib, recently.     Patient Active Problem List   Diagnosis    Hypertension    S/P elbow joint replacement    Ganglion cyst of flexor tendon sheath    Trigger ring finger of right hand    Hand pain, right    Wrist pain, right    Thyroid nodule    Chronic pansinusitis    Personal history of malignant carcinoid tumor of small intestine    Microcytic anemia    Lower abdominal pain    Iron deficiency anemia due to chronic blood loss    Intestinal malabsorption    Hearing loss due to cerumen impaction    Mixed hyperlipidemia    Syncope    Multiple thyroid nodules    Sprain of left rotator cuff capsule    Ataxia    Vertigo    Globus

## 2025-07-28 ENCOUNTER — TELEPHONE (OUTPATIENT)
Dept: FAMILY MEDICINE CLINIC | Age: 89
End: 2025-07-28

## 2025-07-28 NOTE — TELEPHONE ENCOUNTER
[facility] Calling stating that patient Blood Pressure today is 168/86. Patients family stating patient is having intermitted hallucinations. Requesting verbal for Skilled Nursing, twice a week for 2 weeks then down to once a week

## 2025-07-29 ENCOUNTER — TELEPHONE (OUTPATIENT)
Dept: FAMILY MEDICINE CLINIC | Age: 89
End: 2025-07-29

## 2025-07-29 ENCOUNTER — HOSPITAL ENCOUNTER (EMERGENCY)
Age: 89
Discharge: HOME | End: 2025-07-29
Attending: EMERGENCY MEDICINE
Payer: COMMERCIAL

## 2025-07-29 VITALS
SYSTOLIC BLOOD PRESSURE: 171 MMHG | RESPIRATION RATE: 17 BRPM | DIASTOLIC BLOOD PRESSURE: 82 MMHG | TEMPERATURE: 98.5 F | HEART RATE: 82 BPM | OXYGEN SATURATION: 96 %

## 2025-07-29 DIAGNOSIS — B02.9 HERPES ZOSTER WITHOUT COMPLICATION: Primary | ICD-10-CM

## 2025-07-29 PROCEDURE — 6370000000 HC RX 637 (ALT 250 FOR IP): Performed by: EMERGENCY MEDICINE

## 2025-07-29 PROCEDURE — 99283 EMERGENCY DEPT VISIT LOW MDM: CPT

## 2025-07-29 RX ORDER — PREDNISONE 20 MG/1
60 TABLET ORAL ONCE
Status: COMPLETED | OUTPATIENT
Start: 2025-07-29 | End: 2025-07-29

## 2025-07-29 RX ORDER — PREDNISONE 10 MG/1
TABLET ORAL
Qty: 30 TABLET | Refills: 0 | Status: ON HOLD | OUTPATIENT
Start: 2025-07-29 | End: 2025-08-02 | Stop reason: HOSPADM

## 2025-07-29 RX ADMIN — PREDNISONE 60 MG: 20 TABLET ORAL at 14:59

## 2025-07-29 ASSESSMENT — PAIN - FUNCTIONAL ASSESSMENT: PAIN_FUNCTIONAL_ASSESSMENT: NONE - DENIES PAIN

## 2025-07-29 NOTE — TELEPHONE ENCOUNTER
Pt is having edema in her eyelids, hallucinations,  pain in the shingles area of her face. Spoke with PCP and pt should be evaluated at the ER  Shereen was there during the call and agreed to take her to the ER for an evaluation they will be going to [facility].

## 2025-07-29 NOTE — TELEPHONE ENCOUNTER
Spoke with [facility] and Dr Dunn, they will add a UA to be collected tomorrow. Verbal given.

## 2025-07-29 NOTE — CARE COORDINATION
Referred to patient per MD.  Patient lives at [facility]. Spoke to patient, ex DIL, and granddaughter.  Patient is independent in ADLs. Patient has been getting morning and nights mixed up.  Patient is having difficulty with taking medications.  Discussed options including private duty aides for assistance.  Discussed increased level of care.  Patient recently started with [facility] for SN.  Patient interested in more help in her apartment and having someone spend the night. She does not like [facility] the HHA;s there at [facility].  Discussed other options and information given.  Patient reports she may be interested in moving in September to a different facility.  Information provided on [employer] for assistance.  Supportive counseling provided.  No other needs at this time.  MD updated.  Electronically signed by HIEU Lam on 7/29/2025 at 4:07 PM

## 2025-07-30 ENCOUNTER — APPOINTMENT (OUTPATIENT)
Dept: CT IMAGING | Age: 89
End: 2025-07-30
Payer: COMMERCIAL

## 2025-07-30 ENCOUNTER — HOSPITAL ENCOUNTER (OUTPATIENT)
Age: 89
Setting detail: SPECIMEN
Discharge: HOME | End: 2025-07-30
Payer: COMMERCIAL

## 2025-07-30 ENCOUNTER — HOSPITAL ENCOUNTER (OUTPATIENT)
Age: 89
Setting detail: OBSERVATION
Discharge: HOME | End: 2025-08-02
Attending: EMERGENCY MEDICINE | Admitting: HOSPITALIST
Payer: COMMERCIAL

## 2025-07-30 ENCOUNTER — APPOINTMENT (OUTPATIENT)
Dept: GENERAL RADIOLOGY | Age: 89
End: 2025-07-30
Payer: COMMERCIAL

## 2025-07-30 DIAGNOSIS — E83.42 HYPOMAGNESEMIA: ICD-10-CM

## 2025-07-30 DIAGNOSIS — R41.82 ALTERED MENTAL STATUS, UNSPECIFIED ALTERED MENTAL STATUS TYPE: Primary | ICD-10-CM

## 2025-07-30 DIAGNOSIS — E87.1 HYPONATREMIA: ICD-10-CM

## 2025-07-30 DIAGNOSIS — N30.00 ACUTE CYSTITIS WITHOUT HEMATURIA: ICD-10-CM

## 2025-07-30 LAB
BACTERIA URNS QL MICRO: ABNORMAL /HPF
BASOPHILS # BLD: 0 K/UL (ref 0–0.2)
BASOPHILS NFR BLD: 0.3 %
BILIRUB UR QL STRIP.AUTO: NEGATIVE
CLARITY UR: ABNORMAL
COLOR UR: YELLOW
DEPRECATED RDW RBC AUTO: 15.1 % (ref 12.4–15.4)
EOSINOPHIL # BLD: 0 K/UL (ref 0–0.6)
EOSINOPHIL NFR BLD: 0 %
EPI CELLS #/AREA URNS HPF: ABNORMAL /HPF (ref 0–5)
GLUCOSE UR STRIP.AUTO-MCNC: NEGATIVE MG/DL
HCT VFR BLD AUTO: 36.4 % (ref 36–48)
HGB BLD-MCNC: 12.7 G/DL (ref 12–16)
HGB UR QL STRIP.AUTO: ABNORMAL
HYALINE CASTS #/AREA URNS LPF: ABNORMAL /LPF (ref 0–2)
KETONES UR STRIP.AUTO-MCNC: NEGATIVE MG/DL
LEUKOCYTE ESTERASE UR QL STRIP.AUTO: ABNORMAL
LYMPHOCYTES # BLD: 0.6 K/UL (ref 1–5.1)
LYMPHOCYTES NFR BLD: 8.9 %
MCH RBC QN AUTO: 27.3 PG (ref 26–34)
MCHC RBC AUTO-ENTMCNC: 34.7 G/DL (ref 31–36)
MCV RBC AUTO: 78.5 FL (ref 80–100)
MONOCYTES # BLD: 0.3 K/UL (ref 0–1.3)
MONOCYTES NFR BLD: 3.7 %
MUCOUS THREADS #/AREA URNS LPF: ABNORMAL /LPF
NEUTROPHILS # BLD: 6 K/UL (ref 1.7–7.7)
NEUTROPHILS NFR BLD: 87.1 %
NITRITE UR QL STRIP.AUTO: NEGATIVE
PH UR STRIP.AUTO: 6 [PH] (ref 5–8)
PLATELET # BLD AUTO: 279 K/UL (ref 135–450)
PMV BLD AUTO: 6.4 FL (ref 5–10.5)
PROT UR STRIP.AUTO-MCNC: ABNORMAL MG/DL
RBC # BLD AUTO: 4.64 M/UL (ref 4–5.2)
RBC #/AREA URNS HPF: ABNORMAL /HPF (ref 0–4)
SP GR UR STRIP.AUTO: 1.01 (ref 1–1.03)
UA DIPSTICK W REFLEX MICRO PNL UR: YES
URN SPEC COLLECT METH UR: ABNORMAL
UROBILINOGEN UR STRIP-ACNC: 0.2 E.U./DL
WBC # BLD AUTO: 6.8 K/UL (ref 4–11)
WBC #/AREA URNS HPF: ABNORMAL /HPF (ref 0–5)

## 2025-07-30 PROCEDURE — 85025 COMPLETE CBC W/AUTO DIFF WBC: CPT

## 2025-07-30 PROCEDURE — 81001 URINALYSIS AUTO W/SCOPE: CPT

## 2025-07-30 PROCEDURE — 93005 ELECTROCARDIOGRAM TRACING: CPT | Performed by: PHYSICIAN ASSISTANT

## 2025-07-30 PROCEDURE — 70450 CT HEAD/BRAIN W/O DYE: CPT

## 2025-07-30 PROCEDURE — 87077 CULTURE AEROBIC IDENTIFY: CPT

## 2025-07-30 PROCEDURE — 87086 URINE CULTURE/COLONY COUNT: CPT

## 2025-07-30 PROCEDURE — 99285 EMERGENCY DEPT VISIT HI MDM: CPT

## 2025-07-30 PROCEDURE — 71045 X-RAY EXAM CHEST 1 VIEW: CPT

## 2025-07-30 PROCEDURE — 80053 COMPREHEN METABOLIC PANEL: CPT

## 2025-07-30 PROCEDURE — 87186 SC STD MICRODIL/AGAR DIL: CPT

## 2025-07-30 PROCEDURE — 83735 ASSAY OF MAGNESIUM: CPT

## 2025-07-30 ASSESSMENT — PAIN - FUNCTIONAL ASSESSMENT: PAIN_FUNCTIONAL_ASSESSMENT: NONE - DENIES PAIN

## 2025-07-30 NOTE — ED PROVIDER NOTES
pamoate (VISTARIL) 25 MG capsule Take 1 capsule by mouth 3 times daily as needed for Itching 180 capsule 1    tiZANidine (ZANAFLEX) 4 MG tablet Take 1 tablet by mouth nightly as needed (pain)      ferrous gluconate (FERGON) 324 (38 Fe) MG tablet Take 1 tablet by mouth daily (with breakfast)      vitamin B-12 (CYANOCOBALAMIN) 100 MCG tablet Take 0.5 tablets by mouth daily      calcium carbonate (OSCAL) 500 MG TABS tablet Take 1 tablet by mouth daily      fluticasone (FLONASE) 50 MCG/ACT nasal spray 1 spray by Each Nostril route daily as needed for Rhinitis      donepezil (ARICEPT) 5 MG tablet Take 1 tablet by mouth nightly      valsartan (DIOVAN) 160 MG tablet TAKE 1 TABLET BY MOUTH DAILY 90 tablet 1    Compression Stockings MISC by Does not apply route 1 each 0    Flaxseed, Linseed, (FLAXSEED OIL) 1000 MG CAPS Take 1,000 mg by mouth      TURMERIC PO Take by mouth daily       esomeprazole (NEXIUM) 40 MG delayed release capsule Take 1 capsule by mouth every morning (before breakfast)      Coenzyme Q10 (COQ-10) 100 MG CPCR Take 1 capsule by mouth daily         PHYSICAL EXAM:  ED Triage Vitals [07/29/25 1412]   BP Systolic BP Percentile Diastolic BP Percentile Temp Temp Source Pulse Respirations SpO2   (!) 170/85 -- -- 98.5 °F (36.9 °C) Oral 84 15 99 %      Height Weight         -- --              Physical Exam   Patient awake and alert and in no distress.  Zosters type rash in the V1 distribution on the left.  No other rash noted.  Does have some general facial edema though.  Heart regular rate and rhythm.    DIAGNOSTIC RESULTS   LABS:   Labs Reviewed - No data to display   When ordered only abnormal lab results are displayed. All other labs were within normal range or not returned as of this dictation.     RADIOLOGY:    Interpretation per the Radiologist below, if available at the time of this note:    No orders to display              EMERGENCY DEPARTMENT COURSE and DIFFERENTIAL DIAGNOSIS/MDM:          Vitals:

## 2025-07-31 PROBLEM — F03.92 DEMENTIA WITH PSYCHOTIC DISTURBANCE (HCC): Status: ACTIVE | Noted: 2023-11-01

## 2025-07-31 PROBLEM — F23 ACUTE PSYCHOSIS (HCC): Status: ACTIVE | Noted: 2025-07-31

## 2025-07-31 PROBLEM — G92.8 TOXIC METABOLIC ENCEPHALOPATHY: Status: ACTIVE | Noted: 2025-07-31

## 2025-07-31 PROBLEM — B02.9 ZOSTER WITHOUT COMPLICATIONS: Status: ACTIVE | Noted: 2025-07-31

## 2025-07-31 PROBLEM — R41.82 AMS (ALTERED MENTAL STATUS): Status: ACTIVE | Noted: 2025-07-31

## 2025-07-31 LAB
25(OH)D3 SERPL-MCNC: 35.9 NG/ML
ALBUMIN SERPL-MCNC: 4.1 G/DL (ref 3.4–5)
ALBUMIN/GLOB SERPL: 1.6 {RATIO} (ref 1.1–2.2)
ALP SERPL-CCNC: 154 U/L (ref 40–129)
ALT SERPL-CCNC: 16 U/L (ref 10–40)
AMMONIA PLAS-SCNC: 19 UMOL/L (ref 11–51)
ANION GAP SERPL CALCULATED.3IONS-SCNC: 13 MMOL/L (ref 3–16)
ANION GAP SERPL CALCULATED.3IONS-SCNC: 14 MMOL/L (ref 3–16)
AST SERPL-CCNC: 26 U/L (ref 15–37)
BACTERIA URNS QL MICRO: ABNORMAL /HPF
BILIRUB SERPL-MCNC: 1.1 MG/DL (ref 0–1)
BILIRUB UR QL STRIP.AUTO: NEGATIVE
BUN SERPL-MCNC: 18 MG/DL (ref 7–20)
BUN SERPL-MCNC: 21 MG/DL (ref 7–20)
CALCIUM SERPL-MCNC: 10.4 MG/DL (ref 8.3–10.6)
CALCIUM SERPL-MCNC: 10.9 MG/DL (ref 8.3–10.6)
CHLORIDE SERPL-SCNC: 89 MMOL/L (ref 99–110)
CHLORIDE SERPL-SCNC: 92 MMOL/L (ref 99–110)
CLARITY UR: CLEAR
CO2 SERPL-SCNC: 24 MMOL/L (ref 21–32)
CO2 SERPL-SCNC: 26 MMOL/L (ref 21–32)
COLOR UR: YELLOW
CREAT SERPL-MCNC: 0.8 MG/DL (ref 0.6–1.2)
CREAT SERPL-MCNC: 0.9 MG/DL (ref 0.6–1.2)
EKG ATRIAL RATE: 97 BPM
EKG DIAGNOSIS: NORMAL
EKG P AXIS: 48 DEGREES
EKG P-R INTERVAL: 176 MS
EKG Q-T INTERVAL: 396 MS
EKG QRS DURATION: 134 MS
EKG QTC CALCULATION (BAZETT): 502 MS
EKG R AXIS: -31 DEGREES
EKG T AXIS: 6 DEGREES
EKG VENTRICULAR RATE: 97 BPM
EPI CELLS #/AREA URNS HPF: ABNORMAL /HPF (ref 0–5)
FOLATE SERPL-MCNC: 26.5 NG/ML (ref 4.78–24.2)
GFR SERPLBLD CREATININE-BSD FMLA CKD-EPI: 60 ML/MIN/{1.73_M2}
GFR SERPLBLD CREATININE-BSD FMLA CKD-EPI: 69 ML/MIN/{1.73_M2}
GLUCOSE SERPL-MCNC: 100 MG/DL (ref 70–99)
GLUCOSE SERPL-MCNC: 135 MG/DL (ref 70–99)
GLUCOSE UR STRIP.AUTO-MCNC: NEGATIVE MG/DL
HGB UR QL STRIP.AUTO: ABNORMAL
KETONES UR STRIP.AUTO-MCNC: ABNORMAL MG/DL
LEUKOCYTE ESTERASE UR QL STRIP.AUTO: ABNORMAL
MAGNESIUM SERPL-MCNC: 1.79 MG/DL (ref 1.8–2.4)
MAGNESIUM SERPL-MCNC: 2.37 MG/DL (ref 1.8–2.4)
NITRITE UR QL STRIP.AUTO: NEGATIVE
PH UR STRIP.AUTO: 6 [PH] (ref 5–8)
PHOSPHATE SERPL-MCNC: 2.5 MG/DL (ref 2.5–4.9)
POTASSIUM SERPL-SCNC: 3 MMOL/L (ref 3.5–5.1)
POTASSIUM SERPL-SCNC: 3.5 MMOL/L (ref 3.5–5.1)
POTASSIUM SERPL-SCNC: 3.7 MMOL/L (ref 3.5–5.1)
PROT SERPL-MCNC: 6.6 G/DL (ref 6.4–8.2)
PROT UR STRIP.AUTO-MCNC: NEGATIVE MG/DL
RBC #/AREA URNS HPF: ABNORMAL /HPF (ref 0–4)
SODIUM SERPL-SCNC: 126 MMOL/L (ref 136–145)
SODIUM SERPL-SCNC: 132 MMOL/L (ref 136–145)
SP GR UR STRIP.AUTO: <=1.005 (ref 1–1.03)
TSH SERPL DL<=0.005 MIU/L-ACNC: 0.23 UIU/ML (ref 0.27–4.2)
UA COMPLETE W REFLEX CULTURE PNL UR: ABNORMAL
UA DIPSTICK W REFLEX MICRO PNL UR: YES
URN SPEC COLLECT METH UR: ABNORMAL
UROBILINOGEN UR STRIP-ACNC: 0.2 E.U./DL
VIT B12 SERPL-MCNC: 2334 PG/ML (ref 211–911)
WBC #/AREA URNS HPF: ABNORMAL /HPF (ref 0–5)

## 2025-07-31 PROCEDURE — 82306 VITAMIN D 25 HYDROXY: CPT

## 2025-07-31 PROCEDURE — 96375 TX/PRO/DX INJ NEW DRUG ADDON: CPT

## 2025-07-31 PROCEDURE — 6360000002 HC RX W HCPCS: Performed by: PHYSICIAN ASSISTANT

## 2025-07-31 PROCEDURE — 2500000003 HC RX 250 WO HCPCS: Performed by: PHYSICIAN ASSISTANT

## 2025-07-31 PROCEDURE — 81001 URINALYSIS AUTO W/SCOPE: CPT

## 2025-07-31 PROCEDURE — APPSS45 APP SPLIT SHARED TIME 31-45 MINUTES

## 2025-07-31 PROCEDURE — 96366 THER/PROPH/DIAG IV INF ADDON: CPT

## 2025-07-31 PROCEDURE — 80048 BASIC METABOLIC PNL TOTAL CA: CPT

## 2025-07-31 PROCEDURE — 2500000003 HC RX 250 WO HCPCS

## 2025-07-31 PROCEDURE — 82607 VITAMIN B-12: CPT

## 2025-07-31 PROCEDURE — 84132 ASSAY OF SERUM POTASSIUM: CPT

## 2025-07-31 PROCEDURE — 2580000003 HC RX 258

## 2025-07-31 PROCEDURE — 6370000000 HC RX 637 (ALT 250 FOR IP)

## 2025-07-31 PROCEDURE — 83735 ASSAY OF MAGNESIUM: CPT

## 2025-07-31 PROCEDURE — 96372 THER/PROPH/DIAG INJ SC/IM: CPT

## 2025-07-31 PROCEDURE — 99222 1ST HOSP IP/OBS MODERATE 55: CPT | Performed by: STUDENT IN AN ORGANIZED HEALTH CARE EDUCATION/TRAINING PROGRAM

## 2025-07-31 PROCEDURE — 87077 CULTURE AEROBIC IDENTIFY: CPT

## 2025-07-31 PROCEDURE — 82140 ASSAY OF AMMONIA: CPT

## 2025-07-31 PROCEDURE — 84443 ASSAY THYROID STIM HORMONE: CPT

## 2025-07-31 PROCEDURE — G0378 HOSPITAL OBSERVATION PER HR: HCPCS

## 2025-07-31 PROCEDURE — 87186 SC STD MICRODIL/AGAR DIL: CPT

## 2025-07-31 PROCEDURE — 6360000002 HC RX W HCPCS

## 2025-07-31 PROCEDURE — 93010 ELECTROCARDIOGRAM REPORT: CPT | Performed by: INTERNAL MEDICINE

## 2025-07-31 PROCEDURE — 84100 ASSAY OF PHOSPHORUS: CPT

## 2025-07-31 PROCEDURE — 36415 COLL VENOUS BLD VENIPUNCTURE: CPT

## 2025-07-31 PROCEDURE — 82746 ASSAY OF FOLIC ACID SERUM: CPT

## 2025-07-31 PROCEDURE — 96365 THER/PROPH/DIAG IV INF INIT: CPT

## 2025-07-31 PROCEDURE — 87086 URINE CULTURE/COLONY COUNT: CPT

## 2025-07-31 PROCEDURE — 6370000000 HC RX 637 (ALT 250 FOR IP): Performed by: INTERNAL MEDICINE

## 2025-07-31 PROCEDURE — 96367 TX/PROPH/DG ADDL SEQ IV INF: CPT

## 2025-07-31 RX ORDER — VALSARTAN 80 MG/1
160 TABLET ORAL DAILY
Status: DISCONTINUED | OUTPATIENT
Start: 2025-07-31 | End: 2025-08-02 | Stop reason: HOSPADM

## 2025-07-31 RX ORDER — ENOXAPARIN SODIUM 100 MG/ML
40 INJECTION SUBCUTANEOUS DAILY
Status: DISCONTINUED | OUTPATIENT
Start: 2025-07-31 | End: 2025-08-02 | Stop reason: HOSPADM

## 2025-07-31 RX ORDER — PREDNISONE 20 MG/1
20 TABLET ORAL DAILY
Status: DISCONTINUED | OUTPATIENT
Start: 2025-08-04 | End: 2025-07-31

## 2025-07-31 RX ORDER — MAGNESIUM SULFATE IN WATER 40 MG/ML
2000 INJECTION, SOLUTION INTRAVENOUS PRN
Status: DISCONTINUED | OUTPATIENT
Start: 2025-07-31 | End: 2025-08-02 | Stop reason: HOSPADM

## 2025-07-31 RX ORDER — DONEPEZIL HYDROCHLORIDE 5 MG/1
5 TABLET, FILM COATED ORAL NIGHTLY
Status: DISCONTINUED | OUTPATIENT
Start: 2025-07-31 | End: 2025-07-31

## 2025-07-31 RX ORDER — HYDROCODONE BITARTRATE AND ACETAMINOPHEN 5; 325 MG/1; MG/1
1 TABLET ORAL EVERY 6 HOURS PRN
Status: DISCONTINUED | OUTPATIENT
Start: 2025-07-31 | End: 2025-08-02 | Stop reason: HOSPADM

## 2025-07-31 RX ORDER — ACETAMINOPHEN 325 MG/1
650 TABLET ORAL EVERY 6 HOURS PRN
Status: DISCONTINUED | OUTPATIENT
Start: 2025-07-31 | End: 2025-08-02 | Stop reason: HOSPADM

## 2025-07-31 RX ORDER — POTASSIUM CHLORIDE 1500 MG/1
40 TABLET, EXTENDED RELEASE ORAL PRN
Status: DISCONTINUED | OUTPATIENT
Start: 2025-07-31 | End: 2025-08-02 | Stop reason: HOSPADM

## 2025-07-31 RX ORDER — SODIUM CHLORIDE 0.9 % (FLUSH) 0.9 %
5-40 SYRINGE (ML) INJECTION EVERY 12 HOURS SCHEDULED
Status: DISCONTINUED | OUTPATIENT
Start: 2025-07-31 | End: 2025-08-02 | Stop reason: HOSPADM

## 2025-07-31 RX ORDER — ACETAMINOPHEN 650 MG/1
650 SUPPOSITORY RECTAL EVERY 6 HOURS PRN
Status: DISCONTINUED | OUTPATIENT
Start: 2025-07-31 | End: 2025-08-02 | Stop reason: HOSPADM

## 2025-07-31 RX ORDER — POTASSIUM CHLORIDE 7.45 MG/ML
10 INJECTION INTRAVENOUS PRN
Status: DISCONTINUED | OUTPATIENT
Start: 2025-07-31 | End: 2025-08-02 | Stop reason: HOSPADM

## 2025-07-31 RX ORDER — PREDNISONE 10 MG/1
10 TABLET ORAL DAILY
Status: DISCONTINUED | OUTPATIENT
Start: 2025-07-31 | End: 2025-07-31

## 2025-07-31 RX ORDER — SODIUM CHLORIDE 0.9 % (FLUSH) 0.9 %
5-40 SYRINGE (ML) INJECTION PRN
Status: DISCONTINUED | OUTPATIENT
Start: 2025-07-31 | End: 2025-08-02 | Stop reason: HOSPADM

## 2025-07-31 RX ORDER — MAGNESIUM SULFATE IN WATER 40 MG/ML
2000 INJECTION, SOLUTION INTRAVENOUS ONCE
Status: COMPLETED | OUTPATIENT
Start: 2025-07-31 | End: 2025-07-31

## 2025-07-31 RX ORDER — DONEPEZIL HYDROCHLORIDE 5 MG/1
10 TABLET, FILM COATED ORAL NIGHTLY
Status: DISCONTINUED | OUTPATIENT
Start: 2025-07-31 | End: 2025-08-02 | Stop reason: HOSPADM

## 2025-07-31 RX ORDER — NIFEDIPINE 30 MG/1
30 TABLET, EXTENDED RELEASE ORAL ONCE
Status: DISCONTINUED | OUTPATIENT
Start: 2025-07-31 | End: 2025-07-31 | Stop reason: SDUPTHER

## 2025-07-31 RX ORDER — NIFEDIPINE 30 MG/1
60 TABLET, EXTENDED RELEASE ORAL DAILY
Status: DISCONTINUED | OUTPATIENT
Start: 2025-08-01 | End: 2025-08-02 | Stop reason: HOSPADM

## 2025-07-31 RX ORDER — ALBUTEROL SULFATE 90 UG/1
2 INHALANT RESPIRATORY (INHALATION) EVERY 6 HOURS PRN
Status: DISCONTINUED | OUTPATIENT
Start: 2025-07-31 | End: 2025-08-02 | Stop reason: HOSPADM

## 2025-07-31 RX ORDER — DONEPEZIL HYDROCHLORIDE 5 MG/1
10 TABLET, FILM COATED ORAL DAILY
Status: DISCONTINUED | OUTPATIENT
Start: 2025-08-01 | End: 2025-07-31

## 2025-07-31 RX ORDER — SODIUM CHLORIDE 9 MG/ML
INJECTION, SOLUTION INTRAVENOUS PRN
Status: DISCONTINUED | OUTPATIENT
Start: 2025-07-31 | End: 2025-08-02 | Stop reason: HOSPADM

## 2025-07-31 RX ORDER — ONDANSETRON 2 MG/ML
4 INJECTION INTRAMUSCULAR; INTRAVENOUS EVERY 6 HOURS PRN
Status: DISCONTINUED | OUTPATIENT
Start: 2025-07-31 | End: 2025-07-31

## 2025-07-31 RX ORDER — PANTOPRAZOLE SODIUM 40 MG/1
40 TABLET, DELAYED RELEASE ORAL
Status: DISCONTINUED | OUTPATIENT
Start: 2025-07-31 | End: 2025-08-02 | Stop reason: HOSPADM

## 2025-07-31 RX ORDER — PREDNISONE 10 MG/1
10 TABLET ORAL DAILY
Status: DISCONTINUED | OUTPATIENT
Start: 2025-08-06 | End: 2025-07-31

## 2025-07-31 RX ORDER — NIFEDIPINE 30 MG/1
30 TABLET, EXTENDED RELEASE ORAL ONCE
Status: COMPLETED | OUTPATIENT
Start: 2025-07-31 | End: 2025-07-31

## 2025-07-31 RX ORDER — NIFEDIPINE 30 MG/1
30 TABLET, EXTENDED RELEASE ORAL DAILY
Status: DISCONTINUED | OUTPATIENT
Start: 2025-07-31 | End: 2025-07-31

## 2025-07-31 RX ORDER — PREDNISONE 20 MG/1
40 TABLET ORAL DAILY
Status: DISCONTINUED | OUTPATIENT
Start: 2025-07-31 | End: 2025-07-31

## 2025-07-31 RX ORDER — VALACYCLOVIR HYDROCHLORIDE 500 MG/1
1000 TABLET, FILM COATED ORAL 3 TIMES DAILY
Status: COMPLETED | OUTPATIENT
Start: 2025-07-31 | End: 2025-08-01

## 2025-07-31 RX ORDER — ONDANSETRON 4 MG/1
4 TABLET, ORALLY DISINTEGRATING ORAL EVERY 8 HOURS PRN
Status: DISCONTINUED | OUTPATIENT
Start: 2025-07-31 | End: 2025-07-31

## 2025-07-31 RX ORDER — POLYETHYLENE GLYCOL 3350 17 G/17G
17 POWDER, FOR SOLUTION ORAL DAILY PRN
Status: DISCONTINUED | OUTPATIENT
Start: 2025-07-31 | End: 2025-08-02 | Stop reason: HOSPADM

## 2025-07-31 RX ORDER — HYDROXYZINE PAMOATE 25 MG/1
25 CAPSULE ORAL 3 TIMES DAILY PRN
Status: DISCONTINUED | OUTPATIENT
Start: 2025-07-31 | End: 2025-08-02 | Stop reason: HOSPADM

## 2025-07-31 RX ADMIN — HYDROCODONE BITARTRATE AND ACETAMINOPHEN 1 TABLET: 5; 325 TABLET ORAL at 21:19

## 2025-07-31 RX ADMIN — PANTOPRAZOLE SODIUM 40 MG: 40 TABLET, DELAYED RELEASE ORAL at 06:35

## 2025-07-31 RX ADMIN — VALACYCLOVIR HYDROCHLORIDE 1000 MG: 500 TABLET, FILM COATED ORAL at 10:02

## 2025-07-31 RX ADMIN — MAGNESIUM SULFATE HEPTAHYDRATE 2000 MG: 40 INJECTION, SOLUTION INTRAVENOUS at 02:42

## 2025-07-31 RX ADMIN — PREDNISONE 40 MG: 20 TABLET ORAL at 10:03

## 2025-07-31 RX ADMIN — DONEPEZIL HYDROCHLORIDE 10 MG: 5 TABLET, FILM COATED ORAL at 21:19

## 2025-07-31 RX ADMIN — POTASSIUM PHOSPHATE, MONOBASIC POTASSIUM PHOSPHATE, DIBASIC 15 MMOL: 224; 236 INJECTION, SOLUTION, CONCENTRATE INTRAVENOUS at 13:34

## 2025-07-31 RX ADMIN — VALSARTAN 160 MG: 80 TABLET, FILM COATED ORAL at 10:03

## 2025-07-31 RX ADMIN — NIFEDIPINE 30 MG: 30 TABLET, FILM COATED, EXTENDED RELEASE ORAL at 13:30

## 2025-07-31 RX ADMIN — SODIUM CHLORIDE, PRESERVATIVE FREE 10 ML: 5 INJECTION INTRAVENOUS at 20:59

## 2025-07-31 RX ADMIN — VALACYCLOVIR HYDROCHLORIDE 1000 MG: 500 TABLET, FILM COATED ORAL at 13:29

## 2025-07-31 RX ADMIN — WATER 1000 MG: 1 INJECTION INTRAMUSCULAR; INTRAVENOUS; SUBCUTANEOUS at 02:39

## 2025-07-31 RX ADMIN — SODIUM CHLORIDE, PRESERVATIVE FREE 10 ML: 5 INJECTION INTRAVENOUS at 09:54

## 2025-07-31 RX ADMIN — VALACYCLOVIR HYDROCHLORIDE 1000 MG: 500 TABLET, FILM COATED ORAL at 20:55

## 2025-07-31 RX ADMIN — NIFEDIPINE 30 MG: 30 TABLET, FILM COATED, EXTENDED RELEASE ORAL at 16:27

## 2025-07-31 RX ADMIN — ENOXAPARIN SODIUM 40 MG: 100 INJECTION SUBCUTANEOUS at 10:03

## 2025-07-31 ASSESSMENT — PAIN DESCRIPTION - ORIENTATION: ORIENTATION: LEFT

## 2025-07-31 ASSESSMENT — PAIN DESCRIPTION - LOCATION: LOCATION: LEG

## 2025-07-31 ASSESSMENT — PAIN SCALES - GENERAL
PAINLEVEL_OUTOF10: 3
PAINLEVEL_OUTOF10: 5

## 2025-07-31 ASSESSMENT — PAIN DESCRIPTION - DESCRIPTORS: DESCRIPTORS: ACHING;DISCOMFORT;SORE

## 2025-07-31 NOTE — ED PROVIDER NOTES
[facility] Emergency Department    CHIEF COMPLAINT  Altered Mental Status (Pt arrived from home by squad. Per family pt hasn't been acting like herself. Family thinks it could be a UTI. Hx of UTI. AxOx4, afebrile. )      SHARED SERVICE VISIT  I have seen and evaluated this patient with my supervising physician, Dr. Eliu Salter.    HISTORY OF PRESENT ILLNESS  Catherine Rebolledo is a 89 y.o. female who presents to the ED complaining of altered mental status.  Family was over visiting patient earlier today on a assisted living facility when she came extremely aggressive and hostile to the family.  She was also experiencing visual hallucinations, however she knows that these are hallucinations.  Some of the things she was seeing were snakes coming out of the wall as well as leaks in the ceiling and people that were not there.  She is also experiencing some dysuria with no hematuria. Denies any headache, body ache, fevers or chills.  Denies any coughing or sneezing.  Denies any sore throat or congestion.  Denies any vision changes or dizziness.  Denies any chest pain, shortness of breath, or dyspnea on exertion.  Denies any nausea, vomiting, or abdominal pain.  Denies any diarrhea or bloody stools.  Denies any new onset back pain.  Denies any recent travel or sick contacts.    No other complaints, modifying factors or associated symptoms.     Nursing notes reviewed.   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

## 2025-07-31 NOTE — ACP (ADVANCE CARE PLANNING)
Advance Care Planning     General Advance Care Planning (ACP) Conversation    Date of Conversation: 7/31/2025  Conducted with: Patient with Decision Making Capacity  Other persons present: Granddaughter Shereen    Healthcare Decision Maker:   Primary Decision Maker: Shereen Tomas - Grandchild - [phone]       Content/Action Overview:  Has ACP document(s) on file - reflects the patient's care preferences  Reviewed DNR/DNI and patient elects Full Code (Attempt Resuscitation)   Would not want long term vent.     Length of Voluntary ACP Conversation in minutes:  <16 minutes (Non-Billable)    Lisa Santizo RN

## 2025-07-31 NOTE — CARE COORDINATION
Case Management Assessment  Initial Evaluation    Date/Time of Evaluation: 7/31/2025 1:32 PM  Assessment Completed by: Lisa Santizo RN    If patient is discharged prior to next notation, then this note serves as note for discharge by case management.    Patient Name: Catherine Rebolledo                   YOB: 1934  Diagnosis: Hypomagnesemia [E83.42]  Hyponatremia [E87.1]  Acute cystitis without hematuria [N30.00]  Altered mental status, unspecified altered mental status type [R41.82]  AMS (altered mental status) [R41.82]                   Date / Time: 7/30/2025 10:36 PM    Patient Admission Status: Observation   Readmission Risk (Low < 19, Mod (19-27), High > 27): Readmission Risk Score: 15.6    Current PCP: Laura Carrasco, DO  PCP verified by CM? Yes    Chart Reviewed: Yes      History Provided by: Patient, Child/Family  Patient Orientation: Person    Patient Cognition: Alert    Hospitalization in the last 30 days (Readmission):  No    If yes, Readmission Assessment in CM Navigator will be completed.    Advance Directives:      Code Status: Full Code   Patient's Primary Decision Maker is: Legal Next of Kin    Primary Decision Maker: Shereen Tomas - Grandchild - [phone]    Discharge Planning:    Patient lives with: Alone Type of Home: Independent Living  Primary Care Giver: Self  Patient Support Systems include: Family Members, Children   Current Financial resources: Medicare  Current community resources: None  Current services prior to admission: Durable Medical Equipment            Current DME: Walker, Cane            Type of Home Care services:  None    ADLS  Prior functional level: Mobility, Shopping, Housework, Cooking  Current functional level: Mobility, Shopping, Cooking    PT AM-PAC:   /24  OT AM-PAC:   /24    Family can provide assistance at DC: No  Would you like Case Management to discuss the discharge plan with any other family members/significant others, and if so, who? Yes  Patient and/or Patient Representative Agree with the Discharge Plan?      Lisa Santizo RN  Case Management Department

## 2025-07-31 NOTE — CONSULTS
Consult Call Back    Who:Brisa Lorenz, APRN - CNP   Date:7/31/2025,  Time:12:45 PM    Electronically signed by Chhaya Gore on 7/31/25 at 12:45 PM EDT

## 2025-07-31 NOTE — ED NOTES
Catherine Rebolledo is a 89 y.o. female admitted for  Principal Problem:    AMS (altered mental status)  Resolved Problems:    * No resolved hospital problems. *  .   Patient Home via EMS transportation with   Chief Complaint   Patient presents with    Altered Mental Status     Pt arrived from home by squad. Per family pt hasn't been acting like herself. Family thinks it could be a UTI. Hx of UTI. AxOx4, afebrile.    .  Patient is alert and Person, Place, Time, and Situation  Patient's baseline mobility: Baseline Mobility: Walker and Cane   Code Status: Prior   Cardiac Rhythm:       Is patient on baseline Oxygen: no   Abnormal Assessment Findings: See note    Isolation: None      NIH Score:    C-SSRS: Risk of Suicide: No Risk  Bedside swallow:        Active LDA's:   Peripheral IV 07/30/25 Left Antecubital (Active)     Patient admitted with a carson: no       Family/Caregiver Present yes Any Concerns: no   Restraints no  Sitter no         Vitals: MEWS Score: 0    Vitals:    07/30/25 2313 07/31/25 0014 07/31/25 0214 07/31/25 0313   BP: (!) 160/92 (!) 168/90 (!) 181/92 (!) 163/98   Pulse: 100 93 88 96   Resp: 16 16 15 15   Temp:       TempSrc:       SpO2: 91% 100% 100% 100%   Weight:       Height:           Last documented pain score (0-10 scale)    Pain medication administered Yes- see MAR.    Pertinent or High Risk Medications/Drips: No.    Pending Blood Product Administration: no    Abnormal labs:   Abnormal Labs Reviewed   CBC WITH AUTO DIFFERENTIAL - Abnormal; Notable for the following components:       Result Value    MCV 78.5 (*)     Lymphocytes Absolute 0.6 (*)     All other components within normal limits   COMPREHENSIVE METABOLIC PANEL W/ REFLEX TO MG FOR LOW K - Abnormal; Notable for the following components:    Sodium 126 (*)     Chloride 89 (*)     Glucose 135 (*)     BUN 21 (*)     Est, Glom Filt Rate 60 (*)     Calcium 10.9 (*)     Total Bilirubin 1.1 (*)     Alkaline Phosphatase 154 (*)     All other

## 2025-07-31 NOTE — PLAN OF CARE
Problem: Safety - Adult  Goal: Free from fall injury  7/31/2025 1032 by Shawna Dean, RN  Outcome: Progressing  Flowsheets (Taken 7/31/2025 1032)  Free From Fall Injury: Instruct family/caregiver on patient safety

## 2025-07-31 NOTE — PROGRESS NOTES
Pt's BP at this time 178/102 at this time, but am Valsartan just given. Dr. Frankel paged and notified. Instructed to repeat vital signs in 1 hour.

## 2025-07-31 NOTE — CONSULTS
Infectious Diseases   Consult Note      Reason for Consult: VZV    Requesting Physician:   Jostin      Date of Admission: 7/30/2025    Subjective:   CHIEF COMPLAINT:  none given       HPI:    Catherine Rebolledo is a 89yoF with history of HTN, HLD, COPD, CAD, rheumatic fever, dementia                 Brief admission 7/13-7/15/25 after falling   R rib fracture noted  Discharged home     PCP office 7/25 -   She was thoguht to have VZV in V1 distribution  She was prescribed valtrex and referred to Ophthalmology   She saw Ophtho that day, no ocular involvement noted.  She was given erythromycin ointment     Call to PCP office on 7/28 re hallucinations   On 7/28, PCP ordered UA, that was collected by home health on 7/30     She was referred to the ED and seen on 7/29  Increasing facial swelling and pain were reported   She was given prednisone taper and discharged      Home health nurse collected a UA on 7/30 with 6-9wbc, epithelial cells, 4+ bacteria  That culture is positive for Serratia  Treatment was not initiated prior to return to ED     Back to ED today 7/31 - ongoing visual hallucinations and encephalopathy w behavioral disturbance   WBC was wnl   UA was negative, no pyuria   She was given dose of rocephin   Sodium was low   Admitted for evaluation     ID is consulted regarding VZV, possible UTI       Current abx:  Rocephin 1g q24  Valtrex 1g po TID        Past Surgical History:       Diagnosis Date    Anxiety 02/24/2022    Bleeding ulcer     Cancer (HCC)     melanoma L IRIS    Confusion 10/30/2023    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

## 2025-07-31 NOTE — PLAN OF CARE
Problem: Chronic Conditions and Co-morbidities  Goal: Patient's chronic conditions and co-morbidity symptoms are monitored and maintained or improved  Outcome: Progressing     Problem: Skin/Tissue Integrity  Goal: Skin integrity remains intact  Description: 1.  Monitor for areas of redness and/or skin breakdown  2.  Assess vascular access sites hourly  3.  Every 4-6 hours minimum:  Change oxygen saturation probe site  4.  Every 4-6 hours:  If on nasal continuous positive airway pressure, respiratory therapy assess nares and determine need for appliance change or resting period  Outcome: Progressing     Problem: Safety - Adult  Goal: Free from fall injury  Outcome: Progressing     Problem: ABCDS Injury Assessment  Goal: Absence of physical injury  Outcome: Progressing

## 2025-07-31 NOTE — PROGRESS NOTES
4 Eyes Skin Assessment     The patient is being assess for  Admission    I agree that 2 RN's have performed a thorough Head to Toe Skin Assessment on the patient. ALL assessment sites listed below have been assessed.       Areas assessed by both nurses:   [x]   Head, Face, and Ears   [x]   Shoulders, Back, and Chest  [x]   Arms, Elbows, and Hands   [x]   Coccyx, Sacrum, and Ischum  [x]   Legs, Feet, and Heels    Patient has a wound on the forehead.        Does the Patient have Skin Breakdown?  Yes a wound was noted on the Admission Assessment and an WOUND LDA was Initiated documentation include the Ijeoma-wound, Wound Assessment, Measurements, Dressing Treatment, Drainage, and Color\",         Billy Prevention initiated:  Yes   Wound Care Orders initiated:  no      WOC nurse consulted for Pressure Injury (Stage 3,4, Unstageable, DTI, NWPT, and Complex wounds):  no    Nurse 1 eSignature: Electronically signed by Lupe Ortiz RN on 7/31/25 at 6:24 AM EDT    **SHARE this note so that the co-signing nurse is able to place an eSignature**    Nurse 2 eSignature: Electronically signed by Joceline Adam RN on 7/31/25 at 6:52 AM EDT

## 2025-07-31 NOTE — PROGRESS NOTES
[facility] Medicine Progress Note  V 7.24      Date of Admission: 7/30/2025    Hospital Day: 2      Chief Admission Complaint:  AMS    Subjective:  Patient seen today morning. Accompanied by family. Patient AO1, hallucinations. Nurse mentions ongoing hallucinations overnight. No fevers, chills, pain anywhere, nausea, vomiting.    Presenting Admission History:       89-year-old female past medical history of dementia, HTN, chronic hyponatremia presenting with son and daughter-in-law.  Patient is a limited historian due to AMS and impaired hearing, son and daughter-in-law acting as partial historian. She was brought in  they were concerned for her elevated blood pressure and increasingly combative behavior with hallucinations.  Patient is currently being treated for facial rash for shingles and is being treated with antivirals and prednisone taper.  Her swelling and facial pain have resolved since starting this treatment 2 days ago.  During interview she did point to a person that was bothering her who was not in the room.  Patient mentions that it hurts when she urinates, and this has been occurring for the past few days.  Denies blood in the urine, denies back pain. Denies chest pain, abdominal pain, shortness of breath, nausea, vomiting, diarrhea.     Assessment/Plan:      AMS of unknown origin  CT head unremarkable, labs not suggestive of metabolic or toxic cause  Hallucinations may be due to combination of underlying dementia/adverse effects of steroid tx   Plan:  Continue donepezil  Neurology consulted, appreciate recommendations  MRI brain ordered, pending  Ammonia  Swallow study     Acute Cystitis with Hematuria, uncomplicated   Presenting with dysuria, UA positive for leukocyte Estrace, trace hematuria  Afebrile, no leukocytosis, HD stable   Plan:  Ceftriaxone  UC ordered, continue to follow  Intake, output monitor     Herpes Zoster without complication   Evaluated on 7/25 for shingles along path of

## 2025-07-31 NOTE — CONSULTS
Catherine Rebolledo  Inpatient Neurology Consult  [facility]            Catherine Rebolledo  2/23/1934    Date of Service: 7/31/2025    Referring Physician: Clinton Frankel MD      Reason for the consult and CC: AMS, possible dementia    HPI:   The patient is a 89 y.o.  years old female with a prior medical history of melanoma, COPD, CAD, hyperlipidemia, hypertension, shingles, dementia who presented to the hospital yesterday with altered mental status.  Patient lives in independent living [facility].  She does have a history of dementia, however she manages her own ADLs and her own medications (with pill dispenser).  Family reports that she typically has sundowning every evening and that she will hallucinate and have conversations with people who are not there.  They state this has been going on for several years.  She is currently being treated for shingles outbreak on her face and was recently seen in the ED on 7/29 where she was prescribed oral prednisone.  Yesterday, family went to visit her at her independent living facility and she was agitated and aggressive toward them.  She was hallucinating and said that she saw snakes coming out of the walls.  He said that this agitation was not normal for her so they brought her to the ED.  In the ED, she was found to have a UTI and is being treated with Rocephin.  Family says that she currently does not seem any more confused than usual, only more aggressive and irritable, with hallucinations around-the-clock as opposed to only in the evening (which would be typical for her).  They do report that she has a history of increased hallucinations with UTI and also irritability with steroids.       Constitutional:   Vitals:    07/31/25 0313 07/31/25 0425 07/31/25 0435 07/31/25 0941   BP: (!) 163/98  (!) 167/89 (!) 178/102   Pulse: 96  88 92   Resp: 15  18 18   Temp:   97.7 °F (36.5 °C) 97.9 °F (36.6 °C)   TempSrc:   Oral Oral   SpO2: 100%  99% 99%

## 2025-07-31 NOTE — CONSULTS
Consult Placed   Consult sent via perfect serve  Who: Dr. Glover  Date: 7/31/2025  Time: 10:34 AM       Electronically signed by Chhaya Gore on 7/31/2025 at 10:34 AM

## 2025-07-31 NOTE — CONSULTS
Consult Placed   Consult sent via perfect serve  Who: Brisa Lorenz,   Date: 7/31/2025  Time: 10:38 AM       Electronically signed by Chhaya Gore on 7/31/2025 at 10:38 AM

## 2025-07-31 NOTE — H&P
[facility] Medicine History & Physical    V 1.6    Date of Admission: 7/30/2025    Date of Service:  Pt seen/examined on 07/31/25      []Admitted to Inpatient with expected LOS greater than two midnights due to medical therapy.  [x]Placed in Observation status.    Chief Admission Complaint:    Chief Complaint   Patient presents with    Altered Mental Status     Pt arrived from home by squad. Per family pt hasn't been acting like herself. Family thinks it could be a UTI. Hx of UTI. AxOx4, afebrile.             Presenting Admission History:      Per Initial ED note:     Catherine Rebolledo is a 89 y.o. female who presents for facial swelling.  Patient and family report that patient has a facial rash from shingles and is under treatment with valacyclovir.  She however lives alone and has been forgetting to take her medicines occasionally and now has some increased facial swelling and facial pain.  Family is also concerned because she has been hallucinating more than normal.  Patient states she knows that it was a hallucination earlier when she saw a bed that had Mikael outlines.  She is not currently hallucinating.  She has already been evaluated by ophthalmology for possibility of zoster ophthalmicus and was placed on erythromycin ointment.       89 y.o. female who presented to [facility] with   Chief Complaint   Patient presents with    Altered Mental Status     Pt arrived from home by squad. Per family pt hasn't been acting like herself. Family thinks it could be a UTI. Hx of UTI. AxOx4, afebrile.     .     HPI    89-year-old female past medical history of dementia, HTN, chronic hyponatremia presenting with son and daughter-in-law.  Patient is a limited historian due to AMS and impaired hearing, son and daughter-in-law acting as partial historian. She was brought in  they were concerned for her elevated blood pressure and increasingly combative behavior with hallucinations.  Patient is currently being

## 2025-08-01 LAB
ALBUMIN SERPL-MCNC: 4.3 G/DL (ref 3.4–5)
ALBUMIN/GLOB SERPL: 2 {RATIO} (ref 1.1–2.2)
ALP SERPL-CCNC: 149 U/L (ref 40–129)
ALT SERPL-CCNC: 17 U/L (ref 10–40)
ANION GAP SERPL CALCULATED.3IONS-SCNC: 11 MMOL/L (ref 3–16)
ANION GAP SERPL CALCULATED.3IONS-SCNC: 16 MMOL/L (ref 3–16)
AST SERPL-CCNC: 27 U/L (ref 15–37)
BACTERIA UR CULT: ABNORMAL
BASOPHILS # BLD: 0 K/UL (ref 0–0.2)
BASOPHILS NFR BLD: 0.5 %
BILIRUB SERPL-MCNC: 0.9 MG/DL (ref 0–1)
BUN SERPL-MCNC: 15 MG/DL (ref 7–20)
BUN SERPL-MCNC: 18 MG/DL (ref 7–20)
CALCIUM SERPL-MCNC: 9.8 MG/DL (ref 8.3–10.6)
CALCIUM SERPL-MCNC: 9.9 MG/DL (ref 8.3–10.6)
CHLORIDE SERPL-SCNC: 94 MMOL/L (ref 99–110)
CHLORIDE SERPL-SCNC: 96 MMOL/L (ref 99–110)
CO2 SERPL-SCNC: 18 MMOL/L (ref 21–32)
CO2 SERPL-SCNC: 26 MMOL/L (ref 21–32)
CREAT SERPL-MCNC: 0.7 MG/DL (ref 0.6–1.2)
CREAT SERPL-MCNC: 0.9 MG/DL (ref 0.6–1.2)
DEPRECATED RDW RBC AUTO: 15.4 % (ref 12.4–15.4)
EOSINOPHIL # BLD: 0.1 K/UL (ref 0–0.6)
EOSINOPHIL NFR BLD: 0.6 %
GFR SERPLBLD CREATININE-BSD FMLA CKD-EPI: 60 ML/MIN/{1.73_M2}
GFR SERPLBLD CREATININE-BSD FMLA CKD-EPI: 81 ML/MIN/{1.73_M2}
GLUCOSE SERPL-MCNC: 132 MG/DL (ref 70–99)
GLUCOSE SERPL-MCNC: 91 MG/DL (ref 70–99)
HCT VFR BLD AUTO: 40.3 % (ref 36–48)
HGB BLD-MCNC: 13.9 G/DL (ref 12–16)
LYMPHOCYTES # BLD: 1.9 K/UL (ref 1–5.1)
LYMPHOCYTES NFR BLD: 19.9 %
MAGNESIUM SERPL-MCNC: 2.04 MG/DL (ref 1.8–2.4)
MCH RBC QN AUTO: 27.4 PG (ref 26–34)
MCHC RBC AUTO-ENTMCNC: 34.6 G/DL (ref 31–36)
MCV RBC AUTO: 79.1 FL (ref 80–100)
MONOCYTES # BLD: 1 K/UL (ref 0–1.3)
MONOCYTES NFR BLD: 10.7 %
NEUTROPHILS # BLD: 6.4 K/UL (ref 1.7–7.7)
NEUTROPHILS NFR BLD: 68.3 %
ORGANISM: ABNORMAL
PHOSPHATE SERPL-MCNC: 2.2 MG/DL (ref 2.5–4.9)
PLATELET # BLD AUTO: 303 K/UL (ref 135–450)
PMV BLD AUTO: 6.7 FL (ref 5–10.5)
POTASSIUM SERPL-SCNC: 2.9 MMOL/L (ref 3.5–5.1)
POTASSIUM SERPL-SCNC: 3.9 MMOL/L (ref 3.5–5.1)
PROT SERPL-MCNC: 6.5 G/DL (ref 6.4–8.2)
RBC # BLD AUTO: 5.1 M/UL (ref 4–5.2)
SODIUM SERPL-SCNC: 130 MMOL/L (ref 136–145)
SODIUM SERPL-SCNC: 131 MMOL/L (ref 136–145)
WBC # BLD AUTO: 9.4 K/UL (ref 4–11)

## 2025-08-01 PROCEDURE — 96372 THER/PROPH/DIAG INJ SC/IM: CPT

## 2025-08-01 PROCEDURE — 2500000003 HC RX 250 WO HCPCS

## 2025-08-01 PROCEDURE — 97530 THERAPEUTIC ACTIVITIES: CPT

## 2025-08-01 PROCEDURE — 97116 GAIT TRAINING THERAPY: CPT

## 2025-08-01 PROCEDURE — 97161 PT EVAL LOW COMPLEX 20 MIN: CPT

## 2025-08-01 PROCEDURE — 97166 OT EVAL MOD COMPLEX 45 MIN: CPT

## 2025-08-01 PROCEDURE — G0378 HOSPITAL OBSERVATION PER HR: HCPCS

## 2025-08-01 PROCEDURE — 84100 ASSAY OF PHOSPHORUS: CPT

## 2025-08-01 PROCEDURE — 97535 SELF CARE MNGMENT TRAINING: CPT

## 2025-08-01 PROCEDURE — 6370000000 HC RX 637 (ALT 250 FOR IP)

## 2025-08-01 PROCEDURE — 96376 TX/PRO/DX INJ SAME DRUG ADON: CPT

## 2025-08-01 PROCEDURE — 6360000002 HC RX W HCPCS

## 2025-08-01 PROCEDURE — 36415 COLL VENOUS BLD VENIPUNCTURE: CPT

## 2025-08-01 PROCEDURE — 85025 COMPLETE CBC W/AUTO DIFF WBC: CPT

## 2025-08-01 PROCEDURE — 6370000000 HC RX 637 (ALT 250 FOR IP): Performed by: INTERNAL MEDICINE

## 2025-08-01 PROCEDURE — 96366 THER/PROPH/DIAG IV INF ADDON: CPT

## 2025-08-01 PROCEDURE — 83735 ASSAY OF MAGNESIUM: CPT

## 2025-08-01 PROCEDURE — 2580000003 HC RX 258

## 2025-08-01 PROCEDURE — 80053 COMPREHEN METABOLIC PANEL: CPT

## 2025-08-01 RX ORDER — POTASSIUM CHLORIDE 1500 MG/1
40 TABLET, EXTENDED RELEASE ORAL ONCE
Status: COMPLETED | OUTPATIENT
Start: 2025-08-01 | End: 2025-08-01

## 2025-08-01 RX ADMIN — PANTOPRAZOLE SODIUM 40 MG: 40 TABLET, DELAYED RELEASE ORAL at 05:22

## 2025-08-01 RX ADMIN — VALSARTAN 160 MG: 80 TABLET, FILM COATED ORAL at 08:03

## 2025-08-01 RX ADMIN — HYDROCODONE BITARTRATE AND ACETAMINOPHEN 1 TABLET: 5; 325 TABLET ORAL at 12:04

## 2025-08-01 RX ADMIN — POTASSIUM PHOSPHATE, MONOBASIC POTASSIUM PHOSPHATE, DIBASIC 30 MMOL: 224; 236 INJECTION, SOLUTION, CONCENTRATE INTRAVENOUS at 08:36

## 2025-08-01 RX ADMIN — VALACYCLOVIR HYDROCHLORIDE 1000 MG: 500 TABLET, FILM COATED ORAL at 08:04

## 2025-08-01 RX ADMIN — VALACYCLOVIR HYDROCHLORIDE 1000 MG: 500 TABLET, FILM COATED ORAL at 13:25

## 2025-08-01 RX ADMIN — ENOXAPARIN SODIUM 40 MG: 100 INJECTION SUBCUTANEOUS at 10:20

## 2025-08-01 RX ADMIN — NIFEDIPINE 60 MG: 30 TABLET, FILM COATED, EXTENDED RELEASE ORAL at 08:04

## 2025-08-01 RX ADMIN — DONEPEZIL HYDROCHLORIDE 10 MG: 5 TABLET, FILM COATED ORAL at 21:08

## 2025-08-01 RX ADMIN — POTASSIUM CHLORIDE 40 MEQ: 1500 TABLET, EXTENDED RELEASE ORAL at 08:03

## 2025-08-01 RX ADMIN — SODIUM CHLORIDE, PRESERVATIVE FREE 10 ML: 5 INJECTION INTRAVENOUS at 21:08

## 2025-08-01 ASSESSMENT — PAIN DESCRIPTION - LOCATION: LOCATION: HEAD;NECK

## 2025-08-01 ASSESSMENT — PAIN - FUNCTIONAL ASSESSMENT: PAIN_FUNCTIONAL_ASSESSMENT: ACTIVITIES ARE NOT PREVENTED

## 2025-08-01 ASSESSMENT — PAIN DESCRIPTION - DESCRIPTORS: DESCRIPTORS: ACHING

## 2025-08-01 ASSESSMENT — PAIN DESCRIPTION - ORIENTATION: ORIENTATION: POSTERIOR

## 2025-08-01 NOTE — PROGRESS NOTES
Physical Therapy  Facility/Department: [place] [address] TELE/MED SURG/ONC  Physical Therapy Initial Assessment    Name: Catherine Rebolledo  : 1934  MRN: [id number]  Date of Service: 2025    Discharge Recommendations:  Home with assist PRN, (Return to Independent Living), Home with Home Health PT  PT Equipment Recommendations  Equipment Needed: No      Patient Diagnosis(es): The primary encounter diagnosis was Altered mental status, unspecified altered mental status type. Diagnoses of Hyponatremia, Acute cystitis without hematuria, and Hypomagnesemia were also pertinent to this visit.  Past Medical History:  has a past medical history of Anxiety, Bleeding ulcer, Cancer (HCC), Confusion, COPD with asthma (HCC), Coronary artery disease involving native heart, Gastroesophagitis, GERD (gastroesophageal reflux disease), Hyperlipidemia, Hypertension, Moderate persistent asthma, Pneumonia, Rheumatic fever with heart involvement, Shingles, Trigger ring finger of right hand, and Unspecified diseases of blood and blood-forming organs.  Past Surgical History:  has a past surgical history that includes Hysterectomy; Tonsillectomy; tumor removal (); Rectocele repair; Breast surgery; fracture surgery; Upper gastrointestinal endoscopy (2012); bladder repair (N/A, ); cyst removal (Right, 2013); joint replacement (Right, 2012); shoulder surgery (Right); Upper gastrointestinal endoscopy (2013); Upper gastrointestinal endoscopy (2014); eye surgery; Upper gastrointestinal endoscopy (2016); Upper gastrointestinal endoscopy (2017); Upper gastrointestinal endoscopy (N/A, 2021); Colonoscopy (N/A, 2024); Upper gastrointestinal endoscopy (N/A, 2024); and other surgical history (2024).    Assessment  Body Structures, Functions, Activity Limitations Requiring Skilled Therapeutic Intervention: Decreased functional mobility ;Decreased ADL status;Decreased strength;Decreased

## 2025-08-01 NOTE — PLAN OF CARE
Problem: Chronic Conditions and Co-morbidities  Goal: Patient's chronic conditions and co-morbidity symptoms are monitored and maintained or improved  Outcome: Progressing  Flowsheets (Taken 7/31/2025 2201)  Care Plan - Patient's Chronic Conditions and Co-Morbidity Symptoms are Monitored and Maintained or Improved:   Monitor and assess patient's chronic conditions and comorbid symptoms for stability, deterioration, or improvement   Collaborate with multidisciplinary team to address chronic and comorbid conditions and prevent exacerbation or deterioration   Update acute care plan with appropriate goals if chronic or comorbid symptoms are exacerbated and prevent overall improvement and discharge     Problem: Skin/Tissue Integrity  Goal: Skin integrity remains intact  Description: 1.  Monitor for areas of redness and/or skin breakdown  2.  Assess vascular access sites hourly  3.  Every 4-6 hours minimum:  Change oxygen saturation probe site  4.  Every 4-6 hours:  If on nasal continuous positive airway pressure, respiratory therapy assess nares and determine need for appliance change or resting period  Outcome: Progressing  Flowsheets  Taken 7/31/2025 2201  Skin Integrity Remains Intact:   Monitor for areas of redness and/or skin breakdown   Every 4-6 hours minimum:  Change oxygen saturation probe site   Every 4-6 hours:  If on nasal continuous positive airway pressure, assess nares and determine need for appliance change or resting period   Turn and reposition as indicated   Assess need for specialty bed   Positioning devices   Assess vascular access sites hourly   Check visual cues for pain   Monitor skin under medical devices   Pressure redistribution bed/mattress (bed type)  Taken 7/31/2025 2200  Skin Integrity Remains Intact:   Monitor for areas of redness and/or skin breakdown   Assess vascular access sites hourly   Every 4-6 hours minimum:  Change oxygen saturation probe site   Every 4-6 hours:  If on nasal

## 2025-08-01 NOTE — PROGRESS NOTES
Occupational Therapy  Facility/Department: [facility] C3 TELE/MED SURG/ONC  Occupational Therapy Initial Assessment and Treatment    Name: Catherine Rebolledo  : 1934  MRN: [id number]  Date of Service: 2025    Discharge Recommendations:  Home with assist PRN, Home with Home health OT (return to [place])  OT Equipment Recommendations  Equipment Needed: Yes  Mobility Devices: ADL Assistive Devices  ADL Assistive Devices: Toilet Safety Frame     If pt is unable to be seen after this session, please let this note serve as discharge summary.  Please see case management note for discharge disposition.  Thank you.    Patient Diagnosis(es): The primary encounter diagnosis was Altered mental status, unspecified altered mental status type. Diagnoses of Hyponatremia, Acute cystitis without hematuria, and Hypomagnesemia were also pertinent to this visit.  Past Medical History:  has a past medical history of Anxiety, Bleeding ulcer, Cancer (HCC), Confusion, COPD with asthma (HCC), Coronary artery disease involving native heart, Gastroesophagitis, GERD (gastroesophageal reflux disease), Hyperlipidemia, Hypertension, Moderate persistent asthma, Pneumonia, Rheumatic fever with heart involvement, Shingles, Trigger ring finger of right hand, and Unspecified diseases of blood and blood-forming organs.  Past Surgical History:  has a past surgical history that includes Hysterectomy; Tonsillectomy; tumor removal (); Rectocele repair; Breast surgery; fracture surgery; Upper gastrointestinal endoscopy (2012); bladder repair (N/A, ); cyst removal (Right, 2013); joint replacement (Right, 2012); shoulder surgery (Right); Upper gastrointestinal endoscopy (2013); Upper gastrointestinal endoscopy (2014); eye surgery; Upper gastrointestinal endoscopy (2016); Upper gastrointestinal endoscopy (2017); Upper gastrointestinal endoscopy (N/A, 2021); Colonoscopy (N/A, 2024); Upper gastrointestinal

## 2025-08-01 NOTE — CARE COORDINATION
Chart reviewed. Care managed by IM. Per ID if anything, would likely be po atbx at d/c. Therapy with recs for HHC. Plan to [place] with resumption of [facility]. Lisa Santizo RN

## 2025-08-01 NOTE — PROGRESS NOTES
swelling improved, no ocular symptoms   Plan:  Discontinued steroids  Continue 7-day course of Valtrex (ends on 8/1)  Infectious disease consulted, appreciated recommendations     Hyponatremia - improving  Initial Na: 126   Chronic, euvolemic, renal function stable   Altered mental status not likely related to low Na (see above)  Plan:  Fluid restriction of 1 L daily  IVF as needed  Trend daily BMP     Hypomagnesia   Hypokalemia  Hypophosphatemia  Given 2g Mg in ED   Continue to monitor and replete as needed   Daily BMP     HTN - controlled  HLD  CAD  Continue losartan, Nifedipine   Adjust regimen as needed     COPD   stable at this time   continue home albuterol inhaler  Maintain oxygen saturation     GERD   - continue PPI    Ongoing threat to life and/or bodily function without ongoing treatment due to:  AMS    Consults:      IP CONSULT TO HOSPITALIST  IP CONSULT TO INFECTIOUS DISEASES  IP CONSULT TO NEUROLOGY  IP CONSULT TO CASE MANAGEMENT      The following subspecialty service(s) Neurology was/were consulted and any/all available notes from yesterday and today were reviewed on 8/1/2025, w/ plan for continued inpatient w/up and management as noted above in the assessment and plan     --------------------------------------------------      Medications:        Infusion Medications    sodium chloride       Scheduled Medications    potassium phosphate IVPB (PERIPHERAL LINE)  30 mmol IntraVENous Once    sodium chloride flush  5-40 mL IntraVENous 2 times per day    enoxaparin  40 mg SubCUTAneous Daily    pantoprazole  40 mg Oral QAM AC    valACYclovir  1,000 mg Oral TID    valsartan  160 mg Oral Daily    NIFEdipine  60 mg Oral Daily    donepezil  10 mg Oral Nightly     PRN Meds: sodium chloride flush, sodium chloride, potassium chloride **OR** potassium alternative oral replacement **OR** potassium chloride, magnesium sulfate, polyethylene glycol, acetaminophen **OR** acetaminophen, albuterol sulfate HFA,    Component Value Date/Time    BC No Growth after 4 days of incubation. 01/27/2025 09:04 PM     Lab Results   Component Value Date/Time    BLOODCULT2 No Growth after 4 days of incubation. 01/27/2025 09:04 PM     Organism:   Lab Results   Component Value Date/Time    ORG Serratia marcescens 07/31/2025 01:53 AM         Clinton Frankel MD

## 2025-08-01 NOTE — PROGRESS NOTES
Received patient in bed, A/O with confusion/hallucination.   Patient is impulsive at times and trying to get OOB. On AVSYS.   Vital signs taken and recorded. Shift assessment done.   Noted with [place] and with hearing aid at bedside.   On RA and denies any SOB. On telemetry.   Patent PIV line on her L AC - on saline locked.   Patient ambulates with a walker x1 assist.   Due medications given as scheduled - see eMAR.   Bed wheels locked; Call light within reached; Raised siderails x2; on Fall precaution. Denies any need at the moment.

## 2025-08-01 NOTE — PLAN OF CARE
Problem: Chronic Conditions and Co-morbidities  Goal: Patient's chronic conditions and co-morbidity symptoms are monitored and maintained or improved  8/1/2025 0806 by Shay Lockhart RN  Outcome: Progressing  7/31/2025 2201 by Irene Madrigal RN  Outcome: Progressing  Flowsheets (Taken 7/31/2025 2201)  Care Plan - Patient's Chronic Conditions and Co-Morbidity Symptoms are Monitored and Maintained or Improved:   Monitor and assess patient's chronic conditions and comorbid symptoms for stability, deterioration, or improvement   Collaborate with multidisciplinary team to address chronic and comorbid conditions and prevent exacerbation or deterioration   Update acute care plan with appropriate goals if chronic or comorbid symptoms are exacerbated and prevent overall improvement and discharge     Problem: Skin/Tissue Integrity  Goal: Skin integrity remains intact  Description: 1.  Monitor for areas of redness and/or skin breakdown  2.  Assess vascular access sites hourly  3.  Every 4-6 hours minimum:  Change oxygen saturation probe site  4.  Every 4-6 hours:  If on nasal continuous positive airway pressure, respiratory therapy assess nares and determine need for appliance change or resting period  8/1/2025 0806 by Shay Lockhart, RN  Outcome: Progressing  7/31/2025 2201 by Irene Madrigal RN  Outcome: Progressing  Flowsheets  Taken 7/31/2025 2201  Skin Integrity Remains Intact:   Monitor for areas of redness and/or skin breakdown   Every 4-6 hours minimum:  Change oxygen saturation probe site   Every 4-6 hours:  If on nasal continuous positive airway pressure, assess nares and determine need for appliance change or resting period   Turn and reposition as indicated   Assess need for specialty bed   Positioning devices   Assess vascular access sites hourly   Check visual cues for pain   Monitor skin under medical devices   Pressure redistribution bed/mattress (bed type)  Taken 7/31/2025 2200  Skin Integrity Remains

## 2025-08-01 NOTE — PROGRESS NOTES
Assessment completed and documented. VSS. A/ox2, oriented to self and place, anxious. Patient thinks this RN is \"the randall.\" Very pleasant. Family at bedside. Denies pain. Purewick in place during assessment, needs replaced when back to bed. SBA with walker. Pt/ot placed patient in bed. Bed locked and in lowest position. Bedside table and call light within reach. Denies further needs at this time.

## 2025-08-01 NOTE — PROGRESS NOTES
ID    Chart reviewed   I was notified the repeat UC collected 7/31 is positive with Serratia   There was no pyuria on the UA collected on 7/31, it did meet institutional criteria for reflex to culture.  UC was ordered separately and is positive     My impression is that she was clinically improving with other supportive measures and did not have any lower urinary tract symptoms.  As long as his is true, then the +UC is regarded as colonization/bacteriuria and treatment is not indicated   If situation changes, she develops s/s compatible with infection, can give short course po Bactrim or cipro     I am signing off  Please reach out if further ID input is needed  DANIELLE LIRIANO MD

## 2025-08-02 VITALS
HEIGHT: 62 IN | HEART RATE: 86 BPM | SYSTOLIC BLOOD PRESSURE: 129 MMHG | OXYGEN SATURATION: 99 % | RESPIRATION RATE: 17 BRPM | BODY MASS INDEX: 24.75 KG/M2 | WEIGHT: 134.48 LBS | TEMPERATURE: 97.9 F | DIASTOLIC BLOOD PRESSURE: 66 MMHG

## 2025-08-02 LAB
ANION GAP SERPL CALCULATED.3IONS-SCNC: 10 MMOL/L (ref 3–16)
BACTERIA UR CULT: ABNORMAL
BASOPHILS # BLD: 0.1 K/UL (ref 0–0.2)
BASOPHILS NFR BLD: 0.9 %
BUN SERPL-MCNC: 14 MG/DL (ref 7–20)
CALCIUM SERPL-MCNC: 9.2 MG/DL (ref 8.3–10.6)
CHLORIDE SERPL-SCNC: 98 MMOL/L (ref 99–110)
CO2 SERPL-SCNC: 26 MMOL/L (ref 21–32)
CREAT SERPL-MCNC: 0.8 MG/DL (ref 0.6–1.2)
DEPRECATED RDW RBC AUTO: 15.3 % (ref 12.4–15.4)
EOSINOPHIL # BLD: 0.3 K/UL (ref 0–0.6)
EOSINOPHIL NFR BLD: 3.6 %
GFR SERPLBLD CREATININE-BSD FMLA CKD-EPI: 69 ML/MIN/{1.73_M2}
GLUCOSE SERPL-MCNC: 90 MG/DL (ref 70–99)
HCT VFR BLD AUTO: 38.9 % (ref 36–48)
HGB BLD-MCNC: 13.4 G/DL (ref 12–16)
LYMPHOCYTES # BLD: 2.2 K/UL (ref 1–5.1)
LYMPHOCYTES NFR BLD: 30.6 %
MAGNESIUM SERPL-MCNC: 1.8 MG/DL (ref 1.8–2.4)
MCH RBC QN AUTO: 27.4 PG (ref 26–34)
MCHC RBC AUTO-ENTMCNC: 34.4 G/DL (ref 31–36)
MCV RBC AUTO: 79.5 FL (ref 80–100)
MONOCYTES # BLD: 0.7 K/UL (ref 0–1.3)
MONOCYTES NFR BLD: 10.5 %
NEUTROPHILS # BLD: 3.9 K/UL (ref 1.7–7.7)
NEUTROPHILS NFR BLD: 54.4 %
ORGANISM: ABNORMAL
PHOSPHATE SERPL-MCNC: 2.6 MG/DL (ref 2.5–4.9)
PLATELET # BLD AUTO: 308 K/UL (ref 135–450)
PMV BLD AUTO: 6.5 FL (ref 5–10.5)
POTASSIUM SERPL-SCNC: 3.4 MMOL/L (ref 3.5–5.1)
RBC # BLD AUTO: 4.9 M/UL (ref 4–5.2)
SODIUM SERPL-SCNC: 134 MMOL/L (ref 136–145)
WBC # BLD AUTO: 7.1 K/UL (ref 4–11)

## 2025-08-02 PROCEDURE — G0378 HOSPITAL OBSERVATION PER HR: HCPCS

## 2025-08-02 PROCEDURE — 6370000000 HC RX 637 (ALT 250 FOR IP)

## 2025-08-02 PROCEDURE — 80048 BASIC METABOLIC PNL TOTAL CA: CPT

## 2025-08-02 PROCEDURE — 84100 ASSAY OF PHOSPHORUS: CPT

## 2025-08-02 PROCEDURE — 2580000003 HC RX 258

## 2025-08-02 PROCEDURE — 96372 THER/PROPH/DIAG INJ SC/IM: CPT

## 2025-08-02 PROCEDURE — 85025 COMPLETE CBC W/AUTO DIFF WBC: CPT

## 2025-08-02 PROCEDURE — 36415 COLL VENOUS BLD VENIPUNCTURE: CPT

## 2025-08-02 PROCEDURE — 83735 ASSAY OF MAGNESIUM: CPT

## 2025-08-02 PROCEDURE — 6360000002 HC RX W HCPCS

## 2025-08-02 PROCEDURE — 96366 THER/PROPH/DIAG IV INF ADDON: CPT

## 2025-08-02 PROCEDURE — 2500000003 HC RX 250 WO HCPCS

## 2025-08-02 RX ORDER — VALACYCLOVIR HYDROCHLORIDE 1 G/1
1000 TABLET, FILM COATED ORAL 3 TIMES DAILY
Qty: 6 TABLET | Refills: 0 | Status: SHIPPED | OUTPATIENT
Start: 2025-08-02 | End: 2025-08-04

## 2025-08-02 RX ORDER — NIFEDIPINE 30 MG/1
60 TABLET, EXTENDED RELEASE ORAL DAILY
Qty: 30 TABLET | Refills: 3 | Status: SHIPPED | OUTPATIENT
Start: 2025-08-03

## 2025-08-02 RX ORDER — POTASSIUM CHLORIDE 1500 MG/1
40 TABLET, EXTENDED RELEASE ORAL ONCE
Status: COMPLETED | OUTPATIENT
Start: 2025-08-02 | End: 2025-08-02

## 2025-08-02 RX ORDER — POLYETHYLENE GLYCOL 3350 17 G/17G
17 POWDER, FOR SOLUTION ORAL DAILY PRN
Qty: 30 PACKET | Refills: 0 | Status: SHIPPED | OUTPATIENT
Start: 2025-08-02 | End: 2025-09-01

## 2025-08-02 RX ORDER — VALACYCLOVIR HYDROCHLORIDE 500 MG/1
1000 TABLET, FILM COATED ORAL 3 TIMES DAILY
Status: DISCONTINUED | OUTPATIENT
Start: 2025-08-02 | End: 2025-08-02 | Stop reason: HOSPADM

## 2025-08-02 RX ADMIN — NIFEDIPINE 60 MG: 30 TABLET, FILM COATED, EXTENDED RELEASE ORAL at 10:01

## 2025-08-02 RX ADMIN — PANTOPRAZOLE SODIUM 40 MG: 40 TABLET, DELAYED RELEASE ORAL at 06:35

## 2025-08-02 RX ADMIN — ENOXAPARIN SODIUM 40 MG: 100 INJECTION SUBCUTANEOUS at 10:01

## 2025-08-02 RX ADMIN — VALSARTAN 160 MG: 80 TABLET, FILM COATED ORAL at 10:01

## 2025-08-02 RX ADMIN — POTASSIUM PHOSPHATE, MONOBASIC POTASSIUM PHOSPHATE, DIBASIC 15 MMOL: 224; 236 INJECTION, SOLUTION, CONCENTRATE INTRAVENOUS at 10:07

## 2025-08-02 RX ADMIN — POTASSIUM CHLORIDE 40 MEQ: 1500 TABLET, EXTENDED RELEASE ORAL at 10:01

## 2025-08-02 NOTE — DISCHARGE INSTR - COC
Continuity of Care Form    Patient Name: Catherine Rebolledo   :  1934  MRN:  [id number]    Admit date:  2025  Discharge date:  25    Code Status Order: Full Code   Advance Directives:     Admitting Physician:  Buzz Rivas MD  PCP: Laura Carrasco DO    Discharging Nurse: Alise CARDENAS  Discharging Hospital Unit/Room#: 0347/0347-01  Discharging Unit Phone Number: [phone]    Emergency Contact:   Extended Emergency Contact Information  Primary Emergency Contact: Shereen Tomas   [facility]  Home Phone: [phone]  Mobile Phone: [phone]  Relation: Grandchild  Secondary Emergency Contact: Vinayak Rebolledo  Home Phone: [phone]  Mobile Phone: [phone]  Relation: Child    Past Surgical History:  Past Surgical History:   Procedure Laterality Date    BLADDER REPAIR N/A     BREAST SURGERY      L breast tumor-benign    COLONOSCOPY N/A 2024    COLONOSCOPY POLYPECTOMY SNARE BIOPSY performed by Josue Sorenson MD at [facility] ENDOSCOPY    CYST REMOVAL Right 2013    GANGLION CYST EXCISION RIGHT WRIST, TRIGGER FINGER RELEASE RIGHT FOURTH    EYE SURGERY      , cataract right eye    FRACTURE SURGERY      R shoulderx2-pinned    HYSTERECTOMY (CERVIX STATUS UNKNOWN)      JOINT REPLACEMENT Right 2012    Total Elbow - Dr. Ramirez    OTHER SURGICAL HISTORY  2024    CapsoCam plus endoscopy    RECTOCELE REPAIR      SHOULDER SURGERY Right     TONSILLECTOMY      TUMOR REMOVAL      carcinoid    UPPER GASTROINTESTINAL ENDOSCOPY  2012    with bx    UPPER GASTROINTESTINAL ENDOSCOPY  2013    EUS    UPPER GASTROINTESTINAL ENDOSCOPY  2014    UPPER GASTROINTESTINAL ENDOSCOPY  2016    push enteroscopy with ablation    UPPER GASTROINTESTINAL ENDOSCOPY  2017    UPPER GASTROINTESTINAL ENDOSCOPY N/A 2021    EGD ULTRASOUND OF STOMACH performed by Kian Brar MD at [facility] ENDOSCOPY    UPPER GASTROINTESTINAL ENDOSCOPY N/A 2024

## 2025-08-02 NOTE — PROGRESS NOTES
Patient discharging back to \[facility]\"" at this time. PIV removed without complications. AVS reviewed with patient and daughter. All questions answered. [facility]. All belongings sent with patient at discharge. Discharged via wheelchair to private vehicle at this time. Electronically signed by EVE BALTAZAR RN on 8/2/25 at 3:54 PM EDT

## 2025-08-02 NOTE — CARE COORDINATION
Case Management Discharge Summary       DISCHARGE Disposition: Home with Home Health Care    Home Care:  Home Care ordered at discharge: Yes  Skilled home care services to be provded:  RN, PT, and OT  Home Care Agency: [facility]  Orders faxed: Yes by writer mica Davis sent orders to office     CMS Letters:  OBS MOON GIVEN     Transportation:  Transportation plan for discharge: Private Car with dtr in law       Confirmed discharge plan with:  Patient: Yes  Family/Name: Dtr in law via phone [phone]    RN/name: Alise RN will dc after 2pm to finish I fusion     The Patient and/or patient representative Catherine and her family were provided with a choice of provider and agrees with the discharge plan Yes  Freedom of choice list was provided with basic dialogue that supports the patient's individualized plan of care/goals and shares the quality data associated with the providers. Yes    KULWANT Antony  [facility]   Case Management Department  Ph: [phone]  Fax: [phone]

## 2025-08-02 NOTE — PROGRESS NOTES
Received patient in bed, A/O with confusion; family at bedside.    Vital signs taken and recorded. Shift assessment done.   Noted with [place] and hearing aid at bedside.   On RA and denies any SOB.   Patent PIV line on her L AC - on saline locked.   Patient ambulates with a walker x1 SBA.   Due medications given as scheduled - see eMAR.   Bed wheels locked; Call light within reached; Raised siderails x2; on Fall precaution. Denies any need at the moment.

## 2025-08-02 NOTE — PLAN OF CARE
Problem: Chronic Conditions and Co-morbidities  Goal: Patient's chronic conditions and co-morbidity symptoms are monitored and maintained or improved  8/1/2025 2127 by Irene Madrigal RN  Outcome: Progressing  Flowsheets (Taken 8/1/2025 2127)  Care Plan - Patient's Chronic Conditions and Co-Morbidity Symptoms are Monitored and Maintained or Improved:   Monitor and assess patient's chronic conditions and comorbid symptoms for stability, deterioration, or improvement   Collaborate with multidisciplinary team to address chronic and comorbid conditions and prevent exacerbation or deterioration   Update acute care plan with appropriate goals if chronic or comorbid symptoms are exacerbated and prevent overall improvement and discharge     Problem: Skin/Tissue Integrity  Goal: Skin integrity remains intact  Description: 1.  Monitor for areas of redness and/or skin breakdown  2.  Assess vascular access sites hourly  3.  Every 4-6 hours minimum:  Change oxygen saturation probe site  4.  Every 4-6 hours:  If on nasal continuous positive airway pressure, respiratory therapy assess nares and determine need for appliance change or resting period  8/1/2025 2127 by Irene Madrigal RN  Outcome: Progressing  Flowsheets (Taken 8/1/2025 2127)  Skin Integrity Remains Intact:   Monitor for areas of redness and/or skin breakdown   Assess vascular access sites hourly   Every 4-6 hours minimum:  Change oxygen saturation probe site   Every 4-6 hours:  If on nasal continuous positive airway pressure, assess nares and determine need for appliance change or resting period   Positioning devices   Pressure redistribution bed/mattress (bed type)   Check visual cues for pain   Turn and reposition as indicated   Monitor skin under medical devices   Assess need for specialty bed     Problem: Safety - Adult  Goal: Free from fall injury  Outcome: Progressing  Flowsheets (Taken 8/1/2025 2127)  Free From Fall Injury:   Instruct family/caregiver on

## 2025-08-04 ENCOUNTER — TELEPHONE (OUTPATIENT)
Dept: FAMILY MEDICINE CLINIC | Age: 89
End: 2025-08-04

## 2025-08-05 ENCOUNTER — TELEPHONE (OUTPATIENT)
Dept: FAMILY MEDICINE CLINIC | Age: 89
End: 2025-08-05

## 2025-08-05 NOTE — DISCHARGE SUMMARY
Hospital Medicine Discharge Summary    Patient: Catherine Rebolledo   : 1934     Hospital:  [facility]  Admit Date: 2025   Discharge Date: 2025    Disposition:  Home w/ [facility]   Code status:  Full  Condition at Discharge: Stable  Primary Care Provider: Laura Carrasco DO    Admitting Provider: Buzz Rivas MD  Discharge Provider: Clinton Frankel MD     Discharge Diagnoses:      Active Hospital Problems    Diagnosis     AMS (altered mental status) [R41.82]     Toxic metabolic encephalopathy [G92.8]     Acute psychosis (HCC) [F23]     Zoster without complications [B02.9]     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 Patient is a limited historian due to AMS and impaired hearing, son and daughter-in-law acting as partial historian. She was brought in they were concerned for her elevated blood pressure and increasingly combative behavior with hallucinations. Patient is currently being treated for facial rash for shingles and is being treated with antivirals and prednisone taper. Her swelling and facial pain have resolved since starting this treatment 2 days ago. During interview she did point to a person that was bothering her who was not in the room. Patient mentions that it hurts when she urinates, and this has been occurring for the past few days. Denies blood in the urine, denies back pain. Denies chest pain, abdominal pain, shortness of breath, nausea, vomiting, diarrhea.      Assessment/Plan:      AMS of unknown origin - improved  CT head unremarkable, labs not suggestive of metabolic or toxic cause  Hallucinations may be due to combination of underlying dementia/adverse effects of steroid tx   Plan:  Continue donepezil  Neurology consulted, appreciate recommendations     Acute Cystitis with Hematuria,

## 2025-08-06 ENCOUNTER — TELEPHONE (OUTPATIENT)
Dept: FAMILY MEDICINE CLINIC | Age: 89
End: 2025-08-06

## 2025-08-11 ENCOUNTER — PATIENT MESSAGE (OUTPATIENT)
Dept: FAMILY MEDICINE CLINIC | Age: 89
End: 2025-08-11

## 2025-08-11 ENCOUNTER — OFFICE VISIT (OUTPATIENT)
Dept: FAMILY MEDICINE CLINIC | Age: 89
End: 2025-08-11
Payer: COMMERCIAL

## 2025-08-11 VITALS
DIASTOLIC BLOOD PRESSURE: 66 MMHG | TEMPERATURE: 97.6 F | WEIGHT: 132.6 LBS | BODY MASS INDEX: 24.25 KG/M2 | SYSTOLIC BLOOD PRESSURE: 120 MMHG | RESPIRATION RATE: 17 BRPM

## 2025-08-11 DIAGNOSIS — Z09 HOSPITAL DISCHARGE FOLLOW-UP: Primary | ICD-10-CM

## 2025-08-11 DIAGNOSIS — F03.92 DEMENTIA WITH PSYCHOTIC DISTURBANCE, UNSPECIFIED DEMENTIA SEVERITY, UNSPECIFIED DEMENTIA TYPE (HCC): ICD-10-CM

## 2025-08-11 DIAGNOSIS — R41.0 DISORIENTATION: ICD-10-CM

## 2025-08-11 DIAGNOSIS — B02.29 HERPES ZOSTER VIRUS INFECTION OF FACE AND EAR NERVES: ICD-10-CM

## 2025-08-11 PROCEDURE — 1123F ACP DISCUSS/DSCN MKR DOCD: CPT | Performed by: SURGERY

## 2025-08-11 PROCEDURE — 1159F MED LIST DOCD IN RCRD: CPT | Performed by: SURGERY

## 2025-08-11 PROCEDURE — 1111F DSCHRG MED/CURRENT MED MERGE: CPT | Performed by: SURGERY

## 2025-08-11 PROCEDURE — 99215 OFFICE O/P EST HI 40 MIN: CPT | Performed by: SURGERY

## 2025-08-11 RX ORDER — VALACYCLOVIR HYDROCHLORIDE 1 G/1
1000 TABLET, FILM COATED ORAL 2 TIMES DAILY
Qty: 14 TABLET | Refills: 0 | Status: SHIPPED | OUTPATIENT
Start: 2025-08-11 | End: 2025-08-18

## 2025-08-11 RX ORDER — ERYTHROMYCIN 5 MG/G
OINTMENT OPHTHALMIC
COMMUNITY
Start: 2025-07-25

## 2025-08-11 RX ORDER — POTASSIUM CHLORIDE 750 MG/1
10 TABLET, EXTENDED RELEASE ORAL DAILY
COMMUNITY
Start: 2025-07-11

## 2025-08-11 RX ORDER — DONEPEZIL HYDROCHLORIDE 5 MG/1
10 TABLET, FILM COATED ORAL NIGHTLY
Qty: 180 TABLET | Refills: 0 | Status: SHIPPED | OUTPATIENT
Start: 2025-08-11 | End: 2025-08-14 | Stop reason: DRUGHIGH

## 2025-08-11 RX ORDER — PERMETHRIN 50 MG/G
CREAM TOPICAL
COMMUNITY
Start: 2024-11-26

## 2025-08-12 ENCOUNTER — TELEPHONE (OUTPATIENT)
Dept: FAMILY MEDICINE CLINIC | Age: 89
End: 2025-08-12

## 2025-08-12 DIAGNOSIS — F03.92 DEMENTIA WITH PSYCHOTIC DISTURBANCE, UNSPECIFIED DEMENTIA SEVERITY, UNSPECIFIED DEMENTIA TYPE (HCC): Primary | ICD-10-CM

## 2025-08-13 LAB
BILIRUBIN, URINE: NEGATIVE
CLARITY, UA: CLEAR
COLOR, UA: ABNORMAL
GLUCOSE URINE: NEGATIVE MG/DL
HB: SOURCE: ABNORMAL
KETONES, URINE: NEGATIVE MG/DL
LEUKOCYTE ESTERASE, URINE: ABNORMAL
MUCUS, URINE: ABNORMAL /LPF
NITRITE, URINE: NEGATIVE
PH, URINE: 6 PH (ref 5–8)
PROTEIN, URINE: NEGATIVE MG/DL
RBC URINE: 5 /HPF (ref 0–3)
RBC URINE: ABNORMAL
SPECIFIC GRAVITY UA: 1.02 NO UNITS (ref 1–1.03)
SQUAMOUS CELLS: ABNORMAL /LPF
URINE TYPE: ABNORMAL
UROBILINOGEN, URINE: NORMAL MG/DL
WBC URINE: 2 /HPF (ref 0–4)

## 2025-08-14 RX ORDER — DONEPEZIL HYDROCHLORIDE 10 MG/1
10 TABLET, FILM COATED ORAL DAILY
Qty: 90 TABLET | Refills: 1 | Status: SHIPPED | OUTPATIENT
Start: 2025-08-14

## 2025-08-20 ENCOUNTER — TELEPHONE (OUTPATIENT)
Dept: FAMILY MEDICINE CLINIC | Age: 89
End: 2025-08-20

## 2025-08-20 DIAGNOSIS — N39.0 URINARY TRACT INFECTION WITHOUT HEMATURIA, SITE UNSPECIFIED: Primary | ICD-10-CM

## 2025-08-20 RX ORDER — SULFAMETHOXAZOLE AND TRIMETHOPRIM 800; 160 MG/1; MG/1
1 TABLET ORAL 2 TIMES DAILY
Qty: 6 TABLET | Refills: 0 | Status: SHIPPED | OUTPATIENT
Start: 2025-08-20 | End: 2025-08-23

## 2025-08-21 ENCOUNTER — TELEPHONE (OUTPATIENT)
Dept: FAMILY MEDICINE CLINIC | Age: 89
End: 2025-08-21

## 2025-08-30 ENCOUNTER — HOSPITAL ENCOUNTER (OUTPATIENT)
Age: 89
Setting detail: OBSERVATION
Discharge: NURSING, REHAB OR LONG-TERM CARE | End: 2025-08-31
Attending: EMERGENCY MEDICINE | Admitting: INTERNAL MEDICINE
Payer: COMMERCIAL

## 2025-08-30 ENCOUNTER — APPOINTMENT (OUTPATIENT)
Dept: CT IMAGING | Age: 89
End: 2025-08-30
Payer: COMMERCIAL

## 2025-08-30 ENCOUNTER — APPOINTMENT (OUTPATIENT)
Dept: GENERAL RADIOLOGY | Age: 89
End: 2025-08-30
Payer: COMMERCIAL

## 2025-08-30 DIAGNOSIS — Z74.09 IMPAIRED MOBILITY AND ADLS: ICD-10-CM

## 2025-08-30 DIAGNOSIS — E83.52 HYPERCALCEMIA: ICD-10-CM

## 2025-08-30 DIAGNOSIS — M43.6 TORTICOLLIS: Primary | ICD-10-CM

## 2025-08-30 DIAGNOSIS — Z78.9 IMPAIRED MOBILITY AND ADLS: ICD-10-CM

## 2025-08-30 DIAGNOSIS — E87.20 LACTIC ACIDOSIS: ICD-10-CM

## 2025-08-30 PROBLEM — R53.1 WEAKNESS: Status: ACTIVE | Noted: 2025-08-30

## 2025-08-30 LAB
ALBUMIN SERPL-MCNC: 4.3 G/DL (ref 3.4–5)
ALBUMIN/GLOB SERPL: 2 {RATIO} (ref 1.1–2.2)
ALP SERPL-CCNC: 124 U/L (ref 40–129)
ALT SERPL-CCNC: 13 U/L (ref 10–40)
ANION GAP SERPL CALCULATED.3IONS-SCNC: 14 MMOL/L (ref 3–16)
AST SERPL-CCNC: 21 U/L (ref 15–37)
BASOPHILS # BLD: 0.1 K/UL (ref 0–0.2)
BASOPHILS NFR BLD: 1.2 %
BILIRUB SERPL-MCNC: 0.6 MG/DL (ref 0–1)
BILIRUB UR QL STRIP.AUTO: NEGATIVE
BUN SERPL-MCNC: 18 MG/DL (ref 7–20)
CALCIUM SERPL-MCNC: 10.9 MG/DL (ref 8.3–10.6)
CHLORIDE SERPL-SCNC: 96 MMOL/L (ref 99–110)
CLARITY UR: CLEAR
CO2 SERPL-SCNC: 20 MMOL/L (ref 21–32)
COLOR UR: YELLOW
CREAT SERPL-MCNC: 1.2 MG/DL (ref 0.6–1.2)
DEPRECATED RDW RBC AUTO: 17.3 % (ref 12.4–15.4)
EOSINOPHIL # BLD: 0.1 K/UL (ref 0–0.6)
EOSINOPHIL NFR BLD: 1.9 %
EPI CELLS #/AREA URNS HPF: NORMAL /HPF (ref 0–5)
FLUAV RNA RESP QL NAA+PROBE: NOT DETECTED
FLUBV RNA RESP QL NAA+PROBE: NOT DETECTED
GFR SERPLBLD CREATININE-BSD FMLA CKD-EPI: 43 ML/MIN/{1.73_M2}
GLUCOSE SERPL-MCNC: 115 MG/DL (ref 70–99)
GLUCOSE UR STRIP.AUTO-MCNC: NEGATIVE MG/DL
HCT VFR BLD AUTO: 40.1 % (ref 36–48)
HGB BLD-MCNC: 13.4 G/DL (ref 12–16)
HGB UR QL STRIP.AUTO: NEGATIVE
KETONES UR STRIP.AUTO-MCNC: NEGATIVE MG/DL
LACTATE BLDV-SCNC: 2.5 MMOL/L (ref 0.4–1.9)
LACTATE BLDV-SCNC: 2.6 MMOL/L (ref 0.4–1.9)
LEUKOCYTE ESTERASE UR QL STRIP.AUTO: ABNORMAL
LYMPHOCYTES # BLD: 1.9 K/UL (ref 1–5.1)
LYMPHOCYTES NFR BLD: 31.6 %
MCH RBC QN AUTO: 27.3 PG (ref 26–34)
MCHC RBC AUTO-ENTMCNC: 33.5 G/DL (ref 31–36)
MCV RBC AUTO: 81.7 FL (ref 80–100)
MONOCYTES # BLD: 0.3 K/UL (ref 0–1.3)
MONOCYTES NFR BLD: 4.4 %
NEUTROPHILS # BLD: 3.8 K/UL (ref 1.7–7.7)
NEUTROPHILS NFR BLD: 60.9 %
NITRITE UR QL STRIP.AUTO: NEGATIVE
NT-PROBNP SERPL-MCNC: 195 PG/ML (ref 0–449)
PH UR STRIP.AUTO: 7 [PH] (ref 5–8)
PLATELET # BLD AUTO: 250 K/UL (ref 135–450)
PMV BLD AUTO: 7.2 FL (ref 5–10.5)
POTASSIUM SERPL-SCNC: 4.1 MMOL/L (ref 3.5–5.1)
PROT SERPL-MCNC: 6.5 G/DL (ref 6.4–8.2)
PROT UR STRIP.AUTO-MCNC: NEGATIVE MG/DL
RBC # BLD AUTO: 4.91 M/UL (ref 4–5.2)
RBC #/AREA URNS HPF: NORMAL /HPF (ref 0–4)
SARS-COV-2 RNA RESP QL NAA+PROBE: NOT DETECTED
SODIUM SERPL-SCNC: 130 MMOL/L (ref 136–145)
SP GR UR STRIP.AUTO: <=1.005 (ref 1–1.03)
TROPONIN, HIGH SENSITIVITY: 16 NG/L (ref 0–14)
TROPONIN, HIGH SENSITIVITY: 24 NG/L (ref 0–14)
UA COMPLETE W REFLEX CULTURE PNL UR: ABNORMAL
UA DIPSTICK W REFLEX MICRO PNL UR: YES
URN SPEC COLLECT METH UR: ABNORMAL
UROBILINOGEN UR STRIP-ACNC: 0.2 E.U./DL
WBC # BLD AUTO: 6.2 K/UL (ref 4–11)
WBC #/AREA URNS HPF: NORMAL /HPF (ref 0–5)

## 2025-08-30 PROCEDURE — 6370000000 HC RX 637 (ALT 250 FOR IP): Performed by: INTERNAL MEDICINE

## 2025-08-30 PROCEDURE — 83605 ASSAY OF LACTIC ACID: CPT

## 2025-08-30 PROCEDURE — 96361 HYDRATE IV INFUSION ADD-ON: CPT

## 2025-08-30 PROCEDURE — 83880 ASSAY OF NATRIURETIC PEPTIDE: CPT

## 2025-08-30 PROCEDURE — 96372 THER/PROPH/DIAG INJ SC/IM: CPT

## 2025-08-30 PROCEDURE — 85025 COMPLETE CBC W/AUTO DIFF WBC: CPT

## 2025-08-30 PROCEDURE — 81001 URINALYSIS AUTO W/SCOPE: CPT

## 2025-08-30 PROCEDURE — 2580000003 HC RX 258: Performed by: EMERGENCY MEDICINE

## 2025-08-30 PROCEDURE — 2580000003 HC RX 258: Performed by: INTERNAL MEDICINE

## 2025-08-30 PROCEDURE — 93005 ELECTROCARDIOGRAM TRACING: CPT | Performed by: EMERGENCY MEDICINE

## 2025-08-30 PROCEDURE — G0378 HOSPITAL OBSERVATION PER HR: HCPCS

## 2025-08-30 PROCEDURE — 71045 X-RAY EXAM CHEST 1 VIEW: CPT

## 2025-08-30 PROCEDURE — 96360 HYDRATION IV INFUSION INIT: CPT

## 2025-08-30 PROCEDURE — 6360000002 HC RX W HCPCS: Performed by: INTERNAL MEDICINE

## 2025-08-30 PROCEDURE — 80053 COMPREHEN METABOLIC PANEL: CPT

## 2025-08-30 PROCEDURE — 99285 EMERGENCY DEPT VISIT HI MDM: CPT

## 2025-08-30 PROCEDURE — 70450 CT HEAD/BRAIN W/O DYE: CPT

## 2025-08-30 PROCEDURE — 87636 SARSCOV2 & INF A&B AMP PRB: CPT

## 2025-08-30 PROCEDURE — 84484 ASSAY OF TROPONIN QUANT: CPT

## 2025-08-30 RX ORDER — MAGNESIUM SULFATE IN WATER 40 MG/ML
2000 INJECTION, SOLUTION INTRAVENOUS PRN
Status: DISCONTINUED | OUTPATIENT
Start: 2025-08-30 | End: 2025-08-31 | Stop reason: HOSPADM

## 2025-08-30 RX ORDER — ACETAMINOPHEN 325 MG/1
650 TABLET ORAL EVERY 6 HOURS PRN
Status: DISCONTINUED | OUTPATIENT
Start: 2025-08-30 | End: 2025-08-31 | Stop reason: HOSPADM

## 2025-08-30 RX ORDER — POLYETHYLENE GLYCOL 3350 17 G/17G
17 POWDER, FOR SOLUTION ORAL DAILY PRN
Status: DISCONTINUED | OUTPATIENT
Start: 2025-08-30 | End: 2025-08-31 | Stop reason: HOSPADM

## 2025-08-30 RX ORDER — 0.9 % SODIUM CHLORIDE 0.9 %
500 INTRAVENOUS SOLUTION INTRAVENOUS ONCE
Status: COMPLETED | OUTPATIENT
Start: 2025-08-30 | End: 2025-08-30

## 2025-08-30 RX ORDER — DONEPEZIL HYDROCHLORIDE 5 MG/1
10 TABLET, FILM COATED ORAL DAILY
Status: DISCONTINUED | OUTPATIENT
Start: 2025-08-30 | End: 2025-08-31 | Stop reason: HOSPADM

## 2025-08-30 RX ORDER — HEPARIN SODIUM 5000 [USP'U]/ML
5000 INJECTION, SOLUTION INTRAVENOUS; SUBCUTANEOUS EVERY 8 HOURS SCHEDULED
Status: DISCONTINUED | OUTPATIENT
Start: 2025-08-30 | End: 2025-08-31 | Stop reason: HOSPADM

## 2025-08-30 RX ORDER — CALCIUM CARBONATE 500(1250)
500 TABLET ORAL DAILY
Status: DISCONTINUED | OUTPATIENT
Start: 2025-08-30 | End: 2025-08-31 | Stop reason: HOSPADM

## 2025-08-30 RX ORDER — ACETAMINOPHEN 650 MG/1
650 SUPPOSITORY RECTAL EVERY 6 HOURS PRN
Status: DISCONTINUED | OUTPATIENT
Start: 2025-08-30 | End: 2025-08-31 | Stop reason: HOSPADM

## 2025-08-30 RX ORDER — SODIUM CHLORIDE, SODIUM LACTATE, POTASSIUM CHLORIDE, CALCIUM CHLORIDE 600; 310; 30; 20 MG/100ML; MG/100ML; MG/100ML; MG/100ML
INJECTION, SOLUTION INTRAVENOUS CONTINUOUS
Status: DISCONTINUED | OUTPATIENT
Start: 2025-08-30 | End: 2025-08-31 | Stop reason: HOSPADM

## 2025-08-30 RX ORDER — ONDANSETRON 2 MG/ML
4 INJECTION INTRAMUSCULAR; INTRAVENOUS EVERY 6 HOURS PRN
Status: DISCONTINUED | OUTPATIENT
Start: 2025-08-30 | End: 2025-08-31 | Stop reason: HOSPADM

## 2025-08-30 RX ORDER — PREDNISOLONE ACETATE 10 MG/ML
1 SUSPENSION/ DROPS OPHTHALMIC EVERY 6 HOURS SCHEDULED
Status: DISCONTINUED | OUTPATIENT
Start: 2025-08-30 | End: 2025-08-31 | Stop reason: HOSPADM

## 2025-08-30 RX ORDER — ONDANSETRON 4 MG/1
4 TABLET, ORALLY DISINTEGRATING ORAL EVERY 8 HOURS PRN
Status: DISCONTINUED | OUTPATIENT
Start: 2025-08-30 | End: 2025-08-31 | Stop reason: HOSPADM

## 2025-08-30 RX ORDER — SODIUM CHLORIDE 9 MG/ML
INJECTION, SOLUTION INTRAVENOUS PRN
Status: DISCONTINUED | OUTPATIENT
Start: 2025-08-30 | End: 2025-08-31 | Stop reason: HOSPADM

## 2025-08-30 RX ORDER — SODIUM CHLORIDE 0.9 % (FLUSH) 0.9 %
5-40 SYRINGE (ML) INJECTION EVERY 12 HOURS SCHEDULED
Status: DISCONTINUED | OUTPATIENT
Start: 2025-08-30 | End: 2025-08-31 | Stop reason: HOSPADM

## 2025-08-30 RX ORDER — PANTOPRAZOLE SODIUM 40 MG/1
40 TABLET, DELAYED RELEASE ORAL
Status: DISCONTINUED | OUTPATIENT
Start: 2025-08-31 | End: 2025-08-31 | Stop reason: HOSPADM

## 2025-08-30 RX ORDER — POTASSIUM CHLORIDE 7.45 MG/ML
10 INJECTION INTRAVENOUS PRN
Status: DISCONTINUED | OUTPATIENT
Start: 2025-08-30 | End: 2025-08-31 | Stop reason: HOSPADM

## 2025-08-30 RX ORDER — UBIDECARENONE 75 MG
50 CAPSULE ORAL DAILY
Status: DISCONTINUED | OUTPATIENT
Start: 2025-08-30 | End: 2025-08-30 | Stop reason: RX

## 2025-08-30 RX ORDER — ALBUTEROL SULFATE 90 UG/1
2 INHALANT RESPIRATORY (INHALATION) EVERY 6 HOURS PRN
Status: DISCONTINUED | OUTPATIENT
Start: 2025-08-30 | End: 2025-08-31 | Stop reason: HOSPADM

## 2025-08-30 RX ORDER — ENOXAPARIN SODIUM 100 MG/ML
30 INJECTION SUBCUTANEOUS DAILY
Status: DISCONTINUED | OUTPATIENT
Start: 2025-08-30 | End: 2025-08-30

## 2025-08-30 RX ORDER — SODIUM CHLORIDE 0.9 % (FLUSH) 0.9 %
5-40 SYRINGE (ML) INJECTION PRN
Status: DISCONTINUED | OUTPATIENT
Start: 2025-08-30 | End: 2025-08-31 | Stop reason: HOSPADM

## 2025-08-30 RX ORDER — POTASSIUM CHLORIDE 1500 MG/1
40 TABLET, EXTENDED RELEASE ORAL PRN
Status: DISCONTINUED | OUTPATIENT
Start: 2025-08-30 | End: 2025-08-31 | Stop reason: HOSPADM

## 2025-08-30 RX ADMIN — SODIUM CHLORIDE 500 ML: 0.9 INJECTION, SOLUTION INTRAVENOUS at 17:00

## 2025-08-30 RX ADMIN — PREDNISOLONE ACETATE 1 DROP: 10 SUSPENSION/ DROPS OPHTHALMIC at 21:49

## 2025-08-30 RX ADMIN — SODIUM CHLORIDE, SODIUM LACTATE, POTASSIUM CHLORIDE, AND CALCIUM CHLORIDE: .6; .31; .03; .02 INJECTION, SOLUTION INTRAVENOUS at 21:48

## 2025-08-30 RX ADMIN — DONEPEZIL HYDROCHLORIDE 10 MG: 5 TABLET, FILM COATED ORAL at 21:40

## 2025-08-30 RX ADMIN — TIZANIDINE 2 MG: 4 TABLET ORAL at 21:40

## 2025-08-30 RX ADMIN — CALCIUM 500 MG: 500 TABLET ORAL at 21:40

## 2025-08-30 RX ADMIN — HEPARIN SODIUM 5000 UNITS: 5000 INJECTION, SOLUTION INTRAVENOUS; SUBCUTANEOUS at 21:40

## 2025-08-30 ASSESSMENT — PAIN DESCRIPTION - LOCATION: LOCATION: BACK;CHEST;ABDOMEN

## 2025-08-30 ASSESSMENT — PAIN SCALES - GENERAL: PAINLEVEL_OUTOF10: 0

## 2025-08-30 ASSESSMENT — PAIN - FUNCTIONAL ASSESSMENT: PAIN_FUNCTIONAL_ASSESSMENT: 0-10

## 2025-08-31 VITALS
BODY MASS INDEX: 23 KG/M2 | OXYGEN SATURATION: 100 % | TEMPERATURE: 98.1 F | SYSTOLIC BLOOD PRESSURE: 132 MMHG | HEIGHT: 62 IN | WEIGHT: 125 LBS | RESPIRATION RATE: 18 BRPM | HEART RATE: 72 BPM | DIASTOLIC BLOOD PRESSURE: 63 MMHG

## 2025-08-31 LAB
ANION GAP SERPL CALCULATED.3IONS-SCNC: 9 MMOL/L (ref 3–16)
BASOPHILS # BLD: 0.1 K/UL (ref 0–0.2)
BASOPHILS NFR BLD: 1.1 %
BUN SERPL-MCNC: 13 MG/DL (ref 7–20)
CALCIUM SERPL-MCNC: 9.4 MG/DL (ref 8.3–10.6)
CHLORIDE SERPL-SCNC: 102 MMOL/L (ref 99–110)
CO2 SERPL-SCNC: 22 MMOL/L (ref 21–32)
CREAT SERPL-MCNC: 0.7 MG/DL (ref 0.6–1.2)
DEPRECATED RDW RBC AUTO: 17.5 % (ref 12.4–15.4)
EKG ATRIAL RATE: 80 BPM
EKG DIAGNOSIS: NORMAL
EKG P AXIS: 10 DEGREES
EKG P-R INTERVAL: 170 MS
EKG Q-T INTERVAL: 392 MS
EKG QRS DURATION: 130 MS
EKG QTC CALCULATION (BAZETT): 452 MS
EKG R AXIS: 3 DEGREES
EKG T AXIS: 24 DEGREES
EKG VENTRICULAR RATE: 80 BPM
EOSINOPHIL # BLD: 0.2 K/UL (ref 0–0.6)
EOSINOPHIL NFR BLD: 3.4 %
GFR SERPLBLD CREATININE-BSD FMLA CKD-EPI: 81 ML/MIN/{1.73_M2}
GLUCOSE SERPL-MCNC: 97 MG/DL (ref 70–99)
HCT VFR BLD AUTO: 31.3 % (ref 36–48)
HGB BLD-MCNC: 10.5 G/DL (ref 12–16)
LYMPHOCYTES # BLD: 1.8 K/UL (ref 1–5.1)
LYMPHOCYTES NFR BLD: 38 %
MAGNESIUM SERPL-MCNC: 1.95 MG/DL (ref 1.8–2.4)
MCH RBC QN AUTO: 27.8 PG (ref 26–34)
MCHC RBC AUTO-ENTMCNC: 33.6 G/DL (ref 31–36)
MCV RBC AUTO: 82.7 FL (ref 80–100)
MONOCYTES # BLD: 0.3 K/UL (ref 0–1.3)
MONOCYTES NFR BLD: 6.2 %
NEUTROPHILS # BLD: 2.5 K/UL (ref 1.7–7.7)
NEUTROPHILS NFR BLD: 51.3 %
PHOSPHATE SERPL-MCNC: 2.6 MG/DL (ref 2.5–4.9)
PLATELET # BLD AUTO: 189 K/UL (ref 135–450)
PMV BLD AUTO: 7.1 FL (ref 5–10.5)
POTASSIUM SERPL-SCNC: 3.8 MMOL/L (ref 3.5–5.1)
RBC # BLD AUTO: 3.78 M/UL (ref 4–5.2)
SODIUM SERPL-SCNC: 133 MMOL/L (ref 136–145)
WBC # BLD AUTO: 4.8 K/UL (ref 4–11)

## 2025-08-31 PROCEDURE — 96361 HYDRATE IV INFUSION ADD-ON: CPT

## 2025-08-31 PROCEDURE — 6360000002 HC RX W HCPCS: Performed by: INTERNAL MEDICINE

## 2025-08-31 PROCEDURE — 97165 OT EVAL LOW COMPLEX 30 MIN: CPT

## 2025-08-31 PROCEDURE — 97530 THERAPEUTIC ACTIVITIES: CPT

## 2025-08-31 PROCEDURE — 6370000000 HC RX 637 (ALT 250 FOR IP): Performed by: INTERNAL MEDICINE

## 2025-08-31 PROCEDURE — 84100 ASSAY OF PHOSPHORUS: CPT

## 2025-08-31 PROCEDURE — 93010 ELECTROCARDIOGRAM REPORT: CPT | Performed by: INTERNAL MEDICINE

## 2025-08-31 PROCEDURE — 36415 COLL VENOUS BLD VENIPUNCTURE: CPT

## 2025-08-31 PROCEDURE — 80048 BASIC METABOLIC PNL TOTAL CA: CPT

## 2025-08-31 PROCEDURE — 83735 ASSAY OF MAGNESIUM: CPT

## 2025-08-31 PROCEDURE — 96372 THER/PROPH/DIAG INJ SC/IM: CPT

## 2025-08-31 PROCEDURE — G0378 HOSPITAL OBSERVATION PER HR: HCPCS

## 2025-08-31 PROCEDURE — 85025 COMPLETE CBC W/AUTO DIFF WBC: CPT

## 2025-08-31 RX ADMIN — TIZANIDINE 2 MG: 4 TABLET ORAL at 04:04

## 2025-08-31 RX ADMIN — PANTOPRAZOLE SODIUM 40 MG: 40 TABLET, DELAYED RELEASE ORAL at 06:02

## 2025-08-31 RX ADMIN — TIZANIDINE 2 MG: 4 TABLET ORAL at 11:49

## 2025-08-31 RX ADMIN — CALCIUM 500 MG: 500 TABLET ORAL at 08:45

## 2025-08-31 RX ADMIN — PREDNISOLONE ACETATE 1 DROP: 10 SUSPENSION/ DROPS OPHTHALMIC at 06:02

## 2025-08-31 RX ADMIN — HEPARIN SODIUM 5000 UNITS: 5000 INJECTION, SOLUTION INTRAVENOUS; SUBCUTANEOUS at 06:02

## 2025-08-31 RX ADMIN — ACETAMINOPHEN 650 MG: 325 TABLET ORAL at 04:04

## 2025-08-31 RX ADMIN — PREDNISOLONE ACETATE 1 DROP: 10 SUSPENSION/ DROPS OPHTHALMIC at 11:50

## 2025-08-31 ASSESSMENT — PAIN SCALES - GENERAL: PAINLEVEL_OUTOF10: 0

## 2025-09-02 ENCOUNTER — TELEPHONE (OUTPATIENT)
Dept: FAMILY MEDICINE CLINIC | Age: 89
End: 2025-09-02

## 2025-09-03 ENCOUNTER — TELEPHONE (OUTPATIENT)
Dept: FAMILY MEDICINE CLINIC | Age: 89
End: 2025-09-03

## (undated) DEVICE — MOUTHPIECE ENDOSCP L CTRL OPN AND SIDE PORTS DISP

## (undated) DEVICE — SOLUTION IV IRRIG WATER 500ML POUR BRL ST 2F7113

## (undated) DEVICE — FORCEPS BX 240CM 2.4MM L NDL RAD JAW 4 M00513334

## (undated) DEVICE — TRAP POLYP ETRAP

## (undated) DEVICE — ENDOSCOPY KIT: Brand: MEDLINE INDUSTRIES, INC.

## (undated) DEVICE — FORMALIN CLEAR VIAL 20 ML 10%

## (undated) DEVICE — Device: Brand: BALLOON3

## (undated) DEVICE — BW-412T DISP COMBO CLEANING BRUSH: Brand: SINGLE USE COMBINATION CLEANING BRUSH

## (undated) DEVICE — SET VLV 3 PC AWS DISPOSABLE GRDIAN SCOPEVALET

## (undated) DEVICE — SNARES COLD OVAL 10MM THIN

## (undated) DEVICE — BITE BLOCK ENDOSCP AD 60 FR W/ ADJ STRP PLAS GRN BLOX

## (undated) DEVICE — GOWN AURORA NONREINF LG: Brand: MEDLINE INDUSTRIES, INC.

## (undated) DEVICE — PROCEDURE KIT ENDOSCP CUST